# Patient Record
Sex: FEMALE | Race: BLACK OR AFRICAN AMERICAN | NOT HISPANIC OR LATINO | Employment: OTHER | URBAN - METROPOLITAN AREA
[De-identification: names, ages, dates, MRNs, and addresses within clinical notes are randomized per-mention and may not be internally consistent; named-entity substitution may affect disease eponyms.]

---

## 2017-01-07 DIAGNOSIS — D05.12 DCIS (DUCTAL CARCINOMA IN SITU), LEFT: ICD-10-CM

## 2017-01-07 RX ORDER — TAMOXIFEN CITRATE 20 MG/1
TABLET ORAL
Qty: 30 TABLET | Refills: 0 | Status: SHIPPED | OUTPATIENT
Start: 2017-01-07 | End: 2017-03-01 | Stop reason: SDUPTHER

## 2017-01-23 DIAGNOSIS — D05.12 DCIS (DUCTAL CARCINOMA IN SITU), LEFT: ICD-10-CM

## 2017-01-24 RX ORDER — TAMOXIFEN CITRATE 20 MG/1
20 TABLET ORAL DAILY
Qty: 30 TABLET | Refills: 0 | Status: SHIPPED | OUTPATIENT
Start: 2017-01-24 | End: 2017-04-03 | Stop reason: SDUPTHER

## 2017-02-03 ENCOUNTER — OFFICE VISIT (OUTPATIENT)
Dept: OPHTHALMOLOGY | Facility: CLINIC | Age: 62
End: 2017-02-03
Payer: COMMERCIAL

## 2017-02-03 ENCOUNTER — CLINICAL SUPPORT (OUTPATIENT)
Dept: OPHTHALMOLOGY | Facility: CLINIC | Age: 62
End: 2017-02-03
Payer: COMMERCIAL

## 2017-02-03 DIAGNOSIS — H40.1133 PRIMARY OPEN ANGLE GLAUCOMA OF BOTH EYES, SEVERE STAGE: ICD-10-CM

## 2017-02-03 DIAGNOSIS — H53.40 VISUAL FIELD LOSS: ICD-10-CM

## 2017-02-03 DIAGNOSIS — H52.4 HYPEROPIA OF BOTH EYES WITH ASTIGMATISM AND PRESBYOPIA: ICD-10-CM

## 2017-02-03 DIAGNOSIS — H52.203 HYPEROPIA OF BOTH EYES WITH ASTIGMATISM AND PRESBYOPIA: ICD-10-CM

## 2017-02-03 DIAGNOSIS — H25.13 NUCLEAR SCLEROSIS OF BOTH EYES: Primary | ICD-10-CM

## 2017-02-03 DIAGNOSIS — H52.03 HYPEROPIA OF BOTH EYES WITH ASTIGMATISM AND PRESBYOPIA: ICD-10-CM

## 2017-02-03 PROCEDURE — 92083 EXTENDED VISUAL FIELD XM: CPT | Mod: S$GLB,,, | Performed by: OPHTHALMOLOGY

## 2017-02-03 PROCEDURE — 99999 PR PBB SHADOW E&M-EST. PATIENT-LVL III: CPT | Mod: PBBFAC,,, | Performed by: OPHTHALMOLOGY

## 2017-02-03 PROCEDURE — 92133 CPTRZD OPH DX IMG PST SGM ON: CPT | Mod: S$GLB,,, | Performed by: OPHTHALMOLOGY

## 2017-02-03 PROCEDURE — 92014 COMPRE OPH EXAM EST PT 1/>: CPT | Mod: S$GLB,,, | Performed by: OPHTHALMOLOGY

## 2017-02-03 NOTE — PROGRESS NOTES
HPI     DLS: 12/20/16    Pt here for HVF review;    Meds: Jennifer 1% tid od             Cosopt bid ou             Brimonidine tid ou             Lumigan qhs ou    1. Primary open angle glaucoma of both eyes, severe stage  2. Visual field loss  3. Papilloma of left eyelid  4. Nuclear sclerosis, both eyes  5. Hyperopia with astigmatism and presbyopia, bilateral          Last edited by Chapis York on 2/3/2017 11:44 AM.     ROS     Positive for: Eyes    Negative for: Constitutional, Gastrointestinal, Neurological, Skin,   Genitourinary, Musculoskeletal, HENT, Endocrine, Cardiovascular,   Respiratory, Psychiatric, Allergic/Imm, Heme/Lymph    Last edited by Alfonso Atkins MD on 2/3/2017 12:51 PM. (History)        Assessment /Plan     For exam results, see Encounter Report.    Nuclear sclerosis of both eyes    Primary open angle glaucoma of both eyes, severe stage  -     Posterior Segment OCT Optic Nerve- Both eyes    Visual field loss    Hyperopia of both eyes with astigmatism and presbyopia      REFERRED BY DR ELISE FOR SEVERE POORLY CONTROLLED POAG - 7/24/2015   Pt recently moved from [place] to the [facility]     HERE FOR PRIMARY SLT OS AND IOP CHECK OD ON JENNIFER 2%    Previously referred to Dr. Elise from [facility], review of outside records:    Tm 27/25    Last visit 8/5/13 - s/p 360 degree SLT OD  Ta 16 OD, 18 OS  Pt was supposed to be scheduled for SLT OS...    Prior to that, IOP ranged mid-teens to low-20's on Combigan/Dorzolamide/Lumigan OU    C:D documented as 0.9 OD, 0.85 OS    Initial visit 3/29/12  IOP 10 OU on Combitan and Lumigan  C:D 0.85 OU  CCT 524OD, 531 OS    HVF's  2012 - superior defect affecting fixation OD with beginnings of early inferior arc; possible early superior arc OS  2013 - stable superior defect OD with early inferior arc; OS superior nasal step    Here for evaluation of POAG on MMT. Last IOP with Dr. Elise 21//19. IOP today 21//20 on MMT. Patient has a family  history of glaucoma in maternal grandfather. States that she is compliant with all Gtt's.              Glaucoma (type and duration)    Dx about 2002 - [place]    First HVF   [facility]   First photos   First at [facility] 2015    Treatment / Drops started   2002            Family history    + mom / brother         Glaucoma meds    cosopt ou / brimonidine ou tid / lumigan ou q hs / bisi od tid        H/O adverse rxn to glaucoma drops    none        LASERS    SLT OD - 8/5/2013 - [place] // SLT os - [facility] 8/27/2015 (good resp 21-->16)        GLAUCOMA SURGERIES    none        OTHER EYE SURGERIES    none        CDR    0.99/0.95        Tbase    ?  Off gtts (on gtts was up to 28/25)         Tmax    27/25 - per outside record's (reviewed by Arik )             Ttarget    15 ou    (may need to be lower)           HVF   24-2 ss //  2 test 2015 to  [place] [facility] [place]  od // SAD   10-2 ss - 3 test 2015 to 2017 - SALT/ IAD (progression) od // hint SAD / IAD os    (( 2 VF's from [place] - that were reviewed by Dr. IVEY - 2012 and 2013 -  SAD w/ fix invl . IAD od / early IAD os ))            Gonio    +3-4 ou         CCT    524/531        OCT    2 test 2016 to 2017 - RNFL - OD:dec. S/T/I/N // OS:dec. S/I, pratik N/T        HRT    1 test 2015 to 2015  - MR - dec. S/N/I od // dec./ S/N/I os        Disc photos    2015     - Ttoday   18/17 ( still above target of 15- 16 ou)    - Test done today    IOP / DFE / HVF 10-2 OD 24-2 OS / OCT RNFL    2. + APD od     3.  NS ou    Minimal - [facility] 20/25 ou still     4. Papilloma OS   To be removed with Arik       PLAN   Severe POAG ou   OD > OS  IOP still above target - even with diamox 500 po bid    Cont  bisi to 4% od tid - change to ou - 1/15/2016   Cont all other glaucoma gtts ou   Cont diamox 500 sequals po twice daily     HVF's continue to progress - especially OD     IOP still too high ou - rec trab with MMC od vs cyclo G6 od - very little sight to save on this eye    Once od stable - consider trab with MMC os or cyclo G6   No vis sign cataracts   Long discussion with patient about the irreversible nature of the glaucomatous visual field and vision loss.  Showed her how restricted her field in the right eye is using Merk view master     Pts  is going through a lot of medical testing and evaluations at this time   She is not able to consider surgery until her husbands situation is sorted out     F/U 6 weeks - IOP check - re-discuss surgical options     I have seen and personally examined the patient.  I agree with the findings, assessment and plan of the resident and/or fellow.     Jacqueline Perez MD

## 2017-03-01 ENCOUNTER — OFFICE VISIT (OUTPATIENT)
Dept: HEMATOLOGY/ONCOLOGY | Facility: CLINIC | Age: 62
End: 2017-03-01
Payer: COMMERCIAL

## 2017-03-01 ENCOUNTER — LAB VISIT (OUTPATIENT)
Dept: LAB | Facility: HOSPITAL | Age: 62
End: 2017-03-01
Attending: INTERNAL MEDICINE
Payer: COMMERCIAL

## 2017-03-01 VITALS
SYSTOLIC BLOOD PRESSURE: 121 MMHG | HEIGHT: 65 IN | RESPIRATION RATE: 17 BRPM | DIASTOLIC BLOOD PRESSURE: 62 MMHG | WEIGHT: 173.06 LBS | BODY MASS INDEX: 28.83 KG/M2 | TEMPERATURE: 98 F | HEART RATE: 69 BPM

## 2017-03-01 DIAGNOSIS — D05.12 DCIS (DUCTAL CARCINOMA IN SITU), LEFT: ICD-10-CM

## 2017-03-01 DIAGNOSIS — M89.9 DISORDER OF BONE AND CARTILAGE: ICD-10-CM

## 2017-03-01 DIAGNOSIS — Z79.810 USE OF TAMOXIFEN (NOLVADEX): ICD-10-CM

## 2017-03-01 DIAGNOSIS — C50.412 MALIGNANT NEOPLASM OF UPPER-OUTER QUADRANT OF LEFT FEMALE BREAST: Primary | ICD-10-CM

## 2017-03-01 DIAGNOSIS — E87.6 HYPOKALEMIA: ICD-10-CM

## 2017-03-01 DIAGNOSIS — M94.9 DISORDER OF BONE AND CARTILAGE: ICD-10-CM

## 2017-03-01 DIAGNOSIS — N60.99 ATYPICAL DUCTAL HYPERPLASIA OF BREAST: ICD-10-CM

## 2017-03-01 LAB
ALBUMIN SERPL BCP-MCNC: 4.4 G/DL
ALP SERPL-CCNC: 72 U/L
ALT SERPL W/O P-5'-P-CCNC: 38 U/L
ANION GAP SERPL CALC-SCNC: 12 MMOL/L
AST SERPL-CCNC: 26 U/L
BASOPHILS # BLD AUTO: 0.08 K/UL
BASOPHILS NFR BLD: 1 %
BILIRUB SERPL-MCNC: 0.5 MG/DL
BUN SERPL-MCNC: 20 MG/DL
CALCIUM SERPL-MCNC: 9.5 MG/DL
CHLORIDE SERPL-SCNC: 105 MMOL/L
CO2 SERPL-SCNC: 27 MMOL/L
CREAT SERPL-MCNC: 0.91 MG/DL
DIFFERENTIAL METHOD: NORMAL
EOSINOPHIL # BLD AUTO: 0.1 K/UL
EOSINOPHIL NFR BLD: 1.5 %
ERYTHROCYTE [DISTWIDTH] IN BLOOD BY AUTOMATED COUNT: 13.4 %
EST. GFR  (AFRICAN AMERICAN): >60 ML/MIN/1.73 M^2
EST. GFR  (NON AFRICAN AMERICAN): >60 ML/MIN/1.73 M^2
GLUCOSE SERPL-MCNC: 125 MG/DL
HCT VFR BLD AUTO: 38.7 %
HGB BLD-MCNC: 13.4 G/DL
LDH SERPL L TO P-CCNC: 437 U/L
LYMPHOCYTES # BLD AUTO: 1.8 K/UL
LYMPHOCYTES NFR BLD: 22.8 %
MCH RBC QN AUTO: 28.5 PG
MCHC RBC AUTO-ENTMCNC: 34.6 %
MCV RBC AUTO: 82 FL
MONOCYTES # BLD AUTO: 0.5 K/UL
MONOCYTES NFR BLD: 6.8 %
NEUTROPHILS # BLD AUTO: 5.4 K/UL
NEUTROPHILS NFR BLD: 67.9 %
NRBC BLD-RTO: 0 /100 WBC
PLATELET # BLD AUTO: 269 K/UL
PMV BLD AUTO: 10.5 FL
POTASSIUM SERPL-SCNC: 2.8 MMOL/L
PROT SERPL-MCNC: 7.9 G/DL
RBC # BLD AUTO: 4.7 M/UL
SODIUM SERPL-SCNC: 144 MMOL/L
WBC # BLD AUTO: 7.91 K/UL

## 2017-03-01 PROCEDURE — 1160F RVW MEDS BY RX/DR IN RCRD: CPT | Mod: S$GLB,,, | Performed by: NURSE PRACTITIONER

## 2017-03-01 PROCEDURE — 83615 LACTATE (LD) (LDH) ENZYME: CPT | Mod: PN

## 2017-03-01 PROCEDURE — 80053 COMPREHEN METABOLIC PANEL: CPT | Mod: PN

## 2017-03-01 PROCEDURE — 83615 LACTATE (LD) (LDH) ENZYME: CPT

## 2017-03-01 PROCEDURE — 85025 COMPLETE CBC W/AUTO DIFF WBC: CPT | Mod: PN

## 2017-03-01 PROCEDURE — 99999 PR PBB SHADOW E&M-EST. PATIENT-LVL IV: CPT | Mod: PBBFAC,,, | Performed by: NURSE PRACTITIONER

## 2017-03-01 PROCEDURE — 99213 OFFICE O/P EST LOW 20 MIN: CPT | Mod: S$GLB,,, | Performed by: NURSE PRACTITIONER

## 2017-03-01 PROCEDURE — 3074F SYST BP LT 130 MM HG: CPT | Mod: S$GLB,,, | Performed by: NURSE PRACTITIONER

## 2017-03-01 PROCEDURE — 3078F DIAST BP <80 MM HG: CPT | Mod: S$GLB,,, | Performed by: NURSE PRACTITIONER

## 2017-03-01 PROCEDURE — 85025 COMPLETE CBC W/AUTO DIFF WBC: CPT

## 2017-03-01 PROCEDURE — 36415 COLL VENOUS BLD VENIPUNCTURE: CPT | Mod: PN

## 2017-03-01 PROCEDURE — 80053 COMPREHEN METABOLIC PANEL: CPT

## 2017-03-01 NOTE — MR AVS SNAPSHOT
[facility]  [facility] [facility] [address]  [facility] [address]  Phone: [phone]  Fax: [phone]                  Deb Tesfaye   3/1/2017 9:00 AM   Office Visit    Description:  Female : 1955   Provider:  Tony Berg NP   Department:  [facility]           Diagnoses this Visit        Comments    Malignant neoplasm of upper-outer quadrant of left female breast    -  Primary     Hypokalemia         Disorder of bone and cartilage         Use of tamoxifen (Nolvadex)                To Do List           Future Appointments        Provider Department Dept Phone    3/6/2017 10:00 AM LAB, [facility] DRAW STATION [facility] [phone]    3/10/2017 11:00 AM Jacqueline Perez MD [facility] - Ophthalmology [phone]    3/14/2017 10:00 AM NS DEXA1 [facility] [phone]    3/14/2017 10:45 AM [facility] XR3 [facility] [phone]    2017 9:20 AM Shy Tapia MD [facility] Internal Medicine [phone]      Goals (5 Years of Data)     None      Follow-Up and Disposition     Return in about 6 months (around 2017) for 18 month post therapy evaluation - no labs.    Follow-up and Disposition History      [facility] On Call     [facility] On Call Nurse Care Line -  Assistance  Registered nurses in the [facility] On Call Center provide clinical advisement, health education, appointment booking, and other advisory services.  Call for this free service at [phone].             Medications           Message regarding Medications     Verify the changes and/or additions to your medication regime listed below are the same as discussed with your clinician today.  If any of these changes or additions are incorrect, please notify your healthcare provider.             Verify that the below list of medications is an accurate representation of the medications you are currently taking.  If none reported, the list may be blank.  If incorrect, please contact your healthcare provider. Carry this list with you in case of emergency.           Current Medications     acetaZOLAMIDE (DIAMOX) 500 mg CpSR Take 1 capsule (500 mg total) by mouth 2 (two) times daily.    bimatoprost (LUMIGAN) 0.01 % Drop Place 1 drop into both eyes every evening.    brimonidine 0.2% (ALPHAGAN) 0.2 % Drop     dorzolamide-timolol 2-0.5% (COSOPT) 22.3-6.8 mg/mL ophthalmic solution     ERGOCALCIFEROL, VITAMIN D2, (VITAMIN D ORAL) Take 1,000 Units by mouth.    KRILL OIL ORAL Take 1 capsule by mouth once daily.     MULTIVITS,CA,MINERALS/IRON/FA (ONE-A-DAY WOMENS FORMULA ORAL) Take 1 capsule by mouth once daily.    pilocarpine HCL 4% (PILOCAR) 4 % ophthalmic solution Place 1 drop into both eyes 3 (three) times daily.    potassium chloride SA (K-DUR,KLOR-CON) 20 MEQ tablet TAKE 1 TABLET BY MOUTH EVERY DAY    pravastatin (PRAVACHOL) 20 MG tablet TAKE 1 TABLET BY MOUTH EVERY DAY    tamoxifen (NOLVADEX) 20 MG Tab Take 1 tablet (20 mg total) by mouth once daily.    triamterene-hydrochlorothiazide 37.5-25 mg (DYAZIDE) 37.5-25 mg per capsule TAKE 1 CAPSULE BY MOUTH EVERY DAY           Clinical Reference Information           Your Vitals Were     BP                   121/62           Blood Pressure          Most Recent Value    BP  121/62      Allergies as of 3/1/2017     No Known Allergies      Immunizations Administered on Date of Encounter - 3/1/2017     None      Orders Placed During Today's Visit     Future Labs/Procedures Expected by Expires    BONE MIN DENSITY  3/1/2017 3/1/2018    CXR  3/1/2017 3/1/2018    POTASSIUM  3/1/2017 4/30/2018      Language Assistance Services     ATTENTION: Language assistance services are available, free of charge. Please call [phone].      ATENCIÓN: Si luis thompsonisak, tiene a montalvo disposición servicios gratuitos de asistencia lingüística. Llame al [phone].     CHÚ Ý: N?u b?n nói Ti?ng Vi?t, có các d?ch v? h? tr? ngôn ng? mi?n phí dành  pepe gonzales?nAlaina G?i s? [phone].         [facility] complies with applicable Federal civil rights laws and does not discriminate on the basis of race, color, national origin, age, disability, or sex.

## 2017-03-01 NOTE — PROGRESS NOTES
HISTORY OF PRESENT ILLNESS:  This is a 62-year-old black female known to Dr. 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  She is status post lumpectomy, post-lumpectomy irradiation   and presently on adjuvant tamoxifen 20 mg daily.  She presents to the clinic   today for her 12-month post-therapy evaluation in good spirits.  She denies any   new breast complaints, bone pain, difficulties with hot flashes, vaginal bleeding,   abdominal discomfort/bloating, nausea, vomiting, constipation, diarrhea, irregular   heartbeat, chest pain, myalgias cramping, etc.  No other new complaints or pertinent   findings on a 14-point review of systems.    PHYSICAL EXAMINATION:  GENERAL:  Well-developed, well-nourished black female in no acute distress.    Alert and oriented x3.  VITAL SIGNS:  Weight 173.1 pounds (decrease of 3 pounds in three months),   /62, pulse 69, respirations 17 and temp 98.4.  HEENT:  Normocephalic, atraumatic.  Oral mucosa pink and moist.  Lips without   lesions.  Tongue midline.  Oropharynx clear.  Nonicteric sclerae.  NECK:  Supple, no adenopathy.  No carotid bruits, thyromegaly or thyroid nodule.  HEART:  Regular rate and rhythm without murmur, gallop or rub.  LUNGS:  Clear to auscultation bilaterally.  Normal respiratory effort.  ABDOMEN:  Soft, nontender, nondistended with positive normoactive bowel sounds,   no hepatosplenomegaly.  EXTREMITIES:  No cyanosis, clubbing or edema.  Distal pulses are intact.  AXILLAE AND GROIN:  No palpable pathologic lymphadenopathy is appreciated.  SKIN:  Intact/turgor normal.  NEUROLOGIC:  Cranial nerves II-XII grossly intact.  Motor:  Good muscle bulk and   tone.  Strength/sensory 5/5 throughout.  Gait stable.  BREASTS:  The right breast is soft, free of masses, tenderness, nipple discharge   or inversion.  The left breast with noted healed lumpectomy scar to the upper   outer region; no signs of local  recurrence.    LABORATORY DATA:  Dated 03/01/2017, WBC 7.91, H & H 13.4/38.7, platelets 269,   unremarkable differential.  Chemistry:  Sodium 144, potassium 2.8 (3.5, 3.3),   chloride 105, CO2 27, BUN 20, creatinine 0.91, EGFR > 60, glucose 125, calcium 9.5.    Alk phos, liver enzymes and LDH within normal limits.    RADIOLOGY:  Mammogram dated 12/02/2016, impression:  Post-surgical scar in the   left breast is benign - negative, mammogram in one year is recommended.    ACR BI-RADS category II, benign.    IMPRESSION:  1.  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  2.  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  3.  Hypokalemia.    PLAN:  1.  The patient will continue tamoxifen 20 mg daily.  2.  We will increase K-Dur from 20 mEq per day to 20 mEq b.i.d. X 5 days and   redraw serum potassium on Monday, March 6th and evaluate.  3.  We will schedule bone mineral density and chest x-ray as soon as possible   and phone review.    Assessment/plan reviewed and approved by Dr. Rider.      COCO/DAPHNE  dd: 03/01/2017 09:24:43 (CST)  td: 03/01/2017 12:01:40 (CST)  Doc ID   #[id number]  Job ID #[id number]    CC:

## 2017-03-06 ENCOUNTER — LAB VISIT (OUTPATIENT)
Dept: LAB | Facility: HOSPITAL | Age: 62
End: 2017-03-06
Attending: NURSE PRACTITIONER
Payer: COMMERCIAL

## 2017-03-06 DIAGNOSIS — E87.6 HYPOKALEMIA: ICD-10-CM

## 2017-03-06 LAB — POTASSIUM SERPL-SCNC: 2.9 MMOL/L

## 2017-03-06 PROCEDURE — 36415 COLL VENOUS BLD VENIPUNCTURE: CPT | Mod: PN

## 2017-03-06 PROCEDURE — 84132 ASSAY OF SERUM POTASSIUM: CPT | Mod: PN

## 2017-03-06 PROCEDURE — 84132 ASSAY OF SERUM POTASSIUM: CPT

## 2017-03-07 RX ORDER — POTASSIUM CHLORIDE 20 MEQ/1
20 TABLET, EXTENDED RELEASE ORAL DAILY
Qty: 30 TABLET | Refills: 5 | Status: SHIPPED | OUTPATIENT
Start: 2017-03-07 | End: 2017-03-08 | Stop reason: SDUPTHER

## 2017-03-08 ENCOUNTER — TELEPHONE (OUTPATIENT)
Dept: HEMATOLOGY/ONCOLOGY | Facility: CLINIC | Age: 62
End: 2017-03-08

## 2017-03-08 DIAGNOSIS — E87.6 HYPOKALEMIA: ICD-10-CM

## 2017-03-08 RX ORDER — POTASSIUM CHLORIDE 20 MEQ/1
20 TABLET, EXTENDED RELEASE ORAL 2 TIMES DAILY
Qty: 60 TABLET | Refills: 1 | Status: SHIPPED | OUTPATIENT
Start: 2017-03-08 | End: 2017-05-23 | Stop reason: SDUPTHER

## 2017-03-08 NOTE — TELEPHONE ENCOUNTER
----- Message from Annalisa New sent at 3/8/2017  4:14 PM CST -----  Contact: self  Call placed to pod.  Returning your call.  Please call back at

## 2017-03-08 NOTE — TELEPHONE ENCOUNTER
Left message on answering machine to call office.  Repeat K+ = 2.9 from 2.8 with 20 meq bid x 5 day; Will continue K-dur 20 meq bid; Rx sent to [facility], [address] in [place].

## 2017-03-10 ENCOUNTER — OFFICE VISIT (OUTPATIENT)
Dept: OPHTHALMOLOGY | Facility: CLINIC | Age: 62
End: 2017-03-10
Payer: COMMERCIAL

## 2017-03-10 DIAGNOSIS — H52.03 HYPEROPIA OF BOTH EYES WITH ASTIGMATISM AND PRESBYOPIA: ICD-10-CM

## 2017-03-10 DIAGNOSIS — H53.40 VISUAL FIELD LOSS: ICD-10-CM

## 2017-03-10 DIAGNOSIS — H52.203 HYPEROPIA OF BOTH EYES WITH ASTIGMATISM AND PRESBYOPIA: ICD-10-CM

## 2017-03-10 DIAGNOSIS — H40.1133 PRIMARY OPEN ANGLE GLAUCOMA OF BOTH EYES, SEVERE STAGE: Primary | ICD-10-CM

## 2017-03-10 DIAGNOSIS — H52.4 HYPEROPIA OF BOTH EYES WITH ASTIGMATISM AND PRESBYOPIA: ICD-10-CM

## 2017-03-10 DIAGNOSIS — H25.13 NUCLEAR SCLEROSIS OF BOTH EYES: ICD-10-CM

## 2017-03-10 PROCEDURE — 99999 PR PBB SHADOW E&M-EST. PATIENT-LVL III: CPT | Mod: PBBFAC,,, | Performed by: OPHTHALMOLOGY

## 2017-03-10 PROCEDURE — 92012 INTRM OPH EXAM EST PATIENT: CPT | Mod: S$GLB,,, | Performed by: OPHTHALMOLOGY

## 2017-03-10 NOTE — PROGRESS NOTES
HPI     Glaucoma    Additional comments: IOP ck today           Comments   DLS: 2/3*17    1. POAG OD>OS  2. APD OD  3. NS OU  4. Papilloma OS    MEDS:  Cosopt BID OU  Sandro 4% TID OU  Brimonidine TID OU  Lumigan QHS OU  Diamox 500mg BID PO       Last edited by Bharati Graham MA on 3/10/2017 11:19 AM. (History)            Assessment /Plan     For exam results, see Encounter Report.    Primary open angle glaucoma of both eyes, severe stage    Visual field loss    Nuclear sclerosis of both eyes    Hyperopia of both eyes with astigmatism and presbyopia    Glaucoma of both eyes secondary to inflammation, unspecified glaucoma stage      REFERRED BY DR HILLMAN FOR SEVERE POORLY CONTROLLED POAG - 7/24/2015   Pt recently moved from [place] to the [facility]     HERE FOR PRIMARY SLT OS AND IOP CHECK OD ON SANDRO 2%    Previously referred to Dr. Hillman from [facility], review of outside records:    Tm 27/25    Last visit 8/5/13 - s/p 360 degree SLT OD  Ta 16 OD, 18 OS  Pt was supposed to be scheduled for SLT OS...    Prior to that, IOP ranged mid-teens to low-20's on Combigan/Dorzolamide/Lumigan OU    C:D documented as 0.9 OD, 0.85 OS    Initial visit 3/29/12  IOP 10 OU on Combitan and Lumigan  C:D 0.85 OU  CCT 524OD, 531 OS    HVF's  2012 - superior defect affecting fixation OD with beginnings of early inferior arc; possible early superior arc OS  2013 - stable superior defect OD with early inferior arc; OS superior nasal step    Here for evaluation of POAG on MMT. Last IOP with Dr. Hillman 21//19. IOP today 21//20 on MMT. Patient has a family history of glaucoma in maternal grandfather. States that she is compliant with all Gtt's.              Glaucoma (type and duration)    Dx about 2002 - [place]    First HVF   First a t [facility] 2015   First photos   First at [facility] 2015    Treatment / Drops started   2002            Family history    + mom / brother         Glaucoma meds    cosopt ou / brimonidine ou tid  / lumigan ou q hs / bisi od tid        H/O adverse rxn to glaucoma drops    none        LASERS    SLT OD - 8/5/2013 - [place] // [facility]T os - [facility] 8/27/2015 (good resp 21-->16)        GLAUCOMA SURGERIES    none        OTHER EYE SURGERIES    none        CDR    0.99/0.95        Tbase    ?  Off gtts (on gtts was up to 28/25)         Tmax    27/25 - per outside record's (reviewed by Arik )             Ttarget    15 ou    (may need to be lower)           HVF   24-2 ss //  2 test 2015 to  2017 - [facility]  od // SAD   10-2 ss - 3 test 2015 to 2017 - SALT/ IAD (progression) od // hint SAD / IAD os    (( 2 VF's from [place] - that were reviewed by Dr. IVEY - 2012 and 2013 -  SAD w/ fix invl . IAD od / early IAD os ))            Gonio    +3-4 ou         CCT    524/531        OCT    2 test 2016 to 2017 - RNFL - OD:dec. S/T/I/N // OS:dec. S/I, bord N/T        HRT    1 test 2015 to 2015  - MR - dec. S/N/I od // dec./ S/N/I os        Disc photos    2015     - Ttoday   19/18 ( still above target of 15- 16 ou)    - Test done today    IOP    2. + APD od     3.  NS ou    Minimal - [facility] 20/25 ou still     4. Papilloma OS   To be removed with Arik       PLAN   Severe POAG ou   OD > OS  IOP still above target - even with diamox 500 po bid    Cont  bisi to 4% od tid - change to ou - 1/15/2016   Cont all other glaucoma gtts ou   Cont diamox 500 sequals po twice daily     HVF's continue to progress - especially OD     IOP still too high ou - rec trab with MMC od vs cyclo G6 od - very little sight to save on this eye   Once od stable - consider trab with MMC os or cyclo G6   No vis sign cataracts   Long discussion with patient about the irreversible nature of the glaucomatous visual field and vision loss.  Showed her how restricted her field in the right eye is using [employer] master     Pts  is with her today 3/10/2017   Discussed at length the options of cyclo G6 od vs trab with MMC and PI - (pt is still phakic -  minimal to no cataract) - a bit shallow for a tube or mini shunt.   In view of the extremely poor potential od - they are leaning towards the cyclo G6 - but they will think about it for a wile and will let angle know which one they choose to go ahead with.    With the left eye - i think incisional surgery may be best option - as there is more vision to save long term     Will have angle call and schedule surgery od - cyclo G6 vs traditional trab with MMC with surgical PI     I have seen and personally examined the patient.  I agree with the findings, assessment and plan of the resident and/or fellow.     Jacqueline Perez MD

## 2017-03-10 NOTE — Clinical Note
Pt needs a glaucoma surgery in the right eye  She is decieding between the cyclo G6 od vs a traditional trab with MMC od  She will let you know which she has decieded on

## 2017-03-10 NOTE — Clinical Note
Pt is deciding between a cyclo G6 od vs a traditional trab with MMC od - VF od is essentially extinguished. Her left eye is in better shape - but will need surgery once od is stable

## 2017-03-13 ENCOUNTER — TELEPHONE (OUTPATIENT)
Dept: HEMATOLOGY/ONCOLOGY | Facility: CLINIC | Age: 62
End: 2017-03-13

## 2017-03-13 NOTE — TELEPHONE ENCOUNTER
----- Message from Selene Garza sent at 3/13/2017  8:43 AM CDT -----  Contact: self  Patient is returning call regarding results.  Please call patient at [phone].  Thanks!

## 2017-03-13 NOTE — TELEPHONE ENCOUNTER
Called patient to inform of Tony Berg NP message. Patient also asked the difference between DCIS and malignant neoplasm of the upper outer quad of left breast, spoke with dr. rico and he informed that it would be easier to have patient wait until followup then will discuss with patient. Patient was informed.

## 2017-03-14 ENCOUNTER — HOSPITAL ENCOUNTER (OUTPATIENT)
Dept: RADIOLOGY | Facility: HOSPITAL | Age: 62
Discharge: HOME | End: 2017-03-14
Attending: NURSE PRACTITIONER
Payer: COMMERCIAL

## 2017-03-14 DIAGNOSIS — M89.9 DISORDER OF BONE AND CARTILAGE: ICD-10-CM

## 2017-03-14 DIAGNOSIS — M94.9 DISORDER OF BONE AND CARTILAGE: ICD-10-CM

## 2017-03-14 DIAGNOSIS — C50.412 MALIGNANT NEOPLASM OF UPPER-OUTER QUADRANT OF LEFT FEMALE BREAST: ICD-10-CM

## 2017-03-14 PROCEDURE — 71020 XR CHEST PA AND LATERAL: CPT | Mod: 26,,, | Performed by: RADIOLOGY

## 2017-03-14 PROCEDURE — 71020 XR CHEST PA AND LATERAL: CPT | Mod: TC,PO

## 2017-03-14 PROCEDURE — 77080 DXA BONE DENSITY AXIAL: CPT | Mod: 26,,, | Performed by: RADIOLOGY

## 2017-03-14 PROCEDURE — 77080 DXA BONE DENSITY AXIAL: CPT | Mod: TC,PO

## 2017-03-16 ENCOUNTER — TELEPHONE (OUTPATIENT)
Dept: OPHTHALMOLOGY | Facility: CLINIC | Age: 62
End: 2017-03-16

## 2017-03-20 ENCOUNTER — TELEPHONE (OUTPATIENT)
Dept: OPHTHALMOLOGY | Facility: CLINIC | Age: 62
End: 2017-03-20

## 2017-03-20 DIAGNOSIS — H40.1133 PRIMARY OPEN ANGLE GLAUCOMA OF BOTH EYES, SEVERE STAGE: Primary | ICD-10-CM

## 2017-03-31 ENCOUNTER — OFFICE VISIT (OUTPATIENT)
Dept: OPHTHALMOLOGY | Facility: CLINIC | Age: 62
End: 2017-03-31
Payer: COMMERCIAL

## 2017-03-31 DIAGNOSIS — H40.1133 PRIMARY OPEN ANGLE GLAUCOMA OF BOTH EYES, SEVERE STAGE: Primary | ICD-10-CM

## 2017-03-31 DIAGNOSIS — H53.40 VISUAL FIELD LOSS: ICD-10-CM

## 2017-03-31 DIAGNOSIS — H21.561 AFFERENT PUPILLARY DEFECT, RIGHT: ICD-10-CM

## 2017-03-31 DIAGNOSIS — H52.03 HYPEROPIA OF BOTH EYES WITH ASTIGMATISM AND PRESBYOPIA: ICD-10-CM

## 2017-03-31 DIAGNOSIS — H52.4 HYPEROPIA OF BOTH EYES WITH ASTIGMATISM AND PRESBYOPIA: ICD-10-CM

## 2017-03-31 DIAGNOSIS — D23.10 PAPILLOMA OF LEFT EYELID: ICD-10-CM

## 2017-03-31 DIAGNOSIS — H52.203 HYPEROPIA OF BOTH EYES WITH ASTIGMATISM AND PRESBYOPIA: ICD-10-CM

## 2017-03-31 PROCEDURE — 99499 UNLISTED E&M SERVICE: CPT | Mod: S$GLB,,, | Performed by: OPHTHALMOLOGY

## 2017-03-31 PROCEDURE — 99999 PR PBB SHADOW E&M-EST. PATIENT-LVL III: CPT | Mod: PBBFAC,,, | Performed by: OPHTHALMOLOGY

## 2017-03-31 RX ORDER — LIDOCAINE HYDROCHLORIDE 10 MG/ML
1 INJECTION, SOLUTION EPIDURAL; INFILTRATION; INTRACAUDAL; PERINEURAL ONCE
Status: CANCELLED | OUTPATIENT
Start: 2017-03-31 | End: 2017-03-31

## 2017-03-31 RX ORDER — SODIUM CHLORIDE 9 MG/ML
INJECTION, SOLUTION INTRAVENOUS CONTINUOUS
Status: CANCELLED | OUTPATIENT
Start: 2017-03-31

## 2017-03-31 RX ORDER — MOXIFLOXACIN 5 MG/ML
1 SOLUTION/ DROPS OPHTHALMIC
Status: CANCELLED | OUTPATIENT
Start: 2017-03-31

## 2017-03-31 NOTE — H&P
Subjective:       Patient ID: Deb Tesfaye is a 62 y.o. female.    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  Mother with colon cancer.    COLONOSCOPY  12/2010    COLONOSCOPY N/A 7/20/2016    Procedure: COLONOSCOPY;  Surgeon: Janak De Leon Jr., MD;  Location[facility] ENDO;  Service: Endoscopy;  Laterality: N/A;    SELECTIVE LASER TRABECULPALSTY Right 8/13    OS DONE IN [place], [place]    SELECTIVE LASER TRABECUPLASTY Left 8/27/15    OS WITH     TUBAL LIGATION       Family History   Problem Relation Age of Onset    Cancer Mother 54     colon cancer    Cancer Maternal Grandfather     Glaucoma Maternal Grandfather     Cataracts Maternal Grandfather     Hypertension Father     Amblyopia Neg Hx     Blindness Neg Hx     Diabetes Neg Hx     Macular degeneration Neg Hx     Retinal detachment Neg Hx     Stroke Neg Hx     Strabismus Neg Hx     Thyroid disease Neg Hx      Social History     Social History    Marital status:      Spouse name: N/A    Number of children: 3    Years of education: N/A     Social History Main Topics    Smoking status: Never Smoker    Smokeless tobacco: Never Used    Alcohol use No    Drug use: No    Sexual activity: Yes     Partners: Male     Other Topics Concern    None     Social History Narrative       Current Outpatient Prescriptions   Medication Sig Dispense Refill    acetaZOLAMIDE (DIAMOX) 500 mg CpSR Take 1 capsule (500 mg total) by mouth 2 (two) times daily. 60 capsule 3    bimatoprost (LUMIGAN) 0.01 % Drop Place 1 drop into both eyes every evening. 1 Bottle 12    brimonidine 0.2% (ALPHAGAN) 0.2 % Drop Place 1 drop into both eyes 3 (three)  times daily.       dorzolamide-timolol 2-0.5% (COSOPT) 22.3-6.8 mg/mL ophthalmic solution Place 1 drop into both eyes 2 (two) times daily.       ERGOCALCIFEROL, VITAMIN D2, (VITAMIN D ORAL) Take 1,000 Units by mouth once daily.       KRILL OIL ORAL Take 1 capsule by mouth once daily.       MULTIVITS,CA,MINERALS/IRON/FA (ONE-A-DAY WOMENS FORMULA ORAL) Take 1 capsule by mouth once daily.      pilocarpine HCL 4% (PILOCAR) 4 % ophthalmic solution Place 1 drop into both eyes 3 (three) times daily. 15 mL 12    potassium chloride SA (K-DUR,KLOR-CON) 20 MEQ tablet Take 1 tablet (20 mEq total) by mouth 2 (two) times daily. 60 tablet 1    pravastatin (PRAVACHOL) 20 MG tablet TAKE 1 TABLET BY MOUTH EVERY DAY 30 tablet 9    tamoxifen (NOLVADEX) 20 MG Tab Take 1 tablet (20 mg total) by mouth once daily. 30 tablet 0    triamterene-hydrochlorothiazide 37.5-25 mg (DYAZIDE) 37.5-25 mg per capsule TAKE 1 CAPSULE BY MOUTH EVERY DAY 30 capsule 5     No current facility-administered medications for this visit.      Review of patient's allergies indicates:  No Known Allergies    Review of Systems   Constitutional: Negative.    HENT: Negative.    Eyes: Positive for visual disturbance.   Respiratory: Negative.    Cardiovascular: Negative.    Neurological: Negative.    Psychiatric/Behavioral: Negative.        Objective:      There were no vitals filed for this visit.  Physical Exam   Constitutional: She is oriented to person, place, and time. She appears well-developed and well-nourished.   HENT:   Head: Normocephalic and atraumatic.   Eyes:   See eye exam   Cardiovascular: Normal rate.    Pulmonary/Chest: Effort normal.   Neurological: She is alert and oriented to person, place, and time.   Skin: Skin is warm and dry.   Psychiatric: She has a normal mood and affect.            Assessment:       1. Primary open angle glaucoma of both eyes, severe stage    2. Visual field loss    3. Afferent pupillary defect, right    4. Papilloma  of left eyelid    5. Hyperopia of both eyes with astigmatism and presbyopia        Plan:       Adv. Glaucoma od - plan cyclo G6 laser - CB destruction

## 2017-03-31 NOTE — PROGRESS NOTES
HPI     DLS: 3/10/17    Pt here for Pre-Op cyclo G6 vs traditional trab with MMC with surgical PI-   (Surgery 4/12/17)    Meds: Cosopt bid ou             Sandro 4% tid ou            Brimonidine tid ou            Lumigan qhs ou            Diamox 500mg bid po     1. Primary open angle glaucoma of both eyes, severe stage  2. Visual field loss  3. Nuclear sclerosis of both eyes  4. Hyperopia of both eyes with astigmatism and presbyopia  5. Glaucoma of both eyes secondary to inflammation, unspecified glaucoma   stage       Last edited by Chapis York on 3/31/2017  1:45 PM.         Assessment /Plan     For exam results, see Encounter Report.    Primary open angle glaucoma of both eyes, severe stage    Visual field loss    Afferent pupillary defect, right    Papilloma of left eyelid    Hyperopia of both eyes with astigmatism and presbyopia        REFERRED BY DR ELISE FOR SEVERE POORLY CONTROLLED POAG - 7/24/2015   Pt recently moved from [place] to [facility]     Previously referred to Dr. Elise from [facility], review of outside records:    Tm 27/25.    Prior to that, IOP ranged mid-teens to low-20's on Combigan/Dorzolamide/Lumigan OU    C:D documented as 0.9 OD, 0.85 OS    Initial visit 3/29/12  IOP 10 OU on Combitan and Lumigan  C:D 0.85 OU  CCT 524OD, 531 OS    HVF's  2012 - superior defect affecting fixation OD with beginnings of early inferior arc; possible early superior arc OS  2013 - stable superior defect OD with early inferior arc; OS superior nasal step    Here for evaluation of POAG on MMT. Last IOP with Dr. Elise 21//19. IOP today 21//20 on MMT. Patient has a family history of glaucoma in maternal grandfather. States that she is compliant with all Gtt's.        Glaucoma (type and duration)    Dx about 2002 - [place]    First HVF   First at [facility] 2015   First photos   First at [facility] 2015    Treatment / Drops started   2002            Family history    + mom / brother          Glaucoma meds    cosopt ou / brimonidine ou tid / lumigan ou q hs / bisi od tid / diamox / bisi ou        H/O adverse rxn to glaucoma drops    none        LASERS    SLT OD - 8/5/2013 - [place] [facility] // SLT os - [facility] 8/27/2015 (good resp 21-->16)        GLAUCOMA SURGERIES    none        OTHER EYE SURGERIES    none        CDR    0.99/0.95        Tbase    ?  Off gtts (on gtts was up to 28/25)         Tmax    27/25 - per outside record's (reviewed by Arik )             Ttarget    15 ou    (may need to be lower)           HVF   24-2 ss //  2 test 2015 to  2017 [place]  od // SAD   10-2 ss - 3 test 2015 to 2017 - SALT/ IAD (progression) od // hint SAD / IAD os    (( 2 VF's from [place] - that were reviewed by Dr. IVEY - 2012 and 2013 -  SAD w/ fix invl . IAD od / early IAD os ))            Gonio    +3-4 ou         CCT    524/531        OCT    2 test 2016 to 2017 - RNFL - OD:dec. S/T/I/N // OS:dec. S/I, bord N/T        HRT    1 test 2015 to 2015  - MR - dec. S/N/I od // dec./ S/N/I os        Disc photos    2015     - Ttoday   19/18 ( still above target of 15- 16 ou)    - Test done today    IOP    2. + APD od     3.  NS ou    Minimal - VA 20/25 ou still     4. Papilloma OS   To be removed with [facility]k       PLAN   Severe POAG ou   OD > OS  IOP still above target - even with diamox 500 po bid    Cont  bisi to 4% od tid - change to ou - 1/15/2016   Cont all other glaucoma gtts ou   Cont diamox 500 sequals po twice daily     HVF's continue to progress - especially OD     IOP still too high ou - rec trab with MMC od vs cyclo G6 od - very little sight to save on this eye   Once od stable - consider trab with MMC os or cyclo G6   No vis sign cataracts   Long discussion with patient about the irreversible nature of the glaucomatous visual field and vision loss.  Showed her how restricted her field in the right eye is using Folkstr master     Pts  is with her today 3/10/2017   Discussed at length the options  of cyclo G6 od vs trab with MMC and PI - (pt is still phakic - minimal to no cataract) - a bit shallow for a tube or mini shunt.   In view of the extremely poor potential od - they are leaning towards the cyclo G6 - but they will think about it for a wile and will let angle know which one they choose to go ahead with.    With the left eye - I think incisional surgery may be best option - as there is more vision to save long term     Pt has elected to proceed with cyclo G6 od   Risks and benefits discussed and consent signed. (3/31/2017 )       I have seen and personally examined the patient.  I agree with the findings, assessment and plan of the resident and/or fellow.     Jacqueline Perez MD

## 2017-04-03 DIAGNOSIS — D05.12 DUCTAL CARCINOMA IN SITU (DCIS) OF LEFT BREAST: ICD-10-CM

## 2017-04-03 RX ORDER — TAMOXIFEN CITRATE 20 MG/1
TABLET ORAL
Qty: 30 TABLET | Refills: 0 | Status: SHIPPED | OUTPATIENT
Start: 2017-04-03 | End: 2017-05-22 | Stop reason: SDUPTHER

## 2017-04-12 ENCOUNTER — HOSPITAL ENCOUNTER (OUTPATIENT)
Facility: HOSPITAL | Age: 62
Discharge: HOME | End: 2017-04-12
Attending: OPHTHALMOLOGY | Admitting: OPHTHALMOLOGY
Payer: COMMERCIAL

## 2017-04-12 ENCOUNTER — ANESTHESIA (OUTPATIENT)
Dept: SURGERY | Facility: HOSPITAL | Age: 62
End: 2017-04-12
Payer: COMMERCIAL

## 2017-04-12 ENCOUNTER — ANESTHESIA EVENT (OUTPATIENT)
Dept: SURGERY | Facility: HOSPITAL | Age: 62
End: 2017-04-12
Payer: COMMERCIAL

## 2017-04-12 ENCOUNTER — SURGERY (OUTPATIENT)
Age: 62
End: 2017-04-12

## 2017-04-12 VITALS
RESPIRATION RATE: 16 BRPM | BODY MASS INDEX: 27.99 KG/M2 | HEIGHT: 65 IN | OXYGEN SATURATION: 100 % | SYSTOLIC BLOOD PRESSURE: 127 MMHG | WEIGHT: 168 LBS | DIASTOLIC BLOOD PRESSURE: 76 MMHG | TEMPERATURE: 98 F | HEART RATE: 62 BPM

## 2017-04-12 DIAGNOSIS — H40.1133 PRIMARY OPEN ANGLE GLAUCOMA OF BOTH EYES, SEVERE STAGE: ICD-10-CM

## 2017-04-12 PROCEDURE — 25000003 PHARM REV CODE 250: Performed by: OPHTHALMOLOGY

## 2017-04-12 PROCEDURE — 66710 CILIARY TRANSSLERAL THERAPY: CPT | Mod: RT,,, | Performed by: OPHTHALMOLOGY

## 2017-04-12 PROCEDURE — 71000015 HC POSTOP RECOV 1ST HR: Performed by: OPHTHALMOLOGY

## 2017-04-12 PROCEDURE — 37000009 HC ANESTHESIA EA ADD 15 MINS: Performed by: OPHTHALMOLOGY

## 2017-04-12 PROCEDURE — 36000706: Performed by: OPHTHALMOLOGY

## 2017-04-12 PROCEDURE — 27201423 OPTIME MED/SURG SUP & DEVICES STERILE SUPPLY: Performed by: OPHTHALMOLOGY

## 2017-04-12 PROCEDURE — 99499 UNLISTED E&M SERVICE: CPT | Mod: ,,, | Performed by: OPHTHALMOLOGY

## 2017-04-12 PROCEDURE — 36000707: Performed by: OPHTHALMOLOGY

## 2017-04-12 PROCEDURE — D9220A PRA ANESTHESIA: Mod: ANES,,, | Performed by: ANESTHESIOLOGY

## 2017-04-12 PROCEDURE — 37000008 HC ANESTHESIA 1ST 15 MINUTES: Performed by: OPHTHALMOLOGY

## 2017-04-12 PROCEDURE — 25000003 PHARM REV CODE 250: Performed by: NURSE ANESTHETIST, CERTIFIED REGISTERED

## 2017-04-12 PROCEDURE — 63600175 PHARM REV CODE 636 W HCPCS: Performed by: NURSE ANESTHETIST, CERTIFIED REGISTERED

## 2017-04-12 PROCEDURE — D9220A PRA ANESTHESIA: Mod: CRNA,,, | Performed by: NURSE ANESTHETIST, CERTIFIED REGISTERED

## 2017-04-12 RX ORDER — BUPIVACAINE HYDROCHLORIDE 7.5 MG/ML
INJECTION, SOLUTION EPIDURAL; RETROBULBAR
Status: DISCONTINUED
Start: 2017-04-12 | End: 2017-04-12 | Stop reason: HOSPADM

## 2017-04-12 RX ORDER — BUPIVACAINE HYDROCHLORIDE 7.5 MG/ML
INJECTION, SOLUTION EPIDURAL; RETROBULBAR
Status: DISCONTINUED | OUTPATIENT
Start: 2017-04-12 | End: 2017-04-12 | Stop reason: HOSPADM

## 2017-04-12 RX ORDER — ATROPINE SULFATE 10 MG/ML
SOLUTION/ DROPS OPHTHALMIC
Status: DISCONTINUED
Start: 2017-04-12 | End: 2017-04-12 | Stop reason: HOSPADM

## 2017-04-12 RX ORDER — LIDOCAINE HYDROCHLORIDE 40 MG/ML
INJECTION, SOLUTION RETROBULBAR
Status: DISCONTINUED | OUTPATIENT
Start: 2017-04-12 | End: 2017-04-12 | Stop reason: HOSPADM

## 2017-04-12 RX ORDER — LIDOCAINE HYDROCHLORIDE 40 MG/ML
INJECTION, SOLUTION RETROBULBAR
Status: DISCONTINUED
Start: 2017-04-12 | End: 2017-04-12 | Stop reason: HOSPADM

## 2017-04-12 RX ORDER — ATROPINE SULFATE 10 MG/ML
SOLUTION/ DROPS OPHTHALMIC
Status: DISCONTINUED | OUTPATIENT
Start: 2017-04-12 | End: 2017-04-12 | Stop reason: HOSPADM

## 2017-04-12 RX ORDER — MIDAZOLAM HYDROCHLORIDE 1 MG/ML
INJECTION, SOLUTION INTRAMUSCULAR; INTRAVENOUS
Status: DISCONTINUED | OUTPATIENT
Start: 2017-04-12 | End: 2017-04-12

## 2017-04-12 RX ORDER — PREDNISOLONE ACETATE 10 MG/ML
SUSPENSION/ DROPS OPHTHALMIC
Status: DISCONTINUED
Start: 2017-04-12 | End: 2017-04-12 | Stop reason: HOSPADM

## 2017-04-12 RX ORDER — MOXIFLOXACIN 5 MG/ML
1 SOLUTION/ DROPS OPHTHALMIC
Status: DISCONTINUED | OUTPATIENT
Start: 2017-04-12 | End: 2017-04-12 | Stop reason: HOSPADM

## 2017-04-12 RX ORDER — LIDOCAINE HYDROCHLORIDE 20 MG/ML
INJECTION, SOLUTION INFILTRATION; PERINEURAL
Status: DISCONTINUED | OUTPATIENT
Start: 2017-04-12 | End: 2017-04-12 | Stop reason: HOSPADM

## 2017-04-12 RX ORDER — LIDOCAINE HYDROCHLORIDE 20 MG/ML
INJECTION, SOLUTION EPIDURAL; INFILTRATION; INTRACAUDAL; PERINEURAL
Status: DISCONTINUED
Start: 2017-04-12 | End: 2017-04-12 | Stop reason: HOSPADM

## 2017-04-12 RX ORDER — LIDOCAINE HYDROCHLORIDE 10 MG/ML
1 INJECTION, SOLUTION EPIDURAL; INFILTRATION; INTRACAUDAL; PERINEURAL ONCE
Status: COMPLETED | OUTPATIENT
Start: 2017-04-12 | End: 2017-04-12

## 2017-04-12 RX ORDER — PROPOFOL 10 MG/ML
VIAL (ML) INTRAVENOUS
Status: DISCONTINUED | OUTPATIENT
Start: 2017-04-12 | End: 2017-04-12

## 2017-04-12 RX ORDER — LIDOCAINE HCL/PF 100 MG/5ML
SYRINGE (ML) INTRAVENOUS
Status: DISCONTINUED | OUTPATIENT
Start: 2017-04-12 | End: 2017-04-12

## 2017-04-12 RX ORDER — SODIUM CHLORIDE 9 MG/ML
INJECTION, SOLUTION INTRAVENOUS CONTINUOUS
Status: DISCONTINUED | OUTPATIENT
Start: 2017-04-12 | End: 2017-04-12 | Stop reason: HOSPADM

## 2017-04-12 RX ADMIN — BUPIVACAINE HYDROCHLORIDE 2.5 ML: 7.5 INJECTION, SOLUTION EPIDURAL; RETROBULBAR at 12:04

## 2017-04-12 RX ADMIN — PROPOFOL 30 MG: 10 INJECTION, EMULSION INTRAVENOUS at 12:04

## 2017-04-12 RX ADMIN — MOXIFLOXACIN HYDROCHLORIDE 1 DROP: 5 SOLUTION/ DROPS OPHTHALMIC at 11:04

## 2017-04-12 RX ADMIN — LIDOCAINE HYDROCHLORIDE 4 DROP: 40 INJECTION, SOLUTION RETROBULBAR; TOPICAL at 12:04

## 2017-04-12 RX ADMIN — PROPOFOL 100 MG: 10 INJECTION, EMULSION INTRAVENOUS at 12:04

## 2017-04-12 RX ADMIN — LIDOCAINE HYDROCHLORIDE 25 MG: 20 INJECTION, SOLUTION INTRAVENOUS at 12:04

## 2017-04-12 RX ADMIN — LIDOCAINE HYDROCHLORIDE 10 MG: 10 INJECTION, SOLUTION EPIDURAL; INFILTRATION; INTRACAUDAL; PERINEURAL at 11:04

## 2017-04-12 RX ADMIN — MIDAZOLAM HYDROCHLORIDE 2 MG: 1 INJECTION, SOLUTION INTRAMUSCULAR; INTRAVENOUS at 12:04

## 2017-04-12 RX ADMIN — SODIUM CHLORIDE: 0.9 INJECTION, SOLUTION INTRAVENOUS at 11:04

## 2017-04-12 RX ADMIN — LIDOCAINE HYDROCHLORIDE 2.5 ML: 20 INJECTION, SOLUTION INFILTRATION; PERINEURAL at 12:04

## 2017-04-12 RX ADMIN — ATROPINE SULFATE 2 DROP: 10 SOLUTION OPHTHALMIC at 12:04

## 2017-04-12 NOTE — TRANSFER OF CARE
"Anesthesia Transfer of Care Note    Patient: Deb Tesfaye    Procedure(s) Performed: Procedure(s) (LRB):  G6 laser (Right)    Patient location: PACU    Anesthesia Type: MAC    Transport from OR: Transported from OR on room air with adequate spontaneous ventilation    Post pain: adequate analgesia    Post assessment: no apparent anesthetic complications    Post vital signs: stable    Level of consciousness: awake    Nausea/Vomiting: no nausea/vomiting    Complications: none          Last vitals:   Visit Vitals    /86 (BP Location: Right arm, Patient Position: Lying, BP Method: Automatic)    Pulse 64    Temp 37 °C (98.6 °F) (Oral)    Resp 16    Ht 5' 5" (1.651 m)    Wt 76.2 kg (168 lb)    Breastfeeding No    BMI 27.96 kg/m2     "

## 2017-04-12 NOTE — H&P (VIEW-ONLY)
Subjective:       Patient ID: Deb Tesfaye is a 62 y.o. female.    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  Mother with colon cancer.    COLONOSCOPY  12/2010    COLONOSCOPY N/A 7/20/2016    Procedure: COLONOSCOPY;  Surgeon: Janak De Leon Jr., MD;  Location: [facility];  Service: Endoscopy;  Laterality: N/A;    SELECTIVE LASER TRABECULPALSTY Right 8/13    OS DONE IN [place]    SELECTIVE LASER TRABECUPLASTY Left 8/27/15    OS WITH     TUBAL LIGATION       Family History   Problem Relation Age of Onset    Cancer Mother 54     colon cancer    Cancer Maternal Grandfather     Glaucoma Maternal Grandfather     Cataracts Maternal Grandfather     Hypertension Father     Amblyopia Neg Hx     Blindness Neg Hx     Diabetes Neg Hx     Macular degeneration Neg Hx     Retinal detachment Neg Hx     Stroke Neg Hx     Strabismus Neg Hx     Thyroid disease Neg Hx      Social History     Social History    Marital status:      Spouse name: N/A    Number of children: 3    Years of education: N/A     Social History Main Topics    Smoking status: Never Smoker    Smokeless tobacco: Never Used    Alcohol use No    Drug use: No    Sexual activity: Yes     Partners: Male     Other Topics Concern    None     Social History Narrative       Current Outpatient Prescriptions   Medication Sig Dispense Refill    acetaZOLAMIDE (DIAMOX) 500 mg CpSR Take 1 capsule (500 mg total) by mouth 2 (two) times daily. 60 capsule 3    bimatoprost (LUMIGAN) 0.01 % Drop Place 1 drop into both eyes every evening. 1 Bottle 12    brimonidine 0.2% (ALPHAGAN) 0.2 % Drop Place 1 drop into both eyes 3 (three)  times daily.       dorzolamide-timolol 2-0.5% (COSOPT) 22.3-6.8 mg/mL ophthalmic solution Place 1 drop into both eyes 2 (two) times daily.       ERGOCALCIFEROL, VITAMIN D2, (VITAMIN D ORAL) Take 1,000 Units by mouth once daily.       KRILL OIL ORAL Take 1 capsule by mouth once daily.       MULTIVITS,CA,MINERALS/IRON/FA (ONE-A-DAY WOMENS FORMULA ORAL) Take 1 capsule by mouth once daily.      pilocarpine HCL 4% (PILOCAR) 4 % ophthalmic solution Place 1 drop into both eyes 3 (three) times daily. 15 mL 12    potassium chloride SA (K-DUR,KLOR-CON) 20 MEQ tablet Take 1 tablet (20 mEq total) by mouth 2 (two) times daily. 60 tablet 1    pravastatin (PRAVACHOL) 20 MG tablet TAKE 1 TABLET BY MOUTH EVERY DAY 30 tablet 9    tamoxifen (NOLVADEX) 20 MG Tab Take 1 tablet (20 mg total) by mouth once daily. 30 tablet 0    triamterene-hydrochlorothiazide 37.5-25 mg (DYAZIDE) 37.5-25 mg per capsule TAKE 1 CAPSULE BY MOUTH EVERY DAY 30 capsule 5     No current facility-administered medications for this visit.      Review of patient's allergies indicates:  No Known Allergies    Review of Systems   Constitutional: Negative.    HENT: Negative.    Eyes: Positive for visual disturbance.   Respiratory: Negative.    Cardiovascular: Negative.    Neurological: Negative.    Psychiatric/Behavioral: Negative.        Objective:      There were no vitals filed for this visit.  Physical Exam   Constitutional: She is oriented to person, place, and time. She appears well-developed and well-nourished.   HENT:   Head: Normocephalic and atraumatic.   Eyes:   See eye exam   Cardiovascular: Normal rate.    Pulmonary/Chest: Effort normal.   Neurological: She is alert and oriented to person, place, and time.   Skin: Skin is warm and dry.   Psychiatric: She has a normal mood and affect.            Assessment:       1. Primary open angle glaucoma of both eyes, severe stage    2. Visual field loss    3. Afferent pupillary defect, right    4. Papilloma  of left eyelid    5. Hyperopia of both eyes with astigmatism and presbyopia        Plan:       Adv. Glaucoma od - plan cyclo G6 laser - CB destruction

## 2017-04-12 NOTE — DISCHARGE INSTRUCTIONS
Follow verbal instructions given to you by Dr. Perez.         GENERAL POST-OPERATIVE  PATIENT INSTRUCTIONS      FOLLOW-UP:  Please make an appointment with your physician in 1 day. Call your physician immediately if you have any fevers greater than 102.5, drainage from you wound that is not clear or looks infected, persistent bleeding, increasing abdominal pain, problems urinating, or persistent nausea/vomiting.      WOUND CARE INSTRUCTIONS:  No dressing needed.    DIET:  You may eat any foods that you can tolerate.  It is a good idea to eat a high fiber diet and take in plenty of fluids to prevent constipation.  If you do become constipated you may want to take a mild laxative or take ducolax tablets on a daily basis until your bowel habits are regular.  Constipation can be very uncomfortable, along with straining, after recent surgery.    ACTIVITY:  Light activity until seen by MD in clinic.      MEDICATIONS:  Try to take narcotic medications and anti-inflammatory medications, such as tylenol, ibuprofen, naprosyn, etc., with food.  This will minimize stomach upset from the medication.  Should you develop nausea and vomiting from the pain medication, or develop a rash, please discontinue the medication and contact your physician.  You should not drive, make important decisions, or operate machinery when taking narcotic pain medication.    QUESTIONS:  Please feel free to call your physician or the hospital  if you have any questions, and they will be glad to assist you.

## 2017-04-12 NOTE — ANESTHESIA PREPROCEDURE EVALUATION
04/12/2017  Deb Tesfaye is a 62 y.o., female.  Pre-operative evaluation for Procedure(s) (LRB):  G6 laser (Right)    Deb Tesfaye is a 62 y.o. female     Patient Active Problem List   Diagnosis    Primary open angle glaucoma of both eyes, severe stage    Visual field loss    Papilloma of left eyelid    Hyperopia of both eyes with astigmatism and presbyopia    Nuclear sclerosis of both eyes    Family history of colon cancer in mother    Essential hypertension    Ductal carcinoma in situ (DCIS) of left breast    Hyperlipidemia    Hypokalemia    Family history of colon cancer    Atypical ductal hyperplasia of breast    Glaucoma of both eyes secondary to inflammation       Review of patient's allergies indicates:  No Known Allergies    No current facility-administered medications on file prior to encounter.      Current Outpatient Prescriptions on File Prior to Encounter   Medication Sig Dispense Refill    acetaZOLAMIDE (DIAMOX) 500 mg CpSR Take 1 capsule (500 mg total) by mouth 2 (two) times daily. 60 capsule 3    bimatoprost (LUMIGAN) 0.01 % Drop Place 1 drop into both eyes every evening. 1 Bottle 12    brimonidine 0.2% (ALPHAGAN) 0.2 % Drop Place 1 drop into both eyes 3 (three) times daily.       dorzolamide-timolol 2-0.5% (COSOPT) 22.3-6.8 mg/mL ophthalmic solution Place 1 drop into both eyes 2 (two) times daily.       ERGOCALCIFEROL, VITAMIN D2, (VITAMIN D ORAL) Take 1,000 Units by mouth once daily.       KRILL OIL ORAL Take 1 capsule by mouth once daily.       MULTIVITS,CA,MINERALS/IRON/FA (ONE-A-DAY WOMENS FORMULA ORAL) Take 1 capsule by mouth once daily.      pilocarpine HCL 4% (PILOCAR) 4 % ophthalmic solution Place 1 drop into both eyes 3 (three) times daily. 15 mL 12    potassium chloride SA (K-DUR,KLOR-CON) 20 MEQ tablet Take 1 tablet (20 mEq total) by mouth 2  (two) times daily. 60 tablet 1    pravastatin (PRAVACHOL) 20 MG tablet TAKE 1 TABLET BY MOUTH EVERY DAY 30 tablet 9    triamterene-hydrochlorothiazide 37.5-25 mg (DYAZIDE) 37.5-25 mg per capsule TAKE 1 CAPSULE BY MOUTH EVERY DAY 30 capsule 5       Past Surgical History:   Procedure Laterality Date    BREAST BIOPSY      BREAST LUMPECTOMY Left     COLONOSCOPY      due for every 5.  Mother with colon cancer.    COLONOSCOPY  2010    COLONOSCOPY N/A 2016    Procedure: COLONOSCOPY;  Surgeon: Janak De Leon Jr., MD;  Location[facility];  Service: Endoscopy;  Laterality: N/A;    SELECTIVE LASER TRABECULPALSTY Right     OS DONE IN [place], [place]    SELECTIVE LASER TRABECUPLASTY Left 8/27/15    OS WITH     TUBAL LIGATION         Social History     Social History    Marital status:      Spouse name: N/A    Number of children: 3    Years of education: N/A     Occupational History    Not on file.     Social History Main Topics    Smoking status: Never Smoker    Smokeless tobacco: Never Used    Alcohol use No    Drug use: No    Sexual activity: Yes     Partners: Male     Other Topics Concern    Not on file     Social History Narrative         Vital Signs Range (Last 24H):         CBC: No results for input(s): WBC, RBC, HGB, HCT, PLT, MCV, MCH, MCHC in the last 72 hours.    CMP: No results for input(s): NA, K, CL, CO2, BUN, CREATININE, GLU, MG, PHOS, CALCIUM, ALBUMIN, PROT, ALKPHOS, ALT, AST, BILITOT in the last 72 hours.    INR  No results for input(s): INR, PROTIME, APTT in the last 72 hours.    Invalid input(s): PT        Diagnostic Studies:      [facility] Echo:      [facility] Anesthesia Evaluation    I have reviewed the Patient Summary Reports.    I have reviewed the Nursing Notes.   I have reviewed the Medications.     Review of Systems  Anesthesia Hx:  No problems with previous Anesthesia  Denies Family Hx of Anesthesia complications.   Denies Personal Hx of Anesthesia  complications.   Cardiovascular:   Hypertension    Pulmonary:  Pulmonary Normal    Hepatic/GI:   Denies GERD.        Physical Exam  General:  Well nourished    Airway/Jaw/Neck:  Airway Findings: Mouth Opening: Normal Tongue: Normal  Mallampati: II  TM Distance: Normal, at least 6 cm      Dental:  Dental Findings: In tact   Chest/Lungs:  Chest/Lungs Findings: Clear to auscultation, Normal Respiratory Rate     Heart/Vascular:  Heart Findings: Rate: Normal  Rhythm: Regular Rhythm  Sounds: Normal             Anesthesia Plan  Type of Anesthesia, risks & benefits discussed:  Anesthesia Type:  MAC  Patient's Preference:   Intra-op Monitoring Plan:   Intra-op Monitoring Plan Comments:   Post Op Pain Control Plan:   Post Op Pain Control Plan Comments:   Induction:   IV  Beta Blocker:  Patient is not currently on a Beta-Blocker (No further documentation required).       Informed Consent: Patient understands risks and agrees with Anesthesia plan.  Questions answered. Anesthesia consent signed with patient.  ASA Score: 2     Day of Surgery Review of History & Physical:    H&P update referred to the surgeon.         Ready For Surgery From Anesthesia Perspective.

## 2017-04-12 NOTE — OP NOTE
Operative Date:  04/12/2017    Discharge Date:  04/12/2017    Discharge Patient Home    SURGEON: Nataile Perez MD    ASSISTANT: Sarina Wright MD     PREOPERATIVE DIAGNOSIS: Poorly controlled glaucoma of the right eye    POSTOPERATIVE DIAGNOSIS: Poorly controlled glaucoma of the right eye    PROCEDURE PERFORMED: MicroPulse G6 Laser Ciliary Body Destruction 63451 Treatment  To all quadrants of the right eye    COMPLICATIONS: None.    ESTIMATED BLOOD LOSS: Minimal.    ANESTHESIA: Retrobulbar block     PROCEDURE IN DETIAL: The patient is a 62 year old woman with poorly controlled glaucoma.  The intraocular pressure was deemed too high for the health the optic nerve and visual field status. Discussions have been carried out with this patient regarding the pertinent options, surgical risks and benefits of the procedure and expectations.   The patient voiced good understanding and wished to proceed with the above procedure.    The patient and correct eye to be operated on were identified by the operating surgeon and surgical team and the patient was brought into the operating room. The patient received topical anesthesia followed by a retrobulbar block consisting of Bupivacaine and non preserved lidocaine, a speculum was placed and Gonak solution was applied to the eye surface.    The MP3 handpiece was positioned on the eye in proper location using the calibrated guides provided on the probe and treatment was begun with nine 10 second timed sweeps (90 sec total) at 2000 mW through 180 degrees of treatment.  Care was taken to skip the 3 & 9 o'clock locations. The anterior chamber was well formed throughout the case and there were no complications.  Following the procedure, a drop of atropine 1% along with maxitrol ointment was placed on the cornea.  The eye was closed & patched.  The patient was taken to the recovery room in good and stable condition.  The patient tolerated the procedure well.  The patient  was  instructed to refrain from any heavy lifting, bending, stooping, or straining activities, discharged Home and to follow up in the morning for routine postoperative care with Dr. Perez.

## 2017-04-12 NOTE — IP AVS SNAPSHOT
[facility]  [address]  [facility] [address]  Phone: [phone]           Patient Discharge Instructions   Our goal is to set you up for success. This packet includes information on your condition, medications, and your home care.  It will help you care for yourself to prevent having to return to the hospital.     Please ask your nurse if you have any questions.      There are many details to remember when preparing to leave the hospital. Here is what you will need to do:    1. Take your medicine. If you are prescribed medications, review your Medication List on the following pages. You may have new medications to  at the pharmacy and others that you'll need to stop taking. Review the instructions for how and when to take your medications. Talk with your doctor or nurses if you are unsure of what to do.     2. Go to your follow-up appointments. Specific follow-up information is listed in the following pages. Your may be contacted by a nurse or clinical provider about future appointments. Be sure we have all of the phone numbers to reach you. Please contact your provider's office if you are unable to make an appointment.     3. Watch for warning signs. Your doctor or nurse will give you detailed warning signs to watch for and when to call for assistance. These instructions may also include educational information about your condition. If you experience any of warning signs to your health, call your doctor.           [facility]  Unless otherwise directed by your provider, please   contact [facility], our nurse care line   that is available for 24/7 assistance.     [phone] (toll-free)     Registered nurses in the [facility]   provide: appointment scheduling, clinical advisement, health education, and other advisory services.                  ** Verify the list of medication(s) below is accurate and up to date. Carry this with you in case of  emergency. If your medications have changed, please notify your healthcare provider.             Medication List      CONTINUE taking these medications        Additional Info                      acetaZOLAMIDE 500 mg Cpsr   Commonly known as:  DIAMOX   Quantity:  60 capsule   Refills:  3   Dose:  500 mg    Instructions:  Take 1 capsule (500 mg total) by mouth 2 (two) times daily.     Begin Date    AM    Noon    PM    Bedtime       bimatoprost 0.01 % Drop   Commonly known as:  LUMIGAN   Quantity:  1 Bottle   Refills:  12   Dose:  1 drop    Instructions:  Place 1 drop into both eyes every evening.     Begin Date    AM    Noon    PM    Bedtime       brimonidine 0.2% 0.2 % Drop   Commonly known as:  ALPHAGAN   Refills:  0   Dose:  1 drop    Instructions:  Place 1 drop into both eyes 3 (three) times daily.     Begin Date    AM    Noon    PM    Bedtime       dorzolamide-timolol 2-0.5% 22.3-6.8 mg/mL ophthalmic solution   Commonly known as:  COSOPT   Refills:  0   Dose:  1 drop    Instructions:  Place 1 drop into both eyes 2 (two) times daily.     Begin Date    AM    Noon    PM    Bedtime       KRILL OIL ORAL   Refills:  0   Dose:  1 capsule    Instructions:  Take 1 capsule by mouth once daily.     Begin Date    AM    Noon    PM    Bedtime       ONE-A-DAY WOMENS FORMULA ORAL   Refills:  0   Dose:  1 capsule    Instructions:  Take 1 capsule by mouth once daily.     Begin Date    AM    Noon    PM    Bedtime       pilocarpine HCL 4% 4 % ophthalmic solution   Commonly known as:  PILOCAR   Quantity:  15 mL   Refills:  12   Dose:  1 drop    Instructions:  Place 1 drop into both eyes 3 (three) times daily.     Begin Date    AM    Noon    PM    Bedtime       potassium chloride SA 20 MEQ tablet   Commonly known as:  K-DUR,KLOR-CON   Quantity:  60 tablet   Refills:  1   Dose:  20 mEq    Instructions:  Take 1 tablet (20 mEq total) by mouth 2 (two) times daily.     Begin Date    AM    Noon    PM    Bedtime       pravastatin 20 MG  tablet   Commonly known as:  PRAVACHOL   Quantity:  30 tablet   Refills:  9    Instructions:  TAKE 1 TABLET BY MOUTH EVERY DAY     Begin Date    AM    Noon    PM    Bedtime       tamoxifen 20 MG Tab   Commonly known as:  NOLVADEX   Quantity:  30 tablet   Refills:  0    Instructions:  TAKE 1 TABLET BY MOUTH ONCE DAILY     Begin Date    AM    Noon    PM    Bedtime       triamterene-hydrochlorothiazide 37.5-25 mg 37.5-25 mg per capsule   Commonly known as:  DYAZIDE   Quantity:  30 capsule   Refills:  5    Instructions:  TAKE 1 CAPSULE BY MOUTH EVERY DAY     Begin Date    AM    Noon    PM    Bedtime       VITAMIN D ORAL   Refills:  0   Dose:  1000 Units    Instructions:  Take 1,000 Units by mouth once daily.     Begin Date    AM    Noon    PM    Bedtime                  Please bring to all follow up appointments:    1. A copy of your discharge instructions.  2. All medicines you are currently taking in their original bottles.  3. Identification and insurance card.    Please arrive 15 minutes ahead of scheduled appointment time.    Please call 24 hours in advance if you must reschedule your appointment and/or time.        Your Scheduled Appointments     Apr 13, 2017 11:00 AM CDT   Post OP with MD David Sepulveda kenny - Ophthalmology ([facility] )    [address]  [place] [address]   [phone]            Apr 25, 2017  9:20 AM CDT   Established Patient Visit with Shy Tapia MD   [place] - Internal Medicine ([facility] [place])    [address] [facility] [address] [place] [address]   [phone]            Sep 01, 2017  9:00 AM CDT   Established Patient Visit with Tony Berg NP   [facility] - Hematology ([facility])    [address] [place] [address] [address]  [facility] [address]   [phone]              Follow-up Information     Follow up with Jacqueline Perez MD. Go in 1 day.    Specialty:  Ophthalmology    Contact information:    [facility]  [address]  [phone]          Discharge Instructions     Future Orders    Activity as tolerated     Diet general     Questions:    Total calories:      Fat restriction, if any:      Protein restriction, if any:      Na restriction, if any:      Fluid restriction:      Additional restrictions:      Leave dressing on - Keep it clean, dry, and intact until clinic visit         Discharge Instructions       Follow verbal instructions given to you by Dr. Perez.         GENERAL POST-OPERATIVE  PATIENT INSTRUCTIONS      FOLLOW-UP:  Please make an appointment with your physician in 1 day. Call your physician immediately if you have any fevers greater than 102.5, drainage from you wound that is not clear or looks infected, persistent bleeding, increasing abdominal pain, problems urinating, or persistent nausea/vomiting.      WOUND CARE INSTRUCTIONS:  No dressing needed.    DIET:  You may eat any foods that you can tolerate.  It is a good idea to eat a high fiber diet and take in plenty of fluids to prevent constipation.  If you do become constipated you may want to take a mild laxative or take ducolax tablets on a daily basis until your bowel habits are regular.  Constipation can be very uncomfortable, along with straining, after recent surgery.    ACTIVITY:  Light activity until seen by MD in clinic.      MEDICATIONS:  Try to take narcotic medications and anti-inflammatory medications, such as tylenol, ibuprofen, naprosyn, etc., with food.  This will minimize stomach upset from the medication.  Should you develop nausea and vomiting from the pain medication, or develop a rash, please discontinue the medication and contact your physician.  You should not drive, make important decisions, or operate machinery when taking narcotic pain medication.    QUESTIONS:  Please feel free to call your physician or the hospital  if you have any questions, and they will be glad to assist you.            Primary Diagnosis     Your  "primary diagnosis was:  Glaucoma (Increased Eye Pressure)      Admission Information     Date & Time Provider Department CSN    4/12/2017 10:19 AM Jacqueline Perez MD [facility] [id number]      Care Providers     Provider Role Specialty Primary office phone    Jacqueline Perez MD Attending Provider Ophthalmology [phone]    Jacqueline Perez MD Surgeon  Ophthalmology [phone]      Your Vitals Were     BP Pulse Temp Resp Height Weight    117/75 (BP Location: Left arm, Patient Position: Lying, BP Method: Automatic) 70 98.2 °F (36.8 °C) (Temporal) 16 5' 5" (1.651 m) 76.2 kg (168 lb)    SpO2 BMI             100% 27.96 kg/m2         Recent Lab Values     No lab values to display.      Allergies as of 4/12/2017     No Known Allergies      Advance Directives     An advance directive is a document which, in the event you are no longer able to make decisions for yourself, tells your healthcare team what kind of treatment you do or do not want to receive, or who you would like to make those decisions for you.  If you do not currently have an advance directive, [facility] encourages you to create one.  For more information call:  [phone]), [phone] ([phone]),  or log on to [email or web address].        Language Assistance Services     ATTENTION: Language assistance services are available, free of charge. Please call [phone].      ATENCIÓN: Si habla español, tiene a montalvo disposición servicios gratuitos de asistencia lingüística. Llame al [phone].     CHÚ Ý: N?u b?n nói Ti?ng Vi?t, có các d?ch v? h? tr? ngôn ng? mi?n phí dành cho b?n. G?i s? [phone].         [facility] complies with applicable Federal civil rights laws and does not discriminate on the basis of race, color, national origin, age, disability, or sex.        "

## 2017-04-12 NOTE — ANESTHESIA POSTPROCEDURE EVALUATION
"Anesthesia Post Evaluation    Patient: Deb Tesfaye    Procedure(s) Performed: Procedure(s) (LRB):  G6 laser (Right)    Final Anesthesia Type: MAC  Patient location during evaluation: PACU  Patient participation: Yes- Able to Participate  Level of consciousness: awake and alert  Post-procedure vital signs: reviewed and stable  Pain management: adequate  Airway patency: patent  PONV status at discharge: No PONV  Anesthetic complications: no      Cardiovascular status: stable  Respiratory status: unassisted  Hydration status: euvolemic  Follow-up not needed.        Visit Vitals    /75 (BP Location: Left arm, Patient Position: Lying, BP Method: Automatic)    Pulse 70    Temp 36.8 °C (98.2 °F) (Temporal)    Resp 16    Ht 5' 5" (1.651 m)    Wt 76.2 kg (168 lb)    SpO2 100%    Breastfeeding No    BMI 27.96 kg/m2       Pain/Luke Score: Pain Assessment Performed: Yes (4/12/2017 12:50 PM)  Presence of Pain: denies (4/12/2017 12:50 PM)  Luke Score: 10 (4/12/2017 11:21 AM)  Modified Luke Score: 19 (4/12/2017 12:50 PM)      "

## 2017-04-12 NOTE — ANESTHESIA RELEASE NOTE
"Anesthesia Release from PACU Note    Patient: Deb Tesfaye    Procedure(s) Performed: Procedure(s) (LRB):  G6 laser (Right)    Anesthesia type: MAC    Post pain: Adequate analgesia    Post assessment: no apparent anesthetic complications    Last Vitals:   Visit Vitals    /75 (BP Location: Left arm, Patient Position: Lying, BP Method: Automatic)    Pulse 70    Temp 36.8 °C (98.2 °F) (Temporal)    Resp 16    Ht 5' 5" (1.651 m)    Wt 76.2 kg (168 lb)    SpO2 100%    Breastfeeding No    BMI 27.96 kg/m2       Post vital signs: stable    Level of consciousness: awake    Nausea/Vomiting: no nausea/no vomiting    Complications: none    Airway Patency: patent    Respiratory: unassisted    Cardiovascular: stable and blood pressure at baseline    Hydration: euvolemic  "

## 2017-04-12 NOTE — PLAN OF CARE
Problem: Patient Care Overview  Goal: Plan of Care Review  Outcome: Outcome(s) achieved Date Met:  04/12/17     Discharge instructions given to patient and spouse. Verbalized understanding. Patient stable, tolerating fluids. No complaints at this time. Patient adequate for discharge.

## 2017-04-12 NOTE — DISCHARGE SUMMARY
Date of Procedure / Discharge: 04/12/2017     PreOp Diagnosis: Uncontrolled Glaucoma OD     PostOp Diagnosis:as above s/p procedure    Procedure:laser ciliary body destruction  OD w/ laser G6 micropulse   Attending:Jacqueline Perez MD    Assistant Sarina jade MD      Anesthesia:Local MAC with retrobulbar block    Complications::None    Blood loss: <5 CC    Specimens: none    Procedure Details:  See Dictation      -D/C home when patient meets anesthesia requirements  -Follow up tomorrow with surgeon in the [facility].

## 2017-04-13 ENCOUNTER — OFFICE VISIT (OUTPATIENT)
Dept: OPHTHALMOLOGY | Facility: CLINIC | Age: 62
End: 2017-04-13
Payer: COMMERCIAL

## 2017-04-13 DIAGNOSIS — D23.10 PAPILLOMA OF LEFT EYELID: ICD-10-CM

## 2017-04-13 DIAGNOSIS — H40.1133 PRIMARY OPEN ANGLE GLAUCOMA OF BOTH EYES, SEVERE STAGE: ICD-10-CM

## 2017-04-13 DIAGNOSIS — H21.561 AFFERENT PUPILLARY DEFECT, RIGHT: ICD-10-CM

## 2017-04-13 DIAGNOSIS — H53.40 VISUAL FIELD LOSS: ICD-10-CM

## 2017-04-13 DIAGNOSIS — H52.4 HYPEROPIA WITH ASTIGMATISM AND PRESBYOPIA, BILATERAL: ICD-10-CM

## 2017-04-13 DIAGNOSIS — H25.13 NUCLEAR SCLEROSIS OF BOTH EYES: ICD-10-CM

## 2017-04-13 DIAGNOSIS — H52.03 HYPEROPIA WITH ASTIGMATISM AND PRESBYOPIA, BILATERAL: ICD-10-CM

## 2017-04-13 DIAGNOSIS — Z48.89 ENCOUNTER FOR POSTOPERATIVE CARE: Primary | ICD-10-CM

## 2017-04-13 DIAGNOSIS — H52.203 HYPEROPIA WITH ASTIGMATISM AND PRESBYOPIA, BILATERAL: ICD-10-CM

## 2017-04-13 PROCEDURE — 99999 PR PBB SHADOW E&M-EST. PATIENT-LVL II: CPT | Mod: PBBFAC,,, | Performed by: OPHTHALMOLOGY

## 2017-04-13 PROCEDURE — 99024 POSTOP FOLLOW-UP VISIT: CPT | Mod: S$GLB,,, | Performed by: OPHTHALMOLOGY

## 2017-04-24 ENCOUNTER — OFFICE VISIT (OUTPATIENT)
Dept: OPHTHALMOLOGY | Facility: CLINIC | Age: 62
End: 2017-04-24
Payer: COMMERCIAL

## 2017-04-24 DIAGNOSIS — H25.13 NUCLEAR SCLEROSIS OF BOTH EYES: ICD-10-CM

## 2017-04-24 DIAGNOSIS — H52.03 HYPEROPIA WITH ASTIGMATISM AND PRESBYOPIA, BILATERAL: ICD-10-CM

## 2017-04-24 DIAGNOSIS — H53.40 VISUAL FIELD LOSS: ICD-10-CM

## 2017-04-24 DIAGNOSIS — H52.4 HYPEROPIA WITH ASTIGMATISM AND PRESBYOPIA, BILATERAL: ICD-10-CM

## 2017-04-24 DIAGNOSIS — H52.203 HYPEROPIA WITH ASTIGMATISM AND PRESBYOPIA, BILATERAL: ICD-10-CM

## 2017-04-24 DIAGNOSIS — D23.10 PAPILLOMA OF LEFT EYELID: ICD-10-CM

## 2017-04-24 DIAGNOSIS — H21.561 AFFERENT PUPILLARY DEFECT, RIGHT: ICD-10-CM

## 2017-04-24 DIAGNOSIS — Z48.89 ENCOUNTER FOR POSTOPERATIVE CARE: Primary | ICD-10-CM

## 2017-04-24 DIAGNOSIS — H40.1133 PRIMARY OPEN ANGLE GLAUCOMA OF BOTH EYES, SEVERE STAGE: ICD-10-CM

## 2017-04-24 PROCEDURE — 99024 POSTOP FOLLOW-UP VISIT: CPT | Mod: S$GLB,,, | Performed by: OPHTHALMOLOGY

## 2017-04-24 PROCEDURE — 99999 PR PBB SHADOW E&M-EST. PATIENT-LVL III: CPT | Mod: PBBFAC,,, | Performed by: OPHTHALMOLOGY

## 2017-04-24 RX ORDER — PREDNISOLONE ACETATE 10 MG/ML
1 SUSPENSION/ DROPS OPHTHALMIC 3 TIMES DAILY
Qty: 10 ML | Refills: 1 | Status: SHIPPED | OUTPATIENT
Start: 2017-04-24 | End: 2017-08-18 | Stop reason: SDUPTHER

## 2017-04-24 RX ORDER — ATROPINE SULFATE 10 MG/ML
1 SOLUTION/ DROPS OPHTHALMIC DAILY
Qty: 5 ML | Refills: 0 | Status: SHIPPED | OUTPATIENT
Start: 2017-04-24 | End: 2017-06-15

## 2017-04-24 NOTE — PROGRESS NOTES
HPI     DLS: 4/13/17    Pt here for S/P MicroPulse G6 Laser Ciliary body destruction OD- 4/12/17  Pt states OD is  but not as tender as it was before.    Meds: PA qid od            Atropine bid od            Diamox 500 po bid                        Sandro tid os            Lumigan qhs os              Cosopt bid ou            Brimonidine tid ou    1. Post operative state  2. Primary open angle glaucoma of both eyes, severe stage  3. Visual field loss   4. Afferent pupillary defect, right  5. Papilloma of left eyelid  6. Nuclear sclerosis, both eyes  7. Hyperopia with astigmatism and presbyopia, bilateral        Last edited by Chapis York on 4/24/2017 11:28 AM.         Assessment /Plan     For exam results, see Encounter Report.    There are no diagnoses linked to this encounter.    F/U CYCLO G6 OD - 4/12/2017  REFERRED BY DR ELISE FOR SEVERE POORLY CONTROLLED POAG - 7/24/2015   Pt recently moved from [place] to the [facility]     Previously referred to Dr. Elise from [facility], review of outside records:    Tm 27/25.    Prior to that, IOP ranged mid-teens to low-20's on Combigan/Dorzolamide/Lumigan OU    C:D documented as 0.9 OD, 0.85 OS    Initial visit 3/29/12  IOP 10 OU on Combitan and Lumigan  C:D 0.85 OU  CCT 524OD, 531 OS    HVF's  2012 - superior defect affecting fixation OD with beginnings of early inferior arc; possible early superior arc OS  2013 - stable superior defect OD with early inferior arc; OS superior nasal step    Here for evaluation of POAG on MMT. Last IOP with Dr. Elise 21//19. IOP today 21//20 on MMT. Patient has a family history of glaucoma in maternal grandfather. States that she is compliant with all Gtt's.        Glaucoma (type and duration)    Dx about 2002 - [place]    First HVF   [facility] at [facility] 2015   First photos   First at [facility] 2015    Treatment / Drops started   2002            Family history    + mom / brother         Glaucoma meds     cosopt ou / brimonidine ou tid / lumigan ou q hs / bisi od tid / diamox / bisi ou        H/O adverse rxn to glaucoma drops    none        LASERS    SLT OD - 8/5/2013 - [facility] // SLT os - [facility] 8/27/2015 (good resp 21-->16)        GLAUCOMA SURGERIES    CYCL G6 OD - 4/12/2017        OTHER EYE SURGERIES    none        CDR    0.99/0.95        Tbase    ?  Off gtts (on gtts was up to 28/25)         Tmax    27/25 - per outside record's (reviewed by Arik )             Ttarget    15 ou    (may need to be lower)           HVF   24-2 ss //  2 test 2015 to  2017 - [place]  od // SAD   10-2 ss - 3 test 2015 to 2017 - SALT/ IAD (progression) od // hint SAD / IAD os    (( 2 VF's from [facility] - that were reviewed by Dr. IVEY - 2012 and 2013 -  SAD w/ fix invl . IAD od / early IAD os ))            Gonio    +3-4 ou         CCT    524/531        OCT    2 test 2016 to 2017 - RNFL - OD:dec. S/T/I/N // OS:dec. S/I, bord N/T        HRT    1 test 2015 to 2015  - MR - dec. S/N/I od // dec./ S/N/I os        Disc photos    2015     - Ttoday  8 OD  ( POW #2 cyclo G6 od)    - Test done today    IOP check/AC check     2. + APD od     3.  NS ou    Minimal - VA 20/25 ou still     4. Papilloma OS   To be removed with Arik       PLAN   Severe POAG ou   OD > OS  IOP still above target - even with diamox 500 po bid    Cont  bisi to 4% od tid - change to ou - 1/15/2016   Cont all other glaucoma gtts ou   Cont diamox 500 sequals po twice daily     HVF's continue to progress - especially OD     IOP still too high ou - rec trab with MMC od vs cyclo G6 od - very little sight to save on this eye   Once od stable - consider trab with MMC os or cyclo G6   No vis sign cataracts   Long discussion with patient about the irreversible nature of the glaucomatous visual field and vision loss.  Showed her how restricted her field in the right eye is using [email or web address].com master     Pts  is with her today 3/10/2017   Discussed at length the  options of cyclo G6 od vs trab with MMC and PI - (pt is still phakic - minimal to no cataract) - a bit shallow for a tube or mini shunt.   In view of the extremely poor potential od - they are leaning towards the cyclo G6 - but they will think about it for a wile and will let angle know which one they choose to go ahead with.    With the left eye - I think incisional surgery may be best option - as there is more vision to save long term     Pt has elected to proceed with cyclo G6 od   S/P cyclo G6 od 4/12/2017   POW #2  IOP 8  + iritis and conj hyperemia     Right eye   Begin PA qid   Begin atropine bid     Both eyes   Cont cosopt  - OU bid  Cont brimonidine tid OU    Left eye   bisi tid   lumigan q hs     STOP       A/P:  Doing well   IOP 8 OD   Trace cell/flare --> DECREASE PA tid OD  Continue atropine - DECREASE TO Q  OD   Stop DIAMOX     CONT   F/U  3 WEEKS  // IOP check ou // iritis check od - iop CHECK OFF DIAMOX

## 2017-04-25 ENCOUNTER — LAB VISIT (OUTPATIENT)
Dept: LAB | Facility: HOSPITAL | Age: 62
End: 2017-04-25
Attending: INTERNAL MEDICINE
Payer: COMMERCIAL

## 2017-04-25 ENCOUNTER — OFFICE VISIT (OUTPATIENT)
Dept: INTERNAL MEDICINE | Facility: CLINIC | Age: 62
End: 2017-04-25
Payer: COMMERCIAL

## 2017-04-25 VITALS
HEART RATE: 67 BPM | BODY MASS INDEX: 28.57 KG/M2 | HEIGHT: 65 IN | OXYGEN SATURATION: 98 % | RESPIRATION RATE: 16 BRPM | WEIGHT: 171.5 LBS | SYSTOLIC BLOOD PRESSURE: 110 MMHG | DIASTOLIC BLOOD PRESSURE: 80 MMHG

## 2017-04-25 DIAGNOSIS — E87.6 HYPOKALEMIA: ICD-10-CM

## 2017-04-25 DIAGNOSIS — E78.5 HYPERLIPIDEMIA, UNSPECIFIED HYPERLIPIDEMIA TYPE: ICD-10-CM

## 2017-04-25 DIAGNOSIS — I10 ESSENTIAL HYPERTENSION: ICD-10-CM

## 2017-04-25 DIAGNOSIS — E78.5 HYPERLIPIDEMIA, UNSPECIFIED HYPERLIPIDEMIA TYPE: Primary | ICD-10-CM

## 2017-04-25 DIAGNOSIS — D05.12 DUCTAL CARCINOMA IN SITU (DCIS) OF LEFT BREAST: ICD-10-CM

## 2017-04-25 LAB
ANION GAP SERPL CALC-SCNC: 9 MMOL/L
BUN SERPL-MCNC: 17 MG/DL
CALCIUM SERPL-MCNC: 9.5 MG/DL
CHLORIDE SERPL-SCNC: 108 MMOL/L
CHOLEST/HDLC SERPL: 3.7 {RATIO}
CO2 SERPL-SCNC: 25 MMOL/L
CREAT SERPL-MCNC: 0.9 MG/DL
EST. GFR  (AFRICAN AMERICAN): >60 ML/MIN/1.73 M^2
EST. GFR  (NON AFRICAN AMERICAN): >60 ML/MIN/1.73 M^2
GLUCOSE SERPL-MCNC: 104 MG/DL
HDL/CHOLESTEROL RATIO: 27.3 %
HDLC SERPL-MCNC: 176 MG/DL
HDLC SERPL-MCNC: 48 MG/DL
LDLC SERPL CALC-MCNC: 111.2 MG/DL
NONHDLC SERPL-MCNC: 128 MG/DL
POTASSIUM SERPL-SCNC: 2.9 MMOL/L
SODIUM SERPL-SCNC: 142 MMOL/L
TRIGL SERPL-MCNC: 84 MG/DL

## 2017-04-25 PROCEDURE — 99214 OFFICE O/P EST MOD 30 MIN: CPT | Mod: S$GLB,,, | Performed by: INTERNAL MEDICINE

## 2017-04-25 PROCEDURE — 36415 COLL VENOUS BLD VENIPUNCTURE: CPT | Mod: PO

## 2017-04-25 PROCEDURE — 80061 LIPID PANEL: CPT

## 2017-04-25 PROCEDURE — 80048 BASIC METABOLIC PNL TOTAL CA: CPT

## 2017-04-25 PROCEDURE — 1160F RVW MEDS BY RX/DR IN RCRD: CPT | Mod: S$GLB,,, | Performed by: INTERNAL MEDICINE

## 2017-04-25 PROCEDURE — 99999 PR PBB SHADOW E&M-EST. PATIENT-LVL III: CPT | Mod: PBBFAC,,, | Performed by: INTERNAL MEDICINE

## 2017-04-25 PROCEDURE — 3079F DIAST BP 80-89 MM HG: CPT | Mod: S$GLB,,, | Performed by: INTERNAL MEDICINE

## 2017-04-25 PROCEDURE — 3074F SYST BP LT 130 MM HG: CPT | Mod: S$GLB,,, | Performed by: INTERNAL MEDICINE

## 2017-04-25 NOTE — PROGRESS NOTES
HISTORY OF PRESENT ILLNESS:  Pt. is a 62 y.o. female presents for 6 month monitoring of her hyperlipidemia, hypokalemia, HTN, DCIS of left breast.  She has high-grade DCIS of the left breast as well as atypical ductal hyperplasia. The patient is status post lumpectomy, post-lumpectomy radiation, and is receiving adjuvant tamoxifen 20 mg daily.  Is followed by Dr. Quiles.  We had referred her back to Dr. Perez for her glaucoma after our last appt, S/P MicroPulse G6 Laser Ciliary body destruction OD- 4/12/17.  She is being followed by [facility] and I have reviewed their last note.  HTN is in good control on current meds.  She states she doesn't wish to have her pneumonia shot today.    Lab Results   Component Value Date    WBC 7.91 03/01/2017    HGB 13.4 03/01/2017    HCT 38.7 03/01/2017     03/01/2017    CHOL 210 (H) 06/07/2016    TRIG 116 06/07/2016    HDL 50 06/07/2016    ALT 38 03/01/2017    AST 26 03/01/2017     03/01/2017    K 2.9 (L) 03/06/2017     03/01/2017    CREATININE 0.91 03/01/2017    BUN 20 (H) 03/01/2017    CO2 27 03/01/2017    TSH 1.925 11/28/2016     Lab Results   Component Value Date    LDLCALC 136.8 06/07/2016       Constipation, diarrhea improved.       ROS:  GENERAL: No fever, chills, fatigability or weight loss.  SKIN: No rashes, itching or changes in color or texture of skin.  HEAD: No headaches or recent head trauma.  EARS: Denies ear pain, discharge or vertigo.  NOSE: No loss of smell, no epistaxis or postnasal drip.  MOUTH & THROAT: No hoarseness or change in voice. No excessive gum bleeding.  NODES: Denies swollen glands.  CHEST: Denies MCCOY, cyanosis, wheezing, cough and sputum production.  CARDIOVASCULAR: Denies chest pain, PND, orthopnea or reduced exercise tolerance.  ABDOMEN: Appetite fine. No weight loss. Denies constipation, diarrhea, abdominal pain, hematemesis or blood in stool.  URINARY: No flank pain, dysuria or hematuria.  PERIPHERAL VASCULAR: No  claudication or cyanosis. No edema.  MUSCULOSKELETAL: No joint stiffness or swelling. Denies back pain.  NEUROLOGIC: Denies numbness    PE:   Vitals:   Vitals:    04/25/17 0935   BP: 110/80   Pulse: 67   Resp: 16     GENERAL: no acute distress, A&Ox3, comfortable.  Female with BMI of 28   HEENT: tympanic membranes clear, nasal mucosa pink, no pharyngeal erythema or exudate  NECK: supple, no cervical lymphadenopathy, no thyromegaly; no supraclavicular nodes;   CHEST:  clear to auscultation bilaterally, no crackles or wheeze; no increased work of breathing;  CARDIOVASCULAR: regular rate and rhythm, no rubs, murmurs or gallops.  ABDOMEN: normal bowel sounds, soft non-tender, non-distended; no palpable organomegaly;   EXT: no clubbing, cyanosis or edema.     ASSESSMENT/PLAN:    HTN: In control on current medication; will continue;    Hyperlipidemia, unspecified hyperlipidemia type  -     Lipid panel; Future; Expected date: 5/25/17    Hypokalemia  -     Basic metabolic panel; Future; Expected date: 4/25/17      History breast cancer: is UTD with the mammogram; saw Dr. Quiles and Dr. Rider;    Medication List with Changes/Refills   New Medications    LOSARTAN-HYDROCHLOROTHIAZIDE 50-12.5 MG (HYZAAR) 50-12.5 MG PER TABLET    Take 1 tablet by mouth once daily.   Current Medications    ATROPINE 1% (ISOPTO ATROPINE) 1 % DROP    Place 1 drop into the right eye once daily.    BIMATOPROST (LUMIGAN) 0.01 % DROP    Place 1 drop into both eyes every evening.    BRIMONIDINE 0.2% (ALPHAGAN) 0.2 % DROP    Place 1 drop into both eyes 3 (three) times daily.     DORZOLAMIDE-TIMOLOL 2-0.5% (COSOPT) 22.3-6.8 MG/ML OPHTHALMIC SOLUTION    Place 1 drop into both eyes 2 (two) times daily.     ERGOCALCIFEROL, VITAMIN D2, (VITAMIN D ORAL)    Take 1,000 Units by mouth once daily.     KRILL OIL ORAL    Take 1 capsule by mouth once daily.     MULTIVITS,CA,MINERALS/IRON/FA (ONE-A-DAY WOMENS FORMULA ORAL)    Take 1 capsule by mouth once daily.     PILOCARPINE HCL 4% (PILOCAR) 4 % OPHTHALMIC SOLUTION    Place 1 drop into both eyes 3 (three) times daily.    POTASSIUM CHLORIDE SA (K-DUR,KLOR-CON) 20 MEQ TABLET    Take 1 tablet (20 mEq total) by mouth 2 (two) times daily.    PRAVASTATIN (PRAVACHOL) 20 MG TABLET    TAKE 1 TABLET BY MOUTH EVERY DAY    PREDNISOLONE ACETATE (PRED FORTE) 1 % DRPS    Place 1 drop into the right eye 3 (three) times daily.    TAMOXIFEN (NOLVADEX) 20 MG TAB    TAKE 1 TABLET BY MOUTH ONCE DAILY   Discontinued Medications    ACETAZOLAMIDE (DIAMOX) 500 MG CPSR    Take 1 capsule (500 mg total) by mouth 2 (two) times daily.    TRIAMTERENE-HYDROCHLOROTHIAZIDE 37.5-25 MG (DYAZIDE) 37.5-25 MG PER CAPSULE    TAKE 1 CAPSULE BY MOUTH EVERY DAY         Call if condition changes or worsens.  Answers for HPI/ROS submitted by the patient on 4/23/2017   neck pain: No, No  unexpected weight change: No  hearing loss: No  rhinorrhea: No  trouble swallowing: No  eye discharge: No  visual disturbance: Yes  chest tightness: No  wheezing: No  chest pain: No  palpitations: No  blood in stool: No  constipation: No  vomiting: No  diarrhea: No  polydipsia: No  polyuria: No  difficulty urinating: No  hematuria: No  menstrual problem: No  dysuria: No  joint swelling: No  arthralgias: No  headaches: No  weakness: No  confusion: No  dysphoric mood: No

## 2017-04-25 NOTE — MR AVS SNAPSHOT
[facility]  [address] [facility]  [facility] [address]  Phone: [phone]  Fax: [phone]                  Deb Tesfaye   2017 9:20 AM   Office Visit    Description:  Female : 1955   Provider:  Shy Tapia MD   Department:  [facility]           Reason for Visit     Follow-up           Diagnoses this Visit        Comments    Hyperlipidemia, unspecified hyperlipidemia type    -  Primary     Hypokalemia                To Do List           Future Appointments        Provider Department Dept Phone    2017 11:00 AM Jacqueline Perez MD [facility] - Ophthalmology [phone]    2017 9:00 AM Tony Berg NP [facility] - Hematology [phone]    10/27/2017 9:00 AM Shy Tapia MD [facility] [phone]      Goals (5 Years of Data)     None      Follow-Up and Disposition     Return in about 6 months (around 10/25/2017).      [facility]     [facility] Nurse Care Line -  Assistance  Unless otherwise directed by your provider, please contact [facility], our nurse care line that is available for  assistance.     Registered nurses in the [facility] provide: appointment scheduling, clinical advisement, health education, and other advisory services.  Call: [phone] (toll free)               Medications           Message regarding Medications     Verify the changes and/or additions to your medication regime listed below are the same as discussed with your clinician today.  If any of these changes or additions are incorrect, please notify your healthcare provider.        STOP taking these medications     acetaZOLAMIDE (DIAMOX) 500 mg CpSR Take 1 capsule (500 mg total) by mouth 2 (two) times daily.           Verify that the below list of medications is an accurate representation of the medications you are currently taking.  If none reported, the list may be blank. If incorrect,  "please contact your healthcare provider. Carry this list with you in case of emergency.           Current Medications     atropine 1% (ISOPTO ATROPINE) 1 % Drop Place 1 drop into the right eye once daily.    bimatoprost (LUMIGAN) 0.01 % Drop Place 1 drop into both eyes every evening.    brimonidine 0.2% (ALPHAGAN) 0.2 % Drop Place 1 drop into both eyes 3 (three) times daily.     dorzolamide-timolol 2-0.5% (COSOPT) 22.3-6.8 mg/mL ophthalmic solution Place 1 drop into both eyes 2 (two) times daily.     ERGOCALCIFEROL, VITAMIN D2, (VITAMIN D ORAL) Take 1,000 Units by mouth once daily.     KRILL OIL ORAL Take 1 capsule by mouth once daily.     MULTIVITS,CA,MINERALS/IRON/FA (ONE-A-DAY WOMENS FORMULA ORAL) Take 1 capsule by mouth once daily.    pilocarpine HCL 4% (PILOCAR) 4 % ophthalmic solution Place 1 drop into both eyes 3 (three) times daily.    potassium chloride SA (K-DUR,KLOR-CON) 20 MEQ tablet Take 1 tablet (20 mEq total) by mouth 2 (two) times daily.    pravastatin (PRAVACHOL) 20 MG tablet TAKE 1 TABLET BY MOUTH EVERY DAY    prednisoLONE acetate (PRED FORTE) 1 % DrpS Place 1 drop into the right eye 3 (three) times daily.    tamoxifen (NOLVADEX) 20 MG Tab TAKE 1 TABLET BY MOUTH ONCE DAILY    triamterene-hydrochlorothiazide 37.5-25 mg (DYAZIDE) 37.5-25 mg per capsule TAKE 1 CAPSULE BY MOUTH EVERY DAY           Clinical Reference Information           Your Vitals Were     BP Pulse Resp Height Weight SpO2    110/80 67 16 5' 5" (1.651 m) 77.8 kg (171 lb 8.3 oz) 98%    BMI                28.54 kg/m2          Blood Pressure          Most Recent Value    BP  110/80      Allergies as of 4/25/2017     No Known Allergies      Immunizations Administered on Date of Encounter - 4/25/2017     None      Orders Placed During Today's Visit     Future Labs/Procedures Expected by Expires    Basic metabolic panel  4/25/2017 6/24/2018    Lipid panel  5/25/2017 6/24/2018      Language Assistance Services     ATTENTION: Language " assistance services are available, free of charge. Please call [phone].      ATENCIÓN: Si habla luke, tiene a montalvo disposición servicios gratuitos de asistencia lingüística. Llame al [phone].     CHÚ Ý: N?u b?n nói Ti?ng Vi?t, có các d?ch v? h? tr? ngôn ng? mi?n phí dành cho b?n. G?i s? [phone].         [facility] complies with applicable Federal civil rights laws and does not discriminate on the basis of race, color, national origin, age, disability, or sex.

## 2017-04-26 ENCOUNTER — TELEPHONE (OUTPATIENT)
Dept: INTERNAL MEDICINE | Facility: CLINIC | Age: 62
End: 2017-04-26

## 2017-04-26 DIAGNOSIS — E87.6 HYPOKALEMIA: Primary | ICD-10-CM

## 2017-04-26 RX ORDER — LOSARTAN POTASSIUM AND HYDROCHLOROTHIAZIDE 12.5; 5 MG/1; MG/1
1 TABLET ORAL DAILY
Qty: 30 TABLET | Refills: 1 | Status: SHIPPED | OUTPATIENT
Start: 2017-04-26 | End: 2017-06-15 | Stop reason: SDUPTHER

## 2017-05-04 DIAGNOSIS — D05.12 DUCTAL CARCINOMA IN SITU (DCIS) OF LEFT BREAST: ICD-10-CM

## 2017-05-04 RX ORDER — TAMOXIFEN CITRATE 20 MG/1
TABLET ORAL
Qty: 30 TABLET | Refills: 0 | Status: SHIPPED | OUTPATIENT
Start: 2017-05-04 | End: 2017-08-24 | Stop reason: SDUPTHER

## 2017-05-16 ENCOUNTER — CLINICAL SUPPORT (OUTPATIENT)
Dept: FAMILY MEDICINE | Facility: CLINIC | Age: 62
End: 2017-05-16
Payer: COMMERCIAL

## 2017-05-16 ENCOUNTER — TELEPHONE (OUTPATIENT)
Dept: FAMILY MEDICINE | Facility: CLINIC | Age: 62
End: 2017-05-16

## 2017-05-16 ENCOUNTER — LAB VISIT (OUTPATIENT)
Dept: LAB | Facility: HOSPITAL | Age: 62
End: 2017-05-16
Attending: INTERNAL MEDICINE
Payer: COMMERCIAL

## 2017-05-16 VITALS — DIASTOLIC BLOOD PRESSURE: 80 MMHG | SYSTOLIC BLOOD PRESSURE: 116 MMHG

## 2017-05-16 DIAGNOSIS — E87.6 HYPOKALEMIA: ICD-10-CM

## 2017-05-16 DIAGNOSIS — H40.1133 PRIMARY OPEN ANGLE GLAUCOMA OF BOTH EYES, SEVERE STAGE: ICD-10-CM

## 2017-05-16 DIAGNOSIS — I10 ESSENTIAL HYPERTENSION: Primary | ICD-10-CM

## 2017-05-16 LAB
ANION GAP SERPL CALC-SCNC: 11 MMOL/L
BUN SERPL-MCNC: 13 MG/DL
CALCIUM SERPL-MCNC: 9.3 MG/DL
CHLORIDE SERPL-SCNC: 106 MMOL/L
CO2 SERPL-SCNC: 27 MMOL/L
CREAT SERPL-MCNC: 0.9 MG/DL
EST. GFR  (AFRICAN AMERICAN): >60 ML/MIN/1.73 M^2
EST. GFR  (NON AFRICAN AMERICAN): >60 ML/MIN/1.73 M^2
GLUCOSE SERPL-MCNC: 96 MG/DL
POTASSIUM SERPL-SCNC: 3.6 MMOL/L
SODIUM SERPL-SCNC: 144 MMOL/L

## 2017-05-16 PROCEDURE — 99499 UNLISTED E&M SERVICE: CPT | Mod: S$GLB,,, | Performed by: INTERNAL MEDICINE

## 2017-05-16 PROCEDURE — 80048 BASIC METABOLIC PNL TOTAL CA: CPT

## 2017-05-16 PROCEDURE — 36415 COLL VENOUS BLD VENIPUNCTURE: CPT | Mod: PO

## 2017-05-16 NOTE — TELEPHONE ENCOUNTER
Pt here for Bp check. Pt /78 at 930am and waited to recheck BP at 937am 116/80. Pt advised Will let MD know and will contact pt with any further instructions

## 2017-05-17 RX ORDER — ACETAZOLAMIDE 500 MG/1
CAPSULE, EXTENDED RELEASE ORAL
Qty: 60 CAPSULE | Refills: 3 | Status: SHIPPED | OUTPATIENT
Start: 2017-05-17 | End: 2017-05-18

## 2017-05-18 ENCOUNTER — OFFICE VISIT (OUTPATIENT)
Dept: OPHTHALMOLOGY | Facility: CLINIC | Age: 62
End: 2017-05-18
Payer: COMMERCIAL

## 2017-05-18 DIAGNOSIS — Z48.89 ENCOUNTER FOR POSTOPERATIVE CARE: Primary | ICD-10-CM

## 2017-05-18 DIAGNOSIS — H53.40 VISUAL FIELD LOSS: ICD-10-CM

## 2017-05-18 DIAGNOSIS — H25.13 NUCLEAR SCLEROSIS OF BOTH EYES: ICD-10-CM

## 2017-05-18 DIAGNOSIS — H21.561 AFFERENT PUPILLARY DEFECT, RIGHT: ICD-10-CM

## 2017-05-18 DIAGNOSIS — H52.203 HYPEROPIA WITH ASTIGMATISM AND PRESBYOPIA, BILATERAL: ICD-10-CM

## 2017-05-18 DIAGNOSIS — H52.4 HYPEROPIA WITH ASTIGMATISM AND PRESBYOPIA, BILATERAL: ICD-10-CM

## 2017-05-18 DIAGNOSIS — H52.03 HYPEROPIA WITH ASTIGMATISM AND PRESBYOPIA, BILATERAL: ICD-10-CM

## 2017-05-18 DIAGNOSIS — H40.1133 PRIMARY OPEN ANGLE GLAUCOMA OF BOTH EYES, SEVERE STAGE: ICD-10-CM

## 2017-05-18 DIAGNOSIS — D23.10 PAPILLOMA OF LEFT EYELID: ICD-10-CM

## 2017-05-18 PROCEDURE — 99999 PR PBB SHADOW E&M-EST. PATIENT-LVL III: CPT | Mod: PBBFAC,,, | Performed by: OPHTHALMOLOGY

## 2017-05-18 PROCEDURE — 99024 POSTOP FOLLOW-UP VISIT: CPT | Mod: S$GLB,,, | Performed by: OPHTHALMOLOGY

## 2017-05-18 NOTE — PROGRESS NOTES
HPI     DLS:  4/24/17    S/P MicroPulse G6 Laser Ciliary body destruction OD- 4/12/17    Pt here for post op IOP check .  Pt denies eye pain. Pt without complaints   today OU.       Atropine daily OD  PF TID OD    Brimonidine TID OU  Cosopt BID OU    Lumigan QHS OS  Bisi 4% TID OS         Last edited by Jacqueline Perez MD on 5/18/2017 12:30 PM.         Assessment /Plan     For exam results, see Encounter Report.    Encounter for postoperative care    Primary open angle glaucoma of both eyes, severe stage    Visual field loss    Afferent pupillary defect, right    Papilloma of left eyelid    Nuclear sclerosis of both eyes    Hyperopia with astigmatism and presbyopia, bilateral      F/U CYCLO G6 OD - 4/12/2017  REFERRED BY DR HILLMAN FOR SEVERE POORLY CONTROLLED POAG - 7/24/2015   Pt recently moved from [place] to [facility]     Previously referred to Dr. Hillman from [facility], review of outside records:    Tm 27/25.    Prior to that, IOP ranged mid-teens to low-20's on Combigan/Dorzolamide/Lumigan OU    C:D documented as 0.9 OD, 0.85 OS    Initial visit 3/29/12  IOP 10 OU on Combitan and Lumigan  C:D 0.85 OU  CCT 524OD, 531 OS    HVF's  2012 - superior defect affecting fixation OD with beginnings of early inferior arc; possible early superior arc OS  2013 - stable superior defect OD with early inferior arc; OS superior nasal step    Here for evaluation of POAG on MMT. Last IOP with Dr. Hillman 21//19. IOP today 21//20 on MMT. Patient has a family history of glaucoma in maternal grandfather. States that she is compliant with all Gtt's.        Glaucoma (type and duration)    Dx about 2002 - [place]    First HVF   First at [facility] 2015   First photos   [facility] at [facility] 2015    Treatment / Drops started   2002            Family history    + mom / brother         Glaucoma meds    cosopt ou / brimonidine ou tid / lumigan ou q hs / bisi od tid / diamox / bisi ou        H/O adverse rxn to  glaucoma drops    none        LASERS    SLT OD - 8/5/2013 - [place] // [facility]T os - [facility] 8/27/2015 (good resp 21-->16)        GLAUCOMA SURGERIES    CYCL G6 OD - 4/12/2017        OTHER EYE SURGERIES    none        CDR    0.99/0.95        Tbase    ?  Off gtts (on gtts was up to 28/25)         Tmax    27/25 - per outside record's (reviewed by Arik )             Ttarget    15 ou    (may need to be lower)           HVF   24-2 ss //  2 test [place]  [place] // SAD   10-2 ss - 3 test 2015 to 2017 - SALT/ IAD (progression) od // hint SAD / IAD os    (( 2 VF's from [place] - that were reviewed by Dr. IVEY - 2012 and 2013 -  SAD w/ fix invl . IAD od / early IAD os ))            Gonio    +3-4 ou         CCT    524/531        OCT    2 test 2016 to 2017 - RNFL - OD:dec. S/T/I/N // OS:dec. S/Ipratik N/T        HRT    1 test 2015 to 2015  - MR - dec. S/N/I od // dec./ S/N/I os        Disc photos    2015     - Ttoday  8 OD  ( POW #2 cyclo G6 od)    - Test done today    IOP check/AC check     2. + APD od     3.  NS ou    Minimal - [place] 20/25 ou still     4. Papilloma OS   To be removed with [facility]k       PLAN   Severe POAG ou   OD > OS  IOP still above target - even with diamox 500 po bid    Cont  bisi to 4% od tid - change to ou - 1/15/2016   Cont all other glaucoma gtts ou   Cont diamox 500 sequals po twice daily     HVF's continue to progress - especially OD     IOP still too high ou - rec trab with MMC od vs cyclo G6 od - very little sight to save on this eye   Once od stable - consider trab with MMC os or cyclo G6   No vis sign cataracts   Long discussion with patient about the irreversible nature of the glaucomatous visual field and vision loss.  Showed her how restricted her field in the right eye is using [employer] master     Pts  is with her today 3/10/2017   Discussed at length the options of cyclo G6 od vs trab with MMC and PI - (pt is still phakic - minimal to no cataract) - a bit shallow  for a tube or mini shunt.   In view of the extremely poor potential od - they are leaning towards the cyclo G6 - but they will think about it for a wile and will let angle know which one they choose to go ahead with.    With the left eye - I think incisional surgery may be best option - as there is more vision to save long term     Pt has elected to proceed with cyclo G6 od   S/P cyclo G6 od 4/12/2017   POW #5  IOP 8  + iritis and conj hyperemia     Right eye   Begin PA tid  Begin atropine q day     Both eyes   Cont cosopt  - OU bid  Cont brimonidine tid OU    Left eye   bisi tid   lumigan q hs         A/P:  Doing well   IOP 14 OD  (still ok off diamox)   Trace cell/flare --> cont pred acetate tid od  - still with cell and flare   Continue atropine - DECREASE TO Q  OD - stop when runs out   IOP went up off diamox from 8 to 14 od and from 14 to 19 os     F/U 1 month - if iritis ok can begin steroid taper - once od stabilizes - will need to consider surgery os - better eye

## 2017-05-22 DIAGNOSIS — E87.6 HYPOKALEMIA: ICD-10-CM

## 2017-05-22 DIAGNOSIS — D05.12 DUCTAL CARCINOMA IN SITU (DCIS) OF LEFT BREAST: ICD-10-CM

## 2017-05-22 RX ORDER — TAMOXIFEN CITRATE 20 MG/1
20 TABLET ORAL DAILY
Qty: 30 TABLET | Refills: 0 | Status: SHIPPED | OUTPATIENT
Start: 2017-05-22 | End: 2017-06-12 | Stop reason: SDUPTHER

## 2017-05-23 RX ORDER — POTASSIUM CHLORIDE 20 MEQ/1
20 TABLET, EXTENDED RELEASE ORAL 2 TIMES DAILY
Qty: 60 TABLET | Refills: 1 | Status: SHIPPED | OUTPATIENT
Start: 2017-05-23 | End: 2017-07-20 | Stop reason: SDUPTHER

## 2017-06-01 RX ORDER — BRIMONIDINE TARTRATE 2 MG/ML
SOLUTION/ DROPS OPHTHALMIC
Qty: 10 ML | Refills: 12 | Status: SHIPPED | OUTPATIENT
Start: 2017-06-01 | End: 2017-08-18 | Stop reason: SDUPTHER

## 2017-06-12 DIAGNOSIS — D05.12 DUCTAL CARCINOMA IN SITU (DCIS) OF LEFT BREAST: ICD-10-CM

## 2017-06-12 RX ORDER — TAMOXIFEN CITRATE 20 MG/1
20 TABLET ORAL DAILY
Qty: 30 TABLET | Refills: 0 | Status: SHIPPED | OUTPATIENT
Start: 2017-06-12 | End: 2017-08-09 | Stop reason: SDUPTHER

## 2017-06-15 ENCOUNTER — OFFICE VISIT (OUTPATIENT)
Dept: OPHTHALMOLOGY | Facility: CLINIC | Age: 62
End: 2017-06-15
Payer: COMMERCIAL

## 2017-06-15 DIAGNOSIS — Z48.89 ENCOUNTER FOR POSTOPERATIVE CARE: Primary | ICD-10-CM

## 2017-06-15 DIAGNOSIS — H21.561 AFFERENT PUPILLARY DEFECT, RIGHT: ICD-10-CM

## 2017-06-15 DIAGNOSIS — H25.13 NUCLEAR SCLEROSIS OF BOTH EYES: ICD-10-CM

## 2017-06-15 DIAGNOSIS — H52.203 HYPEROPIA WITH ASTIGMATISM AND PRESBYOPIA, BILATERAL: ICD-10-CM

## 2017-06-15 DIAGNOSIS — H52.4 HYPEROPIA WITH ASTIGMATISM AND PRESBYOPIA, BILATERAL: ICD-10-CM

## 2017-06-15 DIAGNOSIS — H53.40 VISUAL FIELD LOSS: ICD-10-CM

## 2017-06-15 DIAGNOSIS — D23.10 PAPILLOMA OF LEFT EYELID: ICD-10-CM

## 2017-06-15 DIAGNOSIS — H40.1133 PRIMARY OPEN ANGLE GLAUCOMA OF BOTH EYES, SEVERE STAGE: ICD-10-CM

## 2017-06-15 DIAGNOSIS — H52.03 HYPEROPIA WITH ASTIGMATISM AND PRESBYOPIA, BILATERAL: ICD-10-CM

## 2017-06-15 PROCEDURE — 99024 POSTOP FOLLOW-UP VISIT: CPT | Mod: S$GLB,,, | Performed by: OPHTHALMOLOGY

## 2017-06-15 PROCEDURE — 99999 PR PBB SHADOW E&M-EST. PATIENT-LVL II: CPT | Mod: PBBFAC,,, | Performed by: OPHTHALMOLOGY

## 2017-06-15 RX ORDER — LOSARTAN POTASSIUM AND HYDROCHLOROTHIAZIDE 12.5; 5 MG/1; MG/1
TABLET ORAL
Qty: 30 TABLET | Refills: 11 | Status: SHIPPED | OUTPATIENT
Start: 2017-06-15 | End: 2017-12-06 | Stop reason: ALTCHOICE

## 2017-06-15 NOTE — PROGRESS NOTES
HPI     DLS: 5/18/17    Pt here for S/P MicoPulse G6 laser ciliary body destruction- OD 4/12/17    Pt states no pain or discomfort.     Meds: Atropine qday od            PF tid od           Brimonidine tid ou          Cosopt bid ou          Lumigan qhs os          Sandro 4% tid os    1. Post operative state  2. Primary open angle glaucoma of both eyes, severe stage  3. Visual field loss  4. Afferent pupillary defect, right eye  5. Papilloma of left eyelid  6. Nuclear sclerosis of both eyes  7. Hyperopia with astigmatism and presbyopia, bilateral     Last edited by Chapis York on 6/15/2017 11:24 AM. (History)            Assessment /Plan     For exam results, see Encounter Report.    Encounter for postoperative care    Primary open angle glaucoma of both eyes, severe stage    Visual field loss    Afferent pupillary defect, right    Nuclear sclerosis of both eyes    Hyperopia with astigmatism and presbyopia, bilateral    Papilloma of left eyelid        F/U CYCLO G6 OD - 4/12/2017  REFERRED BY DR ELISE FOR SEVERE POORLY CONTROLLED POAG - 7/24/2015   Pt recently moved from [place] to [facility]     Previously referred to Dr. Elise from [facility], review of outside records:    Tm 27/25.    Prior to that, IOP ranged mid-teens to low-20's on Combigan/Dorzolamide/Lumigan OU    C:D documented as 0.9 OD, 0.85 OS    Initial visit 3/29/12  IOP 10 OU on Combitan and Lumigan  C:D 0.85 OU  CCT 524OD, 531 OS    HVF's  2012 - superior defect affecting fixation OD with beginnings of early inferior arc; possible early superior arc OS  2013 - stable superior defect OD with early inferior arc; OS superior nasal step    Here for evaluation of POAG on MMT. Last IOP with Dr. Elise 21//19. IOP today 21//20 on MMT. Patient has a family history of glaucoma in maternal grandfather. States that she is compliant with all Gtt's.        Glaucoma (type and duration)    Dx about 2002 - [place]    First HVF   [facility]   First photos   First at [facility] 2015    Treatment / Drops started   2002            Family history    + mom / brother         Glaucoma meds    cosopt ou / brimonidine ou tid / lumigan ou q hs / bisi od tid / diamox / bisi ou        H/O adverse rxn to glaucoma drops    none        LASERS    SLT OD - 8/5/2013 - [place] // SLT os - [facility] 8/27/2015 (good resp 21-->16)        GLAUCOMA SURGERIES    CYCL G6 OD - 4/12/2017        OTHER EYE SURGERIES    none        CDR    0.99/0.95        Tbase    ?  Off gtts (on gtts was up to 28/25)         Tmax    27/25 - per outside record's (reviewed by Arik )             Ttarget    15 ou    (may need to be lower)           HVF   24-2 ss //  2 test 2015 to  [place]  od // SAD   10-2 ss - 3 test 2015 to 2017 - SALT/ IAD (progression) od // hint SAD / IAD os    (( 2 VF's from [place] - that were reviewed by Dr. IVEY - 2012 and 2013 -  SAD w/ fix invl . IAD od / early IAD os ))            Gonio    +3-4 ou         CCT    524/531        OCT    2 test 2016 to 2017 - RNFL - OD:dec. S/T/I/N // OS:dec. S/I, pratik N/T        HRT    1 test 2015 to 2015  - MR - dec. S/N/I od // dec./ S/N/I os        Disc photos    2015     - Ttoday  8 OD  ( POW #2 cyclo G6 od)    - Test done today    IOP check/AC check     2. + APD od     3.  NS ou    Minimal - [place] 20/25 ou still     4. Papilloma OS   To be removed with [facility]       PLAN   Severe POAG ou   OD > OS  IOP still above target - even with diamox 500 po bid    Cont  bisi to 4% od tid - change to ou - 1/15/2016   Cont all other glaucoma gtts ou   Cont diamox 500 sequals po twice daily     HVF's continue to progress - especially OD     IOP still too high ou - rec trab with MMC od vs cyclo G6 od - very little sight to save on this eye   Once od stable - consider trab with MMC os or cyclo G6   No vis sign cataracts   Long discussion with patient about the irreversible nature of the glaucomatous visual field and vision  loss.  Showed her how restricted her field in the right eye is using [employer] view master     Pts  is with her today 3/10/2017   Discussed at length the options of cyclo G6 od vs trab with MMC and PI - (pt is still phakic - minimal to no cataract) - a bit shallow for a tube or mini shunt.   In view of the extremely poor potential od - they are leaning towards the cyclo G6 - but they will think about it for a wile and will let angle know which one they choose to go ahead with.    With the left eye - I think incisional surgery may be best option - as there is more vision to save long term     Pt has elected to proceed with cyclo G6 od   S/P cyclo G6 od 4/12/2017   POM 2   IOP 16  + iritis     Right eye   Cont  PA tid    Both eyes   Cont cosopt  - OU bid  Cont brimonidine tid OU    Left eye   bisi tid   lumigan q hs         A/P:  Doing well - but with persitent iritis post G6 od   IOP 16 OD  (still ok off diamox)   +1-2 cell/flare --> cont pred acetate tid od  - still with cell and flare   Stop atropine   IOP went up off diamox from 8 to 14 od and from 14 to 19 os     F/U 1 month - if iritis ok can begin steroid taper - once od stabilizes - will need to consider surgery os - better eye

## 2017-07-18 DIAGNOSIS — E78.5 HYPERLIPIDEMIA: ICD-10-CM

## 2017-07-19 ENCOUNTER — PATIENT MESSAGE (OUTPATIENT)
Dept: INTERNAL MEDICINE | Facility: CLINIC | Age: 62
End: 2017-07-19

## 2017-07-19 DIAGNOSIS — E87.6 HYPOKALEMIA: ICD-10-CM

## 2017-07-19 RX ORDER — PRAVASTATIN SODIUM 20 MG/1
TABLET ORAL
Qty: 30 TABLET | Refills: 0 | Status: SHIPPED | OUTPATIENT
Start: 2017-07-19 | End: 2017-08-11 | Stop reason: SDUPTHER

## 2017-07-20 NOTE — TELEPHONE ENCOUNTER
Pt requesting to have noted medication refilled at noted pharmacy. Last lab for potassium was 3/6/17 and LOV 4/25/17 with an upcoming appt 10/27/17. Please review and advise. Thank you. CLC

## 2017-07-21 ENCOUNTER — OFFICE VISIT (OUTPATIENT)
Dept: OPHTHALMOLOGY | Facility: CLINIC | Age: 62
End: 2017-07-21
Payer: COMMERCIAL

## 2017-07-21 ENCOUNTER — TELEPHONE (OUTPATIENT)
Dept: HEMATOLOGY/ONCOLOGY | Facility: CLINIC | Age: 62
End: 2017-07-21

## 2017-07-21 DIAGNOSIS — H52.03 HYPEROPIA WITH ASTIGMATISM AND PRESBYOPIA, BILATERAL: ICD-10-CM

## 2017-07-21 DIAGNOSIS — H53.40 VISUAL FIELD LOSS: ICD-10-CM

## 2017-07-21 DIAGNOSIS — H52.203 HYPEROPIA WITH ASTIGMATISM AND PRESBYOPIA, BILATERAL: ICD-10-CM

## 2017-07-21 DIAGNOSIS — H21.561 AFFERENT PUPILLARY DEFECT, RIGHT: ICD-10-CM

## 2017-07-21 DIAGNOSIS — D23.10 PAPILLOMA OF LEFT EYELID: ICD-10-CM

## 2017-07-21 DIAGNOSIS — Z48.89 ENCOUNTER FOR POSTOPERATIVE CARE: Primary | ICD-10-CM

## 2017-07-21 DIAGNOSIS — H25.13 NUCLEAR SCLEROSIS OF BOTH EYES: ICD-10-CM

## 2017-07-21 DIAGNOSIS — H52.4 HYPEROPIA WITH ASTIGMATISM AND PRESBYOPIA, BILATERAL: ICD-10-CM

## 2017-07-21 DIAGNOSIS — H40.1133 PRIMARY OPEN ANGLE GLAUCOMA OF BOTH EYES, SEVERE STAGE: ICD-10-CM

## 2017-07-21 PROCEDURE — 99024 POSTOP FOLLOW-UP VISIT: CPT | Mod: S$GLB,,, | Performed by: OPHTHALMOLOGY

## 2017-07-21 PROCEDURE — 99999 PR PBB SHADOW E&M-EST. PATIENT-LVL II: CPT | Mod: PBBFAC,,, | Performed by: OPHTHALMOLOGY

## 2017-07-21 NOTE — PROGRESS NOTES
HPI     DLS: 6/15/17    Pt here for S/P MicoPulse G6 laser ciliary body destruction- OD 4/12/17  Pt states no pain or discomfort. Pt states it feels like a film over OD.     Meds:  PA od tid    Cosopt bid ou   Brimonidine ou tid ou             Lumigan qhs os            Sandro 4% tid os     1. Post operative state  2. Primary open angle glaucoma of both eyes, severe stage  3. Visual field loss  4. Afferent pupillary defect, right  5. Nuclear sclerosis of both eyes  6. Hyperopia with astigmatism and presbyopia, bilateral  7. Papilloma of left eyelid    Last edited by Jacqueline Perez MD on 7/21/2017 12:54 PM. (History)            Assessment /Plan     For exam results, see Encounter Report.    Encounter for postoperative care    Primary open angle glaucoma of both eyes, severe stage    Visual field loss    Afferent pupillary defect, right    Nuclear sclerosis of both eyes    Hyperopia with astigmatism and presbyopia, bilateral    Papilloma of left eyelid        F/U CYCLO G6 OD - 4/12/2017  REFERRED BY DR HILLMAN FOR SEVERE POORLY CONTROLLED POAG - 7/24/2015   Pt recently moved from [place] to [facility]     Previously referred to Dr. Hillman from [facility], review of outside records:    Tm 27/25.    Prior to that, IOP ranged mid-teens to low-20's on Combigan/Dorzolamide/Lumigan OU    C:D documented as 0.9 OD, 0.85 OS    Initial visit 3/29/12  IOP 10 OU on Combitan and Lumigan  C:D 0.85 OU  CCT 524OD, 531 OS    HVF's  2012 - superior defect affecting fixation OD with beginnings of early inferior arc; possible early superior arc OS  2013 - stable superior defect OD with early inferior arc; OS superior nasal step    Here for evaluation of POAG on MMT. Last IOP with Dr. Hillman 21//19. IOP today 21//20 on MMT. Patient has a family history of glaucoma in maternal grandfather. States that she is compliant with all Gtt's.        Glaucoma (type and duration)    Dx about 2002 - [facility]    First at [facility] 2015   First photos   First at [facility] 2015    Treatment / Drops started   2002            Family history    + mom / brother         Glaucoma meds    cosopt ou / brimonidine ou tid / lumigan ou q hs / bisi od tid / diamox / bisi ou        H/O adverse rxn to glaucoma drops    none        LASERS    SLT OD - 8/5/2013 - [place] // SLT os - [facility] 8/27/2015 (good resp 21-->16)        GLAUCOMA SURGERIES    CYCL G6 OD - 4/12/2017        OTHER EYE SURGERIES    none        CDR    0.99/0.95        Tbase    ?  Off gtts (on gtts was up to 28/25)         Tmax    27/25 - per outside record's (reviewed by Arik )             Ttarget    15 ou    (may need to be lower)           HVF   24-2 ss //  2 test 2015 to  [place]  od // SAD   10-2 ss - 3 test 2015 to 2017 - SALT/ IAD (progression) od // hint SAD / IAD os    (( 2 VF's from [place] - that were reviewed by Dr. IVEY - 2012 and 2013 -  SAD w/ fix invl . IAD od / early IAD os ))            Gonio    +3-4 ou         CCT    524/531        OCT    2 test 2016 to 2017 - RNFL - OD:dec. S/T/I/N // OS:dec. S/Ipratik N/T        HRT    1 test 2015 to 2015  - MR - dec. S/N/I od // dec./ S/N/I os        Disc photos    2015     - Ttoday 20/18 (3 months post G6 od)   - Test done today    IOP check/AC check     2. + APD od     3.  NS ou    Minimal - [place] 20/25 ou still     4. Papilloma OS   To be removed with [facility]       PLAN   Severe POAG ou   OD > OS  IOP still above target - even with diamox 500 po bid    Cont  bisi to 4% od tid - change to ou - 1/15/2016   Cont all other glaucoma gtts ou   Cont diamox 500 sequals po twice daily     HVF's continue to progress - especially OD     IOP still too high ou - rec trab with MMC od vs cyclo G6 od - very little sight to save on this eye   Once od stable - consider trab with MMC os or cyclo G6   No vis sign cataracts   Long discussion with patient about the irreversible nature of the glaucomatous visual field and  vision loss.  Showed her how restricted her field in the right eye is using [employer] view master     Pts  is with her today 3/10/2017   Discussed at length the options of cyclo G6 od vs trab with MMC and PI - (pt is still phakic - minimal to no cataract) - a bit shallow for a tube or mini shunt.   In view of the extremely poor potential od - they are leaning towards the cyclo G6 - but they will think about it for a wile and will let angle know which one they choose to go ahead with.    With the left eye - I think incisional surgery may be best option - as there is more vision to save long term     Pt has elected to proceed with cyclo G6 od   S/P cyclo G6 od 4/12/2017   POM 3   IOP 20 (back on cosopt and brimonidine)   + iritis       A/P:  Doing well - but with persitent iritis post G6 od - and IOP creeping up - still on pred acetate  IOP 20 OD  - on cosopt and brimonidine   +1 cell/flare --> dec pred acetate to bid od  - still with cell and flare     IOP went up off diamox from 8 to 14 od and from 14 to 19 os     ADJUST GTTS   OD   pred acetate bid    OU   cosopt tid ou   Brimonidine tid ou    OS   bisi tid os  lumigan qhs os     F/U - 1 month IOP check - if iritis ok can cont with taper od - will be outside the 90 day post op period   Once stable - consider trab os

## 2017-07-23 RX ORDER — POTASSIUM CHLORIDE 20 MEQ/1
20 TABLET, EXTENDED RELEASE ORAL 2 TIMES DAILY
Qty: 60 TABLET | Refills: 2 | Status: SHIPPED | OUTPATIENT
Start: 2017-07-23 | End: 2017-10-11 | Stop reason: SDUPTHER

## 2017-08-09 DIAGNOSIS — D05.12 DUCTAL CARCINOMA IN SITU (DCIS) OF LEFT BREAST: ICD-10-CM

## 2017-08-09 DIAGNOSIS — H40.1133 PRIMARY OPEN ANGLE GLAUCOMA OF BOTH EYES, SEVERE STAGE: ICD-10-CM

## 2017-08-09 RX ORDER — DORZOLAMIDE HYDROCHLORIDE AND TIMOLOL MALEATE 20; 5 MG/ML; MG/ML
SOLUTION/ DROPS OPHTHALMIC
Qty: 10 ML | Refills: 11 | Status: SHIPPED | OUTPATIENT
Start: 2017-08-09 | End: 2017-08-18 | Stop reason: SDUPTHER

## 2017-08-09 RX ORDER — TAMOXIFEN CITRATE 20 MG/1
TABLET ORAL
Qty: 30 TABLET | Refills: 0 | Status: SHIPPED | OUTPATIENT
Start: 2017-08-09 | End: 2017-08-22 | Stop reason: SDUPTHER

## 2017-08-11 DIAGNOSIS — H40.1133 PRIMARY OPEN ANGLE GLAUCOMA OF BOTH EYES, SEVERE STAGE: ICD-10-CM

## 2017-08-11 DIAGNOSIS — E78.5 HYPERLIPIDEMIA: ICD-10-CM

## 2017-08-11 RX ORDER — PRAVASTATIN SODIUM 20 MG/1
TABLET ORAL
Qty: 30 TABLET | Refills: 0 | Status: SHIPPED | OUTPATIENT
Start: 2017-08-11 | End: 2017-09-03 | Stop reason: SDUPTHER

## 2017-08-18 ENCOUNTER — OFFICE VISIT (OUTPATIENT)
Dept: OPHTHALMOLOGY | Facility: CLINIC | Age: 62
End: 2017-08-18
Payer: COMMERCIAL

## 2017-08-18 DIAGNOSIS — H21.561 AFFERENT PUPILLARY DEFECT, RIGHT: ICD-10-CM

## 2017-08-18 DIAGNOSIS — H52.03 HYPEROPIA WITH ASTIGMATISM AND PRESBYOPIA, BILATERAL: ICD-10-CM

## 2017-08-18 DIAGNOSIS — H40.1133 PRIMARY OPEN ANGLE GLAUCOMA OF BOTH EYES, SEVERE STAGE: Primary | ICD-10-CM

## 2017-08-18 DIAGNOSIS — Z48.89 ENCOUNTER FOR POSTOPERATIVE CARE: ICD-10-CM

## 2017-08-18 DIAGNOSIS — H20.9 IRITIS OF RIGHT EYE: ICD-10-CM

## 2017-08-18 DIAGNOSIS — H53.40 VISUAL FIELD LOSS: ICD-10-CM

## 2017-08-18 DIAGNOSIS — D23.10 PAPILLOMA OF LEFT EYELID: ICD-10-CM

## 2017-08-18 DIAGNOSIS — H52.203 HYPEROPIA WITH ASTIGMATISM AND PRESBYOPIA, BILATERAL: ICD-10-CM

## 2017-08-18 DIAGNOSIS — H52.4 HYPEROPIA WITH ASTIGMATISM AND PRESBYOPIA, BILATERAL: ICD-10-CM

## 2017-08-18 PROCEDURE — 99999 PR PBB SHADOW E&M-EST. PATIENT-LVL II: CPT | Mod: PBBFAC,,, | Performed by: OPHTHALMOLOGY

## 2017-08-18 PROCEDURE — 92012 INTRM OPH EXAM EST PATIENT: CPT | Mod: S$GLB,,, | Performed by: OPHTHALMOLOGY

## 2017-08-18 RX ORDER — DORZOLAMIDE HYDROCHLORIDE AND TIMOLOL MALEATE 20; 5 MG/ML; MG/ML
1 SOLUTION/ DROPS OPHTHALMIC 3 TIMES DAILY
Qty: 10 ML | Refills: 11 | Status: SHIPPED | OUTPATIENT
Start: 2017-08-18 | End: 2018-04-06 | Stop reason: RX

## 2017-08-18 RX ORDER — ACETAZOLAMIDE 500 MG/1
500 CAPSULE, EXTENDED RELEASE ORAL 2 TIMES DAILY
Qty: 60 CAPSULE | Refills: 6 | Status: SHIPPED | OUTPATIENT
Start: 2017-08-18 | End: 2017-12-22 | Stop reason: SDUPTHER

## 2017-08-18 RX ORDER — PREDNISOLONE ACETATE 10 MG/ML
1 SUSPENSION/ DROPS OPHTHALMIC
Qty: 10 ML | Refills: 3 | Status: SHIPPED | OUTPATIENT
Start: 2017-08-18 | End: 2018-01-11 | Stop reason: SDUPTHER

## 2017-08-18 RX ORDER — BRIMONIDINE TARTRATE 2 MG/ML
1 SOLUTION/ DROPS OPHTHALMIC 3 TIMES DAILY
Qty: 10 ML | Refills: 12 | Status: SHIPPED | OUTPATIENT
Start: 2017-08-18 | End: 2019-08-02 | Stop reason: SDUPTHER

## 2017-08-18 RX ORDER — PILOCARPINE HYDROCHLORIDE 40 MG/ML
1 SOLUTION/ DROPS OPHTHALMIC 3 TIMES DAILY
Qty: 15 ML | Refills: 12 | Status: SHIPPED | OUTPATIENT
Start: 2017-08-18 | End: 2020-04-02 | Stop reason: SDUPTHER

## 2017-08-18 NOTE — PROGRESS NOTES
HPI     DLS: 7/21/17    Pt here for 1 month check;  Pt states no pain or discomfort.    Meds: Cosopt bid ou             Brimonidine tid ou             Lumigan qhs os              Sandro 4% tid os             PA bid tid    1. Primary open glaucoma of both eyes, severe stage  2. Visual field loss  3. Afferent pupillary defect, right  4. Nuclear sclerosis of both eyes  5. Hyperopia with astigmatism and presbyopia, bilateral  6. Papilloma of left eyelid     Last edited by Chapis York on 8/18/2017 11:42 AM. (History)            Assessment /Plan     For exam results, see Encounter Report.    Primary open angle glaucoma of both eyes, severe stage    Iritis of right eye    Visual field loss    Afferent pupillary defect, right    Hyperopia with astigmatism and presbyopia, bilateral    Papilloma of left eyelid          F/U CYCLO G6 OD - 4/12/2017  REFERRED BY DR ELISE FOR SEVERE POORLY CONTROLLED POAG - 7/24/2015   Pt recently moved from [place] to [facility]     Previously referred to Dr. Elise from [facility], review of outside records:    Tm 27/25.    Prior to that, IOP ranged mid-teens to low-20's on Combigan/Dorzolamide/Lumigan OU    C:D documented as 0.9 OD, 0.85 OS    Initial visit 3/29/12  IOP 10 OU on Combitan and Lumigan  C:D 0.85 OU  CCT 524OD, 531 OS    HVF's  2012 - superior defect affecting fixation OD with beginnings of early inferior arc; possible early superior arc OS  2013 - stable superior defect OD with early inferior arc; OS superior nasal step    Here for evaluation of POAG on MMT. Last IOP with Dr. Elise 21//19. IOP today 21//20 on MMT. Patient has a family history of glaucoma in maternal grandfather. States that she is compliant with all Gtt's.        Glaucoma (type and duration)    Dx about 2002 - [place]    First HVF   First at [facility] 2015   First photos   First at [facility] 2015    Treatment / Drops started   2002            Family history    + mom / brother          "Glaucoma meds    cosopt ou / brimonidine ou tid / lumigan ou q hs / bisi od tid / diamox / bisi ou        H/O adverse rxn to glaucoma drops    none        LASERS    SLT OD - 8/5/2013 - [place] // SLT os - [facility] 8/27/2015 (good resp 21-->16)        GLAUCOMA SURGERIES    CYCL G6 OD - 4/12/2017        OTHER EYE SURGERIES    none        CDR    0.99/0.95        Tbase    ?  Off gtts (on gtts was up to 28/25)         Tmax    27/25 - per outside record's (reviewed by Arik )             Ttarget    15 ou    (may need to be lower)           HVF   24-2 ss //  2 test [place] // SAD   10-2 ss - 3 test 2015 to 2017 - SALT/ IAD (progression) od // hint SAD / IAD os    (( 2 VF's from [place] - that were reviewed by Dr. IVEY - 2012 and 2013 -  SAD w/ fix invl . IAD od / early IAD os ))            Gonio    +3-4 ou         CCT    524/531        OCT    2 test 2016 to 2017 - RNFL - OD:dec. S/T/I/N // OS:dec. S/I, pratik N/T        HRT    1 test 2015 to 2015  - MR - dec. S/N/I od // dec./ S/N/I os        Disc photos    2015     - Ttoday 20/18 (3 months post G6 od)   - Test done today    IOP check/AC check     2. + APD od     3.  NS ou    Minimal - [facility] 20/25 ou still     4. Papilloma OS   To be removed with Arik       HVF's continue to progress - especially OD       From previous notes:  "IOP still too high ou - rec trab with MMC od vs cyclo G6 od - very little sight to save on this eye   Once od stable - consider trab with MMC os or cyclo G6   No vis sign cataracts   Long discussion with patient about the irreversible nature of the glaucomatous visual field and vision loss.  Showed her how restricted her field in the right eye is using Platter view master     Pts  is with her today 3/10/2017   Discussed at length the options of cyclo G6 od vs trab with MMC and PI - (pt is still phakic - minimal to no cataract) - a bit shallow for a tube or mini shunt.   In view of the extremely poor potential od - " "they are leaning towards the cyclo G6 - but they will think about it for a wile and will let angle know which one they choose to go ahead with.    With the left eye - I think incisional surgery may be best option - as there is more vision to save long term "       S/P cyclo G6 od 4/12/2017   POM 3   IOP 29 still on steroids given prolonged Iritis  + iritis         A/P:  Doing well - but with persitent iritis post G6 od - and IOP creeping up - still on pred acetate  IOP 29 OD  - on cosopt and brimonidine   +1 cell/flare --> dec pred acetate to bid od  - still with cell and flare     ADD DIAMOX BACK 500 PO BID - HAS SOME LEFT OVER     ADJUST GTTS   OD   pred acetate increase to 6 x day for 2 weeks then qid till next visit     OU   cosopt tid ou   Brimonidine tid ou    OS   bisi tid os  lumigan qhs os     F/U - 1 month IOP check - if iritis ok can cont with taper od - will be outside the 90 day post op period   Once stable - consider trab os     PT MAY NEED TO HAVE A GDD OD - ALTHOUGH THE ON AND VF ARE SO DAMAGED THAT IT MAY SNUFF - and pt is still phakic     Pt likely will need trab os - but IOP is good today at 15 - and will likely go down further now that the diamox has been re-started         "

## 2017-08-22 ENCOUNTER — OFFICE VISIT (OUTPATIENT)
Dept: HEMATOLOGY/ONCOLOGY | Facility: CLINIC | Age: 62
End: 2017-08-22
Payer: COMMERCIAL

## 2017-08-22 VITALS
WEIGHT: 175.06 LBS | HEIGHT: 65 IN | BODY MASS INDEX: 29.17 KG/M2 | RESPIRATION RATE: 18 BRPM | SYSTOLIC BLOOD PRESSURE: 122 MMHG | DIASTOLIC BLOOD PRESSURE: 67 MMHG | HEART RATE: 65 BPM | TEMPERATURE: 98 F

## 2017-08-22 DIAGNOSIS — D05.12 DUCTAL CARCINOMA IN SITU (DCIS) OF LEFT BREAST: Primary | ICD-10-CM

## 2017-08-22 DIAGNOSIS — N60.99 ATYPICAL DUCTAL HYPERPLASIA OF BREAST: ICD-10-CM

## 2017-08-22 PROCEDURE — 3008F BODY MASS INDEX DOCD: CPT | Mod: S$GLB,,, | Performed by: NURSE PRACTITIONER

## 2017-08-22 PROCEDURE — 3074F SYST BP LT 130 MM HG: CPT | Mod: S$GLB,,, | Performed by: NURSE PRACTITIONER

## 2017-08-22 PROCEDURE — 99213 OFFICE O/P EST LOW 20 MIN: CPT | Mod: S$GLB,,, | Performed by: NURSE PRACTITIONER

## 2017-08-22 PROCEDURE — 99999 PR PBB SHADOW E&M-EST. PATIENT-LVL V: CPT | Mod: PBBFAC,,, | Performed by: NURSE PRACTITIONER

## 2017-08-22 PROCEDURE — 3078F DIAST BP <80 MM HG: CPT | Mod: S$GLB,,, | Performed by: NURSE PRACTITIONER

## 2017-08-22 NOTE — PROGRESS NOTES
HISTORY OF PRESENT ILLNESS:  This is a 62-year-old black female known to Dr. 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  She is status post lumpectomy, post-lumpectomy irradiation   and presently on adjuvant tamoxifen 20 mg daily.  She presents to the clinic   today for her 18-month post-therapy evaluation.  She denies any new breast complaints,   bone pain, difficulties with hot flashes, vaginal bleeding, abdominal discomfort/bloating,   nausea, vomiting, constipation, diarrhea, irregular heartbeat, chest pain, myalgias cramping, etc.    She remains compliant on Tamoxifen.  No other new complaints or pertinent   findings on a 14-point review of systems.    PHYSICAL EXAMINATION:  GENERAL:  Well-developed, well-nourished black female in no acute distress.    Alert and oriented x3.  VITAL SIGNS:  Weight 175 pounds (increase of 2 pounds in six months),   /67, pulse 65, respirations 18 and temp 97.7.  HEENT:  Normocephalic, atraumatic.  Oral mucosa pink and moist.  Lips without   lesions.  Tongue midline.  Oropharynx clear.  Nonicteric sclerae.  NECK:  Supple, no adenopathy.  No carotid bruits, thyromegaly or thyroid nodule.  HEART:  Regular rate and rhythm without murmur, gallop or rub.  LUNGS:  Clear to auscultation bilaterally.  Normal respiratory effort.  ABDOMEN:  Soft, nontender, nondistended with positive normoactive bowel sounds,   no hepatosplenomegaly.  EXTREMITIES:  No cyanosis, clubbing or edema.  Distal pulses are intact.  AXILLAE AND GROIN:  No palpable pathologic lymphadenopathy is appreciated.  SKIN:  Intact/turgor normal.  NEUROLOGIC:  Cranial nerves II-XII grossly intact.  Motor:  Good muscle bulk and   tone.  Strength/sensory 5/5 throughout.  Gait stable.  BREASTS:  The right breast is soft, free of masses, tenderness, nipple discharge   or inversion.  The left breast with noted healed lumpectomy scar to the upper   outer region; no signs  of local recurrence.    LABORATORY DATA:    [facility]  Lab Results   Component Value Date     05/16/2017    K 3.6 05/16/2017     05/16/2017    CO2 27 05/16/2017    BUN 13 05/16/2017    CREATININE 0.9 05/16/2017    CALCIUM 9.3 05/16/2017    ANIONGAP 11 05/16/2017    ESTGFRAFRICA >60.0 05/16/2017    EGFRNONAA >60.0 05/16/2017     RADIOLOGY:  BMD dated 03/14/17:  Impression:  Normal.  CXR dated 03/14/17:  No evidence of active chest disease.    IMPRESSION:  1.  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  2.  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  3.  Hypokalemia, resolved, continue potassium supplementation.    PLAN:  1.  Continue tamoxifen 20 mg daily.  2.  Return to clinic in 6 months for 24 month post therapy evaluation with interval   CBC, CMP, LDH, CXR, MAMMO (on or after 12/02/17).    Assessment/plan reviewed and approved by Dr. Rider.

## 2017-08-24 DIAGNOSIS — D05.12 DUCTAL CARCINOMA IN SITU (DCIS) OF LEFT BREAST: ICD-10-CM

## 2017-08-24 DIAGNOSIS — N60.99 BENIGN MAMMARY DYSPLASIA, UNSPECIFIED: Primary | ICD-10-CM

## 2017-08-24 RX ORDER — TAMOXIFEN CITRATE 20 MG/1
20 TABLET ORAL DAILY
Qty: 30 TABLET | Refills: 0 | Status: SHIPPED | OUTPATIENT
Start: 2017-08-24 | End: 2017-10-18 | Stop reason: SDUPTHER

## 2017-09-03 DIAGNOSIS — E78.5 HYPERLIPIDEMIA: ICD-10-CM

## 2017-09-04 RX ORDER — PRAVASTATIN SODIUM 20 MG/1
TABLET ORAL
Qty: 30 TABLET | Refills: 8 | Status: SHIPPED | OUTPATIENT
Start: 2017-09-04 | End: 2018-08-01 | Stop reason: SDUPTHER

## 2017-09-15 ENCOUNTER — OFFICE VISIT (OUTPATIENT)
Dept: OPHTHALMOLOGY | Facility: CLINIC | Age: 62
End: 2017-09-15
Payer: COMMERCIAL

## 2017-09-15 DIAGNOSIS — D23.10 PAPILLOMA OF EYELID, LEFT: ICD-10-CM

## 2017-09-15 DIAGNOSIS — H52.03 HYPEROPIA WITH ASTIGMATISM AND PRESBYOPIA, BILATERAL: ICD-10-CM

## 2017-09-15 DIAGNOSIS — H53.40 VISUAL FIELD LOSS: ICD-10-CM

## 2017-09-15 DIAGNOSIS — D23.10 PAPILLOMA OF LEFT EYELID: ICD-10-CM

## 2017-09-15 DIAGNOSIS — H21.561 AFFERENT PUPILLARY DEFECT, RIGHT: ICD-10-CM

## 2017-09-15 DIAGNOSIS — H52.4 HYPEROPIA WITH ASTIGMATISM AND PRESBYOPIA, BILATERAL: ICD-10-CM

## 2017-09-15 DIAGNOSIS — H25.13 NUCLEAR SCLEROSIS OF BOTH EYES: ICD-10-CM

## 2017-09-15 DIAGNOSIS — H20.9 IRITIS OF RIGHT EYE: Primary | ICD-10-CM

## 2017-09-15 DIAGNOSIS — H52.203 HYPEROPIA WITH ASTIGMATISM AND PRESBYOPIA, BILATERAL: ICD-10-CM

## 2017-09-15 PROCEDURE — 92012 INTRM OPH EXAM EST PATIENT: CPT | Mod: S$GLB,,, | Performed by: OPHTHALMOLOGY

## 2017-09-15 PROCEDURE — 99999 PR PBB SHADOW E&M-EST. PATIENT-LVL II: CPT | Mod: PBBFAC,,, | Performed by: OPHTHALMOLOGY

## 2017-09-15 NOTE — PROGRESS NOTES
HPI     DLS: 8/18/17    Pt here for IOP check;    Meds: Cosopt bid ou             Brimonidine tid ou             Lumigan qhs os             Sandro 4% tid os   PA - still taking QID OD  Diamox 500mg PO BID                 1. Primary open angle glaucoma of both eyes, severe stage  2. Iritis of right eye  3. Visual field loss  4. Afferent pupillary defect, right  5. Hyperopia with astigmatism and presbyopia, bilateral  6. Papilloma of left eyelid    Last edited by Jacqueline Perez MD on 9/15/2017 12:24 PM. (History)            Assessment /Plan     For exam results, see Encounter Report.    Iritis of right eye    Visual field loss    Afferent pupillary defect, right    Hyperopia with astigmatism and presbyopia, bilateral    Papilloma of left eyelid    Nuclear sclerosis of both eyes    Papilloma of eyelid, left        F/U CYCLO G6 OD - 4/12/2017  REFERRED BY DR HILLMAN FOR SEVERE POORLY CONTROLLED POAG - 7/24/2015   Pt recently moved from [place] to [facility]     Previously referred to Dr. Hillman from [facility], review of outside records:    Tm 27/25.    Prior to that, IOP ranged mid-teens to low-20's on Combigan/Dorzolamide/Lumigan OU    C:D documented as 0.9 OD, 0.85 OS    Initial visit 3/29/12  IOP 10 OU on Combitan and Lumigan  C:D 0.85 OU  CCT 524OD, 531 OS    HVF's  2012 - superior defect affecting fixation OD with beginnings of early inferior arc; possible early superior arc OS  2013 - stable superior defect OD with early inferior arc; OS superior nasal step    Here for evaluation of POAG on MMT. Last IOP with Dr. Hillman 21//19. IOP today 21//20 on MMT. Patient has a family history of glaucoma in maternal grandfather. States that she is compliant with all Gtt's.        Glaucoma (type and duration)    Dx about 2002 - [place]    First HVF   First at [facility] 2015   First photos   First at [facility] 2015    Treatment / Drops started   2002              Family history    + mom / brother       "   Glaucoma meds    cosopt ou / brimonidine ou tid / lumigan ou q hs / bisi od tid / diamox / bisi ou        H/O adverse rxn to glaucoma drops    none        LASERS    SLT OD - 8/5/2013 - [place] // SLT os - [facility] 8/27/2015 (good resp 21-->16)        GLAUCOMA SURGERIES    CYCL G6 OD - 4/12/2017        OTHER EYE SURGERIES    none        CDR    0.99/0.95        Tbase    ?  Off gtts (on gtts was up to 28/25)         Tmax    27/25 - per outside record's (reviewed by Arik )             Ttarget    15 ou    (may need to be lower)           HVF   24-2 ss //  2 test 2015 to  [place] [facility]  [place] // SAD   10-2 ss - 3 test 2015 to 2017 - SALT/ IAD (progression) od // hint SAD / IAD os    (( 2 VF's from [place] - that were reviewed by Dr. IVEY - 2012 and 2013 -  SAD w/ fix invl . IAD od / early IAD os ))            Gonio    +3-4 ou         CCT    524/531        OCT    2 test 2016 to 2017 - RNFL - OD:dec. S/T/I/N // OS:dec. S/I, pratik N/T        HRT    1 test 2015 to 2015  - MR - dec. S/N/I od // dec./ S/N/I os        Disc photos    2015     - Ttoday 26/16 (4 months post G6 od)   - Test done today    IOP check/AC check     2. + APD od     3.  NS ou    Minimal - [facility] 20/25 ou still     4. Papilloma OS   To be removed with Arik       HVF's continue to progress - especially OD       POM 5 od - cyclo G6  "IOP still too high od - very little sight to save on this eye   S/P  cyclo G6  OD  (4/12/2017)  - IOP back up and persistent iritis   Rec phaco / IOL / GDD - ? ahmed od  (increase in cataract od post cyclo G6 and slightly shallow AC)   Pt is reluctant for surgery   Explained that with the IOP up she will likely loose her remaining sight soon       With the left eye - I think incisional surgery may be best option - as there is more vision to save long term "  IOP seems ok for now on Maximum drops and diamox        ADJUST GTTS   OD   Decrease PA to bid - still with iritis - and ?? Steroid reponder     OU   cosopt tid " ou   Brimonidine tid ou  Diamox pills 500 bid     OS   bisi tid os  lumigan qhs os     Pt likely will need trab os - but IOP is good today at 16    RECOMMEND   COMBINED PHACO/IOL AHMED OD - pt will think about it - literature given   If IOP goes up os - better eye will need surgery - IOP ok at 16 os today

## 2017-09-27 DIAGNOSIS — H40.1133 PRIMARY OPEN ANGLE GLAUCOMA OF BOTH EYES, SEVERE STAGE: ICD-10-CM

## 2017-09-27 RX ORDER — PILOCARPINE HYDROCHLORIDE 40 MG/ML
SOLUTION/ DROPS OPHTHALMIC
Qty: 15 ML | Refills: 6 | Status: SHIPPED | OUTPATIENT
Start: 2017-09-27 | End: 2017-11-16 | Stop reason: SDUPTHER

## 2017-10-11 ENCOUNTER — TELEPHONE (OUTPATIENT)
Dept: INTERNAL MEDICINE | Facility: CLINIC | Age: 62
End: 2017-10-11

## 2017-10-11 DIAGNOSIS — E87.6 HYPOKALEMIA: ICD-10-CM

## 2017-10-12 RX ORDER — POTASSIUM CHLORIDE 20 MEQ/1
TABLET, EXTENDED RELEASE ORAL
Qty: 60 TABLET | Refills: 0 | Status: SHIPPED | OUTPATIENT
Start: 2017-10-12 | End: 2017-12-06 | Stop reason: SDUPTHER

## 2017-10-12 NOTE — TELEPHONE ENCOUNTER
----- Message from Negrita Thompson sent at 10/12/2017 10:40 AM CDT -----  Contact: Deb Coats is returning call. Please call [phone]. Thanks!

## 2017-10-18 DIAGNOSIS — D05.12 DUCTAL CARCINOMA IN SITU (DCIS) OF LEFT BREAST: ICD-10-CM

## 2017-10-18 RX ORDER — TAMOXIFEN CITRATE 20 MG/1
20 TABLET ORAL DAILY
Qty: 30 TABLET | Refills: 0 | Status: SHIPPED | OUTPATIENT
Start: 2017-10-18 | End: 2017-11-17 | Stop reason: SDUPTHER

## 2017-10-19 ENCOUNTER — LAB VISIT (OUTPATIENT)
Dept: LAB | Facility: HOSPITAL | Age: 62
End: 2017-10-19
Attending: INTERNAL MEDICINE
Payer: COMMERCIAL

## 2017-10-19 DIAGNOSIS — E87.6 HYPOKALEMIA: ICD-10-CM

## 2017-10-19 LAB
ANION GAP SERPL CALC-SCNC: 11 MMOL/L
BUN SERPL-MCNC: 13 MG/DL
CALCIUM SERPL-MCNC: 9.4 MG/DL
CHLORIDE SERPL-SCNC: 111 MMOL/L
CO2 SERPL-SCNC: 21 MMOL/L
CREAT SERPL-MCNC: 0.9 MG/DL
EST. GFR  (AFRICAN AMERICAN): >60 ML/MIN/1.73 M^2
EST. GFR  (NON AFRICAN AMERICAN): >60 ML/MIN/1.73 M^2
GLUCOSE SERPL-MCNC: 93 MG/DL
POTASSIUM SERPL-SCNC: 3.3 MMOL/L
SODIUM SERPL-SCNC: 143 MMOL/L

## 2017-10-19 PROCEDURE — 80048 BASIC METABOLIC PNL TOTAL CA: CPT

## 2017-10-19 PROCEDURE — 36415 COLL VENOUS BLD VENIPUNCTURE: CPT | Mod: PO

## 2017-10-20 ENCOUNTER — TELEPHONE (OUTPATIENT)
Dept: FAMILY MEDICINE | Facility: CLINIC | Age: 62
End: 2017-10-20

## 2017-10-20 NOTE — TELEPHONE ENCOUNTER
----- Message from Briseyda Franklin sent at 10/20/2017  1:12 PM CDT -----  Contact: patient   Patient Deb Tesfaye, [phone] is returning the call from Dr. Tapia's office regarding test results. Placed call to POD, no answer.   Please advise.  Thanks!

## 2017-10-27 ENCOUNTER — OFFICE VISIT (OUTPATIENT)
Dept: INTERNAL MEDICINE | Facility: CLINIC | Age: 62
End: 2017-10-27
Payer: COMMERCIAL

## 2017-10-27 ENCOUNTER — LAB VISIT (OUTPATIENT)
Dept: LAB | Facility: HOSPITAL | Age: 62
End: 2017-10-27
Attending: INTERNAL MEDICINE
Payer: COMMERCIAL

## 2017-10-27 VITALS
BODY MASS INDEX: 28.4 KG/M2 | DIASTOLIC BLOOD PRESSURE: 78 MMHG | SYSTOLIC BLOOD PRESSURE: 126 MMHG | OXYGEN SATURATION: 96 % | HEART RATE: 66 BPM | WEIGHT: 170.63 LBS | RESPIRATION RATE: 18 BRPM

## 2017-10-27 DIAGNOSIS — E78.5 HYPERLIPIDEMIA, UNSPECIFIED HYPERLIPIDEMIA TYPE: ICD-10-CM

## 2017-10-27 DIAGNOSIS — L30.9 ECZEMA, UNSPECIFIED TYPE: ICD-10-CM

## 2017-10-27 DIAGNOSIS — Z00.00 HEALTH CARE MAINTENANCE: ICD-10-CM

## 2017-10-27 DIAGNOSIS — E87.6 HYPOKALEMIA: ICD-10-CM

## 2017-10-27 DIAGNOSIS — I10 ESSENTIAL HYPERTENSION: ICD-10-CM

## 2017-10-27 DIAGNOSIS — D05.12 DUCTAL CARCINOMA IN SITU (DCIS) OF LEFT BREAST: ICD-10-CM

## 2017-10-27 LAB — POTASSIUM UR-SCNC: 39 MMOL/L

## 2017-10-27 PROCEDURE — 90670 PCV13 VACCINE IM: CPT | Mod: S$GLB,,, | Performed by: INTERNAL MEDICINE

## 2017-10-27 PROCEDURE — 84133 ASSAY OF URINE POTASSIUM: CPT

## 2017-10-27 PROCEDURE — 90472 IMMUNIZATION ADMIN EACH ADD: CPT | Mod: S$GLB,,, | Performed by: INTERNAL MEDICINE

## 2017-10-27 PROCEDURE — 90686 IIV4 VACC NO PRSV 0.5 ML IM: CPT | Mod: S$GLB,,, | Performed by: INTERNAL MEDICINE

## 2017-10-27 PROCEDURE — 99999 PR PBB SHADOW E&M-EST. PATIENT-LVL III: CPT | Mod: PBBFAC,,, | Performed by: INTERNAL MEDICINE

## 2017-10-27 PROCEDURE — 90471 IMMUNIZATION ADMIN: CPT | Mod: S$GLB,,, | Performed by: INTERNAL MEDICINE

## 2017-10-27 PROCEDURE — 99214 OFFICE O/P EST MOD 30 MIN: CPT | Mod: 25,S$GLB,, | Performed by: INTERNAL MEDICINE

## 2017-10-27 RX ORDER — DORZOLAMIDE HCL 20 MG/ML
SOLUTION/ DROPS OPHTHALMIC
COMMUNITY
Start: 2017-08-29 | End: 2017-11-16

## 2017-10-27 RX ORDER — TIMOLOL MALEATE 5 MG/ML
SOLUTION/ DROPS OPHTHALMIC
Refills: 3 | COMMUNITY
Start: 2017-10-12 | End: 2017-11-16

## 2017-10-27 RX ORDER — DESONIDE 0.5 MG/G
CREAM TOPICAL 2 TIMES DAILY
Qty: 15 G | Refills: 3 | Status: SHIPPED | OUTPATIENT
Start: 2017-10-27 | End: 2017-11-16

## 2017-10-27 NOTE — PROGRESS NOTES
HISTORY OF PRESENT ILLNESS:  Pt. is a 62 y.o. female presents for 6 month monitoring of her hyperlipidemia, hypokalemia, HTN, DCIS of left breast.  She has high-grade DCIS of the left breast as well as atypical ductal hyperplasia. The patient is status post lumpectomy, post-lumpectomy radiation, and is receiving adjuvant tamoxifen 20 mg daily.  Is followed by Dr. Quiles, has upcoming appt. With him and Dr. Rider. She is on pravastatin 20 mg po q day.  Her last potassium was slightly decreased, states she has been taking regularly., HTN is WNL on current meds.  She is due for pneumonia vaccines, flu vaccine.  Is UTD with Pap, will be due for mammogram after 12/2/17.  Had colonoscopy done 2016.  Tetanus done 10/26/16.  She states she has some eczema behind her ears.    Lab Results   Component Value Date    WBC 7.91 03/01/2017    HGB 13.4 03/01/2017    HCT 38.7 03/01/2017     03/01/2017    CHOL 176 04/25/2017    TRIG 84 04/25/2017    HDL 48 04/25/2017    ALT 38 03/01/2017    AST 26 03/01/2017     10/19/2017    K 3.3 (L) 10/19/2017     (H) 10/19/2017    CREATININE 0.9 10/19/2017    BUN 13 10/19/2017    CO2 21 (L) 10/19/2017    TSH 1.925 11/28/2016     Lab Results   Component Value Date    LDLCALC 111.2 04/25/2017             ROS:  GENERAL: No fever, chills, fatigability or weight loss.  SKIN: No rashes, itching or changes in color or texture of skin; eczema behind ears;  HEAD: No headaches or recent head trauma.  EARS: Denies ear pain, discharge or vertigo.  NOSE: No loss of smell, no epistaxis; occ postnasal drip.  MOUTH & THROAT: No hoarseness or change in voice. No excessive gum bleeding.  NODES: Denies swollen glands.  CHEST: Denies MCCOY, cyanosis, wheezing, slight cough and no sputum production.  CARDIOVASCULAR: Denies chest pain, PND, orthopnea or reduced exercise tolerance.  ABDOMEN: Appetite fine. No weight loss. Denies constipation, diarrhea, abdominal pain, hematemesis or blood in  stool.  URINARY: No flank pain, dysuria or hematuria.  PERIPHERAL VASCULAR: No claudication or cyanosis. No edema.  MUSCULOSKELETAL: No joint stiffness or swelling. Denies back pain; occ sciatica/rare;  NEUROLOGIC: Denies numbness    PE:   Vitals:   Vitals:    10/27/17 0900   BP: 126/78   Pulse: 66   Resp: 18     GENERAL: no acute distress, A&Ox3, comfortable.  Female with BMI of 28   HEENT: tympanic membranes clear, nasal mucosa pink, no pharyngeal erythema or exudate  NECK: supple, no cervical lymphadenopathy, no thyromegaly; no supraclavicular nodes;   CHEST:  clear to auscultation bilaterally, no crackles or wheeze; no increased work of breathing;  CARDIOVASCULAR: regular rate and rhythm, no rubs, murmurs or gallops.  ABDOMEN: normal bowel sounds, soft non-tender, non-distended; no palpable organomegaly;   EXT: no clubbing, cyanosis or edema.     ASSESSMENT/PLAN:    HTN: in control on current meds; will continue;    Hyperlipidemia: in control on current pravastatin; will continue;    DCIS of left breast: has high-grade DCIS of the left breast as well as atypical ductal hyperplasia. The patient is status post lumpectomy, post-lumpectomy radiation, and is receiving adjuvant tamoxifen 20 mg daily.  Is followed by Dr. Quiles, has upcoming appt. With him and Dr. Rider;    Hypokalemia  -     Potassium, urine, random; Future; Expected date: 11/10/2017  -     Basic metabolic panel; Future; Expected date: 01/27/2018    Eczema, unspecified type  -     desonide (DESOWEN) 0.05 % cream; Apply topically 2 (two) times daily.  Dispense: 15 g; Refill: 3    Health Maintenance: She is due for pneumonia vaccines, flu vaccine.  Is UTD with Pap, will be due for mammogram after 12/2/17.  Had colonoscopy done 2016.  Tetanus done 10/26/16.   -     (In Office Administered) Pneumococcal Conjugate Vaccine (13 Valent) (IM)      Call if condition changes or worsens.    Answers for HPI/ROS submitted by the patient on 10/19/2017   activity  change: No  unexpected weight change: No  neck pain: No  hearing loss: No  rhinorrhea: No  trouble swallowing: No  eye discharge: Yes  visual disturbance: No  chest tightness: No  wheezing: No  chest pain: No  palpitations: No  blood in stool: No  constipation: No  vomiting: No  diarrhea: No  polydipsia: No  polyuria: No  difficulty urinating: No  hematuria: No  menstrual problem: No  dysuria: No  joint swelling: No  arthralgias: No  headaches: No  weakness: No  confusion: No  dysphoric mood: No

## 2017-11-07 ENCOUNTER — PATIENT MESSAGE (OUTPATIENT)
Dept: OPHTHALMOLOGY | Facility: CLINIC | Age: 62
End: 2017-11-07

## 2017-11-14 ENCOUNTER — TELEPHONE (OUTPATIENT)
Dept: OPHTHALMOLOGY | Facility: CLINIC | Age: 62
End: 2017-11-14

## 2017-11-16 ENCOUNTER — OFFICE VISIT (OUTPATIENT)
Dept: OPHTHALMOLOGY | Facility: CLINIC | Age: 62
End: 2017-11-16
Payer: COMMERCIAL

## 2017-11-16 DIAGNOSIS — H20.9 IRITIS OF RIGHT EYE: ICD-10-CM

## 2017-11-16 DIAGNOSIS — H25.13 NUCLEAR SCLEROSIS OF BOTH EYES: ICD-10-CM

## 2017-11-16 DIAGNOSIS — D23.10 PAPILLOMA OF EYELID, LEFT: ICD-10-CM

## 2017-11-16 DIAGNOSIS — H21.561 AFFERENT PUPILLARY DEFECT, RIGHT: ICD-10-CM

## 2017-11-16 DIAGNOSIS — H40.1133 PRIMARY OPEN ANGLE GLAUCOMA OF BOTH EYES, SEVERE STAGE: Primary | ICD-10-CM

## 2017-11-16 DIAGNOSIS — H53.40 VISUAL FIELD LOSS: ICD-10-CM

## 2017-11-16 PROCEDURE — 99999 PR PBB SHADOW E&M-EST. PATIENT-LVL II: CPT | Mod: PBBFAC,,, | Performed by: OPHTHALMOLOGY

## 2017-11-16 PROCEDURE — 92012 INTRM OPH EXAM EST PATIENT: CPT | Mod: S$GLB,,, | Performed by: OPHTHALMOLOGY

## 2017-11-17 DIAGNOSIS — D05.12 DUCTAL CARCINOMA IN SITU (DCIS) OF LEFT BREAST: ICD-10-CM

## 2017-11-17 RX ORDER — TAMOXIFEN CITRATE 20 MG/1
20 TABLET ORAL DAILY
Qty: 30 TABLET | Refills: 0 | Status: SHIPPED | OUTPATIENT
Start: 2017-11-17 | End: 2017-12-06 | Stop reason: SDUPTHER

## 2017-11-17 NOTE — PROGRESS NOTES
HPI     Glaucoma    Additional comments: IOP ck today           Comments   DLS: 9/15/17    1. POAG OD>OS  2. APD OD  3. NS OU  4. Papilloma OS  5. Steroid Responder    MEDS:  PA BID OD  Cosopt BID OU  Brimonidine TID OU  Bisi 4% TID OS  Lumigan QHS OS  Diamox 500mg BID PO       Last edited by Bharati Graham MA on 11/16/2017  2:09 PM. (History)            Assessment /Plan     For exam results, see Encounter Report.    Primary open angle glaucoma of both eyes, severe stage    Iritis of right eye    Visual field loss    Afferent pupillary defect, right    Nuclear sclerosis of both eyes    Papilloma of eyelid, left        F/U CYCLO G6 OD - 4/12/2017  REFERRED BY DR HILLMAN FOR SEVERE POORLY CONTROLLED POAG - 7/24/2015   Pt recently moved from [place] to [facility]     Previously referred to Dr. Hillman from [facility], review of outside records:    Tm 27/25.    Prior to that, IOP ranged mid-teens to low-20's on Combigan/Dorzolamide/Lumigan OU    C:D documented as 0.9 OD, 0.85 OS    Initial visit 3/29/12  IOP 10 OU on Combitan and Lumigan  C:D 0.85 OU  CCT 524OD, 531 OS    HVF's  2012 - superior defect affecting fixation OD with beginnings of early inferior arc; possible early superior arc OS  2013 - stable superior defect OD with early inferior arc; OS superior nasal step    Here for evaluation of POAG on MMT. Last IOP with Dr. Hillman 21//19. IOP today 21//20 on MMT. Patient has a family history of glaucoma in maternal grandfather. States that she is compliant with all Gtt's.        Glaucoma (type and duration)    Dx about 2002 - [place]    First HVF   First at [facility] 2015   First photos   First at [facility] 2015    Treatment / Drops started   2002              Family history    + mom / brother         Glaucoma meds    cosopt ou / brimonidine ou tid / lumigan ou q hs / bisi od tid / diamox / bisi ou        H/O adverse rxn to glaucoma drops    none        LASERS    SLT OD - 8/5/2013 - [place] // SLT  "os - [facility] 8/[facility]/2015 (good resp 21-->16)        GLAUCOMA SURGERIES    CYCL G6 OD - 4/12/2017        OTHER EYE SURGERIES    none        CDR    0.99/0.95        Tbase    ?  Off gtts (on gtts was up to 28/25)         Tmax    27/25 - per outside record's (reviewed by Arik )             Ttarget    15 ou    (may need to be lower)           HVF   24-2 ss //  2 test 2015 to  [place] [facility] [place]  od // SAD   10-2 ss - 3 test 2015 to 2017 - SALT/ IAD (progression) od // hint SAD / IAD os    (( 2 VF's from [place] - that were reviewed by Dr. IVEY - 2012 and 2013 -  SAD w/ fix invl . IAD od / early IAD os ))            Gonio    +3-4 ou         CCT    524/531        OCT    2 test 2016 to 2017 - RNFL - OD:dec. S/T/I/N // OS:dec. S/I, pratik N/T        HRT    1 test 2015 to 2015  - MR - dec. S/N/I od // dec./ S/N/I os        Disc photos    2015     - Ttoday 26/16 (4 months post G6 od)   - Test done today    IOP check/AC check     2. + APD od     3.  NS ou    Minimal - [facility] 20/25 ou still     4. Papilloma OS   To be removed with Arik       HVF's continue to progress - especially OD       POM 5 od - cyclo G6  "IOP still too high od - very little sight to save on this eye   S/P  cyclo G6  OD  (4/12/2017)  - IOP back up and persistent iritis   Rec phaco / IOL / GDD - ? ahmed od  (increase in cataract od post cyclo G6 and slightly shallow AC)   Pt is reluctant for surgery   Explained that with the IOP up she will likely loose her remaining sight soon       With the left eye - I think incisional surgery may be best option - as there is more vision to save long term "  IOP seems ok for now on Maximum drops and diamox        ADJUST GTTS   OD   Decrease PA to q day od     OU   cosopt tid ou   Brimonidine tid ou  Diamox pills 500 bid     OS   bisi tid os  lumigan qhs os     Pt likely will need trab os - but IOP is good today at 16    RECOMMEND   COMBINED PHACO/IOL AHMED OD - January - trypan blue // ?? If dilates well   If IOP " goes up os - better eye will need surgery - IOP ok at 12 os today

## 2017-11-27 ENCOUNTER — OFFICE VISIT (OUTPATIENT)
Dept: INTERNAL MEDICINE | Facility: CLINIC | Age: 62
End: 2017-11-27
Payer: COMMERCIAL

## 2017-11-27 VITALS
WEIGHT: 168.19 LBS | BODY MASS INDEX: 28.02 KG/M2 | HEIGHT: 65 IN | TEMPERATURE: 98 F | HEART RATE: 66 BPM | SYSTOLIC BLOOD PRESSURE: 120 MMHG | OXYGEN SATURATION: 98 % | DIASTOLIC BLOOD PRESSURE: 76 MMHG

## 2017-11-27 DIAGNOSIS — J40 BRONCHITIS: ICD-10-CM

## 2017-11-27 DIAGNOSIS — J32.9 SINUSITIS, UNSPECIFIED CHRONICITY, UNSPECIFIED LOCATION: Primary | ICD-10-CM

## 2017-11-27 PROCEDURE — 99999 PR PBB SHADOW E&M-EST. PATIENT-LVL III: CPT | Mod: PBBFAC,,, | Performed by: INTERNAL MEDICINE

## 2017-11-27 PROCEDURE — 99213 OFFICE O/P EST LOW 20 MIN: CPT | Mod: S$GLB,,, | Performed by: INTERNAL MEDICINE

## 2017-11-27 RX ORDER — CEFDINIR 300 MG/1
600 CAPSULE ORAL DAILY
Qty: 20 CAPSULE | Refills: 0 | Status: SHIPPED | OUTPATIENT
Start: 2017-11-27 | End: 2017-12-07

## 2017-11-27 RX ORDER — AZELASTINE 1 MG/ML
1 SPRAY, METERED NASAL 2 TIMES DAILY
Qty: 30 ML | Refills: 11 | Status: SHIPPED | OUTPATIENT
Start: 2017-11-27 | End: 2018-12-10 | Stop reason: SDUPTHER

## 2017-11-27 RX ORDER — BENZONATATE 200 MG/1
200 CAPSULE ORAL 2 TIMES DAILY PRN
Qty: 20 CAPSULE | Refills: 0 | Status: SHIPPED | OUTPATIENT
Start: 2017-11-27 | End: 2018-04-09 | Stop reason: SDUPTHER

## 2017-11-27 NOTE — PROGRESS NOTES
HISTORY OF PRESENT ILLNESS:  Pt. is a 62 y.o. female presents with complaint of URI for the last several weeks.  She has yellow rhinorrhea, is coughing a lot, especially at night.  She has been using mucinex DM.  She is having surgery 1/10/18 for her right eye.  She states initial fever, but not the last several days.  She has a slight headache.    Health Maintenance Topics with due status: Not Due       Topic Last Completion Date    Pap Smear with HPV Cotest 12/02/2015    Colonoscopy 07/20/2016    TETANUS VACCINE 10/26/2016    Mammogram 12/02/2016    Lipid Panel 04/25/2017    Pneumococcal PPSV23 (High Risk) Not Due     Health Maintenance Due   Topic Date Due    Zoster Vaccine  02/12/2017       Lab Results   Component Value Date    WBC 7.91 03/01/2017    HGB 13.4 03/01/2017    HCT 38.7 03/01/2017     03/01/2017    CHOL 176 04/25/2017    TRIG 84 04/25/2017    HDL 48 04/25/2017    LDLCALC 111.2 04/25/2017    ALT 38 03/01/2017    AST 26 03/01/2017     10/19/2017    K 3.3 (L) 10/19/2017     (H) 10/19/2017    CREATININE 0.9 10/19/2017    BUN 13 10/19/2017    CO2 21 (L) 10/19/2017    ALBUMIN 4.4 03/01/2017    TSH 1.925 11/28/2016       Past Medical History:   Diagnosis Date    Breast cancer     Cataract     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  Mother with colon cancer.    COLONOSCOPY  12/2010    COLONOSCOPY N/A 7/20/2016    Procedure: COLONOSCOPY;  Surgeon: Janak De Leon Jr., MD;  Location: [facility];  Service: Endoscopy;  Laterality: N/A;    SELECTIVE LASER TRABECULPALSTY Right 8/13    OS DONE IN [place], [place]    SELECTIVE LASER TRABECUPLASTY Left 8/27/15    OS WITH     TUBAL LIGATION         Social History     Social History    Marital status:      Spouse name: N/A    Number  of children: 3    Years of education: N/A     Social History Main Topics    Smoking status: Never Smoker    Smokeless tobacco: Never Used    Alcohol use No    Drug use: No    Sexual activity: Yes     Partners: Male     Other Topics Concern    None     Social History Narrative    None       ROS:  GENERAL: No fever, chills, fatigability or weight loss.  SKIN: No rashes, itching or changes in color or texture of skin.  HEAD: Slight headaches; no recent head trauma.  EARS: Denies ear pain, discharge or vertigo.  NOSE: No loss of smell, no epistaxis; positive postnasal drip.  MOUTH & THROAT: No hoarseness or change in voice. No excessive gum bleeding.  NODES: Denies swollen glands.  CHEST: Denies MCCOY, cyanosis, wheezing, cough and sputum production.  CARDIOVASCULAR: Denies chest pain, PND, orthopnea or reduced exercise tolerance.  ABDOMEN: Appetite fine. No weight loss. Denies constipation, diarrhea, abdominal pain, hematemesis or blood in stool.  URINARY: No flank pain, dysuria or hematuria.  PERIPHERAL VASCULAR: No claudication or cyanosis. No edema.  MUSCULOSKELETAL: No joint stiffness or swelling. Denies back pain.  NEUROLOGIC: Denies numbness    PE:   Vitals:   Vitals:    11/27/17 1530   BP: 120/76   Pulse: 66   Temp: 98.2 °F (36.8 °C)     GENERAL: no acute distress, A&Ox3, comfortable.   HEENT: tympanic membranes dull, nasal mucosa pink, positive congestion; no pharyngeal erythema or exudate  NECK: supple, no cervical lymphadenopathy, no thyromegaly; no supraclavicular nodes;   CHEST:  clear to auscultation bilaterally, no crackles or wheeze; no increased work of breathing; positive cough on deep inspiration;  CARDIOVASCULAR: regular rate and rhythm, no rubs, murmurs or gallops.  ABDOMEN: normal bowel sounds, soft non-tender, non-distended; no palpable organomegaly;   EXT: no clubbing, cyanosis or edema.     ASSESSMENT/PLAN:    Sinusitis, unspecified chronicity, unspecified location  -     cefdinir  (OMNICEF) 300 MG capsule; Take 2 capsules (600 mg total) by mouth once daily.  Dispense: 20 capsule; Refill: 0  -     azelastine (ASTELIN) 137 mcg (0.1 %) nasal spray; 1 spray (137 mcg total) by Nasal route 2 (two) times daily.  Dispense: 30 mL; Refill: 11  -     benzonatate (TESSALON) 200 MG capsule; Take 1 capsule (200 mg total) by mouth 2 (two) times daily as needed for Cough.  Dispense: 20 capsule; Refill: 0    Bronchitis  -     cefdinir (OMNICEF) 300 MG capsule; Take 2 capsules (600 mg total) by mouth once daily.  Dispense: 20 capsule; Refill: 0  -     azelastine (ASTELIN) 137 mcg (0.1 %) nasal spray; 1 spray (137 mcg total) by Nasal route 2 (two) times daily.  Dispense: 30 mL; Refill: 11  -     benzonatate (TESSALON) 200 MG capsule; Take 1 capsule (200 mg total) by mouth 2 (two) times daily as needed for Cough.  Dispense: 20 capsule; Refill: 0        Medication List with Changes/Refills   New Medications    AZELASTINE (ASTELIN) 137 MCG (0.1 %) NASAL SPRAY    1 spray (137 mcg total) by Nasal route 2 (two) times daily.    BENZONATATE (TESSALON) 200 MG CAPSULE    Take 1 capsule (200 mg total) by mouth 2 (two) times daily as needed for Cough.    CEFDINIR (OMNICEF) 300 MG CAPSULE    Take 2 capsules (600 mg total) by mouth once daily.   Current Medications    ACETAZOLAMIDE (DIAMOX) 500 MG CPSR    Take 1 capsule (500 mg total) by mouth 2 (two) times daily.    BIMATOPROST (LUMIGAN) 0.01 % DROP    Place 1 drop into the left eye every evening.    BRIMONIDINE 0.2% (ALPHAGAN) 0.2 % DROP    Place 1 drop into both eyes 3 (three) times daily.    DORZOLAMIDE-TIMOLOL 2-0.5% (COSOPT) 22.3-6.8 MG/ML OPHTHALMIC SOLUTION    Place 1 drop into both eyes 3 (three) times daily.    ERGOCALCIFEROL, VITAMIN D2, (VITAMIN D ORAL)    Take 1,000 Units by mouth once daily.     KRILL OIL ORAL    Take 1 capsule by mouth once daily.     LOSARTAN-HYDROCHLOROTHIAZIDE 50-12.5 MG (HYZAAR) 50-12.5 MG PER TABLET    TAKE 1 TABLET BY MOUTH EVERY DAY     MULTIVITS,CA,MINERALS/IRON/FA (ONE-A-DAY WOMENS FORMULA ORAL)    Take 1 capsule by mouth once daily.    PILOCARPINE HCL 4% (PILOCAR) 4 % OPHTHALMIC SOLUTION    Place 1 drop into the left eye 3 (three) times daily.    POTASSIUM CHLORIDE SA (K-DUR,KLOR-CON) 20 MEQ TABLET    TAKE 1 TABLET(20 MEQ) BY MOUTH TWICE DAILY    PRAVASTATIN (PRAVACHOL) 20 MG TABLET    TAKE 1 TABLET BY MOUTH EVERY DAY    PREDNISOLONE ACETATE (PRED FORTE) 1 % DRPS    Place 1 drop into the right eye 6 (six) times daily.    TAMOXIFEN (NOLVADEX) 20 MG TAB    Take 1 tablet (20 mg total) by mouth once daily.     Call if condition changes or worsens.

## 2017-12-04 ENCOUNTER — HOSPITAL ENCOUNTER (OUTPATIENT)
Dept: RADIOLOGY | Facility: HOSPITAL | Age: 62
Discharge: HOME | End: 2017-12-04
Attending: NURSE PRACTITIONER
Payer: COMMERCIAL

## 2017-12-04 DIAGNOSIS — D05.12 DUCTAL CARCINOMA IN SITU (DCIS) OF LEFT BREAST: ICD-10-CM

## 2017-12-04 PROCEDURE — 71020 XR CHEST PA AND LATERAL: CPT | Mod: TC,PO

## 2017-12-04 PROCEDURE — 71020 XR CHEST PA AND LATERAL: CPT | Mod: 26,,, | Performed by: RADIOLOGY

## 2017-12-05 DIAGNOSIS — E87.6 HYPOKALEMIA: ICD-10-CM

## 2017-12-05 DIAGNOSIS — Z51.81 MEDICATION MONITORING ENCOUNTER: Primary | ICD-10-CM

## 2017-12-05 NOTE — TELEPHONE ENCOUNTER
Spoke with pt she is taking her potassium as directed and adjusted her diet to eating more foods high  In potassium.

## 2017-12-05 NOTE — TELEPHONE ENCOUNTER
----- Message from Tony Berg NP sent at 12/4/2017  3:58 PM CST -----  Potassium is still low.  She sees Dr. Tapia for replacement.    Results for JODI XIE (MRN [id number]) as of 12/4/2017 15:59    4/25/2017 10:11  Sodium: 142  Potassium: 2.9 (L)  Chloride: 108  CO2: 25  Anion Gap: 9  BUN, Bld: 17  Creatinine: 0.9  eGFR if non African American: >60.0  eGFR if African American: >60.0  Glucose: 104  Calcium: 9.5    5/16/2017 09:47  Sodium: 144  Potassium: 3.6  Chloride: 106  CO2: 27  Anion Gap: 11  BUN, Bld: 13  Creatinine: 0.9  eGFR if non African American: >60.0  eGFR if African American: >60.0  Glucose: 96  Calcium: 9.3    10/19/2017 12:46  Sodium: 143  Potassium: 3.3 (L)  Chloride: 111 (H)  CO2: 21 (L)  Anion Gap: 11  BUN, Bld: 13  Creatinine: 0.9  eGFR if non African American: >60.0  eGFR if African American: >60.0  Glucose: 93  Calcium: 9.4    12/4/2017 10:38  Sodium: 140  Potassium: 3.0 (L)  Chloride: 108  CO2: 24  Anion Gap: 8  BUN, Bld: 18  Creatinine: 0.9  eGFR if non : >60  eGFR if African American: >60  Glucose: 101  Calcium: 9.1  Alkaline Phosphatase: 78  Total Protein: 7.5  Albumin: 3.6  Total Bilirubin: 0.4  AST: 28  ALT: 26

## 2017-12-06 ENCOUNTER — TELEPHONE (OUTPATIENT)
Dept: OPHTHALMOLOGY | Facility: CLINIC | Age: 62
End: 2017-12-06

## 2017-12-06 DIAGNOSIS — H25.11 NUCLEAR SCLEROTIC CATARACT OF RIGHT EYE: Primary | ICD-10-CM

## 2017-12-06 DIAGNOSIS — D05.12 DUCTAL CARCINOMA IN SITU (DCIS) OF LEFT BREAST: ICD-10-CM

## 2017-12-06 DIAGNOSIS — H40.1133 PRIMARY OPEN ANGLE GLAUCOMA OF BOTH EYES, SEVERE STAGE: ICD-10-CM

## 2017-12-06 RX ORDER — VALSARTAN 160 MG/1
160 TABLET ORAL DAILY
Qty: 30 TABLET | Refills: 1 | Status: SHIPPED | OUTPATIENT
Start: 2017-12-06 | End: 2018-01-19 | Stop reason: SDUPTHER

## 2017-12-06 RX ORDER — TAMOXIFEN CITRATE 20 MG/1
20 TABLET ORAL DAILY
Qty: 30 TABLET | Refills: 11 | Status: SHIPPED | OUTPATIENT
Start: 2017-12-06 | End: 2018-12-12 | Stop reason: SDUPTHER

## 2017-12-06 RX ORDER — POTASSIUM CHLORIDE 20 MEQ/1
TABLET, EXTENDED RELEASE ORAL
Qty: 60 TABLET | Refills: 5 | Status: SHIPPED | OUTPATIENT
Start: 2017-12-06 | End: 2018-06-07 | Stop reason: SDUPTHER

## 2017-12-06 RX ORDER — POTASSIUM CHLORIDE 20 MEQ/1
TABLET, EXTENDED RELEASE ORAL
Qty: 60 TABLET | Refills: 0 | OUTPATIENT
Start: 2017-12-06

## 2017-12-06 NOTE — TELEPHONE ENCOUNTER
Let's try taking the HCTZ out of her B/P med, will send in new Rx.  Repeat BMP in one month.  Stop losartan HCT.  Sent in valsartan.

## 2017-12-07 DIAGNOSIS — H40.1133 PRIMARY OPEN ANGLE GLAUCOMA OF BOTH EYES, SEVERE STAGE: ICD-10-CM

## 2017-12-07 RX ORDER — ACETAZOLAMIDE 500 MG/1
500 CAPSULE, EXTENDED RELEASE ORAL 2 TIMES DAILY
Qty: 60 CAPSULE | Refills: 12 | Status: SHIPPED | OUTPATIENT
Start: 2017-12-07 | End: 2018-01-11

## 2017-12-13 ENCOUNTER — HOSPITAL ENCOUNTER (OUTPATIENT)
Dept: RADIOLOGY | Facility: HOSPITAL | Age: 62
Discharge: HOME | End: 2017-12-13
Attending: NURSE PRACTITIONER
Payer: COMMERCIAL

## 2017-12-13 DIAGNOSIS — D05.12 DUCTAL CARCINOMA IN SITU (DCIS) OF LEFT BREAST: ICD-10-CM

## 2017-12-13 PROCEDURE — 77062 BREAST TOMOSYNTHESIS BI: CPT | Mod: 26,,, | Performed by: RADIOLOGY

## 2017-12-13 PROCEDURE — 77062 BREAST TOMOSYNTHESIS BI: CPT | Mod: TC,PO

## 2017-12-13 PROCEDURE — 77066 DX MAMMO INCL CAD BI: CPT | Mod: 26,,, | Performed by: RADIOLOGY

## 2017-12-22 ENCOUNTER — OFFICE VISIT (OUTPATIENT)
Dept: OPHTHALMOLOGY | Facility: CLINIC | Age: 62
End: 2017-12-22
Payer: COMMERCIAL

## 2017-12-22 DIAGNOSIS — H21.561 AFFERENT PUPILLARY DEFECT, RIGHT: ICD-10-CM

## 2017-12-22 DIAGNOSIS — H52.203 HYPEROPIA WITH ASTIGMATISM AND PRESBYOPIA, BILATERAL: ICD-10-CM

## 2017-12-22 DIAGNOSIS — H25.11 NUCLEAR SCLEROTIC CATARACT OF RIGHT EYE: Primary | ICD-10-CM

## 2017-12-22 DIAGNOSIS — H25.13 NUCLEAR SCLEROSIS OF BOTH EYES: ICD-10-CM

## 2017-12-22 DIAGNOSIS — D23.10 PAPILLOMA OF EYELID, LEFT: ICD-10-CM

## 2017-12-22 DIAGNOSIS — H40.1133 PRIMARY OPEN ANGLE GLAUCOMA OF BOTH EYES, SEVERE STAGE: ICD-10-CM

## 2017-12-22 DIAGNOSIS — H52.03 HYPEROPIA WITH ASTIGMATISM AND PRESBYOPIA, BILATERAL: ICD-10-CM

## 2017-12-22 DIAGNOSIS — H52.4 HYPEROPIA WITH ASTIGMATISM AND PRESBYOPIA, BILATERAL: ICD-10-CM

## 2017-12-22 DIAGNOSIS — H53.40 VISUAL FIELD LOSS: ICD-10-CM

## 2017-12-22 DIAGNOSIS — H20.9 IRITIS OF RIGHT EYE: ICD-10-CM

## 2017-12-22 PROCEDURE — 99499 UNLISTED E&M SERVICE: CPT | Mod: S$GLB,,, | Performed by: OPHTHALMOLOGY

## 2017-12-22 PROCEDURE — 92136 OPHTHALMIC BIOMETRY: CPT | Mod: RT,S$GLB,, | Performed by: OPHTHALMOLOGY

## 2017-12-22 PROCEDURE — 99999 PR PBB SHADOW E&M-EST. PATIENT-LVL II: CPT | Mod: PBBFAC,,, | Performed by: OPHTHALMOLOGY

## 2017-12-22 RX ORDER — TROPICAMIDE 10 MG/ML
1 SOLUTION/ DROPS OPHTHALMIC
Status: CANCELLED | OUTPATIENT
Start: 2017-12-22

## 2017-12-22 RX ORDER — MOXIFLOXACIN 5 MG/ML
1 SOLUTION/ DROPS OPHTHALMIC
Status: CANCELLED | OUTPATIENT
Start: 2017-12-22

## 2017-12-22 RX ORDER — SODIUM CHLORIDE 9 MG/ML
INJECTION, SOLUTION INTRAVENOUS CONTINUOUS
Status: CANCELLED | OUTPATIENT
Start: 2017-12-22

## 2017-12-22 RX ORDER — PHENYLEPHRINE HYDROCHLORIDE 100 MG/ML
1 SOLUTION/ DROPS OPHTHALMIC
Status: CANCELLED | OUTPATIENT
Start: 2017-12-22

## 2017-12-22 RX ORDER — LIDOCAINE HYDROCHLORIDE 10 MG/ML
1 INJECTION, SOLUTION EPIDURAL; INFILTRATION; INTRACAUDAL; PERINEURAL ONCE
Status: CANCELLED | OUTPATIENT
Start: 2017-12-22 | End: 2017-12-22

## 2017-12-22 RX ORDER — KETOROLAC TROMETHAMINE 5 MG/ML
1 SOLUTION OPHTHALMIC
Status: CANCELLED | OUTPATIENT
Start: 2017-12-22

## 2017-12-23 NOTE — H&P
Subjective:       Patient ID: Deb Tesfaye is a 62 y.o. female.    Chief Complaint: Pre-op Exam (phaco and ahmed od)    HPI  Review of Systems   Constitutional: Negative.    HENT: Negative.    Eyes: Positive for visual disturbance.   Respiratory: Negative.    Cardiovascular: Negative.    Neurological: Negative.    Psychiatric/Behavioral: Negative.        Objective:      Physical Exam   Constitutional: She is oriented to person, place, and time. She appears well-developed and well-nourished.   HENT:   Head: Normocephalic.   Eyes:   See eye exam   Cardiovascular: Normal rate and regular rhythm.    Pulmonary/Chest: Effort normal.   Neurological: She is oriented to person, place, and time.       Assessment:       1. Nuclear sclerotic cataract of right eye    2. Primary open angle glaucoma of both eyes, severe stage    3. Iritis of right eye    4. Visual field loss    5. Afferent pupillary defect, right    6. Nuclear sclerosis of both eyes    7. Papilloma of eyelid, left    8. Hyperopia with astigmatism and presbyopia, bilateral        Plan:       Combine - phaco/IOL - trypan blue // and ahmed  OD

## 2017-12-23 NOTE — PROGRESS NOTES
HPI     Pre-op Exam    Additional comments: phaco and ahmed od           Comments   Preop Combined (Phaco IOL and Ahmed) OD (sx 1/10/18)    1. POAG OD>OS  2. APD OD  3. NS OU  4. Papilloma OS  5. Steroid Responder    MEDS:  PA QDAY OD  Cosopt TID OU  Brimonidine TID OU  Sandro 4% TID OS  Lumigan QHS OS  Diamox 500mg BID PO       Last edited by Bharati Graham MA on 12/22/2017  2:31 PM. (History)            Assessment /Plan     For exam results, see Encounter Report.    Nuclear sclerotic cataract of right eye  -     IOL Master - OU - Both Eyes    Primary open angle glaucoma of both eyes, severe stage    Iritis of right eye    Visual field loss    Afferent pupillary defect, right    Nuclear sclerosis of both eyes    Papilloma of eyelid, left    Hyperopia with astigmatism and presbyopia, bilateral      HERE FOR PRE-OP PHACO/IOL/AHMED - OD     F/U CYCLO G6 OD - 4/12/2017  REFERRED BY DR HILLMAN FOR SEVERE POORLY CONTROLLED POAG - 7/24/2015   Pt recently moved from [place] to [facility]     Previously referred to Dr. Hillman from [facility], review of outside records:    Tm 27/25.    Prior to that, IOP ranged mid-teens to low-20's on Combigan/Dorzolamide/Lumigan OU    C:D documented as 0.9 OD, 0.85 OS    Initial visit 3/29/12  IOP 10 OU on Combitan and Lumigan  C:D 0.85 OU  CCT 524OD, 531 OS    HVF's  2012 - superior defect affecting fixation OD with beginnings of early inferior arc; possible early superior arc OS  2013 - stable superior defect OD with early inferior arc; OS superior nasal step    Here for evaluation of POAG on MMT. Last IOP with Dr. Hillman 21//19. IOP today 21//20 on MMT. Patient has a family history of glaucoma in maternal grandfather. States that she is compliant with all Gtt's.        Glaucoma (type and duration)    Dx about 2002 - [place]    First HVF   First at [facility] 2015   First photos   First at [facility] 2015    Treatment / Drops started   2002              Family history     "+ mom / brother         Glaucoma meds    cosopt ou / brimonidine ou tid / lumigan ou q hs / bisi od tid / diamox / bisi ou        H/O adverse rxn to glaucoma drops    none        LASERS    SLT OD - 8/5/2013 - [facility] // SLT os - [facility] 8/27/2015 (good resp 21-->16)        GLAUCOMA SURGERIES    CYCL G6 OD - 4/12/2017        OTHER EYE SURGERIES    none        CDR    0.99/0.95        Tbase    ?  Off gtts (on gtts was up to 28/25)         Tmax    27/25 - per outside record's (reviewed by Arik )             Ttarget    15 ou    (may need to be lower)           HVF   24-2 ss //  2 test 2015 to  2017 [place] [facility]  od // SAD   10-2 ss - 3 test 2015 to 2017 - SALT/ IAD (progression) od // hint SAD / IAD os    (( 2 VF's from [facility] - that were reviewed by Dr. IVEY - 2012 and 2013 -  SAD w/ fix invl . IAD od / early IAD os ))            Gonio    +3-4 ou         CCT    524/531        OCT    2 test 2016 to 2017 - RNFL - OD:dec. S/T/I/N // OS:dec. S/I, pratik N/T        HRT    1 test 2015 to 2015  - MR - dec. S/N/I od // dec./ S/N/I os        Disc photos    2015     - Ttoday 26/16 (4 months post G6 od)   - Test done today    Pre-op phaco/IOL and ahmed OD     2. + APD od     3.  NS ou    Minimal - VA 20/25 ou still     4. Papilloma OS   To be removed with Arik       HVF's continue to progress - especially OD       S/P  - cyclo G6 - OD - 4/12/2017   "IOP still too high od - very little sight to save on this eye     - IOP back up and persistent iritis   Rec phaco / IOL / GDD - ? ahmed od  (increase in cataract od post cyclo G6 and slightly shallow AC)     With the left eye - I think incisional surgery may be best option - as there is more vision to save long term "  IOP seems ok for now on Maximum drops and diamox         GTTS   OD   Cont  PA to q day od     OU   cosopt tid ou   Brimonidine tid ou  Diamox pills 500 bid     OS   bisi tid os  lumigan qhs os     Pt likely will need trab os - but IOP is reasonably good  " today at 16 -17    RECOMMEND   COMBINED PHACO/IOL AHMED OD - January - trypan blue // ?? If dilates well   If IOP goes up os - better eye will need surgery - IOP borderline  at 17 os today     IOL calculations   OD  PCB00 23.0  AC IOL 18.5      Pt understands that even with surgery she may loose all the sight in the right eye   Pt is at very high risk for Snuff  Pt does not want to be aware of the surgery at all  May do ok with IV sedation and RB block - but she may do better with general anesthesia   Will have her discuss it with the anesthesia staff the day of surgery   Risks and benefits discussed and consent signed.

## 2018-01-09 NOTE — PRE-PROCEDURE INSTRUCTIONS
PreOp Instructions given:     - Verbal medication information (what to hold and what to take)   - NPO guidelines   - Arrival place directions given;  - Bathing with antibacterial soap   - Don't wear any jewelry or bring any valuables AM of surgery   - No makeup or moisturizer to face   - No perfume/cologne, powder, lotions or aftershave       Pt. verbalized understanding.     Denies any family history of side effects or issues with anesthesia or sedation.

## 2018-01-10 ENCOUNTER — ANESTHESIA (OUTPATIENT)
Dept: SURGERY | Facility: HOSPITAL | Age: 63
End: 2018-01-10
Payer: COMMERCIAL

## 2018-01-10 ENCOUNTER — ANESTHESIA EVENT (OUTPATIENT)
Dept: SURGERY | Facility: HOSPITAL | Age: 63
End: 2018-01-10
Payer: COMMERCIAL

## 2018-01-10 ENCOUNTER — HOSPITAL ENCOUNTER (OUTPATIENT)
Facility: HOSPITAL | Age: 63
Discharge: HOME | End: 2018-01-10
Attending: OPHTHALMOLOGY | Admitting: OPHTHALMOLOGY
Payer: COMMERCIAL

## 2018-01-10 ENCOUNTER — SURGERY (OUTPATIENT)
Age: 63
End: 2018-01-10

## 2018-01-10 VITALS
TEMPERATURE: 98 F | OXYGEN SATURATION: 100 % | HEART RATE: 74 BPM | SYSTOLIC BLOOD PRESSURE: 136 MMHG | DIASTOLIC BLOOD PRESSURE: 66 MMHG | BODY MASS INDEX: 28.16 KG/M2 | WEIGHT: 169 LBS | RESPIRATION RATE: 18 BRPM | HEIGHT: 65 IN

## 2018-01-10 DIAGNOSIS — H40.1113 PRIMARY OPEN-ANGLE GLAUCOMA, RIGHT EYE, SEVERE STAGE: ICD-10-CM

## 2018-01-10 DIAGNOSIS — H40.1133 PRIMARY OPEN ANGLE GLAUCOMA OF BOTH EYES, SEVERE STAGE: ICD-10-CM

## 2018-01-10 DIAGNOSIS — H25.11 NUCLEAR SCLEROTIC CATARACT OF RIGHT EYE: Primary | ICD-10-CM

## 2018-01-10 PROCEDURE — 36000706: Performed by: OPHTHALMOLOGY

## 2018-01-10 PROCEDURE — 36000707: Performed by: OPHTHALMOLOGY

## 2018-01-10 PROCEDURE — D9220A PRA ANESTHESIA: Mod: ANES,,, | Performed by: ANESTHESIOLOGY

## 2018-01-10 PROCEDURE — D9220A PRA ANESTHESIA: Mod: CRNA,,, | Performed by: NURSE ANESTHETIST, CERTIFIED REGISTERED

## 2018-01-10 PROCEDURE — 66180 AQUEOUS SHUNT EYE W/GRAFT: CPT | Mod: RT,,, | Performed by: OPHTHALMOLOGY

## 2018-01-10 PROCEDURE — 37000008 HC ANESTHESIA 1ST 15 MINUTES: Performed by: OPHTHALMOLOGY

## 2018-01-10 PROCEDURE — 25000003 PHARM REV CODE 250: Performed by: OPHTHALMOLOGY

## 2018-01-10 PROCEDURE — C9447 INJ, PHENYLEPHRINE KETOROLAC: HCPCS | Performed by: OPHTHALMOLOGY

## 2018-01-10 PROCEDURE — 25000003 PHARM REV CODE 250: Performed by: NURSE ANESTHETIST, CERTIFIED REGISTERED

## 2018-01-10 PROCEDURE — 27201423 OPTIME MED/SURG SUP & DEVICES STERILE SUPPLY: Performed by: OPHTHALMOLOGY

## 2018-01-10 PROCEDURE — 63600175 PHARM REV CODE 636 W HCPCS: Performed by: NURSE ANESTHETIST, CERTIFIED REGISTERED

## 2018-01-10 PROCEDURE — 27800903 OPTIME MED/SURG SUP & DEVICES OTHER IMPLANTS: Performed by: OPHTHALMOLOGY

## 2018-01-10 PROCEDURE — V2632 POST CHMBR INTRAOCULAR LENS: HCPCS | Performed by: OPHTHALMOLOGY

## 2018-01-10 PROCEDURE — 71000033 HC RECOVERY, INTIAL HOUR: Performed by: OPHTHALMOLOGY

## 2018-01-10 PROCEDURE — 37000009 HC ANESTHESIA EA ADD 15 MINS: Performed by: OPHTHALMOLOGY

## 2018-01-10 PROCEDURE — C1783 OCULAR IMP, AQUEOUS DRAIN DE: HCPCS | Performed by: OPHTHALMOLOGY

## 2018-01-10 PROCEDURE — 27200651 HC AIRWAY, LMA: Performed by: NURSE ANESTHETIST, CERTIFIED REGISTERED

## 2018-01-10 PROCEDURE — 63600175 PHARM REV CODE 636 W HCPCS: Performed by: OPHTHALMOLOGY

## 2018-01-10 PROCEDURE — 71000015 HC POSTOP RECOV 1ST HR: Performed by: OPHTHALMOLOGY

## 2018-01-10 PROCEDURE — 66984 XCAPSL CTRC RMVL W/O ECP: CPT | Mod: 51,RT,, | Performed by: OPHTHALMOLOGY

## 2018-01-10 DEVICE — LENS IOL ITEC PRELOAD 23.0D: Type: IMPLANTABLE DEVICE | Site: EYE | Status: FUNCTIONAL

## 2018-01-10 DEVICE — IMPLANTABLE DEVICE: Type: IMPLANTABLE DEVICE | Site: EYE | Status: FUNCTIONAL

## 2018-01-10 DEVICE — PERICARDIUM TUTOPLAST 1.5X1.5: Type: IMPLANTABLE DEVICE | Site: EYE | Status: FUNCTIONAL

## 2018-01-10 RX ORDER — MOXIFLOXACIN 5 MG/ML
1 SOLUTION/ DROPS OPHTHALMIC
Status: DISCONTINUED | OUTPATIENT
Start: 2018-01-10 | End: 2018-01-10 | Stop reason: HOSPADM

## 2018-01-10 RX ORDER — LIDOCAINE HYDROCHLORIDE 20 MG/ML
JELLY TOPICAL
Status: DISCONTINUED
Start: 2018-01-10 | End: 2018-01-10 | Stop reason: HOSPADM

## 2018-01-10 RX ORDER — GENTAMICIN SULFATE 40 MG/ML
INJECTION, SOLUTION INTRAMUSCULAR; INTRAVENOUS
Status: DISCONTINUED
Start: 2018-01-10 | End: 2018-01-10 | Stop reason: HOSPADM

## 2018-01-10 RX ORDER — MOXIFLOXACIN 5 MG/ML
SOLUTION/ DROPS OPHTHALMIC
Status: DISCONTINUED | OUTPATIENT
Start: 2018-01-10 | End: 2018-01-10 | Stop reason: HOSPADM

## 2018-01-10 RX ORDER — ATROPINE SULFATE 10 MG/ML
SOLUTION/ DROPS OPHTHALMIC
Status: DISCONTINUED
Start: 2018-01-10 | End: 2018-01-10 | Stop reason: WASHOUT

## 2018-01-10 RX ORDER — PREDNISOLONE ACETATE 10 MG/ML
SUSPENSION/ DROPS OPHTHALMIC
Status: DISCONTINUED | OUTPATIENT
Start: 2018-01-10 | End: 2018-01-10 | Stop reason: HOSPADM

## 2018-01-10 RX ORDER — PROPOFOL 10 MG/ML
VIAL (ML) INTRAVENOUS
Status: DISCONTINUED | OUTPATIENT
Start: 2018-01-10 | End: 2018-01-10

## 2018-01-10 RX ORDER — LIDOCAINE HYDROCHLORIDE 40 MG/ML
INJECTION, SOLUTION RETROBULBAR
Status: DISCONTINUED
Start: 2018-01-10 | End: 2018-01-10 | Stop reason: HOSPADM

## 2018-01-10 RX ORDER — SODIUM CHLORIDE 9 MG/ML
INJECTION, SOLUTION INTRAVENOUS CONTINUOUS
Status: DISCONTINUED | OUTPATIENT
Start: 2018-01-10 | End: 2018-01-10 | Stop reason: HOSPADM

## 2018-01-10 RX ORDER — LIDOCAINE HYDROCHLORIDE 10 MG/ML
INJECTION, SOLUTION EPIDURAL; INFILTRATION; INTRACAUDAL; PERINEURAL
Status: DISCONTINUED
Start: 2018-01-10 | End: 2018-01-10 | Stop reason: HOSPADM

## 2018-01-10 RX ORDER — TROPICAMIDE 10 MG/ML
1 SOLUTION/ DROPS OPHTHALMIC
Status: DISCONTINUED | OUTPATIENT
Start: 2018-01-10 | End: 2018-01-10 | Stop reason: HOSPADM

## 2018-01-10 RX ORDER — DEXAMETHASONE SODIUM PHOSPHATE 4 MG/ML
INJECTION, SOLUTION INTRA-ARTICULAR; INTRALESIONAL; INTRAMUSCULAR; INTRAVENOUS; SOFT TISSUE
Status: DISCONTINUED
Start: 2018-01-10 | End: 2018-01-10 | Stop reason: HOSPADM

## 2018-01-10 RX ORDER — KETOROLAC TROMETHAMINE 5 MG/ML
1 SOLUTION OPHTHALMIC
Status: DISCONTINUED | OUTPATIENT
Start: 2018-01-10 | End: 2018-01-10 | Stop reason: HOSPADM

## 2018-01-10 RX ORDER — LIDOCAINE HYDROCHLORIDE 10 MG/ML
1 INJECTION, SOLUTION EPIDURAL; INFILTRATION; INTRACAUDAL; PERINEURAL ONCE
Status: COMPLETED | OUTPATIENT
Start: 2018-01-10 | End: 2018-01-10

## 2018-01-10 RX ORDER — MIDAZOLAM HYDROCHLORIDE 1 MG/ML
INJECTION, SOLUTION INTRAMUSCULAR; INTRAVENOUS
Status: DISCONTINUED | OUTPATIENT
Start: 2018-01-10 | End: 2018-01-10

## 2018-01-10 RX ORDER — LIDOCAINE HYDROCHLORIDE 40 MG/ML
INJECTION, SOLUTION RETROBULBAR
Status: DISCONTINUED | OUTPATIENT
Start: 2018-01-10 | End: 2018-01-10 | Stop reason: HOSPADM

## 2018-01-10 RX ORDER — PHENYLEPHRINE HYDROCHLORIDE 100 MG/ML
1 SOLUTION/ DROPS OPHTHALMIC
Status: DISCONTINUED | OUTPATIENT
Start: 2018-01-10 | End: 2018-01-10 | Stop reason: HOSPADM

## 2018-01-10 RX ORDER — LIDOCAINE HCL/PF 100 MG/5ML
SYRINGE (ML) INTRAVENOUS
Status: DISCONTINUED | OUTPATIENT
Start: 2018-01-10 | End: 2018-01-10

## 2018-01-10 RX ORDER — LIDOCAINE HYDROCHLORIDE 10 MG/ML
INJECTION, SOLUTION EPIDURAL; INFILTRATION; INTRACAUDAL; PERINEURAL
Status: DISCONTINUED | OUTPATIENT
Start: 2018-01-10 | End: 2018-01-10 | Stop reason: HOSPADM

## 2018-01-10 RX ORDER — PREDNISOLONE ACETATE 10 MG/ML
SUSPENSION/ DROPS OPHTHALMIC
Status: DISCONTINUED
Start: 2018-01-10 | End: 2018-01-10 | Stop reason: HOSPADM

## 2018-01-10 RX ORDER — FENTANYL CITRATE 50 UG/ML
INJECTION, SOLUTION INTRAMUSCULAR; INTRAVENOUS
Status: DISCONTINUED | OUTPATIENT
Start: 2018-01-10 | End: 2018-01-10

## 2018-01-10 RX ORDER — PHENYLEPHRINE HYDROCHLORIDE 10 MG/ML
INJECTION INTRAVENOUS
Status: DISCONTINUED | OUTPATIENT
Start: 2018-01-10 | End: 2018-01-10

## 2018-01-10 RX ORDER — GENTAMICIN SULFATE 40 MG/ML
INJECTION, SOLUTION INTRAMUSCULAR; INTRAVENOUS
Status: DISCONTINUED | OUTPATIENT
Start: 2018-01-10 | End: 2018-01-10 | Stop reason: HOSPADM

## 2018-01-10 RX ORDER — ACETAMINOPHEN 325 MG/1
650 TABLET ORAL EVERY 6 HOURS PRN
Status: DISCONTINUED | OUTPATIENT
Start: 2018-01-10 | End: 2018-01-10 | Stop reason: HOSPADM

## 2018-01-10 RX ORDER — ONDANSETRON 2 MG/ML
INJECTION INTRAMUSCULAR; INTRAVENOUS
Status: DISCONTINUED | OUTPATIENT
Start: 2018-01-10 | End: 2018-01-10

## 2018-01-10 RX ORDER — SODIUM CHLORIDE 0.9 % (FLUSH) 0.9 %
3 SYRINGE (ML) INJECTION
Status: DISCONTINUED | OUTPATIENT
Start: 2018-01-10 | End: 2018-01-10 | Stop reason: HOSPADM

## 2018-01-10 RX ORDER — DEXAMETHASONE SODIUM PHOSPHATE 4 MG/ML
INJECTION, SOLUTION INTRA-ARTICULAR; INTRALESIONAL; INTRAMUSCULAR; INTRAVENOUS; SOFT TISSUE
Status: DISCONTINUED | OUTPATIENT
Start: 2018-01-10 | End: 2018-01-10 | Stop reason: HOSPADM

## 2018-01-10 RX ADMIN — EPHEDRINE SULFATE 10 MG: 50 INJECTION, SOLUTION INTRAMUSCULAR; INTRAVENOUS; SUBCUTANEOUS at 11:01

## 2018-01-10 RX ADMIN — PHENYLEPHRINE HYDROCHLORIDE 1 DROP: 100 SOLUTION/ DROPS OPHTHALMIC at 09:01

## 2018-01-10 RX ADMIN — PHENYLEPHRINE HYDROCHLORIDE 100 MCG: 10 INJECTION INTRAVENOUS at 10:01

## 2018-01-10 RX ADMIN — MOXIFLOXACIN HYDROCHLORIDE 1 DROP: 5 SOLUTION/ DROPS OPHTHALMIC at 09:01

## 2018-01-10 RX ADMIN — DEXAMETHASONE SODIUM PHOSPHATE 4 MG: 4 INJECTION, SOLUTION INTRAMUSCULAR; INTRAVENOUS at 12:01

## 2018-01-10 RX ADMIN — LIDOCAINE HYDROCHLORIDE 1 ML: 10 INJECTION, SOLUTION EPIDURAL; INFILTRATION; INTRACAUDAL; PERINEURAL at 10:01

## 2018-01-10 RX ADMIN — TROPICAMIDE 1 DROP: 10 SOLUTION/ DROPS OPHTHALMIC at 09:01

## 2018-01-10 RX ADMIN — PHENYLEPHRINE AND KETOROLAC 4 ML: 10.16; 2.88 INJECTION, SOLUTION, CONCENTRATE INTRAOCULAR at 10:01

## 2018-01-10 RX ADMIN — GENTAMICIN SULFATE 80 MG: 40 INJECTION, SOLUTION INTRAMUSCULAR; INTRAVENOUS at 11:01

## 2018-01-10 RX ADMIN — ACETAMINOPHEN 650 MG: 325 TABLET ORAL at 12:01

## 2018-01-10 RX ADMIN — SODIUM CHONDROITIN SULFATE / SODIUM HYALURONATE 1.05 ML: 0.55-0.5 INJECTION INTRAOCULAR at 10:01

## 2018-01-10 RX ADMIN — KETOROLAC TROMETHAMINE 1 DROP: 5 SOLUTION/ DROPS OPHTHALMIC at 09:01

## 2018-01-10 RX ADMIN — PROPOFOL 150 MG: 10 INJECTION, EMULSION INTRAVENOUS at 10:01

## 2018-01-10 RX ADMIN — LIDOCAINE HYDROCHLORIDE 100 MG: 20 INJECTION, SOLUTION INTRAVENOUS at 10:01

## 2018-01-10 RX ADMIN — LIDOCAINE HYDROCHLORIDE 0.5 MG: 10 INJECTION, SOLUTION EPIDURAL; INFILTRATION; INTRACAUDAL; PERINEURAL at 09:01

## 2018-01-10 RX ADMIN — MIDAZOLAM HYDROCHLORIDE 2 MG: 1 INJECTION, SOLUTION INTRAMUSCULAR; INTRAVENOUS at 10:01

## 2018-01-10 RX ADMIN — ACETYLCHOLINE CHLORIDE 2 ML: KIT at 11:01

## 2018-01-10 RX ADMIN — PHENYLEPHRINE HYDROCHLORIDE 100 MCG: 10 INJECTION INTRAVENOUS at 11:01

## 2018-01-10 RX ADMIN — MOXIFLOXACIN HYDROCHLORIDE 1 DROP: 5 SOLUTION/ DROPS OPHTHALMIC at 10:01

## 2018-01-10 RX ADMIN — SODIUM CHLORIDE, SODIUM GLUCONATE, SODIUM ACETATE, POTASSIUM CHLORIDE, MAGNESIUM CHLORIDE, SODIUM PHOSPHATE, DIBASIC, AND POTASSIUM PHOSPHATE: .53; .5; .37; .037; .03; .012; .00082 INJECTION, SOLUTION INTRAVENOUS at 11:01

## 2018-01-10 RX ADMIN — FENTANYL CITRATE 25 MCG: 50 INJECTION, SOLUTION INTRAMUSCULAR; INTRAVENOUS at 10:01

## 2018-01-10 RX ADMIN — LIDOCAINE HYDROCHLORIDE 5 ML: 40 INJECTION, SOLUTION RETROBULBAR; TOPICAL at 10:01

## 2018-01-10 RX ADMIN — SODIUM CHLORIDE: 0.9 INJECTION, SOLUTION INTRAVENOUS at 09:01

## 2018-01-10 RX ADMIN — ONDANSETRON 4 MG: 2 INJECTION INTRAMUSCULAR; INTRAVENOUS at 10:01

## 2018-01-10 RX ADMIN — PREDNISOLONE ACETATE 1 DROP: 10 SUSPENSION/ DROPS OPHTHALMIC at 10:01

## 2018-01-10 NOTE — ANESTHESIA POSTPROCEDURE EVALUATION
"Anesthesia Post Evaluation    Patient: Deb Tesfaye    Procedure(s) Performed: Procedure(s) (LRB):  PHACOEMULSIFICATION-ASPIRATION-CATARACT/ Trypan blue (Right)  INSERTION-INTRAOCULAR LENS (IOL) (Right)  INSERTION-IMPLANT-GLAUCOMA (Right)    Final Anesthesia Type: general  Patient location during evaluation: PACU  Patient participation: Yes- Able to Participate  Level of consciousness: awake and alert  Post-procedure vital signs: reviewed and stable  Pain management: adequate  Airway patency: patent  PONV status at discharge: No PONV  Anesthetic complications: no      Cardiovascular status: hemodynamically stable  Respiratory status: room air, spontaneous ventilation and unassisted  Hydration status: euvolemic  Follow-up not needed.        Visit Vitals  /66   Pulse 74   Temp 36.8 °C (98.2 °F) (Temporal)   Resp 18   Ht 5' 5" (1.651 m)   Wt 76.7 kg (169 lb)   SpO2 100%   Breastfeeding? No   BMI 28.12 kg/m²       Pain/Luke Score: Pain Assessment Performed: Yes (1/10/2018  1:37 PM)  Presence of Pain: complains of pain/discomfort (1/10/2018  1:37 PM)  Pain Rating Prior to Med Admin: 6 (1/10/2018 12:56 PM)  Luke Score: 9 (1/10/2018 12:30 PM)      "

## 2018-01-10 NOTE — OP NOTE
DATE OF PROCEDURE: 1/10/2018    PREOPERATIVE DIAGNOSIS: 1. Cataract// Nuclear sclerotic cataract of right eye OD.        2. Primary open angle glaucoma - severe stage OD    POSTOPERATIVE DIAGNOSIS: 1. Cataract// Nuclear sclerotic cataract of right eyeOD, status post procedure.       2. Primary open angle glaucoma right eye - severe stage      PROCEDURE PERFORMED: 1. Cataract extraction with phacoemulsification, posterior   chamber intraocular lens placement.     2. ahmed glaucoma implant OD     SURGEON: Jacqueline Perez M.D.     ASSISTANT: Parmjit Maldonado MD     COMPLICATIONS: None.     BLOOD LOSS: Less than 5 mL.     ANESTHESIA: General    IMPLANT DATA:   1. Abbott PCB00 , power 23.0 diopters, serial #[id number]    PROCEDURE IN DETAIL: After informed consent including risks, benefits,   alternatives, the patient was brought to the operating room table, placed in   supine position. General  anesthesia care was used throughout the entire   procedure. Once adequate anesthesia was confirmed, the patient was then prepped   and draped in usual sterile fashion for intraocular surgery. Wire speculum was   used to hold the eyelids apart and the procedure was initiated by making   a partial thickness corneal wound with a la blade temporally.   A supersharp blade was then used to make a second entry into the eye via a paracentesis incision.  Intracameral lidocaine followed by Viscoat were placed in the anterior chamber.   A 2.4 mm blade was then used to make to complete the clear corneal   incision temporally. A cystotome was used to make a tear in   the anterior capsular flap, which was continued around with Utrata forceps for   continuous curvilinear capsulorrhexis. BSS on a Travis cannula was then used for   hydrodissection and hydrodelineation of lens. The lens was noted to spin   freely in the bag. Phacoemulsification handpiece was then used to remove the   lens in a divide-and-conquer manner. Irrigation aspiration  handpiece removed   the remaining cortical material. Provisc was placed in the eye followed by the   lens as mentioned above without any complications. Once the lens was in proper   position, irrigation aspiration handpiece was used to remove the remaining Provisc. The   wounds were then hydrated with BSS and noted to be watertight . A 10-0 vicryl suture was placed in the corneal incision.     Next attention was directed to the ahmed glaucoma implant surgery.   superotemporal fornix-based conjunctival peritomy was performed with Vannas and Candido scissor dissection.  The  implant was inspected and tested with 30-gauge cannula on a BSS syringe and demonstrated to be patent.  A sub-Tenons pocket was formed, the rectus muscles were identified on successive throws with muscle hooks and the shunt implant was then placed in the sub-Tenons space without difficulty.  The implant was fixed to the globe 9 to 10 mm posterior to the limbus using 6-0 Vicryl sutures.  The tubing was then cut to size and the eye was first entered at the paracentesis site and then the eye was reentered to form a stent tract site using a 23-gauge butterfly needle.  The shunt tubing  was placed in the anterior chamber in good position through this tract without difficulty.  The silicone stent was fixated to the globe using interrupted 10-0 nylon suture and a pericardial patch graft was cut to shape, fitting  well over the entrance site and tubing length and fixed to the globe with 10-0 Vicryl suture.  The conjunctiva was then secured to the limbus in a watertight fashion using 8-0 and 10-0 Vicryl sutures.  The anterior chamber was well formed throughout the case and there were no complications.  Following the procedure, a collagen shield soaked in vigamox and prednisone 1% along with a drop homatropine 5% was placed on the cornea.  The eye was closed, patched, and a Ordonez shield was placed.  The patient was taken to the recovery room in good and  stable condition.  The patient tolerated the procedure well.  The patient  was instructed to refrain from any heavy lifting, bending, stooping, or straining activities, and to follow up in the morning for routine postoperative care with Dr. Jacqueline Perez.

## 2018-01-10 NOTE — TRANSFER OF CARE
"Anesthesia Transfer of Care Note    Patient: Deb Tesfaye    Procedure(s) Performed: Procedure(s) (LRB):  PHACOEMULSIFICATION-ASPIRATION-CATARACT/ Trypan blue (Right)  INSERTION-INTRAOCULAR LENS (IOL) (Right)  INSERTION-IMPLANT-GLAUCOMA (Right)    Patient location: PACU    Anesthesia Type: general    Transport from OR: Transported from OR on 6-10 L/min O2 by face mask with adequate spontaneous ventilation    Post pain: adequate analgesia    Post assessment: no apparent anesthetic complications and tolerated procedure well    Post vital signs: stable    Level of consciousness: sedated and responds to stimulation    Nausea/Vomiting: no nausea/vomiting    Complications: none    Transfer of care protocol was followed      Last vitals:   Visit Vitals  /65   Pulse 79   Temp 36.7 °C (98.1 °F) (Temporal)   Resp 18   Ht 5' 5" (1.651 m)   Wt 76.7 kg (169 lb)   SpO2 98%   Breastfeeding? No   BMI 28.12 kg/m²     "

## 2018-01-10 NOTE — ANESTHESIA PREPROCEDURE EVALUATION
01/10/2018  Deb Tesfaye is a 62 y.o., female.    Anesthesia Evaluation         Review of Systems  Anesthesia Hx:  No problems with previous Anesthesia   Social:  Non-Smoker, No Alcohol Use    Hematology/Oncology:  Hematology Normal      Current/Recent Cancer. (2015, DCIS, lumpectomy) Breast left no axillary node dissection no lymphedema radiation  Oncology Comments: FRANCISCO JAVIER now     Cardiovascular:   Hypertension, well controlled Denies MI.    Denies Angina. Very active without limitation, 3 hours in mall at times,  Takes care of 3 grandkids during the day./   Pulmonary:  Pulmonary Normal  Denies COPD.  Denies Asthma.  Denies Shortness of breath.    Renal/:   Denies Chronic Renal Disease.     Hepatic/GI:   Denies Liver Disease.    Neurological:   Denies TIA. Denies CVA. Denies Seizures.    Endocrine:   Denies Diabetes.        Physical Exam  General:  Well nourished    Airway/Jaw/Neck:  Airway Findings: Mouth Opening: Normal Tongue: Normal  General Airway Assessment: Adult, Average  Mallampati: II  TM Distance: Normal, at least 6 cm  Jaw/Neck Findings:  Neck ROM: Normal ROM       Chest/Lungs:  Chest/Lungs Findings: Clear to auscultation     Heart/Vascular:  Heart Findings: Rate: Normal  Rhythm: Regular Rhythm  Sounds: Normal        Mental Status:  Mental Status Findings:  Cooperative, Alert and Oriented         Anesthesia Plan  Type of Anesthesia, risks & benefits discussed:  Anesthesia Type:  general, MAC  Patient's Preference:   Intra-op Monitoring Plan:   Intra-op Monitoring Plan Comments:   Post Op Pain Control Plan:   Post Op Pain Control Plan Comments: As per surgeon's plan  Induction:   IV  Beta Blocker:  Patient is not currently on a Beta-Blocker (No further documentation required).       Informed Consent: Patient understands risks and agrees with Anesthesia plan.  Questions answered. Anesthesia  consent signed with patient.  ASA Score: 2     Day of Surgery Review of History & Physical:    H&P update referred to the surgeon.         Ready For Surgery From Anesthesia Perspective.     Placement Date: 01/25/16; Placement Time: 0945; Inserted by: CRNA; Airway Device: LMA; Mask Ventilation: Easy; Intubated: Postinduction; Airway Device Size: 4.0; Style: Cuffed; Cuff Inflation: Minimal occlusive pressure; Inflation Amount: 28; Placement Verified By: Auscultation, Capnometry; Complicating Factors: None; Intubation Findings: Positive EtCO2, Bilateral breath sounds, Atraumatic/Condition of teeth unchanged; Securment: Lips; Complications: None; Breath Sounds: Equal Bilateral; Insertion Attempts: 1; Removal Date: 01/25/16;  Removal Time: 1026

## 2018-01-10 NOTE — DISCHARGE SUMMARY
PreOp Diagnosis: 1. Cataract OD                                 2. Uncontrolled Glaucoma OD    PostOp Diagnosis:as above s/p procedure    Procedure:1. Cataract Extraction OD w/ Phacoemulsification and PCIOL placement                    2. Ahmed Glaucoma Valve Placement OD    Attending: Jacqueline Perez MD    Assistant:Parmjit bejarano MD     Anesthesia:General    Complications::None    Blood loss: <5 CC    Specimens: none    Procedure Details:  See Dictation      -D/C home when patient meets anesthesia requirements  -Follow up tomorrow with surgeon in the [facility].

## 2018-01-10 NOTE — DISCHARGE INSTRUCTIONS
Discharge Instructions for Cataract Surgery  A surgeon removed the cloudy lens in your eye and replaced it with a clear man-made lens. Be sure to have an adult family member or friend drive you home after surgery. Heres what you can expect following surgery and tips for a healthy recovery.  It is normal to have the following:  · Bruised or bloodshot eye for 7 days  · Itching and mild discomfort for several days  · Some fluid discharge  · Sensitivity to light  · Scratchy, sandlike feeling in the eye for 2 weeks  · Feeling tired, especially during the first 24 hours  Activity level  · Do not drive for 2 days or as instructed by your doctor.  · Do not drink alcohol for at least 24 hours.  · Avoid bending at the waist to  objects or lifting anything heavy for 2 days.  · Relax for the first 24 hours after surgery. Watching TV and reading are OK and wont harm your eye.  Eye protection  · Do not rub or press on your eye.  · Sleep on your back or on your unoperated side for 2 nights.  · If instructed, wear a bandage over your eye for 2 days and 2 nights.  · If instructed, wear a shield to protect your eye for 2 days and 2 nights.  Using eyedrops  You may be given special eyedrops or ointment. Here is one way to use eyedrops:  · Tilt your head back.  · Pull your bottom eyelid down.  · Squeeze one drop into your eye. Do not touch your eye with the bottle tip.  · Close your eyes for a few seconds.  · If you need more than one drop, wait a few minutes before adding the next one.   Call your doctor right away if you have any of the following:  · Bleeding or discharge from the eye  · Your vision suddenly becomes worse  · Pain medicine you are told to take does not ease your pain  · Nausea or vomiting  · Chills or fever over 100.4°F (39.1°C)

## 2018-01-11 ENCOUNTER — OFFICE VISIT (OUTPATIENT)
Dept: OPHTHALMOLOGY | Facility: CLINIC | Age: 63
End: 2018-01-11
Payer: COMMERCIAL

## 2018-01-11 DIAGNOSIS — H21.561 AFFERENT PUPILLARY DEFECT, RIGHT: ICD-10-CM

## 2018-01-11 DIAGNOSIS — Z48.89 ENCOUNTER FOR POSTOPERATIVE CARE: Primary | ICD-10-CM

## 2018-01-11 DIAGNOSIS — H20.9 IRITIS OF RIGHT EYE: ICD-10-CM

## 2018-01-11 DIAGNOSIS — H40.1133 PRIMARY OPEN ANGLE GLAUCOMA OF BOTH EYES, SEVERE STAGE: ICD-10-CM

## 2018-01-11 DIAGNOSIS — Z96.1 PSEUDOPHAKIA, RIGHT EYE: ICD-10-CM

## 2018-01-11 DIAGNOSIS — H25.12 NUCLEAR SCLEROSIS OF LEFT EYE: ICD-10-CM

## 2018-01-11 PROCEDURE — 99024 POSTOP FOLLOW-UP VISIT: CPT | Mod: S$GLB,,, | Performed by: OPHTHALMOLOGY

## 2018-01-11 PROCEDURE — 99999 PR PBB SHADOW E&M-EST. PATIENT-LVL II: CPT | Mod: PBBFAC,,, | Performed by: OPHTHALMOLOGY

## 2018-01-11 RX ORDER — DORZOLAMIDE HCL 20 MG/ML
1 SOLUTION/ DROPS OPHTHALMIC 3 TIMES DAILY
Qty: 10 ML | Refills: 12 | OUTPATIENT
Start: 2018-01-11 | End: 2018-01-11 | Stop reason: SDUPTHER

## 2018-01-11 RX ORDER — MOXIFLOXACIN 5 MG/ML
1 SOLUTION/ DROPS OPHTHALMIC 4 TIMES DAILY
COMMUNITY
Start: 2018-01-11 | End: 2018-01-20

## 2018-01-11 RX ORDER — BRINZOLAMIDE 10 MG/ML
1 SUSPENSION/ DROPS OPHTHALMIC 3 TIMES DAILY
Qty: 10 ML | Refills: 12 | Status: SHIPPED | OUTPATIENT
Start: 2018-01-11 | End: 2018-02-01

## 2018-01-11 RX ORDER — DORZOLAMIDE HCL 20 MG/ML
1 SOLUTION/ DROPS OPHTHALMIC 3 TIMES DAILY
Qty: 10 ML | Refills: 12 | Status: SHIPPED | OUTPATIENT
Start: 2018-01-11 | End: 2018-03-02 | Stop reason: CLARIF

## 2018-01-11 RX ORDER — PREDNISOLONE ACETATE 10 MG/ML
1 SUSPENSION/ DROPS OPHTHALMIC 4 TIMES DAILY
COMMUNITY
Start: 2018-01-11 | End: 2018-01-15 | Stop reason: SDUPTHER

## 2018-01-11 NOTE — PROGRESS NOTES
HPI     Post-op Evaluation    Additional comments: Pt states she had some pain OD last night but she   took some tylenol and went to sleep           Comments   1 day po Combined (Phaco IOL and Ahmed) OD 1/10/18    MEDS: (NO DROPS OD YESTERDAY)  PA QDAY OD  Cosopt TID OU  Brimonidine TID OU  Sandro 4% TID OS  Lumigan QHS OS  Diamox 500mg BID PO       Last edited by Bharati Graham MA on 1/11/2018  8:52 AM. (History)            Assessment /Plan     For exam results, see Encounter Report.    Encounter for postoperative care    Primary open angle glaucoma of both eyes, severe stage    Iritis of right eye    Afferent pupillary defect, right    Nuclear sclerosis of left eye    Pseudophakia, right eye      HERE FOR Post -OP PHACO/IOL/AHMED - OD - 1/10/2017     F/U CYCLO G6 OD - 4/12/2017  REFERRED BY DR HILLMAN FOR SEVERE POORLY CONTROLLED POAG - 7/24/2015   Pt recently moved from [place] to [facility]     Previously referred to Dr. Hillman from [facility], review of outside records:    Tm 27/25.    Prior to that, IOP ranged mid-teens to low-20's on Combigan/Dorzolamide/Lumigan OU    C:D documented as 0.9 OD, 0.85 OS    Initial visit 3/29/12  IOP 10 OU on Combitan and Lumigan  C:D 0.85 OU  CCT 524OD, 531 OS    HVF's  2012 - superior defect affecting fixation OD with beginnings of early inferior arc; possible early superior arc OS  2013 - stable superior defect OD with early inferior arc; OS superior nasal step    Here for evaluation of POAG on MMT. Last IOP with Dr. Hillman 21//19. IOP today 21//20 on MMT. Patient has a family history of glaucoma in maternal grandfather. States that she is compliant with all Gtt's.        Glaucoma (type and duration)    Dx about 2002 - [facility]    First HVF   First at [facility] 2015   First photos   First at [facility] 2015    Treatment / Drops started   2002              Family history    + mom / brother         Glaucoma meds    cosopt ou / brimonidine ou tid / lumigan ou q  "hs / bisi od tid / diamox / bisi ou        H/O adverse rxn to glaucoma drops    none        LASERS    SLT OD - 8/5/2013 - [place] // SLT os - [facility] 8/27/2015 (good resp 21-->16)        GLAUCOMA SURGERIES    CYCL G6 OD - 4/12/2017  / phaco/IOL / ahmed od 1/10/2017         OTHER EYE SURGERIES    Combined phaco/IOL / ahmed 1/10/2017         CDR    0.99/0.95        Tbase    ?  Off gtts (on gtts was up to 28/25)         Tmax    27/25 - per outside record's (reviewed by [facility] )             Ttarget    15 ou    (may need to be lower)           HVF   24-2 ss //  2 test 2015 to  [place] [facility] [place]  od // SAD   10-2 ss - 3 test 2015 to 2017 - SALT/ IAD (progression) od // hint SAD / IAD os    (( 2 VF's from [place] - that were reviewed by Dr. IVEY - 2012 and 2013 -  SAD w/ fix invl . IAD od / early IAD os ))            Gonio    +3-4 ou         CCT    524/531        OCT    2 test 2016 to 2017 - RNFL - OD:dec. S/T/I/N // OS:dec. S/I, pratik N/T        HRT    1 test 2015 to 2015  - MR - dec. S/N/I od // dec./ S/N/I os        Disc photos    2015     - Ttoday - 6/13 (POD #1 - phaco/IOL ahmed od)   - Test done today    Post -op phaco/IOL and ahmed OD  - 1/10/2017     2. + APD od     3.  NS ou    Minimal - [facility] 20/25 ou still     4. Papilloma OS   To be removed with [facility]       HVF's continue to progress - especially OD       S/P  - cyclo G6 - OD - 4/12/2017   "IOP still too high od - very little sight to save on this eye     - IOP back up and persistent iritis   Rec phaco / IOL / GDD - ? ahmed od  (increase in cataract od post cyclo G6 and slightly shallow AC)     With the left eye - I think incisional surgery may be best option - as there is more vision to save long term "  IOP seems ok for now on Maximum drops and diamox         GTTS   OD   Cont  PA to q day od     OU   cosopt tid ou   Brimonidine tid ou  Diamox pills 500 bid     OS   bisi tid os  lumigan qhs os     Pt likely will need trab os - but IOP is reasonably " good  today at 16 -17    RECOMMEND   COMBINED PHACO/IOL AHMED OD - January - trypan blue // ?? If dilates well   If IOP goes up os - better eye will need surgery - IOP borderline  at 17 os today     IOL calculations   OD  PCB00 23.0  AC IOL 18.5    Phaco / IOL // ahmed  OD Date: 1/10/2017 - general anesthesia   POD # 1 - phaco/IOL   Doing well - ++ iritis - had pre-op post G6 laser   Begin Pred Acetate - 6 x day   Begin vigamox TID  Shield at night  Glasses day  No lifting, no bending, no eye rubbing  F/U 1 week, AR/MR ou    HOLD all glaucoma gtts od   Hold diamox     Cont all glaucoma gtts os   cosopt tid  ( or timolol and dorzolamide or azopt)   Brimonidine tid   bisi tid   lumigan q hs     No lifting   No bending   No eye rubbing       Pt understands that even with surgery she may loose all the sight in the right eye   Pt is at very high risk for Snuff    F/U Monday - sooner prn increae in pain , decrease in vision

## 2018-01-15 ENCOUNTER — LAB VISIT (OUTPATIENT)
Dept: LAB | Facility: HOSPITAL | Age: 63
End: 2018-01-15
Attending: INTERNAL MEDICINE
Payer: COMMERCIAL

## 2018-01-15 ENCOUNTER — OFFICE VISIT (OUTPATIENT)
Dept: OPHTHALMOLOGY | Facility: CLINIC | Age: 63
End: 2018-01-15
Payer: COMMERCIAL

## 2018-01-15 DIAGNOSIS — H21.561 AFFERENT PUPILLARY DEFECT, RIGHT: ICD-10-CM

## 2018-01-15 DIAGNOSIS — E87.6 HYPOKALEMIA: ICD-10-CM

## 2018-01-15 DIAGNOSIS — H25.12 NUCLEAR SCLEROSIS OF LEFT EYE: ICD-10-CM

## 2018-01-15 DIAGNOSIS — Z96.89 HISTORY OF GLAUCOMA TUBE SHUNT PROCEDURE: ICD-10-CM

## 2018-01-15 DIAGNOSIS — H40.1133 PRIMARY OPEN ANGLE GLAUCOMA OF BOTH EYES, SEVERE STAGE: ICD-10-CM

## 2018-01-15 DIAGNOSIS — H20.9 IRITIS OF RIGHT EYE: ICD-10-CM

## 2018-01-15 DIAGNOSIS — Z48.89 ENCOUNTER FOR POSTOPERATIVE CARE: Primary | ICD-10-CM

## 2018-01-15 DIAGNOSIS — Z96.1 PSEUDOPHAKIA, RIGHT EYE: ICD-10-CM

## 2018-01-15 LAB
ANION GAP SERPL CALC-SCNC: 6 MMOL/L
BUN SERPL-MCNC: 12 MG/DL
CALCIUM SERPL-MCNC: 9.2 MG/DL
CHLORIDE SERPL-SCNC: 108 MMOL/L
CO2 SERPL-SCNC: 26 MMOL/L
CREAT SERPL-MCNC: 0.8 MG/DL
EST. GFR  (AFRICAN AMERICAN): >60 ML/MIN/1.73 M^2
EST. GFR  (NON AFRICAN AMERICAN): >60 ML/MIN/1.73 M^2
GLUCOSE SERPL-MCNC: 108 MG/DL
POTASSIUM SERPL-SCNC: 3.6 MMOL/L
SODIUM SERPL-SCNC: 140 MMOL/L

## 2018-01-15 PROCEDURE — 36415 COLL VENOUS BLD VENIPUNCTURE: CPT | Mod: PO

## 2018-01-15 PROCEDURE — 80048 BASIC METABOLIC PNL TOTAL CA: CPT

## 2018-01-15 PROCEDURE — 99999 PR PBB SHADOW E&M-EST. PATIENT-LVL II: CPT | Mod: PBBFAC,,, | Performed by: OPHTHALMOLOGY

## 2018-01-15 PROCEDURE — 99024 POSTOP FOLLOW-UP VISIT: CPT | Mod: S$GLB,,, | Performed by: OPHTHALMOLOGY

## 2018-01-15 RX ORDER — PREDNISOLONE ACETATE 10 MG/ML
1 SUSPENSION/ DROPS OPHTHALMIC
Qty: 10 ML | Refills: 1 | Status: SHIPPED | OUTPATIENT
Start: 2018-01-15 | End: 2018-02-01 | Stop reason: SDUPTHER

## 2018-01-15 NOTE — PROGRESS NOTES
HPI     Post-op Evaluation    Additional comments: Pt has some scratchy feeling OD but no pain           Comments   S/p Combined (Phaco IOL and Ahmed) OD 1/10/18    MEDS:  PA 6x's OD  Vigamox TID OD    Cosopt TID OS  Brimonidine TID OS  Bisi 4% TID OS  Lumigan QHS OS         Last edited by Bharati Graham MA on 1/15/2018 11:22 AM. (History)            Assessment /Plan     For exam results, see Encounter Report.    Encounter for postoperative care    Primary open angle glaucoma of both eyes, severe stage    Iritis of right eye    Afferent pupillary defect, right    Nuclear sclerosis of left eye    Pseudophakia, right eye    History of glaucoma tube shunt procedure        HERE FOR Post -OP PHACO/IOL/AHMED - OD - 1/10/2017     F/U CYCLO G6 OD - 4/12/2017  REFERRED BY DR HILLMAN FOR SEVERE POORLY CONTROLLED POAG - 7/24/2015   Pt recently moved from [place] to [facility]     Previously referred to Dr. Hillman from [facility], review of outside records:    Tm 27/25.    Prior to that, IOP ranged mid-teens to low-20's on Combigan/Dorzolamide/Lumigan OU    C:D documented as 0.9 OD, 0.85 OS    Initial visit 3/29/12  IOP 10 OU on Combitan and Lumigan  C:D 0.85 OU  CCT 524OD, 531 OS    HVF's  2012 - superior defect affecting fixation OD with beginnings of early inferior arc; possible early superior arc OS  2013 - stable superior defect OD with early inferior arc; OS superior nasal step    Here for evaluation of POAG on MMT. Last IOP with Dr. Hillman 21//19. IOP today 21//20 on MMT. Patient has a family history of glaucoma in maternal grandfather. States that she is compliant with all Gtt's.        Glaucoma (type and duration)    Dx about 2002 - [facility]    First HVF   First at [facility] 2015   First photos   First at [facility] 2015    Treatment / Drops started   2002              Family history    + mom / brother         Glaucoma meds    cosopt ou / brimonidine ou tid / lumigan ou q hs / bisi od tid / diamox /  "biis ou        H/O adverse rxn to glaucoma drops    none        LASERS    SLT OD - 8/5/2013 - [place] // SLT os - [facility] 8/27/2015 (good resp 21-->16)        GLAUCOMA SURGERIES    CYCL G6 OD - 4/12/2017  / phaco/IOL / ahmed od 1/10/2017         OTHER EYE SURGERIES    Combined phaco/IOL / ahmed 1/10/2017         CDR    0.99/0.95        Tbase    ?  Off gtts (on gtts was up to 28/25)         Tmax    27/25 - per outside record's (reviewed by [facility]k )             Ttarget    15 ou    (may need to be lower)           HVF   24-2 ss //  2 test 2015 to  [place]  [place] // SAD   10-2 ss - 3 test 2015 to 2017 - SALT/ IAD (progression) od // hint SAD / IAD os    (( 2 VF's from [place] - that were reviewed by Dr. IVEY - 2012 and 2013 -  SAD w/ fix invl . IAD od / early IAD os ))            Gonio    +3-4 ou         CCT    524/531        OCT    2 test 2016 to 2017 - RNFL - OD:dec. S/T/I/N // OS:dec. S/I, bord N/T        HRT    1 test 2015 to 2015  - MR - dec. S/N/I od // dec./ S/N/I os        Disc photos    2015     - Ttoday - 6/13 (POD #1 - phaco/IOL ahmed od)   - Test done today    Post -op phaco/IOL and ahmed OD  - 1/10/2017     2. + APD od     3.  NS ou    Minimal - [facility] 20/25 ou still     4. Papilloma OS   To be removed with [facility]       HVF's continue to progress - especially OD       S/P  - cyclo G6 - OD - 4/12/2017   "IOP still too high od - very little sight to save on this eye     - IOP back up and persistent iritis   Rec phaco / IOL / GDD - ? ahmed od  (increase in cataract od post cyclo G6 and slightly shallow AC)     With the left eye - I think incisional surgery may be best option - as there is more vision to save long term "  IOP seems ok for now on Maximum drops and diamox         GTTS   OD   Cont  PA to q day od     OU   cosopt tid ou   Brimonidine tid ou  Diamox pills 500 bid     OS   bisi tid os  lumigan qhs os     Pt likely will need trab os - but IOP is reasonably good  today at 16 " -17    RECOMMEND   COMBINED PHACO/IOL AHMED OD - January - trypan blue // ?? If dilates well   If IOP goes up os - better eye will need surgery - IOP borderline  at 17 os today     IOL calculations   OD  PCB00 23.0  AC IOL 18.5    Phaco / IOL // phaco/ IOL / ahmed  OD Date: 1/10/2017 - general anesthesia   POD # 5   Doing well - ++ iritis - had pre-op post G6 laser   Begin Pred Acetate - 6 x day   Begin vigamox TID  Shield at night  Glasses day  No lifting, no bending, no eye rubbing  F/U 1-2 week, AR/MR ou    HOLD all glaucoma gtts od   Hold diamox     Cont all glaucoma gtts os   cosopt tid  ( or timolol and dorzolamide or azopt)   Brimonidine tid   bisi tid   lumigan q hs     No lifting   No bending   No eye rubbing       Pt understands that even with surgery she may loose all the sight in the right eye   Pt is at very high risk for Snuff    F/U Monday - sooner prn increae in pain , decrease in vision

## 2018-01-19 ENCOUNTER — OFFICE VISIT (OUTPATIENT)
Dept: INTERNAL MEDICINE | Facility: CLINIC | Age: 63
End: 2018-01-19
Payer: COMMERCIAL

## 2018-01-19 VITALS
TEMPERATURE: 99 F | SYSTOLIC BLOOD PRESSURE: 136 MMHG | HEIGHT: 65 IN | RESPIRATION RATE: 18 BRPM | BODY MASS INDEX: 29.27 KG/M2 | OXYGEN SATURATION: 98 % | DIASTOLIC BLOOD PRESSURE: 80 MMHG | HEART RATE: 68 BPM | WEIGHT: 175.69 LBS

## 2018-01-19 DIAGNOSIS — D05.12 DUCTAL CARCINOMA IN SITU (DCIS) OF LEFT BREAST: ICD-10-CM

## 2018-01-19 DIAGNOSIS — E87.6 HYPOKALEMIA: ICD-10-CM

## 2018-01-19 DIAGNOSIS — I10 ESSENTIAL HYPERTENSION: ICD-10-CM

## 2018-01-19 DIAGNOSIS — E78.5 HYPERLIPIDEMIA, UNSPECIFIED HYPERLIPIDEMIA TYPE: Primary | ICD-10-CM

## 2018-01-19 PROCEDURE — 99214 OFFICE O/P EST MOD 30 MIN: CPT | Mod: S$GLB,,, | Performed by: INTERNAL MEDICINE

## 2018-01-19 PROCEDURE — 99999 PR PBB SHADOW E&M-EST. PATIENT-LVL III: CPT | Mod: PBBFAC,,, | Performed by: INTERNAL MEDICINE

## 2018-01-19 RX ORDER — VALSARTAN 160 MG/1
160 TABLET ORAL DAILY
Qty: 90 TABLET | Refills: 3 | Status: SHIPPED | OUTPATIENT
Start: 2018-01-19 | End: 2018-07-20

## 2018-01-19 NOTE — PROGRESS NOTES
HISTORY OF PRESENT ILLNESS:  Pt. is a 62 y.o. female presents for 6 month monitoring of her hyperlipidemia, hypokalemia, HTN, DCIS of left breast.  She has high-grade DCIS of the left breast as well as atypical ductal hyperplasia. The patient is status post lumpectomy, post-lumpectomy radiation, and is receiving adjuvant tamoxifen 20 mg daily.  Is followed by Dr. Quiles, has upcoming appt. With him and Dr. Rider. She is on pravastatin 20 mg po q day.  Since our last appt, has had cataract surgery and surgery for glaucoma.  She sees Dr. Yadav for her Pap.    Health Maintenance Topics with due status: Not Due       Topic Last Completion Date    Pap Smear with HPV Cotest 12/02/2015    Colonoscopy 07/20/2016    TETANUS VACCINE 10/26/2016    Lipid Panel 04/25/2017    Mammogram 12/13/2017     Health Maintenance Due   Topic Date Due    Zoster Vaccine  02/12/2017    Pneumococcal PPSV23 (High Risk) (1) 12/22/2017       Lab Results   Component Value Date    WBC 7.64 12/04/2017    HGB 12.3 12/04/2017    HCT 35.7 (L) 12/04/2017     12/04/2017    CHOL 176 04/25/2017    TRIG 84 04/25/2017    HDL 48 04/25/2017    LDLCALC 111.2 04/25/2017    ALT 26 12/04/2017    AST 28 12/04/2017     01/15/2018    K 3.6 01/15/2018     01/15/2018    CREATININE 0.8 01/15/2018    BUN 12 01/15/2018    CO2 26 01/15/2018    ALBUMIN 3.6 12/04/2017    TSH 1.925 11/28/2016       Past Medical History:   Diagnosis Date    Breast cancer     Cataract     Ductal carcinoma in situ (DCIS) of left breast 1/11/2016    left     Fibrocystic breast     Glaucoma     Hypertension        Past Surgical History:   Procedure Laterality Date    AHMED GLAUCOMA VALVE Right 01/10/2018    WITH CE ()    BREAST BIOPSY      BREAST LUMPECTOMY Left     CATARACT EXTRACTION W/  INTRAOCULAR LENS IMPLANT Right 01/10/2018    with [facility] ()    COLONOSCOPY  2002    due for every 5.  Mother with colon cancer.    COLONOSCOPY   12/2010    COLONOSCOPY N/A 7/20/2016    Procedure: COLONOSCOPY;  Surgeon: Janak De Leon Jr., MD;  Location: [facility];  Service: Endoscopy;  Laterality: N/A;    EYE SURGERY      SELECTIVE LASER TRABECULPALSTY Right 8/13    OS DONE IN [place], [place]    SELECTIVE LASER TRABECUPLASTY Left 8/27/15    OS WITH     TUBAL LIGATION         Social History     Social History    Marital status:      Spouse name: N/A    Number of children: 3    Years of education: N/A     Social History Main Topics    Smoking status: Never Smoker    Smokeless tobacco: Never Used    Alcohol use No    Drug use: No    Sexual activity: Yes     Partners: Male     Other Topics Concern    None     Social History Narrative    None       ROS:  GENERAL: No fever, chills, recent fatigability; noweight loss.  SKIN: No rashes, itching or changes in color or texture of skin.  HEAD: No headaches or recent head trauma; positive vision changes;  EARS: Denies ear pain, discharge or vertigo.  NOSE: No loss of smell, no epistaxis; occ postnasal drip.  MOUTH & THROAT: No hoarseness or change in voice. No excessive gum bleeding.  NODES: Denies swollen glands.  CHEST: Denies MCCOY, cyanosis, wheezing, no cough and occ sputum production.  CARDIOVASCULAR: Denies chest pain, PND, orthopnea or reduced exercise tolerance.  ABDOMEN: Appetite fine. No weight loss. Denies constipation, diarrhea, abdominal pain, hematemesis or blood in stool.  URINARY: No flank pain, dysuria or hematuria.  PERIPHERAL VASCULAR: No claudication or cyanosis. No edema.  MUSCULOSKELETAL: No joint stiffness or swelling. Denies back pain.  NEUROLOGIC: Denies numbness    PE:   Vitals:   Vitals:    01/19/18 1009   BP: 136/80   Pulse: 68   Resp: 18   Temp: 98.6 °F (37 °C)     GENERAL: no acute distress, A&Ox3, comfortable.  Female with BMI of 29   HEENT: tympanic membranes clear, nasal mucosa pink, no pharyngeal erythema or exudate  NECK: supple, no cervical  lymphadenopathy, no thyromegaly; no supraclavicular nodes;   CHEST:  clear to auscultation bilaterally, no crackles or wheeze; no increased work of breathing;  CARDIOVASCULAR: regular rate and rhythm, no rubs, murmurs or gallops.  ABDOMEN: normal bowel sounds, soft non-tender, non-distended; no palpable organomegaly;   EXT: no clubbing, cyanosis or edema.     ASSESSMENT/PLAN:    Hyperlipidemia, unspecified hyperlipidemia type  -     Lipid panel; Future; Expected date: 04/19/2018    Essential hypertension  -     valsartan (DIOVAN) 160 MG tablet; Take 1 tablet (160 mg total) by mouth once daily.  Dispense: 90 tablet; Refill: 3    DCIS of left breast: high-grade DCIS of the left breast as well as atypical ductal hyperplasia. The patient is status post lumpectomy, post-lumpectomy radiation, and is receiving adjuvant tamoxifen 20 mg daily.  Is followed by Dr. Quiles, has upcoming appt. With him and Dr. Rider;    Hypokalemia: in control; continue current meds;    Medication List with Changes/Refills   Current Medications    AZELASTINE (ASTELIN) 137 MCG (0.1 %) NASAL SPRAY    1 spray (137 mcg total) by Nasal route 2 (two) times daily.    BIMATOPROST (LUMIGAN) 0.01 % DROP    Place 1 drop into the left eye every evening.    BRIMONIDINE 0.2% (ALPHAGAN) 0.2 % DROP    Place 1 drop into both eyes 3 (three) times daily.    BRINZOLAMIDE (AZOPT) 1 % OPHTHALMIC SUSPENSION    Place 1 drop into the left eye 3 (three) times daily.    DORZOLAMIDE (TRUSOPT) 2 % OPHTHALMIC SOLUTION    Place 1 drop into the left eye 3 (three) times daily.    DORZOLAMIDE-TIMOLOL 2-0.5% (COSOPT) 22.3-6.8 MG/ML OPHTHALMIC SOLUTION    Place 1 drop into both eyes 3 (three) times daily.    ERGOCALCIFEROL, VITAMIN D2, (VITAMIN D ORAL)    Take 1,000 Units by mouth once daily.     KRILL OIL ORAL    Take 1 capsule by mouth once daily.     MULTIVITS,CA,MINERALS/IRON/FA (ONE-A-DAY WOMENS FORMULA ORAL)    Take 1 capsule by mouth once daily.    PILOCARPINE HCL 4%  (PILOCAR) 4 % OPHTHALMIC SOLUTION    Place 1 drop into the left eye 3 (three) times daily.    POTASSIUM CHLORIDE SA (K-DUR,KLOR-CON) 20 MEQ TABLET    TAKE 1 TABLET(20 MEQ) BY MOUTH TWICE DAILY    PRAVASTATIN (PRAVACHOL) 20 MG TABLET    TAKE 1 TABLET BY MOUTH EVERY DAY    PREDNISOLONE ACETATE (PRED FORTE) 1 % DRPS    Place 1 drop into the right eye 6 (six) times daily.    TAMOXIFEN (NOLVADEX) 20 MG TAB    Take 1 tablet (20 mg total) by mouth once daily.   Changed and/or Refilled Medications    Modified Medication Previous Medication    VALSARTAN (DIOVAN) 160 MG TABLET valsartan (DIOVAN) 160 MG tablet       Take 1 tablet (160 mg total) by mouth once daily.    Take 1 tablet (160 mg total) by mouth once daily.     Call if condition changes or worsens.  Answers for HPI/ROS submitted by the patient on 1/17/2018   activity change: No  unexpected weight change: No  neck pain: No  hearing loss: No  rhinorrhea: No  trouble swallowing: No  eye discharge: No  visual disturbance: Yes  chest tightness: No  wheezing: No  chest pain: No  palpitations: No  blood in stool: No  constipation: No  vomiting: No  diarrhea: No  polydipsia: No  polyuria: No  difficulty urinating: No  hematuria: No  menstrual problem: No  dysuria: No  joint swelling: No  arthralgias: No  headaches: No  weakness: No  confusion: No  dysphoric mood: No

## 2018-01-22 NOTE — TELEPHONE ENCOUNTER
Since [facility], [facility], and [facility] have cosopt on back order, in [place], was able to call it into [facility], [address], [phone]- 3 refills w/90 day supply given

## 2018-01-23 ENCOUNTER — TELEPHONE (OUTPATIENT)
Dept: FAMILY MEDICINE | Facility: CLINIC | Age: 63
End: 2018-01-23

## 2018-01-23 RX ORDER — CEFDINIR 300 MG/1
600 CAPSULE ORAL DAILY
Qty: 20 CAPSULE | Refills: 0 | Status: SHIPPED | OUTPATIENT
Start: 2018-01-23 | End: 2018-02-02

## 2018-01-23 NOTE — TELEPHONE ENCOUNTER
Spoke to the pt and she states she thinks she has a sinus infection. States that she has a stuffy nose with yellow mucus when she blows her nose and states this morning there was a tinge of blood with the mucus when she blew her nose. Pt denies fever. Pt states she has a cough that is nonproductive. States she tried the cold ease that you suggested on 1/19/18 but is not helping. Pt would like an antibiotic called in if possible.   Does pt need another appt? Please advise. Thanks.

## 2018-01-23 NOTE — TELEPHONE ENCOUNTER
----- Message from Brandi Hernandez sent at 1/23/2018  8:19 AM CST -----  Contact: Self  Patient is requesting that you call her in an antibiotic for her sinus infection.  Please call back at [phone] (home).  Thank you!    [facility] [address]  [place] [address]  [address] AT [facility] [place] [address]  Phone: [phone] Fax: [phone]

## 2018-01-31 ENCOUNTER — TELEPHONE (OUTPATIENT)
Dept: HEMATOLOGY/ONCOLOGY | Facility: CLINIC | Age: 63
End: 2018-01-31

## 2018-01-31 NOTE — TELEPHONE ENCOUNTER
Called patient to notify светлана would be out of clinic on Friday and appointment needs to be rescheduled. No answer message left.

## 2018-02-01 ENCOUNTER — OFFICE VISIT (OUTPATIENT)
Dept: OPHTHALMOLOGY | Facility: CLINIC | Age: 63
End: 2018-02-01
Payer: COMMERCIAL

## 2018-02-01 DIAGNOSIS — H25.12 NUCLEAR SCLEROSIS OF LEFT EYE: ICD-10-CM

## 2018-02-01 DIAGNOSIS — Z48.89 ENCOUNTER FOR POSTOPERATIVE CARE: Primary | ICD-10-CM

## 2018-02-01 DIAGNOSIS — H21.561 AFFERENT PUPILLARY DEFECT, RIGHT: ICD-10-CM

## 2018-02-01 DIAGNOSIS — Z96.89 HISTORY OF GLAUCOMA TUBE SHUNT PROCEDURE: ICD-10-CM

## 2018-02-01 DIAGNOSIS — H40.1133 PRIMARY OPEN ANGLE GLAUCOMA OF BOTH EYES, SEVERE STAGE: ICD-10-CM

## 2018-02-01 DIAGNOSIS — Z96.1 PSEUDOPHAKIA, RIGHT EYE: ICD-10-CM

## 2018-02-01 DIAGNOSIS — H20.9 IRITIS OF RIGHT EYE: ICD-10-CM

## 2018-02-01 PROCEDURE — 99024 POSTOP FOLLOW-UP VISIT: CPT | Mod: S$GLB,,, | Performed by: OPHTHALMOLOGY

## 2018-02-01 PROCEDURE — 99999 PR PBB SHADOW E&M-EST. PATIENT-LVL II: CPT | Mod: PBBFAC,,, | Performed by: OPHTHALMOLOGY

## 2018-02-01 RX ORDER — PREDNISOLONE ACETATE 10 MG/ML
1 SUSPENSION/ DROPS OPHTHALMIC 4 TIMES DAILY
Qty: 10 ML | Refills: 1 | Status: SHIPPED | OUTPATIENT
Start: 2018-02-01 | End: 2018-06-08

## 2018-02-01 NOTE — PROGRESS NOTES
HPI     Post-op Evaluation    Additional comments: Pt c.o itchy irritated eye but no pain           Comments   S/p Combined (Phaco IOL and Ahmed) OD 1/10/18    MEDS:  PA 6x's OD    Cosopt TID OS  Brimonidine TID OS  Bisi 4% TID OS  Lumigan QHS OS         Last edited by Bharati Graham MA on 2/1/2018 11:44 AM. (History)            Assessment /Plan     For exam results, see Encounter Report.    Encounter for postoperative care    Primary open angle glaucoma of both eyes, severe stage    Iritis of right eye    Afferent pupillary defect, right    Nuclear sclerosis of left eye    Pseudophakia, right eye    History of glaucoma tube shunt procedure        HERE FOR Post -OP PHACO/IOL/AHMED - OD - 1/10/2017     F/U CYCLO G6 OD - 4/12/2017  REFERRED BY DR HILLMAN FOR SEVERE POORLY CONTROLLED POAG - 7/24/2015   Pt recently moved from [place] to [facility]     Previously referred to Dr. Hillman from [facility], review of outside records:    Tm 27/25.    Prior to that, IOP ranged mid-teens to low-20's on Combigan/Dorzolamide/Lumigan OU    C:D documented as 0.9 OD, 0.85 OS    Initial visit 3/29/12  IOP 10 OU on Combitan and Lumigan  C:D 0.85 OU  CCT 524OD, 531 OS    HVF's  2012 - superior defect affecting fixation OD with beginnings of early inferior arc; possible early superior arc OS  2013 - stable superior defect OD with early inferior arc; OS superior nasal step    Here for evaluation of POAG on MMT. Last IOP with Dr. Hillman 21//19. IOP today 21//20 on MMT. Patient has a family history of glaucoma in maternal grandfather. States that she is compliant with all Gtt's.        Glaucoma (type and duration)    Dx about 2002 - [place]    First HVF   First at [facility] 2015   First photos   First at [facility] 2015    Treatment / Drops started   2002              Family history    + mom / brother         Glaucoma meds    cosopt ou / brimonidine ou tid / lumigan ou q hs / bisi od tid / diamox / bisi ou        H/O  "adverse rxn to glaucoma drops    none        LASERS    SLT OD - 8/5/2013 - [place] // SLT os - [facility] 8/27/2015 (good resp 21-->16)        GLAUCOMA SURGERIES    CYCL G6 OD - 4/12/2017  / phaco/IOL / ahmed od 1/10/2017         OTHER EYE SURGERIES    Combined phaco/IOL / ahmed 1/10/2017         CDR    0.99/0.95        Tbase    ?  Off gtts (on gtts was up to 28/25)         Tmax    27/25 - per outside record's (reviewed by Arik )             Ttarget    15 ou    (may need to be lower)           HVF   24-2 ss //  2 test [place] to  [place] [facility]  od // SAD   10-2 ss - 3 test 2015 to 2017 - SALT/ IAD (progression) od // hint SAD / IAD os    (( 2 VF's from [place] - that were reviewed by Dr. IVEY - 2012 and 2013 -  SAD w/ fix invl . IAD od / early IAD os ))            Gonio    +3-4 ou         CCT    524/531        OCT    2 test 2016 to 2017 - RNFL - OD:dec. S/T/I/N // OS:dec. S/I, bord N/T        HRT    1 test 2015 to 2015  - MR - dec. S/N/I od // dec./ S/N/I os        Disc photos    2015     - Ttoday - 6/13 (POD #1 - phaco/IOL ahmed od)   - Test done today    Post -op phaco/IOL and ahmed OD  - 1/10/2017     2. + APD od     3.  NS ou    Minimal - [facility] 20/25 ou still     4. Papilloma OS   To be removed with Arik       HVF's continue to progress - especially OD       S/P  - cyclo G6 - OD - 4/12/2017   "IOP still too high od - very little sight to save on this eye     - IOP back up and persistent iritis   Rec phaco / IOL / GDD - ? ahmed od  (increase in cataract od post cyclo G6 and slightly shallow AC)     With the left eye - I think incisional surgery may be best option - as there is more vision to save long term "  IOP seems ok for now on Maximum drops and diamox       Pt likely will need trab os - but IOP is reasonably good  today at 16 -19    Phaco / IOL // phaco/ IOL / ahmed  OD Date: 1/10/2017 - general anesthesia // PCB00 23.0  POW #  3    Doing well - ++ iritis - had pre-op post G6 laser   Begin Pred " Acetate - 6 x day   Begin vigamox TID - done   Shield at night  Glasses day  No lifting, no bending, no eye rubbing  F/U 1-2 week, AR/MR ou    HOLD all glaucoma gtts od   Hold diamox     Cont all glaucoma gtts os   cosopt tid  ( or timolol and dorzolamide or azopt)   Brimonidine tid   bisi tid   lumigan q hs     No lifting   No bending   No eye rubbing     Pt understands that even with surgery she may loose all the sight in the right eye   Pt is at very high risk for Snuff    F/U 3-4 weeks - if IOP goes up can add cosopt or brimonidine back

## 2018-02-09 NOTE — ANESTHESIA POSTPROCEDURE EVALUATION
"Anesthesia Post Evaluation    Patient: Deb Tesfaye    Procedure(s) Performed: Procedure(s) (LRB):  PHACOEMULSIFICATION-ASPIRATION-CATARACT/ Trypan blue (Right)  INSERTION-INTRAOCULAR LENS (IOL) (Right)  INSERTION-IMPLANT-GLAUCOMA (Right)    Final Anesthesia Type: MAC  Patient location during evaluation: PACU  Patient participation: Yes- Able to Participate  Level of consciousness: awake and alert  Post-procedure vital signs: reviewed and stable  Pain management: adequate  Airway patency: patent  PONV status at discharge: No PONV  Anesthetic complications: no      Cardiovascular status: hemodynamically stable  Respiratory status: room air, spontaneous ventilation and unassisted  Hydration status: euvolemic  Follow-up not needed.        Visit Vitals  /66   Pulse 74   Temp 36.8 °C (98.2 °F) (Temporal)   Resp 18   Ht 5' 5" (1.651 m)   Wt 76.7 kg (169 lb)   SpO2 100%   Breastfeeding? No   BMI 28.12 kg/m²       Pain/Luke Score: No Data Recorded      "

## 2018-02-20 ENCOUNTER — TELEPHONE (OUTPATIENT)
Dept: HEMATOLOGY/ONCOLOGY | Facility: CLINIC | Age: 63
End: 2018-02-20

## 2018-02-20 NOTE — TELEPHONE ENCOUNTER
As patient was no show for lab and chest xray, called patient to reschedule lab, chest xray and follow up, no answer, left detailed message on voice mail

## 2018-02-21 ENCOUNTER — OFFICE VISIT (OUTPATIENT)
Dept: HEMATOLOGY/ONCOLOGY | Facility: CLINIC | Age: 63
End: 2018-02-21
Payer: COMMERCIAL

## 2018-02-21 VITALS
HEIGHT: 65 IN | SYSTOLIC BLOOD PRESSURE: 139 MMHG | TEMPERATURE: 98 F | BODY MASS INDEX: 29.49 KG/M2 | DIASTOLIC BLOOD PRESSURE: 80 MMHG | RESPIRATION RATE: 16 BRPM | HEART RATE: 65 BPM | WEIGHT: 177 LBS

## 2018-02-21 DIAGNOSIS — D05.12 DUCTAL CARCINOMA IN SITU (DCIS) OF LEFT BREAST: Primary | ICD-10-CM

## 2018-02-21 PROCEDURE — 99999 PR PBB SHADOW E&M-EST. PATIENT-LVL IV: CPT | Mod: PBBFAC,,, | Performed by: NURSE PRACTITIONER

## 2018-02-21 PROCEDURE — 3008F BODY MASS INDEX DOCD: CPT | Mod: S$GLB,,, | Performed by: NURSE PRACTITIONER

## 2018-02-21 PROCEDURE — 99213 OFFICE O/P EST LOW 20 MIN: CPT | Mod: S$GLB,,, | Performed by: NURSE PRACTITIONER

## 2018-02-21 NOTE — PROGRESS NOTES
HISTORY OF PRESENT ILLNESS:  This is a 63-year-old black female known to Dr. 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  She is status post lumpectomy,   post-lumpectomy irradiation and presently on adjuvant tamoxifen 20 mg daily.  She presents to the clinic   today for her 24-month post-therapy evaluation.  She denies any new breast complaints, bone pain, difficulties   with hot flashes, vaginal bleeding, abdominal discomfort/bloating, nausea, vomiting, constipation, diarrhea,   irregular heartbeat, chest pain, myalgias cramping, etc.  She remains compliant on Tamoxifen.  No other new   complaints or pertinent findings on a 14-point review of systems.    PHYSICAL EXAMINATION:  GENERAL:  Well-developed, well-nourished black female in no acute distress.  Alert and oriented x3.  VITAL SIGNS:  Weight:  Gain of 2 pounds in 6 months  Wt Readings from Last 3 Encounters:   02/21/18 80.3 kg (177 lb 0.5 oz)   01/19/18 79.7 kg (175 lb 11.3 oz)   01/10/18 76.7 kg (169 lb)     Temp Readings from Last 3 Encounters:   02/21/18 98.1 °F (36.7 °C)   01/19/18 98.6 °F (37 °C) (Oral)   01/10/18 98.2 °F (36.8 °C) (Temporal)     BP Readings from Last 3 Encounters:   02/21/18 139/80   01/19/18 136/80   01/10/18 136/66     Pulse Readings from Last 3 Encounters:   02/21/18 65   01/19/18 68   01/10/18 74   HEENT:  Normocephalic, atraumatic.  Oral mucosa pink and moist.  Lips without   lesions.  Tongue midline.  Oropharynx clear.  Nonicteric sclerae.  NECK:  Supple, no adenopathy.  No carotid bruits, thyromegaly or thyroid nodule.  HEART:  Regular rate and rhythm without murmur, gallop or rub.  LUNGS:  Clear to auscultation bilaterally.  Normal respiratory effort.  ABDOMEN:  Soft, nontender, nondistended with positive normoactive bowel sounds,   no hepatosplenomegaly.  EXTREMITIES:  No cyanosis, clubbing or edema.  Distal pulses are intact.  AXILLAE AND GROIN:  No palpable pathologic  lymphadenopathy is appreciated.  SKIN:  Intact/turgor normal.  NEUROLOGIC:  Cranial nerves II-XII grossly intact.  Motor:  Good muscle bulk and   tone.  Strength/sensory 5/5 throughout.  Gait stable.  BREASTS:  Right breast is soft, free of masses, tenderness, nipple discharge   or inversion.  Left breast with noted healed lumpectomy scar to the upper   outer region; no signs of local recurrence.    LABORATORY DATA:    Lab Results   Component Value Date    WBC 7.64 12/04/2017    HGB 12.3 12/04/2017    HCT 35.7 (L) 12/04/2017    MCV 82 12/04/2017     12/04/2017     Unremarkable differential    CMP  Sodium   Date Value Ref Range Status   01/15/2018 140 136 - 145 mmol/L Final     Potassium   Date Value Ref Range Status   01/15/2018 3.6 3.5 - 5.1 mmol/L Final     Chloride   Date Value Ref Range Status   01/15/2018 108 95 - 110 mmol/L Final     CO2   Date Value Ref Range Status   01/15/2018 26 23 - 29 mmol/L Final     Glucose   Date Value Ref Range Status   01/15/2018 108 70 - 110 mg/dL Final     BUN, Bld   Date Value Ref Range Status   01/15/2018 12 6 - 20 mg/dL Final     Creatinine   Date Value Ref Range Status   01/15/2018 0.8 0.5 - 1.4 mg/dL Final     Calcium   Date Value Ref Range Status   01/15/2018 9.2 8.7 - 10.5 mg/dL Final     Total Protein   Date Value Ref Range Status   12/04/2017 7.5 6.0 - 8.4 g/dL Final     Albumin   Date Value Ref Range Status   12/04/2017 3.6 3.5 - 5.2 g/dL Final     Total Bilirubin   Date Value Ref Range Status   12/04/2017 0.4 0.1 - 1.0 mg/dL Final     Comment:     For infants and newborns, interpretation of results should be based  on gestational age, weight and in agreement with clinical  observations.  Premature Infant recommended reference ranges:  Up to 24 hours.............<8.0 mg/dL  Up to 48 hours............<12.0 mg/dL  3-5 days..................<15.0 mg/dL  6-29 days.................<15.0 mg/dL       Alkaline Phosphatase   Date Value Ref Range Status   12/04/2017 78 55 -  135 U/L Final     AST   Date Value Ref Range Status   12/04/2017 28 10 - 40 U/L Final     ALT   Date Value Ref Range Status   12/04/2017 26 10 - 44 U/L Final     Anion Gap   Date Value Ref Range Status   01/15/2018 6 (L) 8 - 16 mmol/L Final     eGFR if    Date Value Ref Range Status   01/15/2018 >60.0 >60 mL/min/1.73 m^2 Final     eGFR if non    Date Value Ref Range Status   01/15/2018 >60.0 >60 mL/min/1.73 m^2 Final     Comment:     Calculation used to obtain the estimated glomerular filtration  rate (eGFR) is the CKD-EPI equation.            RADIOLOGY:    CXR dated 12/04/2017:  No evidence of active chest disease.  Mammogram:  Dated 12/13/2017:   IMPRESSION:  No mammographic evidence of malignancy.   BI-RADS Category 1: Negative   Recommendation:  Routine screening mammogram in 1 year is recommended.     IMPRESSION:  1.  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  2.  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  3.  Hypokalemia, resolved, continue potassium supplementation.    PLAN:  1.  Continue tamoxifen 20 mg daily.  2.  Return to clinic in 6 months for 30 month post therapy evaluation without labs or imaging.    Assessment/plan reviewed and approved by Dr. Rider.

## 2018-03-02 ENCOUNTER — OFFICE VISIT (OUTPATIENT)
Dept: OPHTHALMOLOGY | Facility: CLINIC | Age: 63
End: 2018-03-02
Payer: COMMERCIAL

## 2018-03-02 DIAGNOSIS — H20.9 IRITIS OF RIGHT EYE: ICD-10-CM

## 2018-03-02 DIAGNOSIS — Z96.89 HISTORY OF GLAUCOMA TUBE SHUNT PROCEDURE: ICD-10-CM

## 2018-03-02 DIAGNOSIS — H25.12 NUCLEAR SCLEROSIS OF LEFT EYE: ICD-10-CM

## 2018-03-02 DIAGNOSIS — Z96.1 PSEUDOPHAKIA, RIGHT EYE: ICD-10-CM

## 2018-03-02 DIAGNOSIS — Z48.89 ENCOUNTER FOR POSTOPERATIVE CARE: Primary | ICD-10-CM

## 2018-03-02 DIAGNOSIS — H40.1133 PRIMARY OPEN ANGLE GLAUCOMA OF BOTH EYES, SEVERE STAGE: ICD-10-CM

## 2018-03-02 DIAGNOSIS — H21.561 AFFERENT PUPILLARY DEFECT, RIGHT: ICD-10-CM

## 2018-03-02 PROCEDURE — 99024 POSTOP FOLLOW-UP VISIT: CPT | Mod: S$GLB,,, | Performed by: OPHTHALMOLOGY

## 2018-03-02 PROCEDURE — 99999 PR PBB SHADOW E&M-EST. PATIENT-LVL II: CPT | Mod: PBBFAC,,, | Performed by: OPHTHALMOLOGY

## 2018-04-06 ENCOUNTER — OFFICE VISIT (OUTPATIENT)
Dept: OPHTHALMOLOGY | Facility: CLINIC | Age: 63
End: 2018-04-06
Payer: COMMERCIAL

## 2018-04-06 DIAGNOSIS — H21.561 AFFERENT PUPILLARY DEFECT, RIGHT: ICD-10-CM

## 2018-04-06 DIAGNOSIS — H25.12 NUCLEAR SCLEROSIS OF LEFT EYE: ICD-10-CM

## 2018-04-06 DIAGNOSIS — H25.11 NUCLEAR SCLEROTIC CATARACT OF RIGHT EYE: ICD-10-CM

## 2018-04-06 DIAGNOSIS — Z48.89 ENCOUNTER FOR POSTOPERATIVE CARE: ICD-10-CM

## 2018-04-06 DIAGNOSIS — H53.40 VISUAL FIELD LOSS: ICD-10-CM

## 2018-04-06 DIAGNOSIS — H40.1133 PRIMARY OPEN ANGLE GLAUCOMA OF BOTH EYES, SEVERE STAGE: ICD-10-CM

## 2018-04-06 DIAGNOSIS — Z96.89 HISTORY OF GLAUCOMA TUBE SHUNT PROCEDURE: ICD-10-CM

## 2018-04-06 DIAGNOSIS — H40.1133 PRIMARY OPEN ANGLE GLAUCOMA (POAG) OF BOTH EYES, SEVERE STAGE: Primary | ICD-10-CM

## 2018-04-06 DIAGNOSIS — Z96.1 PSEUDOPHAKIA, RIGHT EYE: ICD-10-CM

## 2018-04-06 DIAGNOSIS — H20.9 IRITIS OF RIGHT EYE: ICD-10-CM

## 2018-04-06 PROCEDURE — 99999 PR PBB SHADOW E&M-EST. PATIENT-LVL II: CPT | Mod: PBBFAC,,, | Performed by: OPHTHALMOLOGY

## 2018-04-06 PROCEDURE — 99024 POSTOP FOLLOW-UP VISIT: CPT | Mod: S$GLB,,, | Performed by: OPHTHALMOLOGY

## 2018-04-06 RX ORDER — DORZOLAMIDE HCL 20 MG/ML
1 SOLUTION/ DROPS OPHTHALMIC 3 TIMES DAILY
COMMUNITY
Start: 2018-03-19 | End: 2018-06-08

## 2018-04-06 RX ORDER — BIMATOPROST 0.3 MG/ML
1 SOLUTION/ DROPS OPHTHALMIC NIGHTLY
Qty: 2.5 ML | Refills: 6 | Status: SHIPPED | OUTPATIENT
Start: 2018-04-06 | End: 2018-09-07 | Stop reason: CLARIF

## 2018-04-06 RX ORDER — TIMOLOL MALEATE 5 MG/ML
1 SOLUTION/ DROPS OPHTHALMIC 2 TIMES DAILY
COMMUNITY
Start: 2018-03-02 | End: 2018-06-08

## 2018-04-06 RX ORDER — BIMATOPROST 0.3 MG/ML
1 SOLUTION/ DROPS OPHTHALMIC NIGHTLY
COMMUNITY
Start: 2018-04-06 | End: 2018-04-06 | Stop reason: SDUPTHER

## 2018-04-06 NOTE — PROGRESS NOTES
HPI     Post-op Evaluation    Additional comments: Pt states no complaints today           Comments   S/p Combined (Phaco IOL and Ahmed) OD 1/10/18    MEDS:  Right eye  PA BID OD    Left eye  (NO Cosopt TID OS)  Dorzolamide TID OS  Timolol BID OS  Brimonidine TID OS  Sandro 4% TID OS  Lumigan QHS OS         Last edited by Jacqueline Perez MD on 4/6/2018  1:16 PM. (History)            Assessment /Plan     For exam results, see Encounter Report.    Primary open angle glaucoma (POAG) of both eyes, severe stage  -     bimatoprost (LUMIGAN) 0.03 % ophthalmic drops; Place 1 drop into both eyes every evening.  Dispense: 2.5 mL; Refill: 6    Encounter for postoperative care    Primary open angle glaucoma of both eyes, severe stage    Iritis of right eye    Afferent pupillary defect, right    Nuclear sclerosis of left eye    Pseudophakia, right eye    History of glaucoma tube shunt procedure    Nuclear sclerotic cataract of right eye    Visual field loss      HERE FOR Post -OP PHACO/IOL/AHMED - OD - 1/10/2017     F/U CYCLO G6 OD - 4/12/2017  REFERRED BY DR HILLMAN FOR SEVERE POORLY CONTROLLED POAG - 7/24/2015   Pt recently moved from [place] to [facility]     Previously referred to Dr. Hillman from [facility], review of outside records:    Tm 27/25.    Prior to that, IOP ranged mid-teens to low-20's on Combigan/Dorzolamide/Lumigan OU    C:D documented as 0.9 OD, 0.85 OS    Initial visit 3/29/12  IOP 10 OU on Combitan and Lumigan  C:D 0.85 OU  CCT 524OD, 531 OS    HVF's  2012 - superior defect affecting fixation OD with beginnings of early inferior arc; possible early superior arc OS  2013 - stable superior defect OD with early inferior arc; OS superior nasal step    Here for evaluation of POAG on MMT. Last IOP with Dr. Hillman 21//19. IOP today 21//20 on MMT. Patient has a family history of glaucoma in maternal grandfather. States that she is compliant with all Gtt's.        Glaucoma (type and  "duration)    Dx about 2002 - [place]    First HVF   First at [facility] 2015   First photos   First at [facility] 2015    Treatment / Drops started   2002              Family history    + mom / brother         Glaucoma meds    cosopt ou / brimonidine ou tid / lumigan ou q hs / bisi od tid / diamox / bisi ou        H/O adverse rxn to glaucoma drops    none        LASERS    SLT OD - 8/5/2013 - [place] // SLT os - [facility] 8/27/2015 (good resp 21-->16)        GLAUCOMA SURGERIES    CYCL G6 OD - 4/12/2017  / phaco/IOL / ahmed od 1/10/2017         OTHER EYE SURGERIES    Combined phaco/IOL / ahmed 1/10/2017         CDR    0.99/0.95        Tbase    ?  Off gtts (on gtts was up to 28/25)         Tmax    27/25 - per outside record's (reviewed by Arik )             Ttarget    15 ou    (may need to be lower)           HVF   24-2 ss //  2 test 2015 to  [place] [facility]  od // SAD   10-2 ss - 3 test 2015 to 2017 - SALT/ IAD (progression) od // hint SAD / IAD os    (( 2 VF's from [place] - that were reviewed by Dr. IVEY - 2012 and 2013 -  SAD w/ fix invl . IAD od / early IAD os ))            Gonio    +3-4 ou         CCT    524/531        OCT    2 test 2016 to 2017 - RNFL - OD:dec. S/T/I/N // OS:dec. S/I, bord N/T        HRT    1 test 2015 to 2015  - MR - dec. S/N/I od // dec./ S/N/I os        Disc photos    2015     - Ttoday - 6/13 (POD #1 - phaco/IOL ahmed od)   - Test done today    Post -op phaco/IOL and ahmed OD  - 1/10/2017     2. + APD od     3.  NS ou    Minimal - [facility] 20/25 ou still     4. Papilloma OS   To be removed with Arik       HVF's continue to progress - especially OD       S/P  - cyclo G6 - OD - 4/12/2017   "IOP still too high od - very little sight to save on this eye     - IOP back up and persistent iritis   Rec phaco / IOL / GDD - ? ahmed od  (increase in cataract od post cyclo G6 and slightly shallow AC)     With the left eye - I think incisional surgery may be best option - as there is more vision to " "save long term "  IOP seems ok for now on Maximum drops and diamox       Pt likely will need trab os - but IOP is reasonably good  today at 16 -19    Phaco / IOL // phaco/ IOL / ahmed  OD Date: 1/10/2017 - general anesthesia // PCB00 23.0  POM #  3   Doing well - ++ iritis - had pre-op post G6 laser   - Pred Acetate -  Decrease to q day x 1 month then stop     HOLD all glaucoma gtts od   Hold diamox     Cont all glaucoma gtts os   cosopt tid  ( or timolol and dorzolamide or azopt)   Brimonidine tid   bisi tid   lumigan q hs     No lifting   No bending   No eye rubbing     Pt understands that even with surgery she may loose all the sight in the right eye   Pt is at very high risk for Snuff    F/U 2 months - EP HRT // IOP // iritis check off steroids od     "

## 2018-04-09 ENCOUNTER — TELEPHONE (OUTPATIENT)
Dept: INTERNAL MEDICINE | Facility: CLINIC | Age: 63
End: 2018-04-09

## 2018-04-09 DIAGNOSIS — J40 BRONCHITIS: ICD-10-CM

## 2018-04-09 DIAGNOSIS — J32.9 SINUSITIS, UNSPECIFIED CHRONICITY, UNSPECIFIED LOCATION: ICD-10-CM

## 2018-04-09 RX ORDER — VALSARTAN 160 MG/1
TABLET ORAL
Qty: 90 TABLET | Refills: 0 | Status: SHIPPED | OUTPATIENT
Start: 2018-04-09 | End: 2018-07-20

## 2018-04-09 RX ORDER — BENZONATATE 200 MG/1
CAPSULE ORAL
Qty: 20 CAPSULE | Refills: 0 | Status: SHIPPED | OUTPATIENT
Start: 2018-04-09 | End: 2018-07-20

## 2018-04-26 DIAGNOSIS — L30.9 ECZEMA, UNSPECIFIED TYPE: ICD-10-CM

## 2018-04-26 RX ORDER — DESONIDE 0.5 MG/G
CREAM TOPICAL
Qty: 15 G | Refills: 0 | OUTPATIENT
Start: 2018-04-26

## 2018-06-04 ENCOUNTER — TELEPHONE (OUTPATIENT)
Dept: OPHTHALMOLOGY | Facility: CLINIC | Age: 63
End: 2018-06-04

## 2018-06-06 NOTE — PROGRESS NOTES
Assessment /Plan     For exam results, see Encounter Report.    Primary open angle glaucoma (POAG) of both eyes, severe stage    Iritis of right eye    Afferent pupillary defect, right    Nuclear sclerosis of left eye    Pseudophakia, right eye    History of glaucoma tube shunt procedure    Visual field loss        HERE FOR Post -OP PHACO/IOL/AHMED - OD - 1/10/2017     F/U CYCLO G6 OD - 4/12/2017  REFERRED BY DR HILLMAN FOR SEVERE POORLY CONTROLLED POAG - 7/24/2015   Pt recently moved from [place] to [facility]     Previously referred to Dr. Hillman from [facility], review of outside records:    Tm 27/25.    Prior to that, IOP ranged mid-teens to low-20's on Combigan/Dorzolamide/Lumigan OU    C:D documented as 0.9 OD, 0.85 OS    Initial visit 3/29/12  IOP 10 OU on Combitan and Lumigan  C:D 0.85 OU  CCT 524OD, 531 OS    HVF's  2012 - superior defect affecting fixation OD with beginnings of early inferior arc; possible early superior arc OS  2013 - stable superior defect OD with early inferior arc; OS superior nasal step    Here for evaluation of POAG on MMT. Last IOP with Dr. Hillman 21//19. IOP today 21//20 on MMT. Patient has a family history of glaucoma in maternal grandfather. States that she is compliant with all Gtt's.        Glaucoma (type and duration)    Dx about 2002 - [place]    First HVF   First at [facility] 2015   First photos   First at [facility] 2015    Treatment / Drops started   2002              Family history    + mom / brother         Glaucoma meds    cosopt ou / brimonidine ou tid / lumigan ou q hs / bisi od tid / diamox / bisi ou        H/O adverse rxn to glaucoma drops    none        LASERS    SLT OD - 8/5/2013 - [place] // SLT os - [facility] 8/27/2015 (good resp 21-->16)        GLAUCOMA SURGERIES    CYCL G6 OD - 4/12/2017  / phaco/IOL / ahmed od 1/10/2017         OTHER EYE SURGERIES    Combined phaco/IOL / ahmed 1/10/2017         CDR    0.99/0.95        Tbase    ?   "Off gtts (on gtts was up to 28/25)         Tmax    27/25 - per outside record's (reviewed by Arik )             Ttarget    15 ou    (may need to be lower)           HVF   24-2 ss //  2 test 2015 to  2017 - [facility]  od // SAD   10-2 ss - 3 test 2015 to 2017 - SALT/ IAD (progression) od // hint SAD / IAD os    (( 2 VF's from [place] - that were reviewed by Dr. IVEY - 2012 and 2013 -  SAD w/ fix invl . IAD od / early IAD os ))            Gonio    +3-4 ou         CCT    524/531        OCT    2 test 2016 to 2017 - RNFL - OD:dec. S/T/I/N // OS:dec. S/Ipratik N/T        HRT    2 test 2015 to 2018  - MR -   Dec thru out   od // dec thru out  os /// CDR 0. 91 od // 0.88 os        Disc photos    2015     - Ttoday - 10//13  - Test done today    IOP check, AC check, HRT    2. + APD od     3.  NS ou    Minimal - [facility] 20/25 ou still     4. Papilloma OS   To be removed with Arik       HVF's continue to progress - especially OD       S/P  - cyclo G6 - OD - 4/12/2017   "IOP still too high od - very little sight to save on this eye     - IOP back up and persistent iritis   Rec phaco / IOL / GDD - ? ahmed od  (increase in cataract od post cyclo G6 and slightly shallow AC)     With the left eye - I think incisional surgery may be best option - as there is more vision to save long term "  IOP seems ok for now on Maximum drops and diamox       Pt likely will need trab os - but IOP is reasonably good  today at 10//13    Phaco / IOL // phaco/ IOL / ahmed  OD Date: 1/10/2017 - general anesthesia // PCB00 23.0  POM #  5   Doing well. Asymptomatic but still with trace cell and flare  iritis - had pre-op post G6 laser   - Pred Acetate - off x 1 month    HOLD all glaucoma gtts od   Hold diamox     Cont all glaucoma gtts os   cosopt tid    Brimonidine tid   bisi tid   lumigan q hs     No lifting   No bending   No eye rubbing     Pt understands that even with surgery she may loose all the sight in the right eye   Pt is at very " high risk for Snuff    F/U 3 months - F - od - 10-2 stim V // os 24-2 ss and DFE / photos

## 2018-06-07 DIAGNOSIS — E87.6 HYPOKALEMIA: ICD-10-CM

## 2018-06-07 RX ORDER — POTASSIUM CHLORIDE 20 MEQ/1
TABLET, EXTENDED RELEASE ORAL
Qty: 60 TABLET | Refills: 5 | Status: SHIPPED | OUTPATIENT
Start: 2018-06-07 | End: 2018-12-04 | Stop reason: SDUPTHER

## 2018-06-08 ENCOUNTER — OFFICE VISIT (OUTPATIENT)
Dept: OPHTHALMOLOGY | Facility: CLINIC | Age: 63
End: 2018-06-08
Payer: COMMERCIAL

## 2018-06-08 ENCOUNTER — CLINICAL SUPPORT (OUTPATIENT)
Dept: OPHTHALMOLOGY | Facility: CLINIC | Age: 63
End: 2018-06-08
Payer: COMMERCIAL

## 2018-06-08 DIAGNOSIS — Z96.1 PSEUDOPHAKIA, RIGHT EYE: ICD-10-CM

## 2018-06-08 DIAGNOSIS — H40.1133 PRIMARY OPEN ANGLE GLAUCOMA (POAG) OF BOTH EYES, SEVERE STAGE: Primary | ICD-10-CM

## 2018-06-08 DIAGNOSIS — H20.9 IRITIS OF RIGHT EYE: ICD-10-CM

## 2018-06-08 DIAGNOSIS — H21.561 AFFERENT PUPILLARY DEFECT, RIGHT: ICD-10-CM

## 2018-06-08 DIAGNOSIS — H53.40 VISUAL FIELD LOSS: ICD-10-CM

## 2018-06-08 DIAGNOSIS — H25.12 NUCLEAR SCLEROSIS OF LEFT EYE: ICD-10-CM

## 2018-06-08 DIAGNOSIS — Z96.89 HISTORY OF GLAUCOMA TUBE SHUNT PROCEDURE: ICD-10-CM

## 2018-06-08 PROCEDURE — 92012 INTRM OPH EXAM EST PATIENT: CPT | Mod: S$GLB,,, | Performed by: OPHTHALMOLOGY

## 2018-06-08 PROCEDURE — 99999 PR PBB SHADOW E&M-EST. PATIENT-LVL II: CPT | Mod: PBBFAC,,, | Performed by: OPHTHALMOLOGY

## 2018-06-08 PROCEDURE — 92133 CPTRZD OPH DX IMG PST SGM ON: CPT | Mod: S$GLB,,, | Performed by: OPHTHALMOLOGY

## 2018-06-08 RX ORDER — DORZOLAMIDE HYDROCHLORIDE AND TIMOLOL MALEATE 20; 5 MG/ML; MG/ML
1 SOLUTION/ DROPS OPHTHALMIC 2 TIMES DAILY
Qty: 10 ML | Refills: 12 | Status: CANCELLED | OUTPATIENT
Start: 2018-06-08

## 2018-06-08 RX ORDER — DORZOLAMIDE HYDROCHLORIDE AND TIMOLOL MALEATE 20; 5 MG/ML; MG/ML
1 SOLUTION/ DROPS OPHTHALMIC 2 TIMES DAILY
Qty: 10 ML | Refills: 12 | Status: SHIPPED | OUTPATIENT
Start: 2018-06-08 | End: 2018-10-15

## 2018-07-20 ENCOUNTER — OFFICE VISIT (OUTPATIENT)
Dept: FAMILY MEDICINE | Facility: CLINIC | Age: 63
End: 2018-07-20
Payer: COMMERCIAL

## 2018-07-20 VITALS
WEIGHT: 175.5 LBS | BODY MASS INDEX: 29.24 KG/M2 | HEIGHT: 65 IN | HEART RATE: 68 BPM | SYSTOLIC BLOOD PRESSURE: 135 MMHG | TEMPERATURE: 99 F | RESPIRATION RATE: 18 BRPM | DIASTOLIC BLOOD PRESSURE: 90 MMHG | OXYGEN SATURATION: 97 %

## 2018-07-20 DIAGNOSIS — R30.0 DYSURIA: ICD-10-CM

## 2018-07-20 DIAGNOSIS — E78.2 MIXED HYPERLIPIDEMIA: ICD-10-CM

## 2018-07-20 DIAGNOSIS — I10 ESSENTIAL HYPERTENSION: Primary | ICD-10-CM

## 2018-07-20 DIAGNOSIS — M17.12 PRIMARY OSTEOARTHRITIS OF LEFT KNEE: ICD-10-CM

## 2018-07-20 DIAGNOSIS — E87.6 HYPOKALEMIA: ICD-10-CM

## 2018-07-20 DIAGNOSIS — M75.21 BICEPS TENDONITIS ON RIGHT: ICD-10-CM

## 2018-07-20 LAB
BACTERIA #/AREA URNS HPF: ABNORMAL /HPF
BILIRUB UR QL STRIP: NEGATIVE
CLARITY UR: CLEAR
COLOR UR: YELLOW
GLUCOSE UR QL STRIP: NEGATIVE
HGB UR QL STRIP: ABNORMAL
KETONES UR QL STRIP: NEGATIVE
LEUKOCYTE ESTERASE UR QL STRIP: ABNORMAL
MICROSCOPIC COMMENT: ABNORMAL
NITRITE UR QL STRIP: NEGATIVE
PH UR STRIP: 6 [PH] (ref 5–8)
PROT UR QL STRIP: NEGATIVE
RBC #/AREA URNS HPF: 5 /HPF (ref 0–4)
SP GR UR STRIP: 1.01 (ref 1–1.03)
URN SPEC COLLECT METH UR: ABNORMAL
WBC #/AREA URNS HPF: 2 /HPF (ref 0–5)

## 2018-07-20 PROCEDURE — 3077F SYST BP >= 140 MM HG: CPT | Mod: CPTII,S$GLB,, | Performed by: FAMILY MEDICINE

## 2018-07-20 PROCEDURE — 99214 OFFICE O/P EST MOD 30 MIN: CPT | Mod: S$GLB,,, | Performed by: FAMILY MEDICINE

## 2018-07-20 PROCEDURE — 3008F BODY MASS INDEX DOCD: CPT | Mod: CPTII,S$GLB,, | Performed by: FAMILY MEDICINE

## 2018-07-20 PROCEDURE — 81000 URINALYSIS NONAUTO W/SCOPE: CPT | Mod: PO

## 2018-07-20 PROCEDURE — 99999 PR PBB SHADOW E&M-EST. PATIENT-LVL III: CPT | Mod: PBBFAC,,, | Performed by: FAMILY MEDICINE

## 2018-07-20 PROCEDURE — 3080F DIAST BP >= 90 MM HG: CPT | Mod: CPTII,S$GLB,, | Performed by: FAMILY MEDICINE

## 2018-07-20 RX ORDER — TELMISARTAN AND HYDROCHLORTHIAZIDE 80; 25 MG/1; MG/1
1 TABLET ORAL DAILY
Qty: 90 TABLET | Refills: 3 | Status: SHIPPED | OUTPATIENT
Start: 2018-07-20 | End: 2019-07-13 | Stop reason: SDUPTHER

## 2018-07-20 NOTE — PROGRESS NOTES
"Subjective:       Patient ID: Deb Tesfaye is a 63 y.o. female.    Chief Complaint: Establish Care and Follow-up (hypertension (need new rx due to recall)and potassium)    This pt is new to me.  Pt is here for followup of chronic medical issues.  Pt is followed for HTN (she took the valsartan last night), HLD, glaucoma and breast cancer (2.5 years ago).  Today pt reports some dysuria and some vaginal irritation.  She denies abd and back pain.      Review of Systems   Constitutional: Negative for activity change and unexpected weight change.   HENT: Negative for hearing loss, rhinorrhea and trouble swallowing.    Eyes: Negative for discharge and visual disturbance.   Respiratory: Negative for chest tightness and wheezing.    Cardiovascular: Negative for chest pain and palpitations.   Gastrointestinal: Negative for blood in stool, constipation, diarrhea and vomiting.   Endocrine: Negative for polydipsia and polyuria.   Genitourinary: Positive for dysuria. Negative for difficulty urinating, hematuria and menstrual problem.   Musculoskeletal: Positive for arthralgias (R elbow, L knee). Negative for joint swelling and neck pain.   Neurological: Negative for weakness and headaches.   Psychiatric/Behavioral: Negative for confusion, dysphoric mood and sleep disturbance.       Objective:       Vitals:    07/20/18 1033   BP: (!) 160/100   BP Location: Left arm   Patient Position: Sitting   BP Method: Large (Manual)   Pulse: 68   Resp: 18   Temp: 98.9 °F (37.2 °C)   TempSrc: Oral   SpO2: 97%   Weight: 79.6 kg (175 lb 7.8 oz)   Height: 5' 5" (1.651 m)     Physical Exam   Constitutional: She is oriented to person, place, and time. She appears well-developed and well-nourished.   HENT:   Head: Normocephalic.   Eyes: Conjunctivae and EOM are normal. Pupils are equal, round, and reactive to light.   Neck: Normal range of motion. Neck supple. No thyromegaly present.   Cardiovascular: Normal rate, regular rhythm and normal " heart sounds.    Pulmonary/Chest: Effort normal and breath sounds normal.   Abdominal: Soft. Bowel sounds are normal. There is no tenderness.   Musculoskeletal: Normal range of motion. She exhibits no tenderness or deformity.   Mild crepitus B knees; pain with isolation of R biceps tendons   Lymphadenopathy:     She has no cervical adenopathy.   Neurological: She is alert and oriented to person, place, and time. She displays normal reflexes. No cranial nerve deficit. She exhibits normal muscle tone. Coordination normal.   Skin: Skin is warm and dry.   Psychiatric: She has a normal mood and affect. Her behavior is normal.       Assessment:       1. Essential hypertension    2. Dysuria    3. Mixed hyperlipidemia    4. Hypokalemia    5. Biceps tendonitis on right    6. Primary osteoarthritis of left knee        Plan:       Deb was seen today for establish care and follow-up.    Diagnoses and all orders for this visit:    Essential hypertension  -     telmisartan-hydrochlorothiazide (MICARDIS HCT) 80-25 mg per tablet; Take 1 tablet by mouth once daily.    Dysuria  -     Urinalysis; Future  -     Urinalysis    Mixed hyperlipidemia  -     Lipid panel; Future    Hypokalemia    Biceps tendonitis on right    Primary osteoarthritis of left knee    Other orders  -     Urinalysis Microscopic      During this visit, I reviewed the pt's history, medications, allergies, and problem list.

## 2018-07-25 ENCOUNTER — TELEPHONE (OUTPATIENT)
Dept: FAMILY MEDICINE | Facility: CLINIC | Age: 63
End: 2018-07-25

## 2018-07-25 DIAGNOSIS — R31.29 HEMATURIA, MICROSCOPIC: Primary | ICD-10-CM

## 2018-07-25 NOTE — TELEPHONE ENCOUNTER
----- Message from Vero Vazquez sent at 7/25/2018 12:18 PM CDT -----  Schedule a nurse visit.  Also need orders for urine.  Please call patient at [phone].

## 2018-07-25 NOTE — TELEPHONE ENCOUNTER
----- Message from Belen Hendricks sent at 7/25/2018 12:44 PM CDT -----  Contact: Patient  Type:  Patient Returning Call    Who Called:  Deb  Who Left Message for Patient:  Sharifa  Does the patient know what this is regarding?:  Unsure  Best Call Back Number:  [phone]  Additional Information:

## 2018-07-27 ENCOUNTER — TELEPHONE (OUTPATIENT)
Dept: FAMILY MEDICINE | Facility: CLINIC | Age: 63
End: 2018-07-27

## 2018-07-27 NOTE — TELEPHONE ENCOUNTER
----- Message from Diana Gaffney sent at 7/27/2018  9:41 AM CDT -----  Type:  Patient Returning Call    Who Called:  patient  Who Left Message for Patient:  Sharifa  Does the patient know what this is regarding?:  yes  Best Call Back Number:  [phone] (home)     Additional Information:  Na

## 2018-08-01 DIAGNOSIS — E78.5 HYPERLIPIDEMIA, UNSPECIFIED HYPERLIPIDEMIA TYPE: ICD-10-CM

## 2018-08-02 RX ORDER — PRAVASTATIN SODIUM 20 MG/1
20 TABLET ORAL DAILY
Qty: 30 TABLET | Refills: 5 | Status: SHIPPED | OUTPATIENT
Start: 2018-08-02 | End: 2019-02-11 | Stop reason: SDUPTHER

## 2018-08-03 ENCOUNTER — LAB VISIT (OUTPATIENT)
Dept: LAB | Facility: HOSPITAL | Age: 63
End: 2018-08-03
Attending: FAMILY MEDICINE
Payer: COMMERCIAL

## 2018-08-03 DIAGNOSIS — E78.2 MIXED HYPERLIPIDEMIA: ICD-10-CM

## 2018-08-03 LAB
CHOLEST SERPL-MCNC: 163 MG/DL
CHOLEST/HDLC SERPL: 3.4 {RATIO}
HDLC SERPL-MCNC: 48 MG/DL
HDLC SERPL: 29.4 %
LDLC SERPL CALC-MCNC: 100.8 MG/DL
NONHDLC SERPL-MCNC: 115 MG/DL
TRIGL SERPL-MCNC: 71 MG/DL

## 2018-08-03 PROCEDURE — 36415 COLL VENOUS BLD VENIPUNCTURE: CPT | Mod: PO

## 2018-08-03 PROCEDURE — 80061 LIPID PANEL: CPT

## 2018-08-16 DIAGNOSIS — H40.1133 PRIMARY OPEN ANGLE GLAUCOMA OF BOTH EYES, SEVERE STAGE: ICD-10-CM

## 2018-08-16 RX ORDER — PILOCARPINE HYDROCHLORIDE 40 MG/ML
SOLUTION/ DROPS OPHTHALMIC
Qty: 15 ML | Refills: 0 | Status: SHIPPED | OUTPATIENT
Start: 2018-08-16 | End: 2018-10-01 | Stop reason: SDUPTHER

## 2018-08-17 ENCOUNTER — PATIENT MESSAGE (OUTPATIENT)
Dept: FAMILY MEDICINE | Facility: CLINIC | Age: 63
End: 2018-08-17

## 2018-08-17 ENCOUNTER — LAB VISIT (OUTPATIENT)
Dept: LAB | Facility: HOSPITAL | Age: 63
End: 2018-08-17
Attending: FAMILY MEDICINE
Payer: COMMERCIAL

## 2018-08-17 ENCOUNTER — CLINICAL SUPPORT (OUTPATIENT)
Dept: FAMILY MEDICINE | Facility: CLINIC | Age: 63
End: 2018-08-17
Payer: COMMERCIAL

## 2018-08-17 DIAGNOSIS — R31.9 HEMATURIA OF UNDIAGNOSED CAUSE: Primary | ICD-10-CM

## 2018-08-17 DIAGNOSIS — I10 ESSENTIAL HYPERTENSION: Primary | ICD-10-CM

## 2018-08-17 DIAGNOSIS — R31.29 HEMATURIA, MICROSCOPIC: ICD-10-CM

## 2018-08-17 LAB
BACTERIA #/AREA URNS HPF: NORMAL /HPF
BILIRUB UR QL STRIP: NEGATIVE
CLARITY UR: CLEAR
COLOR UR: YELLOW
GLUCOSE UR QL STRIP: NEGATIVE
HGB UR QL STRIP: ABNORMAL
KETONES UR QL STRIP: NEGATIVE
LEUKOCYTE ESTERASE UR QL STRIP: ABNORMAL
MICROSCOPIC COMMENT: NORMAL
NITRITE UR QL STRIP: NEGATIVE
PH UR STRIP: 6 [PH] (ref 5–8)
PROT UR QL STRIP: NEGATIVE
RBC #/AREA URNS HPF: 3 /HPF (ref 0–4)
SP GR UR STRIP: 1.01 (ref 1–1.03)
SQUAMOUS #/AREA URNS HPF: 3 /HPF
URN SPEC COLLECT METH UR: ABNORMAL
WBC #/AREA URNS HPF: 3 /HPF (ref 0–5)

## 2018-08-17 PROCEDURE — 81000 URINALYSIS NONAUTO W/SCOPE: CPT | Mod: PO

## 2018-08-17 PROCEDURE — 99211 OFF/OP EST MAY X REQ PHY/QHP: CPT | Mod: S$GLB,,, | Performed by: FAMILY MEDICINE

## 2018-08-17 NOTE — PROGRESS NOTES
Deb Tesfaye 63 y.o. female is here today for Blood Pressure check.   History of HTN yes.    Review of patient's allergies indicates:  No Known Allergies  Creatinine   Date Value Ref Range Status   01/15/2018 0.8 0.5 - 1.4 mg/dL Final     Sodium   Date Value Ref Range Status   01/15/2018 140 136 - 145 mmol/L Final     Potassium   Date Value Ref Range Status   01/15/2018 3.6 3.5 - 5.1 mmol/L Final   ]  Patient verifies taking blood pressure medications on a regular basis at the same time of the day.     Current Outpatient Medications:     azelastine (ASTELIN) 137 mcg (0.1 %) nasal spray, 1 spray (137 mcg total) by Nasal route 2 (two) times daily., Disp: 30 mL, Rfl: 11    bimatoprost (LUMIGAN) 0.03 % ophthalmic drops, Place 1 drop into both eyes every evening., Disp: 2.5 mL, Rfl: 6    brimonidine 0.2% (ALPHAGAN) 0.2 % Drop, Place 1 drop into both eyes 3 (three) times daily. (Patient taking differently: Place 1 drop into the left eye 3 (three) times daily. ), Disp: 10 mL, Rfl: 12    dorzolamide-timolol 2-0.5% (COSOPT) 22.3-6.8 mg/mL ophthalmic solution, Place 1 drop into the left eye 2 (two) times daily., Disp: 10 mL, Rfl: 12    ERGOCALCIFEROL, VITAMIN D2, (VITAMIN D ORAL), Take 1,000 Units by mouth once daily. , Disp: , Rfl:     KRILL OIL ORAL, Take 1 capsule by mouth once daily. , Disp: , Rfl:     MULTIVITS,CA,MINERALS/IRON/FA (ONE-A-DAY WOMENS FORMULA ORAL), Take 1 capsule by mouth once daily., Disp: , Rfl:     pilocarpine HCL 4% (PILOCAR) 4 % ophthalmic solution, Place 1 drop into the left eye 3 (three) times daily., Disp: 15 mL, Rfl: 12    pilocarpine HCL 4% (PILOCAR) 4 % ophthalmic solution, INSTILL 1 DROP IN BOTH EYES THREE TIMES DAILY, Disp: 15 mL, Rfl: 0    potassium chloride SA (K-DUR,KLOR-CON) 20 MEQ tablet, TAKE 1 TABLET(20 MEQ) BY MOUTH TWICE DAILY, Disp: 60 tablet, Rfl: 5    pravastatin (PRAVACHOL) 20 MG tablet, Take 1 tablet (20 mg total) by mouth once daily., Disp: 30 tablet, Rfl: 5     tamoxifen (NOLVADEX) 20 MG Tab, Take 1 tablet (20 mg total) by mouth once daily., Disp: 30 tablet, Rfl: 11    telmisartan-hydrochlorothiazide (MICARDIS HCT) 80-25 mg per tablet, Take 1 tablet by mouth once daily., Disp: 90 tablet, Rfl: 3  Does patient have record of home blood pressure readings no. Readings have been averaging n/a.   Last dose of blood pressure medication was taken at yesterday evening.  Patient is asymptomatic.   Complains of n/a.     first bp reading 136/84 HR 66 ,   .    Blood pressure reading after 15 minutes was N/A/N/A, Pulse N/A.  Dr. Nichols notified.

## 2018-08-28 DIAGNOSIS — H40.1133 PRIMARY OPEN ANGLE GLAUCOMA OF BOTH EYES, SEVERE STAGE: ICD-10-CM

## 2018-08-28 RX ORDER — BIMATOPROST 0.1 MG/ML
SOLUTION/ DROPS OPHTHALMIC
Qty: 2.5 ML | Refills: 0 | Status: SHIPPED | OUTPATIENT
Start: 2018-08-28 | End: 2018-10-01 | Stop reason: SDUPTHER

## 2018-08-29 ENCOUNTER — OFFICE VISIT (OUTPATIENT)
Dept: HEMATOLOGY/ONCOLOGY | Facility: CLINIC | Age: 63
End: 2018-08-29
Payer: COMMERCIAL

## 2018-08-29 VITALS
HEART RATE: 65 BPM | TEMPERATURE: 98 F | BODY MASS INDEX: 28.91 KG/M2 | SYSTOLIC BLOOD PRESSURE: 139 MMHG | DIASTOLIC BLOOD PRESSURE: 79 MMHG | WEIGHT: 173.5 LBS | HEIGHT: 65 IN | RESPIRATION RATE: 18 BRPM

## 2018-08-29 DIAGNOSIS — M94.9 DISORDER OF BONE AND CARTILAGE: ICD-10-CM

## 2018-08-29 DIAGNOSIS — D05.12 DUCTAL CARCINOMA IN SITU (DCIS) OF LEFT BREAST: Primary | ICD-10-CM

## 2018-08-29 DIAGNOSIS — M89.9 DISORDER OF BONE AND CARTILAGE: ICD-10-CM

## 2018-08-29 DIAGNOSIS — Z79.810 USE OF TAMOXIFEN (NOLVADEX): ICD-10-CM

## 2018-08-29 PROCEDURE — 99213 OFFICE O/P EST LOW 20 MIN: CPT | Mod: S$GLB,,, | Performed by: NURSE PRACTITIONER

## 2018-08-29 PROCEDURE — 3075F SYST BP GE 130 - 139MM HG: CPT | Mod: CPTII,S$GLB,, | Performed by: NURSE PRACTITIONER

## 2018-08-29 PROCEDURE — 99999 PR PBB SHADOW E&M-EST. PATIENT-LVL IV: CPT | Mod: PBBFAC,,, | Performed by: NURSE PRACTITIONER

## 2018-08-29 PROCEDURE — 3008F BODY MASS INDEX DOCD: CPT | Mod: CPTII,S$GLB,, | Performed by: NURSE PRACTITIONER

## 2018-08-29 PROCEDURE — 3078F DIAST BP <80 MM HG: CPT | Mod: CPTII,S$GLB,, | Performed by: NURSE PRACTITIONER

## 2018-08-29 NOTE — PROGRESS NOTES
HISTORY OF PRESENT ILLNESS:  This is a 63-year-old black female known   to Dr. 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  She is status post lumpectomy, post-lumpectomy irradiation   and presently on adjuvant tamoxifen 20 mg daily.  She presents to the clinic   today for her 30-month post-therapy evaluation without complaints.  She denies   any new breast complaints, bone pain, difficulties with hot flashes, vaginal bleeding,   abdominal discomfort/bloating, nausea, vomiting, constipation, diarrhea, irregular   heartbeat, chest pain, myalgias cramping, etc.  She remains compliant on Tamoxifen.    No other new complaints or pertinent findings on a 14-point review of systems.    PHYSICAL EXAMINATION:  GENERAL:  Well-developed, well-nourished black female in no acute distress.    Alert and oriented x 3.  VITAL SIGNS:  Weight:  Loss of 3 1/2 pounds in 6 months  Wt Readings from Last 3 Encounters:   08/29/18 78.7 kg (173 lb 8 oz)   07/20/18 79.6 kg (175 lb 7.8 oz)   02/21/18 80.3 kg (177 lb 0.5 oz)     Temp Readings from Last 3 Encounters:   08/29/18 98.4 °F (36.9 °C)   07/20/18 98.9 °F (37.2 °C) (Oral)   02/21/18 98.1 °F (36.7 °C)     BP Readings from Last 3 Encounters:   08/29/18 139/79   07/20/18 (!) 135/90   02/21/18 139/80     Pulse Readings from Last 3 Encounters:   08/29/18 65   07/20/18 68   02/21/18 65     HEENT:  Normocephalic, atraumatic.  Oral mucosa pink and moist.  Lips without   lesions.  Tongue midline.  Oropharynx clear.  Nonicteric sclerae.  NECK:  Supple, no adenopathy.  No carotid bruits, thyromegaly or thyroid nodule.  HEART:  Regular rate and rhythm without murmur, gallop or rub.  LUNGS:  Clear to auscultation bilaterally.  Normal respiratory effort.  ABDOMEN:  Soft, nontender, nondistended with positive normoactive bowel sounds,   no hepatosplenomegaly.  EXTREMITIES:  No cyanosis, clubbing or edema.  Distal pulses are intact.  AXILLAE AND  GROIN:  No palpable pathologic lymphadenopathy is appreciated.  SKIN:  Intact/turgor normal.  NEUROLOGIC:  Cranial nerves II-XII grossly intact.  Motor:  Good muscle bulk and   tone.  Strength/sensory 5/5 throughout.  Gait stable.  BREASTS:  Right breast is soft, free of masses, tenderness, nipple discharge   or inversion.  Left breast with noted healed lumpectomy scar to the upper   outer region; no signs of local recurrence.    No labs or imaging.    IMPRESSION:  1.  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  2.  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    PLAN:  1.  Continue tamoxifen 20 mg daily.  2.  Return to clinic in 6 months for 36 month post therapy evaluation with cbc, cmp, ldh., vitamin D, CXR & Mammo, bilateral   (imaging on or after 12/13/18) prior.    Assessment/plan reviewed and approved by Dr. Rider.

## 2018-09-02 ENCOUNTER — PATIENT MESSAGE (OUTPATIENT)
Dept: HEMATOLOGY/ONCOLOGY | Facility: CLINIC | Age: 63
End: 2018-09-02

## 2018-09-05 ENCOUNTER — OFFICE VISIT (OUTPATIENT)
Dept: UROLOGY | Facility: CLINIC | Age: 63
End: 2018-09-05
Payer: COMMERCIAL

## 2018-09-05 VITALS
HEIGHT: 65 IN | DIASTOLIC BLOOD PRESSURE: 81 MMHG | SYSTOLIC BLOOD PRESSURE: 123 MMHG | BODY MASS INDEX: 28.95 KG/M2 | WEIGHT: 173.75 LBS | HEART RATE: 69 BPM

## 2018-09-05 DIAGNOSIS — R31.29 MICROHEMATURIA: Primary | ICD-10-CM

## 2018-09-05 LAB
BILIRUB SERPL-MCNC: NORMAL MG/DL
BLOOD URINE, POC: NORMAL
COLOR, POC UA: YELLOW
GLUCOSE UR QL STRIP: NORMAL
KETONES UR QL STRIP: NORMAL
LEUKOCYTE ESTERASE URINE, POC: NORMAL
NITRITE, POC UA: NORMAL
PH, POC UA: 6.5
PROTEIN, POC: NORMAL
SPECIFIC GRAVITY, POC UA: 1.01
UROBILINOGEN, POC UA: NORMAL

## 2018-09-05 PROCEDURE — 99999 PR PBB SHADOW E&M-EST. PATIENT-LVL III: CPT | Mod: PBBFAC,,, | Performed by: UROLOGY

## 2018-09-05 PROCEDURE — 3079F DIAST BP 80-89 MM HG: CPT | Mod: CPTII,S$GLB,, | Performed by: UROLOGY

## 2018-09-05 PROCEDURE — 99203 OFFICE O/P NEW LOW 30 MIN: CPT | Mod: 25,S$GLB,, | Performed by: UROLOGY

## 2018-09-05 PROCEDURE — 81002 URINALYSIS NONAUTO W/O SCOPE: CPT | Mod: S$GLB,,, | Performed by: UROLOGY

## 2018-09-05 PROCEDURE — 3008F BODY MASS INDEX DOCD: CPT | Mod: CPTII,S$GLB,, | Performed by: UROLOGY

## 2018-09-05 PROCEDURE — 3074F SYST BP LT 130 MM HG: CPT | Mod: CPTII,S$GLB,, | Performed by: UROLOGY

## 2018-09-05 NOTE — PROGRESS NOTES
Subjective:       Patient ID: Deb Tesfaye is a 63 y.o. female.    Chief Complaint: Hematuria    HPI     63 year old with microhematuria on several occasions.  No gross hematuria.  No previous urinary tract infection.  She has no history of kidney stones and denies flank pain.  She has never smoked.  She has no bothersome urinary frequency and urgency.  No incontinence.    Urine dipstick shows positive for 1+blood and positive for trace leukocytes.  Micro exam: 3-5 RBC's per HPF.    Past Medical History:   Diagnosis Date    Breast cancer     Cataract     Ductal carcinoma in situ (DCIS) of left breast 1/11/2016    left     Fibrocystic breast     Glaucoma     Hypertension      Past Surgical History:   Procedure Laterality Date    AHMED GLAUCOMA VALVE Right 01/10/2018    WITH CE ()    BREAST BIOPSY      BREAST LUMPECTOMY Left     CATARACT EXTRACTION W/  INTRAOCULAR LENS IMPLANT Right 01/10/2018    with [facility] ()    COLONOSCOPY  2002    due for every 5.  Mother with colon cancer.    COLONOSCOPY  12/2010    EYE SURGERY      SELECTIVE LASER TRABECULPALSTY Right 8/13    OS DONE IN [place], [place]    SELECTIVE LASER TRABECUPLASTY Left 8/27/15    OS WITH     TUBAL LIGATION         Current Outpatient Medications:     azelastine (ASTELIN) 137 mcg (0.1 %) nasal spray, 1 spray (137 mcg total) by Nasal route 2 (two) times daily., Disp: 30 mL, Rfl: 11    bimatoprost (LUMIGAN) 0.03 % ophthalmic drops, Place 1 drop into both eyes every evening., Disp: 2.5 mL, Rfl: 6    brimonidine 0.2% (ALPHAGAN) 0.2 % Drop, Place 1 drop into both eyes 3 (three) times daily. (Patient taking differently: Place 1 drop into the left eye 3 (three) times daily. ), Disp: 10 mL, Rfl: 12    dorzolamide-timolol 2-0.5% (COSOPT) 22.3-6.8 mg/mL ophthalmic solution, Place 1 drop into the left eye 2 (two) times daily., Disp: 10 mL, Rfl: 12    ERGOCALCIFEROL, VITAMIN D2, (VITAMIN D ORAL), Take 1,000  Units by mouth once daily. , Disp: , Rfl:     KRILL OIL ORAL, Take 1 capsule by mouth once daily. , Disp: , Rfl:     LUMIGAN 0.01 % Drop, INSTILL 1 DROP IN BOTH EYES EVERY EVENING, Disp: 2.5 mL, Rfl: 0    MULTIVITS,CA,MINERALS/IRON/FA (ONE-A-DAY WOMENS FORMULA ORAL), Take 1 capsule by mouth once daily., Disp: , Rfl:     pilocarpine HCL 4% (PILOCAR) 4 % ophthalmic solution, Place 1 drop into the left eye 3 (three) times daily., Disp: 15 mL, Rfl: 12    potassium chloride SA (K-DUR,KLOR-CON) 20 MEQ tablet, TAKE 1 TABLET(20 MEQ) BY MOUTH TWICE DAILY, Disp: 60 tablet, Rfl: 5    pravastatin (PRAVACHOL) 20 MG tablet, Take 1 tablet (20 mg total) by mouth once daily., Disp: 30 tablet, Rfl: 5    tamoxifen (NOLVADEX) 20 MG Tab, Take 1 tablet (20 mg total) by mouth once daily., Disp: 30 tablet, Rfl: 11    telmisartan-hydrochlorothiazide (MICARDIS HCT) 80-25 mg per tablet, Take 1 tablet by mouth once daily., Disp: 90 tablet, Rfl: 3    pilocarpine HCL 4% (PILOCAR) 4 % ophthalmic solution, INSTILL 1 DROP IN BOTH EYES THREE TIMES DAILY, Disp: 15 mL, Rfl: 0        Review of Systems   Constitutional: Negative for chills and fever.   Eyes: Negative for visual disturbance.   Respiratory: Negative for shortness of breath.    Cardiovascular: Negative for chest pain.   Gastrointestinal: Negative for nausea.   Genitourinary: Negative for dysuria, flank pain and hematuria.   Musculoskeletal: Negative for gait problem.   Skin: Negative for rash.   Neurological: Negative for seizures.   Psychiatric/Behavioral: Negative for confusion.       Objective:      Physical Exam   Constitutional: She is oriented to person, place, and time. She appears well-developed and well-nourished.   HENT:   Head: Normocephalic.   Eyes: Conjunctivae and EOM are normal.   Neck: Normal range of motion.   Cardiovascular: Normal rate.   Pulmonary/Chest: Effort normal.   Abdominal: Soft. She exhibits no distension and no mass. There is no tenderness.    Genitourinary:   Genitourinary Comments: Bladder non-tender and nondistended  No CVA tenderness   Musculoskeletal: She exhibits no edema.   Neurological: She is alert and oriented to person, place, and time.   Skin: Skin is warm and dry. No rash noted. No erythema.   Psychiatric: She has a normal mood and affect. Her behavior is normal.   Vitals reviewed.      Assessment:       1. Microhematuria        Plan:       Microhematuria  -     POCT URINE DIPSTICK WITHOUT MICROSCOPE  -     US Retroperitoneal Complete (Kidney and; Future; Expected date: 09/05/2018  -     Cystoscopy; Future

## 2018-09-06 NOTE — PROGRESS NOTES
Assessment /Plan     For exam results, see Encounter Report.    Primary open angle glaucoma (POAG) of both eyes, severe stage    Iritis of right eye    Afferent pupillary defect, right    Nuclear sclerosis of left eye    Pseudophakia, right eye    History of glaucoma tube shunt procedure              HERE FOR Post -OP PHACO/IOL/AHMED - OD - 1/10/2017     F/U CYCLO G6 OD - 4/12/2017  REFERRED BY DR HILLMAN FOR SEVERE POORLY CONTROLLED POAG - 7/24/2015   Pt recently moved from [place] to [facility]     Previously referred to Dr. Hillman from [facility], review of outside records:    Tm 27/25.    Prior to that, IOP ranged mid-teens to low-20's on Combigan/Dorzolamide/Lumigan OU    C:D documented as 0.9 OD, 0.85 OS    Initial visit 3/29/12  IOP 10 OU on Combitan and Lumigan  C:D 0.85 OU  CCT 524OD, 531 OS    HVF's  2012 - superior defect affecting fixation OD with beginnings of early inferior arc; possible early superior arc OS  2013 - stable superior defect OD with early inferior arc; OS superior nasal step    Here for evaluation of POAG on MMT. Last IOP with Dr. Hillman 21//19. IOP today 21//20 on MMT. Patient has a family history of glaucoma in maternal grandfather. States that she is compliant with all Gtt's.        Glaucoma (type and duration)    Dx about 2002 - [place]    First HVF   First at [facility] 2015   First photos   First at [facility] 2015    Treatment / Drops started   2002              Family history    + mom / brother         Glaucoma meds    cosopt ou / brimonidine ou tid / lumigan ou q hs / bisi od tid / diamox / bisi ou        H/O adverse rxn to glaucoma drops    none        LASERS    SLT OD - 8/5/2013 - [place] // SLT os - [facility] 8/27/2015 (good resp 21-->16)        GLAUCOMA SURGERIES    CYCL G6 OD - 4/12/2017  / phaco/IOL / ahmed od 1/10/2017         OTHER EYE SURGERIES    Combined phaco/IOL / ahmed 1/10/2017         CDR    0.99/0.95        Tbase    ?  Off gtts (on gtts  "was up to 28/25)         Tmax    27/25 - per outside record's (reviewed by Arik )             Ttarget    15 ou    (may need to be lower)           HVF   24-2 ss //  3 test 2015 to  2018 [place] [facility]  od // SAD   10-2 ss - 3 test 2015 to 2017 - SALT/ IAD (progression) od // hint SAD / IAD os    (( 2 VF's from [place] - that were reviewed by Dr. IVEY - 2012 and 2013 -  SAD w/ fix invl . IAD od / early IAD os ))            Gonio    +3-4 ou         CCT    524/531        OCT    2 test 2016 to 2017 - RNFL - OD:dec. S/T/I/N // OS:dec. S/I, pratik N/T        HRT    2 test 2015 to 2018  - MR -   Dec thru out   od // dec thru out  os /// CDR 0. 91 od // 0.88 os        Disc photos    2015, 2018      - Ttoday - 10//1312-14 // 21-22  - Test done today    IOP check, HVF / photos     2. + APD od     3.  NS ou    Minimal - [facility] 20/25 ou still     4. Papilloma OS   To be removed with [facility]k       HVF's continue to progress - especially OD       S/P  - cyclo G6 - OD - 4/12/2017   "IOP still too high od - very little sight to save on this eye     - IOP back up and persistent iritis   Rec phaco / IOL / GDD - ? ahmed od  (increase in cataract od post cyclo G6 and slightly shallow AC)     With the left eye - I think incisional surgery may be best option - as there is more vision to save long term "  IOP seems ok for now on Maximum drops and diamox       Pt likely will need trab os - but IOP is reasonably good  today at 10//13    Phaco / IOL // phaco/ IOL / ahmed  OD Date: 1/10/2017 - general anesthesia // PCB00 23.0  POM #  8  Doing well. Asymptomatic but still with trace cell and flare  iritis - had pre-op post G6 laser   OFF all gtts     Cont all glaucoma gtts os   cosopt increase to tid   Brimonidine tid   bisi tid   lumigan q hs     F/U repeat slt - nasal and temp od     If IOP remains high - will need some form of filtering surgery - ? Traditional trab with surgical PI and bleb  Pt has very slight narrowing sup and inf on " gonio and may do better with a surgical PI   No sign cataract at this time

## 2018-09-07 ENCOUNTER — OFFICE VISIT (OUTPATIENT)
Dept: OPHTHALMOLOGY | Facility: CLINIC | Age: 63
End: 2018-09-07
Payer: COMMERCIAL

## 2018-09-07 ENCOUNTER — CLINICAL SUPPORT (OUTPATIENT)
Dept: OPHTHALMOLOGY | Facility: CLINIC | Age: 63
End: 2018-09-07
Payer: COMMERCIAL

## 2018-09-07 DIAGNOSIS — Z96.89 HISTORY OF GLAUCOMA TUBE SHUNT PROCEDURE: ICD-10-CM

## 2018-09-07 DIAGNOSIS — H40.1133 PRIMARY OPEN ANGLE GLAUCOMA (POAG) OF BOTH EYES, SEVERE STAGE: Primary | ICD-10-CM

## 2018-09-07 DIAGNOSIS — H40.1133 PRIMARY OPEN ANGLE GLAUCOMA (POAG) OF BOTH EYES, SEVERE STAGE: ICD-10-CM

## 2018-09-07 DIAGNOSIS — H21.561 AFFERENT PUPILLARY DEFECT, RIGHT: ICD-10-CM

## 2018-09-07 DIAGNOSIS — Z96.1 PSEUDOPHAKIA, RIGHT EYE: ICD-10-CM

## 2018-09-07 DIAGNOSIS — H20.9 IRITIS OF RIGHT EYE: ICD-10-CM

## 2018-09-07 DIAGNOSIS — H25.12 NUCLEAR SCLEROSIS OF LEFT EYE: ICD-10-CM

## 2018-09-07 PROCEDURE — 99999 PR PBB SHADOW E&M-EST. PATIENT-LVL II: CPT | Mod: PBBFAC,,, | Performed by: OPHTHALMOLOGY

## 2018-09-07 PROCEDURE — 92250 FUNDUS PHOTOGRAPHY W/I&R: CPT | Mod: S$GLB,,, | Performed by: OPHTHALMOLOGY

## 2018-09-07 PROCEDURE — 92083 EXTENDED VISUAL FIELD XM: CPT | Mod: S$GLB,,, | Performed by: OPHTHALMOLOGY

## 2018-09-07 PROCEDURE — 92014 COMPRE OPH EXAM EST PT 1/>: CPT | Mod: S$GLB,,, | Performed by: OPHTHALMOLOGY

## 2018-09-11 DIAGNOSIS — H40.1133 PRIMARY OPEN ANGLE GLAUCOMA OF BOTH EYES, SEVERE STAGE: ICD-10-CM

## 2018-09-13 RX ORDER — DORZOLAMIDE HYDROCHLORIDE AND TIMOLOL MALEATE 20; 5 MG/ML; MG/ML
SOLUTION/ DROPS OPHTHALMIC
Qty: 10 ML | Refills: 12 | Status: SHIPPED | OUTPATIENT
Start: 2018-09-13 | End: 2018-12-17 | Stop reason: RX

## 2018-09-20 ENCOUNTER — OFFICE VISIT (OUTPATIENT)
Dept: OPHTHALMOLOGY | Facility: CLINIC | Age: 63
End: 2018-09-20
Payer: COMMERCIAL

## 2018-09-20 VITALS — SYSTOLIC BLOOD PRESSURE: 154 MMHG | DIASTOLIC BLOOD PRESSURE: 90 MMHG | HEART RATE: 65 BPM

## 2018-09-20 DIAGNOSIS — Z96.1 PSEUDOPHAKIA, RIGHT EYE: ICD-10-CM

## 2018-09-20 DIAGNOSIS — H20.9 IRITIS OF RIGHT EYE: ICD-10-CM

## 2018-09-20 DIAGNOSIS — Z96.89 HISTORY OF GLAUCOMA TUBE SHUNT PROCEDURE: ICD-10-CM

## 2018-09-20 DIAGNOSIS — H40.1133 PRIMARY OPEN ANGLE GLAUCOMA (POAG) OF BOTH EYES, SEVERE STAGE: Primary | ICD-10-CM

## 2018-09-20 DIAGNOSIS — H21.561 AFFERENT PUPILLARY DEFECT, RIGHT: ICD-10-CM

## 2018-09-20 DIAGNOSIS — H53.40 VISUAL FIELD LOSS: ICD-10-CM

## 2018-09-20 DIAGNOSIS — H25.12 NUCLEAR SCLEROSIS OF LEFT EYE: ICD-10-CM

## 2018-09-20 PROCEDURE — 65855 TRABECULOPLASTY LASER SURG: CPT | Mod: LT,S$GLB,, | Performed by: OPHTHALMOLOGY

## 2018-09-20 PROCEDURE — 99499 UNLISTED E&M SERVICE: CPT | Mod: S$GLB,,, | Performed by: OPHTHALMOLOGY

## 2018-09-20 PROCEDURE — 99999 PR PBB SHADOW E&M-EST. PATIENT-LVL II: CPT | Mod: PBBFAC,,, | Performed by: OPHTHALMOLOGY

## 2018-09-20 NOTE — PROGRESS NOTES
HPI     DLS: 9/07/18    Pt here for SLT OS;    Meds:Cosopt bid os               Brimonidine tid os               Bisi 4% tid os               Lumigan qhs os     1. POAG OD>OS   2. APD OD   3. NS OU   4. Papilloma OS   5. Steroid Responder   6. S/P phaco/IOL / ahmed-OD - 1/10/2017     Last edited by Chapis York on 9/20/2018 10:29 AM. (History)            Assessment /Plan     For exam results, see Encounter Report.    Primary open angle glaucoma (POAG) of both eyes, severe stage    Iritis of right eye    Afferent pupillary defect, right    Nuclear sclerosis of left eye    Pseudophakia, right eye    History of glaucoma tube shunt procedure    Visual field loss        S/P PHACO/IOL/AHMED - OD - 1/10/2017     F/U CYCLO G6 OD - 4/12/2017  REFERRED BY DR ELISE FOR SEVERE POORLY CONTROLLED POAG - 7/24/2015   Pt recently moved from [place] to [facility]     Previously referred to Dr. Elise from [facility], review of outside records:    Tm 27/25.    Prior to that, IOP ranged mid-teens to low-20's on Combigan/Dorzolamide/Lumigan OU    C:D documented as 0.9 OD, 0.85 OS    Initial visit 3/29/12  IOP 10 OU on Combitan and Lumigan  C:D 0.85 OU  CCT 524OD, 531 OS    HVF's  2012 - superior defect affecting fixation OD with beginnings of early inferior arc; possible early superior arc OS  2013 - stable superior defect OD with early inferior arc; OS superior nasal step    Here for evaluation of POAG on MMT. Last IOP with Dr. Elise 21//19. IOP today 21//20 on MMT. Patient has a family history of glaucoma in maternal grandfather. States that she is compliant with all Gtt's.        Glaucoma (type and duration)    Dx about 2002 - [place]    First HVF   First at [facility] 2015   First photos   First at [facility] 2015    Treatment / Drops started   2002              Family history    + mom / brother         Glaucoma meds    cosopt ou / brimonidine ou tid / lumigan ou q hs / bisi od tid / diamox / bisi ou         "H/O adverse rxn to glaucoma drops    none        LASERS    SLT OD - 8/5/2013 - [facility] // SLT os - [facility] 8/27/2015 (good resp 21-->16)        GLAUCOMA SURGERIES    CYCL G6 OD - 4/12/2017  / phaco/IOL / ahmed od 1/10/2017         OTHER EYE SURGERIES    Combined phaco/IOL / ahmed 1/10/2017         CDR    0.99/0.95        Tbase    ?  Off gtts (on gtts was up to 28/25)         Tmax    27/25 - per outside record's (reviewed by Arik )             Ttarget    15 ou    (may need to be lower)           HVF   24-2 ss //  3 test 2015 to  2018 [place] [facility]  od // SAD   10-2 ss - 3 test 2015 to 2017 - SALT/ IAD (progression) od // hint SAD / IAD os    (( 2 VF's from [facility] - that were reviewed by Dr. IVEY - 2012 and 2013 -  SAD w/ fix invl . IAD od / early IAD os ))            Gonio    +3-4 ou         CCT    524/531        OCT    2 test 2016 to 2017 - RNFL - OD:dec. S/T/I/N // OS:dec. S/I, pratik N/T        HRT    2 test 2015 to 2018  - MR -   Dec thru out   od // dec thru out  os /// CDR 0. 91 od // 0.88 os        Disc photos    2015, 2018      - Ttoday - 10//1312-14 // 21-22  - Test done today    IOP check, HVF / photos     2. + APD od     3.  NS ou    Minimal - [facility] 20/25 ou still     4. Papilloma OS   To be removed with [facility]       HVF's continue to progress - especially OD       S/P  - cyclo G6 - OD - 4/12/2017   "IOP still too high od - very little sight to save on this eye     - IOP back up and persistent iritis   Rec phaco / IOL / GDD - ? ahmed od  (increase in cataract od post cyclo G6 and slightly shallow AC)     With the left eye - I think incisional surgery may be best option - as there is more vision to save long term "  IOP seems ok for now on Maximum drops and diamox       Pt likely will need trab os - but IOP is reasonably good  today at 10//13    Phaco / IOL // phaco/ IOL / ahmed  OD Date: 1/10/2017 - general anesthesia // PCB00 23.0  POM #  8  Doing well. Asymptomatic but still with trace cell and " flare  iritis - had pre-op post G6 laser   OFF all gtts     Cont all glaucoma gtts os   cosopt increase to tid   Brimonidine tid   bisi tid   lumigan q hs     Repeat SLT os - only 180 degrees treated - too narrow sup and inf (9/20/2018) - steroid taper      If IOP remains high - will need some form of filtering surgery - ? Traditional trab with surgical PI and bleb  Pt has narrowing sup and inf on gonio and may do better with a surgical PI   No sign cataract at this time     F/U 4 weeks - IOP check post slt os // if IOP still too high discuss trab with [facility] and surgical PI - angles narrow

## 2018-10-01 DIAGNOSIS — H40.1133 PRIMARY OPEN ANGLE GLAUCOMA OF BOTH EYES, SEVERE STAGE: ICD-10-CM

## 2018-10-02 RX ORDER — BIMATOPROST 0.1 MG/ML
SOLUTION/ DROPS OPHTHALMIC
Qty: 2.5 ML | Refills: 0 | Status: SHIPPED | OUTPATIENT
Start: 2018-10-02 | End: 2018-11-02 | Stop reason: SDUPTHER

## 2018-10-02 RX ORDER — PILOCARPINE HYDROCHLORIDE 40 MG/ML
SOLUTION/ DROPS OPHTHALMIC
Qty: 15 ML | Refills: 0 | Status: SHIPPED | OUTPATIENT
Start: 2018-10-02 | End: 2018-10-15

## 2018-10-12 ENCOUNTER — HOSPITAL ENCOUNTER (OUTPATIENT)
Dept: RADIOLOGY | Facility: HOSPITAL | Age: 63
Discharge: HOME | End: 2018-10-12
Attending: UROLOGY
Payer: COMMERCIAL

## 2018-10-12 DIAGNOSIS — R31.29 MICROHEMATURIA: ICD-10-CM

## 2018-10-12 PROCEDURE — 76770 US EXAM ABDO BACK WALL COMP: CPT | Mod: 26,,, | Performed by: RADIOLOGY

## 2018-10-12 PROCEDURE — 76770 US EXAM ABDO BACK WALL COMP: CPT | Mod: TC,PO

## 2018-10-15 ENCOUNTER — PROCEDURE VISIT (OUTPATIENT)
Dept: UROLOGY | Facility: CLINIC | Age: 63
End: 2018-10-15
Payer: COMMERCIAL

## 2018-10-15 VITALS
WEIGHT: 175.25 LBS | HEIGHT: 65 IN | HEART RATE: 77 BPM | SYSTOLIC BLOOD PRESSURE: 122 MMHG | BODY MASS INDEX: 29.2 KG/M2 | DIASTOLIC BLOOD PRESSURE: 80 MMHG

## 2018-10-15 DIAGNOSIS — R31.29 MICROHEMATURIA: ICD-10-CM

## 2018-10-15 LAB
BILIRUB SERPL-MCNC: NORMAL MG/DL
BLOOD URINE, POC: NORMAL
COLOR, POC UA: YELLOW
GLUCOSE UR QL STRIP: NORMAL
KETONES UR QL STRIP: NORMAL
LEUKOCYTE ESTERASE URINE, POC: NORMAL
NITRITE, POC UA: NORMAL
PH, POC UA: 7
PROTEIN, POC: NORMAL
SPECIFIC GRAVITY, POC UA: 1.02
UROBILINOGEN, POC UA: NORMAL

## 2018-10-15 PROCEDURE — 52000 CYSTOURETHROSCOPY: CPT | Mod: S$GLB,,, | Performed by: UROLOGY

## 2018-10-15 PROCEDURE — 81002 URINALYSIS NONAUTO W/O SCOPE: CPT | Mod: S$GLB,,, | Performed by: UROLOGY

## 2018-10-15 NOTE — PROCEDURES
"Cystoscopy  Date/Time: 10/15/2018 2:11 PM  Performed by: JACI Mack MD  Authorized by: JACI Mack MD     Consent Done?:  Yes (Written)  Time out: Immediately prior to procedure a "time out" was called to verify the correct patient, procedure, equipment, support staff and site/side marked as required.    Indications: hematuria    Position:  Other  Patient sedated?: No    Preparation: Patient was prepped and draped in usual sterile fashion      Scope type:  Flexible cystoscope    Patient tolerance:  Patient tolerated the procedure well with no immediate complications    Blood Loss:  None    The patients clinic history and physical were reviewed and there are no changes.    The patient was placed in the frog-leg position.  The genitals were prepped and draped in a sterile fashion.  The urethra was dilated with sounds to 22 [address].  Using a flexible scope, a careful cystoscopic exam was then performed.  The entire bladder mucosa was systematically visualized.  The mucosa appeared normal.  There were no lesions, masses foreign bodies or stones.   Each ureteral orifices were visualized and both had clear efflux of urine.   The cystoscope was then removed and I examined the entire length of the urethra.  The urethra appeared normal.  She tolerated the procedure well.  There were no complications.    Impression:  Normal cystoscopy.      "

## 2018-10-19 NOTE — PROGRESS NOTES
Assessment /Plan     For exam results, see Encounter Report.    Primary open angle glaucoma (POAG) of both eyes, severe stage    Iritis of right eye    Afferent pupillary defect, right    Nuclear sclerosis of left eye    Pseudophakia, right eye    History of glaucoma tube shunt procedure    Visual field loss          S/P PHACO/IOL/AHMED - OD - 1/10/2017     F/U CYCLO G6 OD - 4/12/2017  REFERRED BY DR HILLMAN FOR SEVERE POORLY CONTROLLED POAG - 7/24/2015   Pt recently moved from [place] to [facility]     Previously referred to Dr. Hillman from [facility], review of outside records:    Tm 27/25.    Prior to that, IOP ranged mid-teens to low-20's on Combigan/Dorzolamide/Lumigan OU    C:D documented as 0.9 OD, 0.85 OS    Initial visit 3/29/12  IOP 10 OU on Combitan and Lumigan  C:D 0.85 OU  CCT 524OD, 531 OS    HVF's  2012 - superior defect affecting fixation OD with beginnings of early inferior arc; possible early superior arc OS  2013 - stable superior defect OD with early inferior arc; OS superior nasal step    Here for evaluation of POAG on MMT. Last IOP with Dr. Hillman 21//19. IOP today 21//20 on MMT. Patient has a family history of glaucoma in maternal grandfather. States that she is compliant with all Gtt's.        Glaucoma (type and duration)    Dx about 2002 - [place]    First HVF   First at [facility] 2015   First photos   First at [facility] 2015    Treatment / Drops started   2002              Family history    + mom / brother         Glaucoma meds    cosopt ou / brimonidine ou tid / lumigan ou q hs / bisi od tid / diamox / bisi ou        H/O adverse rxn to glaucoma drops    none        LASERS    SLT OD - 8/5/2013 - [place] // SLT os - [facility] 8/27/2015 (good resp 21-->16)        GLAUCOMA SURGERIES    CYCL G6 OD - 4/12/2017  / phaco/IOL / ahmed od 1/10/2017         OTHER EYE SURGERIES    Combined phaco/IOL / ahmed 1/10/2017         CDR    0.99/0.95        Tbase    ?  Off gtts (on  "gtts was up to 28/25)         Tmax    27/25 - per outside record's (reviewed by Arik )             Ttarget    15 ou    (may need to be lower)           HVF   24-2 ss //  3 test 2015 to  [place] [facility] [place] [facility]  od // SAD   10-2 ss - 3 test 2015 to 2017 - SALT/ IAD (progression) od // hint SAD / IAD os    (( 2 VF's from [place] - that were reviewed by Dr. IVEY - 2012 and 2013 -  SAD w/ fix invl . IAD od / early IAD os ))            Gonio    +3-4 ou         CCT    524/531        OCT    2 test 2016 to 2017 - RNFL - OD:dec. S/T/I/N // OS:dec. S/I, pratik N/T        HRT    2 test 2015 to 2018  - MR -   Dec thru out   od // dec thru out  os /// CDR 0. 91 od // 0.88 os        Disc photos    2015, 2018      - Ttoday - 12/15  - Test done today    IOP check,     2. + APD od     3.  NS ou    Minimal - VA 20/25 ou still     4. Papilloma OS   To be removed with Arik       HVF's continue to progress - especially OD       S/P  - cyclo G6 - OD - 4/12/2017   "IOP still too high od - very little sight to save on this eye     - IOP back up and persistent iritis   Rec phaco / IOL / GDD - ? ahmed od  (increase in cataract od post cyclo G6 and slightly shallow AC)     With the left eye - I think incisional surgery may be best option - as there is more vision to save long term "  IOP seems ok for now on Maximum drops and diamox       Pt likely will need trab os - but IOP is reasonably good  today at  12/15 (10/22/2018)     Phaco / IOL // phaco/ IOL / ahmed  OD Date: 1/10/2017 - general anesthesia // PCB00 23.0  POM #  [id number]  Doing well. Asymptomatic but still with trace cell and flare  iritis - had pre-op post G6 laser   OFF all gtts     Cont all glaucoma gtts os   cosopt increase to tid   Brimonidine tid   bisi tid   lumigan q hs     Pt is OFF the diamox for now (10/22/2018)     Repeat SLT os - only 180 degrees treated - too narrow sup and inf (9/20/2018) - steroid taper      If IOP remains / goes back up  high - will need some form " of filtering surgery - ? Traditional trab with surgical PI and bleb  Pt has narrowing sup and inf on gonio and may do better with a surgical PI   No sign cataract at this time     F/U 3 months - IOP check - if goes up rec trab with [facility] and surgical PI OS

## 2018-10-22 ENCOUNTER — OFFICE VISIT (OUTPATIENT)
Dept: OPHTHALMOLOGY | Facility: CLINIC | Age: 63
End: 2018-10-22
Payer: COMMERCIAL

## 2018-10-22 DIAGNOSIS — Z96.1 PSEUDOPHAKIA, RIGHT EYE: ICD-10-CM

## 2018-10-22 DIAGNOSIS — Z96.89 HISTORY OF GLAUCOMA TUBE SHUNT PROCEDURE: ICD-10-CM

## 2018-10-22 DIAGNOSIS — H21.561 AFFERENT PUPILLARY DEFECT, RIGHT: ICD-10-CM

## 2018-10-22 DIAGNOSIS — H40.1133 PRIMARY OPEN ANGLE GLAUCOMA (POAG) OF BOTH EYES, SEVERE STAGE: Primary | ICD-10-CM

## 2018-10-22 DIAGNOSIS — H53.40 VISUAL FIELD LOSS: ICD-10-CM

## 2018-10-22 DIAGNOSIS — H20.9 IRITIS OF RIGHT EYE: ICD-10-CM

## 2018-10-22 DIAGNOSIS — H25.12 NUCLEAR SCLEROSIS OF LEFT EYE: ICD-10-CM

## 2018-10-22 PROCEDURE — 92012 INTRM OPH EXAM EST PATIENT: CPT | Mod: S$GLB,,, | Performed by: OPHTHALMOLOGY

## 2018-10-22 PROCEDURE — 99999 PR PBB SHADOW E&M-EST. PATIENT-LVL II: CPT | Mod: PBBFAC,,, | Performed by: OPHTHALMOLOGY

## 2018-10-25 ENCOUNTER — OFFICE VISIT (OUTPATIENT)
Dept: FAMILY MEDICINE | Facility: CLINIC | Age: 63
End: 2018-10-25
Payer: COMMERCIAL

## 2018-10-25 VITALS
OXYGEN SATURATION: 99 % | HEART RATE: 73 BPM | DIASTOLIC BLOOD PRESSURE: 84 MMHG | SYSTOLIC BLOOD PRESSURE: 124 MMHG | HEIGHT: 65 IN | WEIGHT: 173.31 LBS | BODY MASS INDEX: 28.87 KG/M2

## 2018-10-25 DIAGNOSIS — M54.9 UPPER BACK PAIN ON RIGHT SIDE: Primary | ICD-10-CM

## 2018-10-25 PROCEDURE — 99214 OFFICE O/P EST MOD 30 MIN: CPT | Mod: 25,S$GLB,, | Performed by: INTERNAL MEDICINE

## 2018-10-25 PROCEDURE — 90471 IMMUNIZATION ADMIN: CPT | Mod: S$GLB,,, | Performed by: INTERNAL MEDICINE

## 2018-10-25 PROCEDURE — 3074F SYST BP LT 130 MM HG: CPT | Mod: CPTII,S$GLB,, | Performed by: INTERNAL MEDICINE

## 2018-10-25 PROCEDURE — 99999 PR PBB SHADOW E&M-EST. PATIENT-LVL III: CPT | Mod: PBBFAC,,, | Performed by: INTERNAL MEDICINE

## 2018-10-25 PROCEDURE — 90686 IIV4 VACC NO PRSV 0.5 ML IM: CPT | Mod: S$GLB,,, | Performed by: INTERNAL MEDICINE

## 2018-10-25 PROCEDURE — 3008F BODY MASS INDEX DOCD: CPT | Mod: CPTII,S$GLB,, | Performed by: INTERNAL MEDICINE

## 2018-10-25 PROCEDURE — 3079F DIAST BP 80-89 MM HG: CPT | Mod: CPTII,S$GLB,, | Performed by: INTERNAL MEDICINE

## 2018-10-25 RX ORDER — METHOCARBAMOL 500 MG/1
500 TABLET, FILM COATED ORAL 4 TIMES DAILY
Qty: 40 TABLET | Refills: 1 | Status: SHIPPED | OUTPATIENT
Start: 2018-10-25 | End: 2018-11-04

## 2018-10-25 NOTE — PROGRESS NOTES
Subjective:       Patient ID: Deb Tesfaye is a 63 y.o. female.    Chief Complaint: Shoulder Pain (right ) and Flu Vaccine    Complains of moderate right chest pain radiating to her right shoulder, arm and right upper back.  It started 2 days ago.  It is worse sitting and laying and wakes her from sleep.  Tylenol relieves the pain.        Review of Systems   Constitutional: Negative for appetite change and fever.   HENT: Negative for nosebleeds and trouble swallowing.    Eyes: Negative for discharge and visual disturbance.   Respiratory: Negative for choking and shortness of breath.    Cardiovascular: Negative for chest pain and palpitations.   Gastrointestinal: Negative for abdominal pain, nausea and vomiting.   Musculoskeletal: Positive for myalgias. Negative for arthralgias and joint swelling.   Skin: Negative for rash and wound.   Neurological: Negative for dizziness and syncope.   Psychiatric/Behavioral: Negative for confusion and dysphoric mood.       Objective:      Vitals:    10/25/18 1031   BP: 124/84   Pulse: 73     Physical Exam   Constitutional: She appears well-nourished.   Eyes: Conjunctivae and EOM are normal.   Neck: Normal range of motion.   Cardiovascular: Normal rate and regular rhythm.   Pulmonary/Chest: Effort normal and breath sounds normal.   Musculoskeletal:   Normal ROM bilateral   Right anterior chest non TTP  Right shoulder non TTP  Right upper back over rhomboids + TTP that reproduces the pain and massage improves pain.    Neurological: No cranial nerve deficit (grossly intact).   Skin: Skin is warm and dry.   Psychiatric: She has a normal mood and affect.   Alert and orientated   Vitals reviewed.        Assessment:       1. Upper back pain on right side        Plan:       Upper back pain on right side  -     methocarbamol (ROBAXIN) 500 MG Tab; Take 1 tablet (500 mg total) by mouth 4 (four) times daily. for 10 days  Dispense: 40 tablet; Refill: 1               Medication List            Accurate as of 10/25/18 10:47 AM. If you have any questions, ask your nurse or doctor.               START taking these medications    methocarbamol 500 MG Tab  Commonly known as:  ROBAXIN  Take 1 tablet (500 mg total) by mouth 4 (four) times daily. for 10 days  Started by:  Ari Ponce MD        CHANGE how you take these medications    brimonidine 0.2% 0.2 % Drop  Commonly known as:  ALPHAGAN  Place 1 drop into both eyes 3 (three) times daily.  What changed:  how to take this     dorzolamide-timolol 2-0.5% 22.3-6.8 mg/mL ophthalmic solution  Commonly known as:  COSOPT  INSTILL 1 DROP IN BOTH EYES TWICE DAILY  What changed:    · how much to take  · how to take this  · when to take this     LUMIGAN 0.01 % Drop  Generic drug:  bimatoprost  INSTILL 1 DROP IN BOTH EYES EVERY EVENING  What changed:    · how much to take  · how to take this  · when to take this        CONTINUE taking these medications    azelastine 137 mcg (0.1 %) nasal spray  Commonly known as:  ASTELIN  1 spray (137 mcg total) by Nasal route 2 (two) times daily.     KRILL OIL ORAL     ONE-A-DAY WOMENS FORMULA ORAL     pilocarpine HCL 4% 4 % ophthalmic solution  Commonly known as:  PILOCAR  Place 1 drop into the left eye 3 (three) times daily.     potassium chloride SA 20 MEQ tablet  Commonly known as:  K-DUR,KLOR-CON  TAKE 1 TABLET(20 MEQ) BY MOUTH TWICE DAILY     pravastatin 20 MG tablet  Commonly known as:  PRAVACHOL  Take 1 tablet (20 mg total) by mouth once daily.     tamoxifen 20 MG Tab  Commonly known as:  NOLVADEX  Take 1 tablet (20 mg total) by mouth once daily.     telmisartan-hydrochlorothiazide 80-25 mg per tablet  Commonly known as:  MICARDIS HCT  Take 1 tablet by mouth once daily.     VITAMIN D ORAL           Where to Get Your Medications      These medications were sent to [id number] [place] AT [facility] [place] [address]    Hours:  24-hours Phone:   "[phone]   · methocarbamol 500 MG Tab       Nw, PT  Continue current management    Counseled on regular exercise, maintenance of a healthy weight, balanced diet rich in fruits/vegetables and lean protein, and avoidance of unhealthy habits like smoking and excessive alcohol intake.   Also, counseled on importance of being compliant with medication, health appointments, diet and exercise.     No Follow-up on file.    "This note will not be shared with the patient."  "

## 2018-11-02 DIAGNOSIS — H40.1133 PRIMARY OPEN ANGLE GLAUCOMA OF BOTH EYES, SEVERE STAGE: ICD-10-CM

## 2018-11-02 RX ORDER — BIMATOPROST 0.1 MG/ML
SOLUTION/ DROPS OPHTHALMIC
Qty: 2.5 ML | Refills: 0 | Status: SHIPPED | OUTPATIENT
Start: 2018-11-02 | End: 2018-12-01 | Stop reason: SDUPTHER

## 2018-11-16 DIAGNOSIS — H40.1133 PRIMARY OPEN ANGLE GLAUCOMA OF BOTH EYES, SEVERE STAGE: ICD-10-CM

## 2018-11-19 ENCOUNTER — TELEPHONE (OUTPATIENT)
Dept: OPHTHALMOLOGY | Facility: CLINIC | Age: 63
End: 2018-11-19

## 2018-11-19 RX ORDER — PILOCARPINE HYDROCHLORIDE 40 MG/ML
SOLUTION/ DROPS OPHTHALMIC
Qty: 15 ML | Refills: 0 | Status: SHIPPED | OUTPATIENT
Start: 2018-11-19 | End: 2019-01-21 | Stop reason: SDUPTHER

## 2018-11-26 ENCOUNTER — PATIENT MESSAGE (OUTPATIENT)
Dept: OPHTHALMOLOGY | Facility: CLINIC | Age: 63
End: 2018-11-26

## 2018-12-01 DIAGNOSIS — H40.1133 PRIMARY OPEN ANGLE GLAUCOMA OF BOTH EYES, SEVERE STAGE: ICD-10-CM

## 2018-12-01 RX ORDER — BIMATOPROST 0.1 MG/ML
SOLUTION/ DROPS OPHTHALMIC
Qty: 2.5 ML | Refills: 0 | Status: SHIPPED | OUTPATIENT
Start: 2018-12-01 | End: 2019-12-02 | Stop reason: SDUPTHER

## 2018-12-04 DIAGNOSIS — E87.6 HYPOKALEMIA: ICD-10-CM

## 2018-12-05 RX ORDER — POTASSIUM CHLORIDE 20 MEQ/1
TABLET, EXTENDED RELEASE ORAL
Qty: 60 TABLET | Refills: 0 | Status: SHIPPED | OUTPATIENT
Start: 2018-12-05 | End: 2019-01-07 | Stop reason: SDUPTHER

## 2018-12-07 ENCOUNTER — HOSPITAL ENCOUNTER (OUTPATIENT)
Dept: RADIOLOGY | Facility: HOSPITAL | Age: 63
Discharge: HOME | End: 2018-12-07
Attending: OBSTETRICS & GYNECOLOGY
Payer: COMMERCIAL

## 2018-12-07 ENCOUNTER — OFFICE VISIT (OUTPATIENT)
Dept: OBSTETRICS AND GYNECOLOGY | Facility: CLINIC | Age: 63
End: 2018-12-07
Payer: COMMERCIAL

## 2018-12-07 VITALS — WEIGHT: 175.5 LBS | BODY MASS INDEX: 29.2 KG/M2

## 2018-12-07 DIAGNOSIS — Z12.39 SCREENING BREAST EXAMINATION: ICD-10-CM

## 2018-12-07 DIAGNOSIS — Z12.4 ENCOUNTER FOR PAP SMEAR OF CERVIX WITH HPV DNA COTESTING: Primary | ICD-10-CM

## 2018-12-07 PROCEDURE — 87624 HPV HI-RISK TYP POOLED RSLT: CPT

## 2018-12-07 PROCEDURE — 99999 PR PBB SHADOW E&M-EST. PATIENT-LVL IV: CPT | Mod: PBBFAC,,, | Performed by: OBSTETRICS & GYNECOLOGY

## 2018-12-07 PROCEDURE — 99386 PREV VISIT NEW AGE 40-64: CPT | Mod: S$GLB,,, | Performed by: OBSTETRICS & GYNECOLOGY

## 2018-12-07 PROCEDURE — 88175 CYTOPATH C/V AUTO FLUID REDO: CPT

## 2018-12-07 NOTE — PROGRESS NOTES
Chief Complaint   Patient presents with    Well Woman    Mammogram       History of Present Illness: Deb Tesfaye is a 63 y.o. female that presents today 12/7/2018 for well gyn visit.    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  Mother with colon cancer.    COLONOSCOPY  12/2010    COLONOSCOPY N/A 7/20/2016    Procedure: COLONOSCOPY;  Surgeon: Janak De Leon Jr., MD;  Location: [facility];  Service: Endoscopy;  Laterality: N/A;    COLONOSCOPY N/A 7/20/2016    Performed by Janak De Leon Jr., MD at [facility] ENDO    EYE SURGERY      G6 laser Right 4/12/2017    Performed by Jacqueline Perez MD at [facility] OR 1ST FLR    INSERTION-IMPLANT-GLAUCOMA Right 1/10/2018    Performed by Jacqueline Perez MD at [facility] OR 1ST [facility]LR    INSERTION-INTRAOCULAR LENS (IOL) Right 1/10/2018    Performed by Jacqueline Perez MD at [facility] OR 1ST FLR    LOCALIZATION-NEEDLE Left 1/25/2016    Performed by John Mendoza MD at [facility] OR    LUMPECTOMY-BREAST Left 1/25/2016    Per[facility] at [facility] OR    PHACOEMULSIFICATION-ASPIRATION-CATARACT/ Trypan blue Right 1/10/2018    Performed by Jacqueline Perez MD at [facility] OR 1ST FLR    SELECTIVE LASER TRABECULPALSTY Right 8/13    OS DONE IN [place]    SELECTIVE LASER TRABECUPLASTY Left 8/27/15    OS WITH     TUBAL LIGATION         Current Outpatient Medications   Medication Sig Dispense Refill    azelastine (ASTELIN) 137 mcg (0.1 %) nasal spray 1 spray (137 mcg total)  by Nasal route 2 (two) times daily. 30 mL 11    brimonidine 0.2% (ALPHAGAN) 0.2 % Drop Place 1 drop into both eyes 3 (three) times daily. (Patient taking differently: Place 1 drop into the left eye 3 (three) times daily. ) 10 mL 12    dorzolamide-timolol 2-0.5% (COSOPT) 22.3-6.8 mg/mL ophthalmic solution INSTILL 1 DROP IN BOTH EYES TWICE DAILY (Patient taking differently: INSTILL 1 DROP IN LEFT EYE THREE TIMES DAILY) 10 mL 12    ERGOCALCIFEROL, VITAMIN D2, (VITAMIN D ORAL) Take 1,000 Units by mouth once daily.       KRILL OIL ORAL Take 1 capsule by mouth once daily.       LUMIGAN 0.01 % Drop INSTILL 1 DROP IN BOTH EYES EVERY EVENING 2.5 mL 0    MULTIVITS,CA,MINERALS/IRON/FA (ONE-A-DAY WOMENS FORMULA ORAL) Take 1 capsule by mouth once daily.      pilocarpine HCL 4% (PILOCAR) 4 % ophthalmic solution Place 1 drop into the left eye 3 (three) times daily. 15 mL 12    pilocarpine HCL 4% (PILOCAR) 4 % ophthalmic solution INSTILL 1 DROP IN BOTH EYES THREE TIMES DAILY 15 mL 0    potassium chloride SA (K-DUR,KLOR-CON) 20 MEQ tablet TAKE 1 TABLET(20 MEQ) BY MOUTH TWICE DAILY 60 tablet 0    pravastatin (PRAVACHOL) 20 MG tablet Take 1 tablet (20 mg total) by mouth once daily. 30 tablet 5    tamoxifen (NOLVADEX) 20 MG Tab Take 1 tablet (20 mg total) by mouth once daily. 30 tablet 11    telmisartan-hydrochlorothiazide (MICARDIS HCT) 80-25 mg per tablet Take 1 tablet by mouth once daily. 90 tablet 3     No current facility-administered medications for this visit.        Review of patient's allergies indicates:  No Known Allergies    Family History   Problem Relation Age of Onset    Cancer Mother 54        colon cancer    Cancer Maternal Grandfather     Glaucoma Maternal Grandfather     Cataracts Maternal Grandfather     Hypertension Father     Amblyopia Neg Hx     Blindness Neg Hx     Diabetes Neg Hx     Macular degeneration Neg Hx     Retinal detachment Neg Hx     Stroke Neg Hx     Strabismus Neg Hx      Thyroid disease Neg Hx        Social History     Socioeconomic History    Marital status:      Spouse name: None    Number of children: 3    Years of education: None    Highest education level: None   Social Needs    Financial resource strain: None    Food insecurity - worry: None    Food insecurity - inability: None    Transportation needs - medical: None    Transportation needs - non-medical: None   Occupational History    None   Tobacco Use    Smoking status: Never Smoker    Smokeless tobacco: Never Used   Substance and Sexual Activity    Alcohol use: No    Drug use: No    Sexual activity: Yes     Partners: Male   Other Topics Concern    None   Social History Narrative    None       OB History    Para Term  AB Living   7 6 3   1 3   SAB TAB Ectopic Multiple Live Births   1              # Outcome Date GA Lbr Reji/2nd Weight Sex Delivery Anes PTL Lv   7 Term            6 Term            5 Term            4 SAB            3 Para      Vag-Spont      2 Para      Vag-Spont      1 Para      Vag-Spont             Review of Symptoms:  GENERAL: Denies weight gain or weight loss. Feeling well overall.   SKIN: Denies rash or lesions.   HEAD: Denies head injury or headache.   NODES: Denies enlarged lymph nodes.   CHEST: Denies chest pain or shortness of breath.   CARDIOVASCULAR: Denies palpitations or left sided chest pain.   ABDOMEN: No abdominal pain, constipation, diarrhea, nausea, vomiting or rectal bleeding.   URINARY: No frequency, dysuria, hematuria, or burning on urination.  HEMATOLOGIC: No easy bruisability or excessive bleeding.   MUSCULOSKELETAL: Denies joint pain or swelling.     Wt 79.6 kg (175 lb 7.8 oz)   Physical Exam:  APPEARANCE: Well nourished, well developed, in no acute distress.  SKIN: Normal skin turgor, no lesions.  NECK: Neck symmetric without masses   RESPIRATORY: Normal respiratory effort with no retractions or use of accessory muscles  CARDIOVASCULAR: Peripheral  vascular system with no swelling no varicosities and palpation of pulses normal  LYMPHATIC: No enlargements of the lymph nodes noted in the neck, axillae, or groin  ABDOMEN: Soft. No tenderness or masses. No hepatosplenomegaly. No hernias.  BREASTS: Symmetrical, no skin changes or visible lesions. No palpable masses, nipple discharge or adenopathy bilaterally.  PELVIC: Normal external female genitalia without lesions. Normal hair distribution. Adequate perineal body, normal urethral meatus. Urethra with no masses.  Bladder nontender. Vagina moist and well rugated without lesions or discharge. Cervix pink and without lesions. No significant cystocele or rectocele. Bimanual exam showed uterus normal size, shape, position, mobile and nontender. Adnexa without masses or tenderness. Urethra and bladder normal.   EXTREMITIES: No clubbing cyanosis or edema.    ASSESSMENT/PLAN:  Encounter for Pap smear of cervix with HPV DNA cotesting  -     Liquid-based pap smear, screening  -     HPV High Risk Genotypes, PCR    Screening breast examination  -     Mammo Digital Screening Bilat With CAD; Future; Expected date: 12/07/2018      Diagnostic mammo planned next week s/p lumpectomy    Patient was counseled today on Pap guidelines, recommendation for pelvic exams, mammograms every other year after the age of 40 and annually after the age of 50, Colonoscopy after the age of 50, Dexa Bone Scan and calcium and vitamin D supplementation in menopause and to see her PCP for other health maintenance.   FOLLOW-UP:prn

## 2018-12-10 DIAGNOSIS — J40 BRONCHITIS: ICD-10-CM

## 2018-12-10 DIAGNOSIS — J32.9 SINUSITIS, UNSPECIFIED CHRONICITY, UNSPECIFIED LOCATION: ICD-10-CM

## 2018-12-10 RX ORDER — AZELASTINE 1 MG/ML
1 SPRAY, METERED NASAL 2 TIMES DAILY
Qty: 30 ML | Refills: 11 | Status: SHIPPED | OUTPATIENT
Start: 2018-12-10 | End: 2019-12-24

## 2018-12-12 DIAGNOSIS — D05.12 DUCTAL CARCINOMA IN SITU (DCIS) OF LEFT BREAST: ICD-10-CM

## 2018-12-12 RX ORDER — TAMOXIFEN CITRATE 20 MG/1
20 TABLET ORAL DAILY
Qty: 90 TABLET | Refills: 3 | Status: SHIPPED | OUTPATIENT
Start: 2018-12-12 | End: 2019-09-30 | Stop reason: SDUPTHER

## 2018-12-13 LAB
HPV HR 12 DNA CVX QL NAA+PROBE: NEGATIVE
HPV16 AG SPEC QL: NEGATIVE
HPV18 DNA SPEC QL NAA+PROBE: NEGATIVE

## 2018-12-14 ENCOUNTER — HOSPITAL ENCOUNTER (OUTPATIENT)
Dept: RADIOLOGY | Facility: HOSPITAL | Age: 63
Discharge: HOME | End: 2018-12-14
Attending: NURSE PRACTITIONER
Payer: COMMERCIAL

## 2018-12-14 VITALS — BODY MASS INDEX: 29.24 KG/M2 | WEIGHT: 175.5 LBS | HEIGHT: 65 IN

## 2018-12-14 DIAGNOSIS — D05.12 DUCTAL CARCINOMA IN SITU (DCIS) OF LEFT BREAST: ICD-10-CM

## 2018-12-14 PROCEDURE — 77062 BREAST TOMOSYNTHESIS BI: CPT | Mod: 26,,, | Performed by: RADIOLOGY

## 2018-12-14 PROCEDURE — 71046 X-RAY EXAM CHEST 2 VIEWS: CPT | Mod: TC,FY,PO

## 2018-12-14 PROCEDURE — 77062 BREAST TOMOSYNTHESIS BI: CPT | Mod: TC,PO

## 2018-12-14 PROCEDURE — 77066 DX MAMMO INCL CAD BI: CPT | Mod: 26,,, | Performed by: RADIOLOGY

## 2018-12-14 PROCEDURE — 71046 X-RAY EXAM CHEST 2 VIEWS: CPT | Mod: 26,,, | Performed by: RADIOLOGY

## 2018-12-17 ENCOUNTER — TELEPHONE (OUTPATIENT)
Dept: OPHTHALMOLOGY | Facility: CLINIC | Age: 63
End: 2018-12-17

## 2018-12-17 RX ORDER — DORZOLAMIDE HCL 20 MG/ML
1 SOLUTION/ DROPS OPHTHALMIC 3 TIMES DAILY
COMMUNITY
Start: 2018-12-17 | End: 2019-12-02

## 2018-12-17 RX ORDER — TIMOLOL MALEATE 5 MG/ML
1 SOLUTION/ DROPS OPHTHALMIC 2 TIMES DAILY
COMMUNITY
Start: 2018-12-17 | End: 2019-12-27 | Stop reason: SDUPTHER

## 2018-12-17 NOTE — TELEPHONE ENCOUNTER
Pharmacy called to states that Cosopt is on back order. On pt's last visit, Dr. Perez increased drops to 3 x;'s a day in both eyes. Pt will switch to dorzolamide TID OU and Timolol BID OU. Rx faxed into pharmacy

## 2019-01-05 DIAGNOSIS — E87.6 HYPOKALEMIA: ICD-10-CM

## 2019-01-07 RX ORDER — POTASSIUM CHLORIDE 20 MEQ/1
TABLET, EXTENDED RELEASE ORAL
Qty: 60 TABLET | Refills: 0 | Status: SHIPPED | OUTPATIENT
Start: 2019-01-07 | End: 2019-02-04 | Stop reason: SDUPTHER

## 2019-01-21 ENCOUNTER — OFFICE VISIT (OUTPATIENT)
Dept: FAMILY MEDICINE | Facility: CLINIC | Age: 64
End: 2019-01-21
Payer: COMMERCIAL

## 2019-01-21 VITALS
SYSTOLIC BLOOD PRESSURE: 112 MMHG | WEIGHT: 179.44 LBS | DIASTOLIC BLOOD PRESSURE: 84 MMHG | HEIGHT: 65 IN | BODY MASS INDEX: 29.9 KG/M2 | HEART RATE: 67 BPM

## 2019-01-21 DIAGNOSIS — I10 ESSENTIAL HYPERTENSION: Primary | ICD-10-CM

## 2019-01-21 DIAGNOSIS — E87.6 HYPOKALEMIA: ICD-10-CM

## 2019-01-21 DIAGNOSIS — E78.2 MIXED HYPERLIPIDEMIA: ICD-10-CM

## 2019-01-21 DIAGNOSIS — J30.1 SEASONAL ALLERGIC RHINITIS DUE TO POLLEN: ICD-10-CM

## 2019-01-21 PROCEDURE — 3008F BODY MASS INDEX DOCD: CPT | Mod: CPTII,S$GLB,, | Performed by: FAMILY MEDICINE

## 2019-01-21 PROCEDURE — 3074F PR MOST RECENT SYSTOLIC BLOOD PRESSURE < 130 MM HG: ICD-10-PCS | Mod: CPTII,S$GLB,, | Performed by: FAMILY MEDICINE

## 2019-01-21 PROCEDURE — 99999 PR PBB SHADOW E&M-EST. PATIENT-LVL III: ICD-10-PCS | Mod: PBBFAC,,, | Performed by: FAMILY MEDICINE

## 2019-01-21 PROCEDURE — 3008F PR BODY MASS INDEX (BMI) DOCUMENTED: ICD-10-PCS | Mod: CPTII,S$GLB,, | Performed by: FAMILY MEDICINE

## 2019-01-21 PROCEDURE — 3079F DIAST BP 80-89 MM HG: CPT | Mod: CPTII,S$GLB,, | Performed by: FAMILY MEDICINE

## 2019-01-21 PROCEDURE — 99214 OFFICE O/P EST MOD 30 MIN: CPT | Mod: S$GLB,,, | Performed by: FAMILY MEDICINE

## 2019-01-21 PROCEDURE — 99214 PR OFFICE/OUTPT VISIT, EST, LEVL IV, 30-39 MIN: ICD-10-PCS | Mod: S$GLB,,, | Performed by: FAMILY MEDICINE

## 2019-01-21 PROCEDURE — 3079F PR MOST RECENT DIASTOLIC BLOOD PRESSURE 80-89 MM HG: ICD-10-PCS | Mod: CPTII,S$GLB,, | Performed by: FAMILY MEDICINE

## 2019-01-21 PROCEDURE — 99999 PR PBB SHADOW E&M-EST. PATIENT-LVL III: CPT | Mod: PBBFAC,,, | Performed by: FAMILY MEDICINE

## 2019-01-21 PROCEDURE — 3074F SYST BP LT 130 MM HG: CPT | Mod: CPTII,S$GLB,, | Performed by: FAMILY MEDICINE

## 2019-01-21 NOTE — PROGRESS NOTES
"Subjective:       Patient ID: Deb Tesfaye is a 63 y.o. female.    Chief Complaint: Follow-up (6 month)    Pt is known to me.  Pt is here for followup of chronic medical issues. She is doing well except for a recent runny nose and dry cough.  She treated it with OTC products and some old Tessalon perles.      Review of Systems   Constitutional: Negative for activity change, fever and unexpected weight change.   HENT: Positive for rhinorrhea. Negative for hearing loss and trouble swallowing.    Eyes: Negative for discharge and visual disturbance.   Respiratory: Negative for chest tightness and wheezing.    Cardiovascular: Negative for chest pain and palpitations.   Gastrointestinal: Negative for blood in stool, constipation, diarrhea and vomiting.   Endocrine: Negative for polydipsia and polyuria.   Genitourinary: Negative for difficulty urinating, dysuria, hematuria and menstrual problem.   Musculoskeletal: Negative for arthralgias, joint swelling and neck pain.   Neurological: Negative for weakness and headaches.   Psychiatric/Behavioral: Negative for confusion and dysphoric mood.       Objective:       Vitals:    01/21/19 1143   BP: 112/84   BP Location: Left arm   Patient Position: Sitting   BP Method: Large (Manual)   Pulse: 67   Weight: 81.4 kg (179 lb 7.3 oz)   Height: 5' 5" (1.651 m)     Physical Exam   Constitutional: She is oriented to person, place, and time. She appears well-developed and well-nourished.   HENT:   Head: Normocephalic.   Mildly boggy nasal mucosa   Eyes: Conjunctivae and EOM are normal. Pupils are equal, round, and reactive to light.   Neck: Normal range of motion. Neck supple. No thyromegaly present.   Cardiovascular: Normal rate, regular rhythm and normal heart sounds.   Pulmonary/Chest: Effort normal and breath sounds normal.   Abdominal: Soft. Bowel sounds are normal. There is no tenderness.   Musculoskeletal: Normal range of motion. She exhibits no tenderness or deformity. "   Lymphadenopathy:     She has no cervical adenopathy.   Neurological: She is alert and oriented to person, place, and time. She displays normal reflexes. No cranial nerve deficit. She exhibits normal muscle tone. Coordination normal.   Skin: Skin is warm and dry.   Psychiatric: She has a normal mood and affect. Her behavior is normal.       Assessment:       1. Essential hypertension    2. Mixed hyperlipidemia    3. Hypokalemia    4. Seasonal allergic rhinitis due to pollen        Plan:       Deb was seen today for follow-up.    Diagnoses and all orders for this visit:    Essential hypertension    Mixed hyperlipidemia  -     Lipid panel; Future    Hypokalemia    Seasonal allergic rhinitis due to pollen      During this visit, I reviewed the pt's history, medications, allergies, and problem list.    Pt to use OTC Claritin

## 2019-01-23 NOTE — PROGRESS NOTES
Assessment /Plan     For exam results, see Encounter Report.    Primary open angle glaucoma (POAG) of both eyes, severe stage    Iritis of right eye    Afferent pupillary defect, right    Nuclear sclerosis of left eye    Pseudophakia, right eye    History of glaucoma tube shunt procedure    Visual field loss        S/P PHACO/IOL/AHMED - OD - 1/10/2017     F/U CYCLO G6 OD - 4/12/2017  REFERRED BY DR HILLMAN FOR SEVERE POORLY CONTROLLED POAG - 7/24/2015   Pt recently moved from [place] to [facility]     Previously referred to Dr. Hillman from [facility] [place], review of outside records:    Tm 27/25.    Prior to that, IOP ranged mid-teens to low-20's on Combigan/Dorzolamide/Lumigan OU    C:D documented as 0.9 OD, 0.85 OS    Initial visit 3/29/12  IOP 10 OU on Combitan and Lumigan  C:D 0.85 OU  CCT 524OD, 531 OS    HVF's  2012 - superior defect affecting fixation OD with beginnings of early inferior arc; possible early superior arc OS  2013 - stable superior defect OD with early inferior arc; OS superior nasal step    Here for evaluation of POAG on MMT. Last IOP with Dr. Hillman 21//19. IOP today 21//20 on MMT. Patient has a family history of glaucoma in maternal grandfather. States that she is compliant with all Gtt's.        Glaucoma (type and duration)    Dx about 2002 - [place]    First HVF   First at [facility] 2015   First photos   First at [facility] 2015    Treatment / Drops started   2002              Family history    + mom / brother         Glaucoma meds    cosopt ou / brimonidine ou tid / lumigan ou q hs / bisi od tid / diamox / bisi ou        H/O adverse rxn to glaucoma drops    none        LASERS    SLT OD - 8/5/2013 - [place] // SLT os - [facility] 8/27/2015 (good resp 21-->16)        GLAUCOMA SURGERIES    CYCL G6 OD - 4/12/2017  / phaco/IOL / ahmed od 1/10/2017         OTHER EYE SURGERIES    Combined phaco/IOL / ahmed 1/10/2017         CDR    0.99/0.95        Tbase    ?  Off gtts (on  "gtts was up to 28/25)         Tmax    27/25 - per outside record's (reviewed by Arik )             Ttarget    15 ou    (may need to be lower)           HVF   24-2 ss //  3 test 2015 to  [place] [facility]  od // SAD   10-2 ss - 3 test 2015 to 2017 - SALT/ IAD (progression) od // hint SAD / IAD os    (( 2 VF's from [place] - that were reviewed by Dr. IVEY - 2012 and 2013 -  SAD w/ fix invl . IAD od / early IAD os ))            Gonio    +3-4 ou         CCT    524/531        OCT    2 test 2016 to 2017 - RNFL - OD:dec. S/T/I/N // OS:dec. S/Ipratik N/T        HRT    2 test 2015 to 2018  - MR -   Dec thru out   od // dec thru out  os /// CDR 0. 91 od // 0.88 os        Disc photos    2015, 2018      - Ttoday - 10/16    - Test done today    IOP check,     2. + APD od     3.  NS ou    Minimal - VA 20/25 ou still     4. Papilloma OS   To be removed with Arik       HVF's continue to progress - especially OD       S/P  - cyclo G6 - OD - 4/12/2017   "IOP still too high od - very little sight to save on this eye     - IOP back up and persistent iritis   Rec phaco / IOL / GDD - ? ahmed od  (increase in cataract od post cyclo G6 and slightly shallow AC)     With the left eye - I think incisional surgery may be best option - as there is more vision to save long term "  IOP seems ok for now on Maximum drops and diamox     Pt likely will need trab os - but IOP is reasonably good  today at  12/15 (10/22/2018)     Phaco / IOL // phaco/ IOL / ahmed  OD Date: 1/10/2017 - general anesthesia // PCB00 23.0  POY 1   IOP  10 off gtts     Cont all glaucoma gtts os - IOP creeping up - was 15 last visit and now 22   cosopt  tid   Brimonidine tid   bisi tid   lumigan q hs     Pt is OFF the diamox for now (10/22/2018)     Repeat SLT os - only 180 degrees treated - too narrow sup and inf (9/20/2018) - steroid taper      If IOP remains / goes back up  high - will need some form of filtering surgery - ? Traditional trab with surgical PI " and bleb  Pt has good vision and minimal cataract OS   Pt has narrowing sup and inf on gonio and may do better with a surgical PI   No sign cataract at this time     F/U 3 - 4  months - IOP check and OCT

## 2019-01-25 ENCOUNTER — OFFICE VISIT (OUTPATIENT)
Dept: OPHTHALMOLOGY | Facility: CLINIC | Age: 64
End: 2019-01-25
Payer: COMMERCIAL

## 2019-01-25 DIAGNOSIS — H21.561 AFFERENT PUPILLARY DEFECT, RIGHT: ICD-10-CM

## 2019-01-25 DIAGNOSIS — H25.12 NUCLEAR SCLEROSIS OF LEFT EYE: ICD-10-CM

## 2019-01-25 DIAGNOSIS — H20.9 IRITIS OF RIGHT EYE: ICD-10-CM

## 2019-01-25 DIAGNOSIS — Z96.1 PSEUDOPHAKIA, RIGHT EYE: ICD-10-CM

## 2019-01-25 DIAGNOSIS — H40.1133 PRIMARY OPEN ANGLE GLAUCOMA (POAG) OF BOTH EYES, SEVERE STAGE: Primary | ICD-10-CM

## 2019-01-25 DIAGNOSIS — H53.40 VISUAL FIELD LOSS: ICD-10-CM

## 2019-01-25 PROCEDURE — 99999 PR PBB SHADOW E&M-EST. PATIENT-LVL II: ICD-10-PCS | Mod: PBBFAC,,, | Performed by: OPHTHALMOLOGY

## 2019-01-25 PROCEDURE — 92012 PR EYE EXAM, EST PATIENT,INTERMED: ICD-10-PCS | Mod: S$GLB,,, | Performed by: OPHTHALMOLOGY

## 2019-01-25 PROCEDURE — 99999 PR PBB SHADOW E&M-EST. PATIENT-LVL II: CPT | Mod: PBBFAC,,, | Performed by: OPHTHALMOLOGY

## 2019-01-25 PROCEDURE — 92012 INTRM OPH EXAM EST PATIENT: CPT | Mod: S$GLB,,, | Performed by: OPHTHALMOLOGY

## 2019-02-04 DIAGNOSIS — E87.6 HYPOKALEMIA: ICD-10-CM

## 2019-02-04 RX ORDER — POTASSIUM CHLORIDE 20 MEQ/1
TABLET, EXTENDED RELEASE ORAL
Qty: 60 TABLET | Refills: 2 | Status: SHIPPED | OUTPATIENT
Start: 2019-02-04 | End: 2019-05-20 | Stop reason: SDUPTHER

## 2019-02-08 DIAGNOSIS — E78.5 HYPERLIPIDEMIA, UNSPECIFIED HYPERLIPIDEMIA TYPE: ICD-10-CM

## 2019-02-11 RX ORDER — PRAVASTATIN SODIUM 20 MG/1
TABLET ORAL
Qty: 30 TABLET | Refills: 0 | Status: SHIPPED | OUTPATIENT
Start: 2019-02-11 | End: 2019-03-11 | Stop reason: SDUPTHER

## 2019-02-25 ENCOUNTER — LAB VISIT (OUTPATIENT)
Dept: LAB | Facility: HOSPITAL | Age: 64
End: 2019-02-25
Attending: NURSE PRACTITIONER
Payer: COMMERCIAL

## 2019-02-25 DIAGNOSIS — M94.9 DISORDER OF BONE AND CARTILAGE: ICD-10-CM

## 2019-02-25 DIAGNOSIS — D05.12 DUCTAL CARCINOMA IN SITU (DCIS) OF LEFT BREAST: ICD-10-CM

## 2019-02-25 DIAGNOSIS — M89.9 DISORDER OF BONE AND CARTILAGE: ICD-10-CM

## 2019-02-25 LAB
25(OH)D3+25(OH)D2 SERPL-MCNC: 34 NG/ML
ALBUMIN SERPL BCP-MCNC: 4.1 G/DL
ALP SERPL-CCNC: 59 U/L
ALT SERPL W/O P-5'-P-CCNC: 20 U/L
ANION GAP SERPL CALC-SCNC: 10 MMOL/L
AST SERPL-CCNC: 19 U/L
BASOPHILS # BLD AUTO: 0.06 K/UL
BASOPHILS NFR BLD: 0.8 %
BILIRUB SERPL-MCNC: 0.5 MG/DL
BUN SERPL-MCNC: 15 MG/DL
CALCIUM SERPL-MCNC: 10 MG/DL
CHLORIDE SERPL-SCNC: 103 MMOL/L
CO2 SERPL-SCNC: 28 MMOL/L
CREAT SERPL-MCNC: 0.8 MG/DL
DIFFERENTIAL METHOD: NORMAL
EOSINOPHIL # BLD AUTO: 0.2 K/UL
EOSINOPHIL NFR BLD: 2.2 %
ERYTHROCYTE [DISTWIDTH] IN BLOOD BY AUTOMATED COUNT: 14 %
EST. GFR  (AFRICAN AMERICAN): >60 ML/MIN/1.73 M^2
EST. GFR  (NON AFRICAN AMERICAN): >60 ML/MIN/1.73 M^2
GLUCOSE SERPL-MCNC: 99 MG/DL
HCT VFR BLD AUTO: 38.8 %
HGB BLD-MCNC: 13.4 G/DL
LDH SERPL L TO P-CCNC: 167 U/L
LYMPHOCYTES # BLD AUTO: 2.2 K/UL
LYMPHOCYTES NFR BLD: 30.6 %
MCH RBC QN AUTO: 28.7 PG
MCHC RBC AUTO-ENTMCNC: 34.5 G/DL
MCV RBC AUTO: 83 FL
MONOCYTES # BLD AUTO: 0.6 K/UL
MONOCYTES NFR BLD: 7.9 %
NEUTROPHILS # BLD AUTO: 4.2 K/UL
NEUTROPHILS NFR BLD: 58.5 %
PLATELET # BLD AUTO: 276 K/UL
PMV BLD AUTO: 10.4 FL
POTASSIUM SERPL-SCNC: 3.4 MMOL/L
PROT SERPL-MCNC: 7.6 G/DL
RBC # BLD AUTO: 4.67 M/UL
SODIUM SERPL-SCNC: 141 MMOL/L
WBC # BLD AUTO: 7.23 K/UL

## 2019-02-25 PROCEDURE — 80053 COMPREHEN METABOLIC PANEL: CPT | Mod: PO

## 2019-02-25 PROCEDURE — 83615 LACTATE (LD) (LDH) ENZYME: CPT | Mod: PO

## 2019-02-25 PROCEDURE — 36415 COLL VENOUS BLD VENIPUNCTURE: CPT | Mod: PO

## 2019-02-25 PROCEDURE — 82306 VITAMIN D 25 HYDROXY: CPT

## 2019-02-25 PROCEDURE — 85025 COMPLETE CBC W/AUTO DIFF WBC: CPT | Mod: PO

## 2019-02-28 ENCOUNTER — OFFICE VISIT (OUTPATIENT)
Dept: HEMATOLOGY/ONCOLOGY | Facility: CLINIC | Age: 64
End: 2019-02-28
Payer: COMMERCIAL

## 2019-02-28 VITALS
BODY MASS INDEX: 30.16 KG/M2 | RESPIRATION RATE: 18 BRPM | DIASTOLIC BLOOD PRESSURE: 78 MMHG | HEIGHT: 65 IN | WEIGHT: 181 LBS | HEART RATE: 73 BPM | SYSTOLIC BLOOD PRESSURE: 122 MMHG | TEMPERATURE: 99 F

## 2019-02-28 DIAGNOSIS — M94.9 DISORDER OF BONE AND CARTILAGE: ICD-10-CM

## 2019-02-28 DIAGNOSIS — E87.6 HYPOKALEMIA: ICD-10-CM

## 2019-02-28 DIAGNOSIS — D05.12 DUCTAL CARCINOMA IN SITU (DCIS) OF LEFT BREAST: Primary | ICD-10-CM

## 2019-02-28 DIAGNOSIS — M89.9 DISORDER OF BONE AND CARTILAGE: ICD-10-CM

## 2019-02-28 PROCEDURE — 99213 PR OFFICE/OUTPT VISIT, EST, LEVL III, 20-29 MIN: ICD-10-PCS | Mod: S$GLB,,, | Performed by: NURSE PRACTITIONER

## 2019-02-28 PROCEDURE — 3078F PR MOST RECENT DIASTOLIC BLOOD PRESSURE < 80 MM HG: ICD-10-PCS | Mod: CPTII,S$GLB,, | Performed by: NURSE PRACTITIONER

## 2019-02-28 PROCEDURE — 3074F SYST BP LT 130 MM HG: CPT | Mod: CPTII,S$GLB,, | Performed by: NURSE PRACTITIONER

## 2019-02-28 PROCEDURE — 3078F DIAST BP <80 MM HG: CPT | Mod: CPTII,S$GLB,, | Performed by: NURSE PRACTITIONER

## 2019-02-28 PROCEDURE — 99213 OFFICE O/P EST LOW 20 MIN: CPT | Mod: S$GLB,,, | Performed by: NURSE PRACTITIONER

## 2019-02-28 PROCEDURE — 3008F BODY MASS INDEX DOCD: CPT | Mod: CPTII,S$GLB,, | Performed by: NURSE PRACTITIONER

## 2019-02-28 PROCEDURE — 99999 PR PBB SHADOW E&M-EST. PATIENT-LVL IV: ICD-10-PCS | Mod: PBBFAC,,, | Performed by: NURSE PRACTITIONER

## 2019-02-28 PROCEDURE — 99999 PR PBB SHADOW E&M-EST. PATIENT-LVL IV: CPT | Mod: PBBFAC,,, | Performed by: NURSE PRACTITIONER

## 2019-02-28 PROCEDURE — 3074F PR MOST RECENT SYSTOLIC BLOOD PRESSURE < 130 MM HG: ICD-10-PCS | Mod: CPTII,S$GLB,, | Performed by: NURSE PRACTITIONER

## 2019-02-28 PROCEDURE — 3008F PR BODY MASS INDEX (BMI) DOCUMENTED: ICD-10-PCS | Mod: CPTII,S$GLB,, | Performed by: NURSE PRACTITIONER

## 2019-02-28 NOTE — PROGRESS NOTES
HISTORY OF PRESENT ILLNESS:  This is a 64-year-old black female known   to Dr. 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  She is status post lumpectomy, post-lumpectomy irradiation   and presently on adjuvant tamoxifen 20 mg daily.      She presents to the clinic today for her 36-month post-therapy evaluation without   complaints.  She denies any new breast complaints, bone pain, difficulties with   hot flashes, vaginal bleeding, abdominal discomfort/bloating, nausea, vomiting,   constipation, diarrhea, irregular heartbeat, chest pain, myalgias cramping, etc.    She remains compliant on Tamoxifen.  No other new complaints or pertinent   findings on a 14-point review of systems.    PHYSICAL EXAMINATION:  GENERAL:  Well-developed, well-nourished black female in no acute distress.    Alert and oriented x 3.  VITAL SIGNS:  Weight:  Gain of 7 1/2 pounds in 6 months  Wt Readings from Last 3 Encounters:   02/28/19 82.1 kg (180 lb 16 oz)   01/21/19 81.4 kg (179 lb 7.3 oz)   12/14/18 79.6 kg (175 lb 7.8 oz)     Temp Readings from Last 3 Encounters:   02/28/19 98.8 °F (37.1 °C)   08/29/18 98.4 °F (36.9 °C)   07/20/18 98.9 °F (37.2 °C) (Oral)     BP Readings from Last 3 Encounters:   02/28/19 122/78   01/21/19 112/84   10/25/18 124/84     Pulse Readings from Last 3 Encounters:   02/28/19 73   01/21/19 67   10/25/18 73     HEENT:  Normocephalic, atraumatic.  Oral mucosa pink and moist.  Lips without   lesions.  Tongue midline.  Oropharynx clear.  Nonicteric sclerae.  NECK:  Supple, no adenopathy.  No carotid bruits, thyromegaly or thyroid nodule.  HEART:  Regular rate and rhythm without murmur, gallop or rub.  LUNGS:  Clear to auscultation bilaterally.  Normal respiratory effort.  ABDOMEN:  Soft, nontender, nondistended with positive normoactive bowel sounds,   no hepatosplenomegaly.  EXTREMITIES:  No cyanosis, clubbing or edema.  Distal pulses are intact.  AXILLAE AND  GROIN:  No palpable pathologic lymphadenopathy is appreciated.  SKIN:  Intact/turgor normal.  NEUROLOGIC:  Cranial nerves II-XII grossly intact.  Motor:  Good muscle bulk and   tone.  Strength/sensory 5/5 throughout.  Gait stable.  BREASTS:  Right breast is soft, free of masses, tenderness, nipple discharge   or inversion.  Left breast with noted healed lumpectomy scar to the upper   outer region; no signs of local recurrence.    LABORATORY:    Lab Results   Component Value Date    WBC 7.23 02/25/2019    HGB 13.4 02/25/2019    HCT 38.8 02/25/2019    MCV 83 02/25/2019     02/25/2019     CMP  Sodium   Date Value Ref Range Status   02/25/2019 141 136 - 145 mmol/L Final     Potassium   Date Value Ref Range Status   02/25/2019 3.4 (L) 3.5 - 5.1 mmol/L Final     Chloride   Date Value Ref Range Status   02/25/2019 103 95 - 110 mmol/L Final     CO2   Date Value Ref Range Status   02/25/2019 28 23 - 29 mmol/L Final     Glucose   Date Value Ref Range Status   02/25/2019 99 70 - 110 mg/dL Final     BUN, Bld   Date Value Ref Range Status   02/25/2019 15 8 - 23 mg/dL Final     Creatinine   Date Value Ref Range Status   02/25/2019 0.8 0.5 - 1.4 mg/dL Final     Calcium   Date Value Ref Range Status   02/25/2019 10.0 8.7 - 10.5 mg/dL Final     Total Protein   Date Value Ref Range Status   02/25/2019 7.6 6.0 - 8.4 g/dL Final     Albumin   Date Value Ref Range Status   02/25/2019 4.1 3.5 - 5.2 g/dL Final     Total Bilirubin   Date Value Ref Range Status   02/25/2019 0.5 0.1 - 1.0 mg/dL Final     Comment:     For infants and newborns, interpretation of results should be based  on gestational age, weight and in agreement with clinical  observations.  Premature Infant recommended reference ranges:  Up to 24 hours.............<8.0 mg/dL  Up to 48 hours............<12.0 mg/dL  3-5 days..................<15.0 mg/dL  6-29 days.................<15.0 mg/dL       Alkaline Phosphatase   Date Value Ref Range Status   02/25/2019 59 55 -  135 U/L Final     AST   Date Value Ref Range Status   02/25/2019 19 10 - 40 U/L Final     ALT   Date Value Ref Range Status   02/25/2019 20 10 - 44 U/L Final     Anion Gap   Date Value Ref Range Status   02/25/2019 10 8 - 16 mmol/L Final     eGFR if    Date Value Ref Range Status   02/25/2019 >60 >60 mL/min/1.73 m^2 Final     eGFR if non    Date Value Ref Range Status   02/25/2019 >60 >60 mL/min/1.73 m^2 Final     Comment:     Calculation used to obtain the estimated glomerular filtration  rate (eGFR) is the CKD-EPI equation.        LDH:  167    Vitamin D:  34    RADIOLOGY:  CXR dated 12/14/2018:  Impression:  No radiographic evidence of active chest disease.    Mammogram dated 12/14/18:  Impression: No mammographic evidence of malignancy.   BI-RADS Category 1: Negative   Recommendation:  Routine screening mammogram in 1 year is recommended.    IMPRESSION:  1.  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  2.  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  3.  Hypokalemia - on supplementation; asymptomatic    PLAN:  1.  Continue Tamoxifen 20 mg daily.  2.  Add Vitamin D 1,000 units daily.  3.  Return to clinic in 6 months for 42 month post therapy evaluation with BMP Vitamin D prior.    Assessment/plan reviewed and approved by Dr. Nichols.

## 2019-03-11 DIAGNOSIS — E78.5 HYPERLIPIDEMIA, UNSPECIFIED HYPERLIPIDEMIA TYPE: ICD-10-CM

## 2019-03-12 RX ORDER — PRAVASTATIN SODIUM 20 MG/1
TABLET ORAL
Qty: 30 TABLET | Refills: 3 | Status: SHIPPED | OUTPATIENT
Start: 2019-03-12 | End: 2019-06-29 | Stop reason: SDUPTHER

## 2019-05-15 NOTE — PROGRESS NOTES
Assessment /Plan     For exam results, see Encounter Report.    Primary open angle glaucoma (POAG) of both eyes, severe stage    Iritis of right eye    Afferent pupillary defect, right    Nuclear sclerosis of left eye    Visual field loss    Pseudophakia, right eye    Glaucoma shunt device of right eye      S/P PHACO/IOL/AHMED - OD - 1/10/2017     F/U CYCLO G6 OD - 4/12/2017  REFERRED BY DR HILLMAN FOR SEVERE POORLY CONTROLLED POAG - 7/24/2015   Pt recently moved from [place] to [facility]     Previously referred to Dr. Hillman from [facility], review of outside records:    Tm 27/25.    Prior to that, IOP ranged mid-teens to low-20's on Combigan/Dorzolamide/Lumigan OU    C:D documented as 0.9 OD, 0.85 OS    Initial visit 3/29/12  IOP 10 OU on Combitan and Lumigan  C:D 0.85 OU  CCT 524OD, 531 OS    HVF's  2012 - superior defect affecting fixation OD with beginnings of early inferior arc; possible early superior arc OS  2013 - stable superior defect OD with early inferior arc; OS superior nasal step    Here for evaluation of POAG on MMT. Last IOP with Dr. Hillman 21//19. IOP today 21//20 on MMT. Patient has a family history of glaucoma in maternal grandfather. States that she is compliant with all Gtt's.        Glaucoma (type and duration)    Dx about 2002 - [place]    First HVF   First at [facility] 2015   First photos   First at [facility] 2015    Treatment / Drops started   2002              Family history    + mom / brother         Glaucoma meds    cosopt ou / brimonidine ou tid / lumigan ou q hs / bisi od tid / diamox / bisi ou        H/O adverse rxn to glaucoma drops    none        LASERS    SLT OD - 8/5/2013 - [place] // SLT os - [facility] 8/27/2015 (good resp 21-->16)        GLAUCOMA SURGERIES    CYCL G6 OD - 4/12/2017  / phaco/IOL / ahmed od 1/10/2018         OTHER EYE SURGERIES    Combined phaco/IOL / ahmed 1/10/2017         CDR    0.99/0.95        Tbase    ?  Off gtts (on gtts was up  to 28/25)         Tmax    27/25 - per outside record's (reviewed by Arik )             Ttarget    15 ou    (may need to be lower)           HVF   24-2 ss //  3 test [place]  [place] // SAD   10-2 ss - 3 test 2015 to 2017 - SALT/ IAD (progression) od // hint SAD / IAD os    (( 2 VF's from [place] - that were reviewed by Dr. IVEY - 2012 and 2013 -  SAD w/ fix invl . IAD od / early IAD os ))            Gonio    +3-4 ou         CCT    524/531        OCT    3 test 2016 to 2019 - RNFL - OD:dec. S/N/I // OS:dec. S/T/I, bord N        HRT    2 test 2015 to 2018  - MR -   Dec thru out   od // dec thru out  os /// CDR 0. 91 od // 0.88 os        Disc photos    2015, 2018      - Ttoday - 15//17 (last visit 10/16)  - Test done today    IOP check, OCT     2. + APD od     3.  NS ou    Minimal - VA 20/25 ou still     4. Papilloma OS   To be removed with Arik     5. ++ CME od    Decrease in VA from 20/70 to 20/200   New since last OCT 2/3/2017    S/p PHACO/IOL / AHMED  1/10/2018     PLAN   GLAUCOMA - ADVANCED - OD >OS   HVF's continue to progress - especially OD     S/P  - cyclo G6 - OD - 4/12/2017      S/p Phaco / IOL // phaco/ IOL / ahmed  OD Date: 1/10/2018 - general anesthesia // PCB00 23.0  POY 1   IOP  15 OFF GTTS      Cont all glaucoma gtts os -   cosopt  tid   Brimonidine tid   bisi tid   lumigan q hs     Pt is OFF the diamox for now (10/22/2018)     Repeat SLT os - only 180 degrees treated - too narrow sup and inf (9/20/2018) - steroid taper      IOP borderline OS   Pt has good vision and minimal cataract OS   May need trab with surgical PI OS   Pt has narrowing sup and inf on gonio and may do better with a surgical PI   No sign cataract at this time     OCT macula today 5/17/19: +++ CME od   OD: , Loss of foveal contour, intraretinal and subretinal fluid  OS: , NFC, no thickening    ADD PA TID OD   ADD ILEVRO TID OD     CONSULT RETINA - AMY SWIFT - IF THE CME PERSIST AND  DOES NOT RESPOND TO TOPICAL STEROIDS AND NSAIDS - ?? CONSIDER INTRVITRAL STEROIDS OR AVASTIN     F/U ME 2 MONTHS WITH REPEAT OCT

## 2019-05-17 ENCOUNTER — OFFICE VISIT (OUTPATIENT)
Dept: OPHTHALMOLOGY | Facility: CLINIC | Age: 64
End: 2019-05-17
Payer: COMMERCIAL

## 2019-05-17 DIAGNOSIS — H20.9 IRITIS OF RIGHT EYE: ICD-10-CM

## 2019-05-17 DIAGNOSIS — H35.351 CYSTOID MACULAR EDEMA OF RIGHT EYE: ICD-10-CM

## 2019-05-17 DIAGNOSIS — Z96.1 PSEUDOPHAKIA, RIGHT EYE: ICD-10-CM

## 2019-05-17 DIAGNOSIS — H40.1133 PRIMARY OPEN ANGLE GLAUCOMA (POAG) OF BOTH EYES, SEVERE STAGE: Primary | ICD-10-CM

## 2019-05-17 DIAGNOSIS — H25.12 NUCLEAR SCLEROSIS OF LEFT EYE: ICD-10-CM

## 2019-05-17 DIAGNOSIS — H21.561 AFFERENT PUPILLARY DEFECT, RIGHT: ICD-10-CM

## 2019-05-17 DIAGNOSIS — H53.40 VISUAL FIELD LOSS: ICD-10-CM

## 2019-05-17 PROCEDURE — 99999 PR PBB SHADOW E&M-EST. PATIENT-LVL II: CPT | Mod: PBBFAC,,, | Performed by: OPHTHALMOLOGY

## 2019-05-17 PROCEDURE — 92133 CPTRZD OPH DX IMG PST SGM ON: CPT | Mod: S$GLB,,, | Performed by: OPHTHALMOLOGY

## 2019-05-17 PROCEDURE — 99999 PR PBB SHADOW E&M-EST. PATIENT-LVL II: ICD-10-PCS | Mod: PBBFAC,,, | Performed by: OPHTHALMOLOGY

## 2019-05-17 PROCEDURE — 92012 INTRM OPH EXAM EST PATIENT: CPT | Mod: S$GLB,,, | Performed by: OPHTHALMOLOGY

## 2019-05-17 PROCEDURE — 92133 POSTERIOR SEGMENT OCT OPTIC NERVE(OCULAR COHERENCE TOMOGRAPHY) - OU - BOTH EYES: ICD-10-PCS | Mod: S$GLB,,, | Performed by: OPHTHALMOLOGY

## 2019-05-17 PROCEDURE — 92012 PR EYE EXAM, EST PATIENT,INTERMED: ICD-10-PCS | Mod: S$GLB,,, | Performed by: OPHTHALMOLOGY

## 2019-05-17 RX ORDER — PREDNISOLONE ACETATE 10 MG/ML
1 SUSPENSION/ DROPS OPHTHALMIC 3 TIMES DAILY
Qty: 1 BOTTLE | Refills: 3 | Status: SHIPPED | OUTPATIENT
Start: 2019-05-17 | End: 2019-12-02 | Stop reason: ALTCHOICE

## 2019-05-20 DIAGNOSIS — E87.6 HYPOKALEMIA: ICD-10-CM

## 2019-05-22 RX ORDER — POTASSIUM CHLORIDE 20 MEQ/1
TABLET, EXTENDED RELEASE ORAL
Qty: 60 TABLET | Refills: 2 | Status: SHIPPED | OUTPATIENT
Start: 2019-05-22 | End: 2019-08-25 | Stop reason: SDUPTHER

## 2019-06-16 NOTE — PROGRESS NOTES
HPI      S/P  MicroPulse G6 Laser Ciliary Body Destruction 12379 Treatment  To   all quadrants of the right eye 04/12/2017    1 day post op   No complaints    Dorzolamide / timolol - bid ou   Brimonidine tid ou  Lumigan q hs ou (( hold od post G6)   Pilocarpine tid ou (( hold od post G6)  Diamox 500 mg po bid        Last edited by Jacqueline Perez MD on 4/13/2017 12:23 PM.         Assessment /Plan     For exam results, see Encounter Report.    Encounter for postoperative care    Primary open angle glaucoma of both eyes, severe stage    Visual field loss    Afferent pupillary defect, right    Papilloma of left eyelid    Nuclear sclerosis of both eyes    Hyperopia with astigmatism and presbyopia, bilateral        F/U CYCLO G6 OD - 4/12/2017  REFERRED BY DR HILLMAN FOR SEVERE POORLY CONTROLLED POAG - 7/24/2015   Pt recently moved from [place] to [facility]     Previously referred to Dr. Hillman from [facility] [place], review of outside records:    Tm 27/25.    Prior to that, IOP ranged mid-teens to low-20's on Combigan/Dorzolamide/Lumigan OU    C:D documented as 0.9 OD, 0.85 OS    Initial visit 3/29/12  IOP 10 OU on Combitan and Lumigan  C:D 0.85 OU  CCT 524OD, 531 OS    HVF's  2012 - superior defect affecting fixation OD with beginnings of early inferior arc; possible early superior arc OS  2013 - stable superior defect OD with early inferior arc; OS superior nasal step    Here for evaluation of POAG on MMT. Last IOP with Dr. Hillman 21//19. IOP today 21//20 on MMT. Patient has a family history of glaucoma in maternal grandfather. States that she is compliant with all Gtt's.        Glaucoma (type and duration)    Dx about 2002 - [place]    First HVF   First at [facility] 2015   First photos   First at [facility] 2015    Treatment / Drops started   2002            Family history    + mom / brother         Glaucoma meds    cosopt ou / brimonidine ou tid / lumigan ou q hs / bisi od tid / diamox / bisi ou         H/O adverse rxn to glaucoma drops    none        LASERS    SLT OD - 8/5/2013 - [place] // SLT os - [facility] 8/27/2015 (good resp 21-->16)        GLAUCOMA SURGERIES    CYCL G6 OD - 4/12/2017        OTHER EYE SURGERIES    none        CDR    0.99/0.95        Tbase    ?  Off gtts (on gtts was up to 28/25)         Tmax    27/25 - per outside record's (reviewed by Arik )             Ttarget    15 ou    (may need to be lower)           HVF   24-2 ss //  2 test 2015 to  [place]  od // SAD   10-2 ss - 3 test 2015 to 2017 - SALT/ IAD (progression) od // hint SAD / IAD os    (( 2 VF's from [place] - that were reviewed by Dr. IVEY - 2012 and 2013 -  SAD w/ fix invl . IAD od / early IAD os ))            Gonio    +3-4 ou         CCT    524/531        OCT    2 test 2016 to 2017 - RNFL - OD:dec. S/T/I/N // OS:dec. S/I, pratik N/T        HRT    1 test 2015 to 2015  - MR - dec. S/N/I od // dec./ S/N/I os        Disc photos    2015     - Ttoday   10/16 ( POD #1 cyclo G6 od)    - Test done today    IOP 10/16    2. + APD od     3.  NS ou    Minimal - VA 20/25 ou still     4. Papilloma OS   To be removed with Arik       PLAN   Severe POAG ou   OD > OS  IOP still above target - even with diamox 500 po bid    Cont  bisi to 4% od tid - change to ou - 1/15/2016   Cont all other glaucoma gtts ou   Cont diamox 500 sequals po twice daily     HVF's continue to progress - especially OD     IOP still too high ou - rec trab with MMC od vs cyclo G6 od - very little sight to save on this eye   Once od stable - consider trab with MMC os or cyclo G6   No vis sign cataracts   Long discussion with patient about the irreversible nature of the glaucomatous visual field and vision loss.  Showed her how restricted her field in the right eye is using GIS Cloud master     Pts  is with her today 3/10/2017   Discussed at length the options of cyclo G6 od vs trab with MMC and PI - (pt is still phakic - minimal to no cataract) - a  bit shallow for a tube or mini shunt.   In view of the extremely poor potential od - they are leaning towards the cyclo G6 - but they will think about it for a wile and will let angle know which one they choose to go ahead with.    With the left eye - I think incisional surgery may be best option - as there is more vision to save long term     Pt has elected to proceed with cyclo G6 od   S/P cyclo G6 od 4/12/2017   POD #1   IOP 10  + iritis and conj hyperemia     Right eye   Begin PA qid   Begin atropine bid     Both eyes   Cont cosopt  - OU bid  Cont brimonidine tid OU    Left eye   bisi tid   lumigan q hs     Cont diamox 500 PO bid     F/U 1-2 weeks // IOP check ou // iritis check od     I have seen and personally examined the patient.  I agree with the findings, assessment and plan of the resident and/or fellow.     Jacqueline Perez MD     Your Primary Care Physician,   Phone: (   )    -  Fax: (   )    -  Follow Up Time: 1-3 Days

## 2019-06-17 ENCOUNTER — INITIAL CONSULT (OUTPATIENT)
Dept: OPHTHALMOLOGY | Facility: CLINIC | Age: 64
End: 2019-06-17
Payer: COMMERCIAL

## 2019-06-17 DIAGNOSIS — H43.393 FLOATERS, BILATERAL: ICD-10-CM

## 2019-06-17 DIAGNOSIS — H35.351 CYSTOID MACULAR EDEMA, RIGHT: Primary | ICD-10-CM

## 2019-06-17 PROCEDURE — 99999 PR PBB SHADOW E&M-EST. PATIENT-LVL II: ICD-10-PCS | Mod: PBBFAC,,, | Performed by: OPHTHALMOLOGY

## 2019-06-17 PROCEDURE — 92134 CPTRZ OPH DX IMG PST SGM RTA: CPT | Mod: S$GLB,,, | Performed by: OPHTHALMOLOGY

## 2019-06-17 PROCEDURE — 99999 PR PBB SHADOW E&M-EST. PATIENT-LVL II: CPT | Mod: PBBFAC,,, | Performed by: OPHTHALMOLOGY

## 2019-06-17 PROCEDURE — 92014 PR EYE EXAM, EST PATIENT,COMPREHESV: ICD-10-PCS | Mod: S$GLB,,, | Performed by: OPHTHALMOLOGY

## 2019-06-17 PROCEDURE — 92225 PR SPECIAL EYE EXAM, INITIAL: ICD-10-PCS | Mod: RT,S$GLB,, | Performed by: OPHTHALMOLOGY

## 2019-06-17 PROCEDURE — 92014 COMPRE OPH EXAM EST PT 1/>: CPT | Mod: S$GLB,,, | Performed by: OPHTHALMOLOGY

## 2019-06-17 PROCEDURE — 92134 POSTERIOR SEGMENT OCT RETINA (OCULAR COHERENCE TOMOGRAPHY)-BOTH EYES: ICD-10-PCS | Mod: S$GLB,,, | Performed by: OPHTHALMOLOGY

## 2019-06-17 PROCEDURE — 92225 PR SPECIAL EYE EXAM, INITIAL: CPT | Mod: RT,S$GLB,, | Performed by: OPHTHALMOLOGY

## 2019-06-17 NOTE — LETTER
June 17, 2019      Jacqueline Perez MD  [address]  [facility] [address]           [facility]  [address] [facility] [address] [place] [address]  Phone: [phone]  Fax: [phone]          Patient: Deb Tesfaye   MR Number: [id number]   YOB: 1955   Date of Visit: 6/17/2019       Dear Dr. Jacqueline Perez:    Thank you for referring Deb Tesfaye to me for evaluation. Attached you will find relevant portions of my assessment and plan of care.    If you have questions, please do not hesitate to call me. I look forward to following Deb Tesfaye along with you.    Sincerely,    UCHE Angel MD    Enclosure  CC:  No Recipients    If you would like to receive this communication electronically, please contact [email or web address] or [phone] to request more information on [facility] access.    For providers and/or their staff who would like to refer a patient to [facility], please contact us through our one-stop-shop provider referral line, [facility], at [phone].    If you feel you have received this communication in error or would no longer like to receive these types of communications, please e-mail [email or web address]

## 2019-06-17 NOTE — PROGRESS NOTES
HPI     Referred by dr morales for CME OD    No noticeable vision change  No pains  No flashes or floaters    COSOPT OS  TID  BRIMONIDIDNE OU TID  LUMIGAN OS QPM  JENNIFER TID OS   PRED TID OD  ILEVRO TID OD    POAG OU     -SHUNT OD  IRITIS OD  APD OD  NS CAT OS  VISUAL FIELD LOSS  PCIOL OD      Last edited by Merno Hammond on 6/17/2019  2:46 PM. (History)     OCT - CME OD improving  OS - no ME     A/P    1. CME OD  Va post phaco/tube 1/17 was 20/50-20/60 until 3/18 when it dropped to 20/200 - and has been that way since.  Started on Nev/PF TID 5/19.  CME improved, but no change in Va or functional vision per patient.    Significant central VF loss on last HVF may be culprit as well as chronicity of CME.    Will continue gtts to get retina Dry as possible, but will have a low threshold for conservative mgmt    If patient notices worsening, can resume or consider intravitreal therapy    2. Advanced POAG OD>OS  S/p ahmed 1/17    3. PCIOL OU      1 month OCT

## 2019-06-29 DIAGNOSIS — E78.5 HYPERLIPIDEMIA, UNSPECIFIED HYPERLIPIDEMIA TYPE: ICD-10-CM

## 2019-07-02 RX ORDER — PRAVASTATIN SODIUM 20 MG/1
TABLET ORAL
Qty: 30 TABLET | Refills: 5 | Status: SHIPPED | OUTPATIENT
Start: 2019-07-02 | End: 2019-12-23

## 2019-07-12 ENCOUNTER — LAB VISIT (OUTPATIENT)
Dept: LAB | Facility: HOSPITAL | Age: 64
End: 2019-07-12
Attending: FAMILY MEDICINE
Payer: COMMERCIAL

## 2019-07-12 DIAGNOSIS — E78.2 MIXED HYPERLIPIDEMIA: ICD-10-CM

## 2019-07-12 LAB
CHOLEST SERPL-MCNC: 181 MG/DL (ref 120–199)
CHOLEST/HDLC SERPL: 3.6 {RATIO} (ref 2–5)
HDLC SERPL-MCNC: 50 MG/DL (ref 40–75)
HDLC SERPL: 27.6 % (ref 20–50)
LDLC SERPL CALC-MCNC: 113.6 MG/DL (ref 63–159)
NONHDLC SERPL-MCNC: 131 MG/DL
TRIGL SERPL-MCNC: 87 MG/DL (ref 30–150)

## 2019-07-12 PROCEDURE — 80061 LIPID PANEL: CPT

## 2019-07-12 PROCEDURE — 36415 COLL VENOUS BLD VENIPUNCTURE: CPT | Mod: PO

## 2019-07-13 DIAGNOSIS — I10 ESSENTIAL HYPERTENSION: ICD-10-CM

## 2019-07-15 RX ORDER — TELMISARTAN AND HYDROCHLORTHIAZIDE 80; 25 MG/1; MG/1
TABLET ORAL
Qty: 90 TABLET | Refills: 0 | Status: SHIPPED | OUTPATIENT
Start: 2019-07-15 | End: 2019-09-04 | Stop reason: SDUPTHER

## 2019-07-25 NOTE — PROGRESS NOTES
HPI     DLS: 5/17/19    Pt here for 2 month check;    Meds:   Cosopt TID OS   Brimonidine TID OS   Bisi 4% TID OS   Lumigan QHS OS   PA TID OD  ILEVRO TID OD  1. POAG OD>OS   2. APD OD   3. NS OS   4. Papilloma OS   5. Steroid Responder   6. PCIOL OD      Last edited by Chapis York on 7/26/2019 12:25 PM. (History)              Assessment /Plan     For exam results, see Encounter Report.    Primary open angle glaucoma (POAG) of both eyes, severe stage    Afferent pupillary defect, right    Nuclear sclerosis of left eye    Cystoid macular edema, right    Floaters, bilateral    Visual field loss    Pseudophakia, right eye    Glaucoma shunt device of right eye        S/P PHACO/IOL/AHMED - OD - 1/10/2017     F/U CYCLO G6 OD - 4/12/2017  REFERRED BY DR ELISE FOR SEVERE POORLY CONTROLLED POAG - 7/24/2015   Pt recently moved from [place] to [facility]     Previously referred to Dr. Elise from [facility], review of outside records:    Tm 27/25.    Prior to that, IOP ranged mid-teens to low-20's on Combigan/Dorzolamide/Lumigan OU    C:D documented as 0.9 OD, 0.85 OS    Initial visit 3/29/12  IOP 10 OU on Combitan and Lumigan  C:D 0.85 OU  CCT 524OD, 531 OS    HVF's  2012 - superior defect affecting fixation OD with beginnings of early inferior arc; possible early superior arc OS  2013 - stable superior defect OD with early inferior arc; OS superior nasal step    Here for evaluation of POAG on MMT. Last IOP with Dr. Elise 21//19. IOP today 21//20 on MMT. Patient has a family history of glaucoma in maternal grandfather. States that she is compliant with all Gtt's.        Glaucoma (type and duration)    Dx about 2002 - [place]    First HVF   First at [facility] 2015   First photos   First at [facility] 2015    Treatment / Drops started   2002              Family history    + mom / brother         Glaucoma meds    cosopt ou / brimonidine ou tid / lumigan ou q hs / bisi od tid / diamox / bisi ou        H/O  adverse rxn to glaucoma drops    none        LASERS    SLT OD - 8/5/2013 - [place] // SLT os - [facility] 8/27/2015 (good resp 21-->16)        GLAUCOMA SURGERIES    CYCL G6 OD - 4/12/2017  / phaco/IOL / ahmed od 1/10/2018         OTHER EYE SURGERIES    Combined phaco/IOL / ahmed 1/10/2017         CDR    0.99/0.95        Tbase    ?  Off gtts (on gtts was up to 28/25)         Tmax    27/25 - per outside record's (reviewed by Arik )             Ttarget    15 ou    (may need to be lower)           HVF   24-2 ss //  3 test [place] to  [place]  od // SAD   10-2 ss - 3 test 2015 to 2017 - SALT/ IAD (progression) od // hint SAD / IAD os    (( 2 VF's from [place] - that were reviewed by Dr. IVEY - 2012 and 2013 -  SAD w/ fix invl . IAD od / early IAD os ))            Gonio    +3-4 ou         CCT    524/531        OCT    3 test 2016 to 2019 - RNFL - OD:dec. S/N/I // OS:dec. S/T/I, bord N        HRT    2 test 2015 to 2018  - MR -   Dec thru out   od // dec thru out  os /// CDR 0. 91 od // 0.88 os        Disc photos    2015, 2018      - Ttoday - 18/16  - Test done today    IOP check // OCT - CME check od     2. + APD od     3.  NS ou    Minimal - VA 20/25 ou still     4. Papilloma OS   To be removed with Arik     5. ++ CME od    Decrease in VA from 20/70 to 20/200   New since last OCT 2/3/2017    S/p PHACO/IOL / AHMED  1/10/2018     PLAN   GLAUCOMA - ADVANCED - OD >OS   HVF's continue to progress - especially OD     S/P  - cyclo G6 - OD - 4/12/2017      S/p Phaco / IOL // phaco/ IOL / ahmed  OD Date: 1/10/2018 - general anesthesia // PCB00 23.0  POY 1.5  IOP  18 OFF GTTS      Cont  glaucoma gtts   cosopt  tid - Change to OU - (7/26/2019 )   Brimonidine tid - cont os   bisi tid - cont os   lumigan q hs - cont os     Pt is OFF the diamox for now (10/22/2018)     Repeat SLT os - only 180 degrees treated - too narrow sup and inf (9/20/2018) - steroid taper      IOP borderline OS   Pt has good vision and minimal  cataract OS   May need trab with surgical PI OS   Pt has narrowing sup and inf on gonio and may do better with a surgical PI   No sign cataract at this time     OCT macula today 5/17/19: +++ CME od   OD: , Loss of foveal contour, intraretinal and subretinal fluid  OS: , NFC, no thickening    OCT - 7/26/2019 - CME improved / resolved // revoiew retina notes // monitor for recurrence of CME od      PA BID  OD - decrease to 1 x day   ILEVRO BID OD - decrease to 1 x day     Keep F/U with RETINA - AMY SWIFT - IF THE CME PERSIST AND DOES NOT RESPOND TO TOPICAL STEROIDS AND NSAIDS - ?? CONSIDER INTRVITRAL STEROIDS OR AVASTIN     F/U 3 months with HVF 10-2 stim V od and 24-2 ss os // DFE // OCT - send a recall   IF ANY VF PROG OS OR IF IOP  CREEPS UP / CONSIDER SOME FOR OF FILTERING SURGERY OS   ALSO - CONSIDER RHOPRESSA

## 2019-07-26 ENCOUNTER — OFFICE VISIT (OUTPATIENT)
Dept: OPHTHALMOLOGY | Facility: CLINIC | Age: 64
End: 2019-07-26
Payer: COMMERCIAL

## 2019-07-26 DIAGNOSIS — H43.393 FLOATERS, BILATERAL: ICD-10-CM

## 2019-07-26 DIAGNOSIS — H35.351 CYSTOID MACULAR EDEMA, RIGHT: ICD-10-CM

## 2019-07-26 DIAGNOSIS — H25.12 NUCLEAR SCLEROSIS OF LEFT EYE: ICD-10-CM

## 2019-07-26 DIAGNOSIS — H53.40 VISUAL FIELD LOSS: ICD-10-CM

## 2019-07-26 DIAGNOSIS — H40.1133 PRIMARY OPEN ANGLE GLAUCOMA (POAG) OF BOTH EYES, SEVERE STAGE: Primary | ICD-10-CM

## 2019-07-26 DIAGNOSIS — H21.561 AFFERENT PUPILLARY DEFECT, RIGHT: ICD-10-CM

## 2019-07-26 DIAGNOSIS — Z96.1 PSEUDOPHAKIA, RIGHT EYE: ICD-10-CM

## 2019-07-26 PROCEDURE — 92133 CPTRZD OPH DX IMG PST SGM ON: CPT | Mod: S$GLB,,, | Performed by: OPHTHALMOLOGY

## 2019-07-26 PROCEDURE — 92012 INTRM OPH EXAM EST PATIENT: CPT | Mod: S$GLB,,, | Performed by: OPHTHALMOLOGY

## 2019-07-26 PROCEDURE — 92133 POSTERIOR SEGMENT OCT OPTIC NERVE(OCULAR COHERENCE TOMOGRAPHY) - OU - BOTH EYES: ICD-10-PCS | Mod: S$GLB,,, | Performed by: OPHTHALMOLOGY

## 2019-07-26 PROCEDURE — 99999 PR PBB SHADOW E&M-EST. PATIENT-LVL II: CPT | Mod: PBBFAC,,, | Performed by: OPHTHALMOLOGY

## 2019-07-26 PROCEDURE — 92012 PR EYE EXAM, EST PATIENT,INTERMED: ICD-10-PCS | Mod: S$GLB,,, | Performed by: OPHTHALMOLOGY

## 2019-07-26 PROCEDURE — 99999 PR PBB SHADOW E&M-EST. PATIENT-LVL II: ICD-10-PCS | Mod: PBBFAC,,, | Performed by: OPHTHALMOLOGY

## 2019-07-29 ENCOUNTER — OFFICE VISIT (OUTPATIENT)
Dept: OPHTHALMOLOGY | Facility: CLINIC | Age: 64
End: 2019-07-29
Payer: COMMERCIAL

## 2019-07-29 DIAGNOSIS — H35.351 CYSTOID MACULAR EDEMA, RIGHT: Primary | ICD-10-CM

## 2019-07-29 DIAGNOSIS — H43.393 FLOATERS, BILATERAL: ICD-10-CM

## 2019-07-29 PROCEDURE — 92134 CPTRZ OPH DX IMG PST SGM RTA: CPT | Mod: S$GLB,,, | Performed by: OPHTHALMOLOGY

## 2019-07-29 PROCEDURE — 92226 PR SPECIAL EYE EXAM, SUBSEQUENT: ICD-10-PCS | Mod: LT,S$GLB,, | Performed by: OPHTHALMOLOGY

## 2019-07-29 PROCEDURE — 99999 PR PBB SHADOW E&M-EST. PATIENT-LVL II: ICD-10-PCS | Mod: PBBFAC,,, | Performed by: OPHTHALMOLOGY

## 2019-07-29 PROCEDURE — 92226 PR SPECIAL EYE EXAM, SUBSEQUENT: CPT | Mod: RT,S$GLB,, | Performed by: OPHTHALMOLOGY

## 2019-07-29 PROCEDURE — 92014 COMPRE OPH EXAM EST PT 1/>: CPT | Mod: S$GLB,,, | Performed by: OPHTHALMOLOGY

## 2019-07-29 PROCEDURE — 92014 PR EYE EXAM, EST PATIENT,COMPREHESV: ICD-10-PCS | Mod: S$GLB,,, | Performed by: OPHTHALMOLOGY

## 2019-07-29 PROCEDURE — 92134 POSTERIOR SEGMENT OCT RETINA (OCULAR COHERENCE TOMOGRAPHY)-BOTH EYES: ICD-10-PCS | Mod: S$GLB,,, | Performed by: OPHTHALMOLOGY

## 2019-07-29 PROCEDURE — 99999 PR PBB SHADOW E&M-EST. PATIENT-LVL II: CPT | Mod: PBBFAC,,, | Performed by: OPHTHALMOLOGY

## 2019-07-29 RX ORDER — DORZOLAMIDE HYDROCHLORIDE AND TIMOLOL MALEATE 20; 5 MG/ML; MG/ML
SOLUTION/ DROPS OPHTHALMIC
Refills: 10 | COMMUNITY
Start: 2019-06-18 | End: 2019-12-02 | Stop reason: SDUPTHER

## 2019-07-29 NOTE — PROGRESS NOTES
HPI     Concerns About Ocular Health      Additional comments: 6 wk chk              Comments         Meds:   Cosopt TID OU   Brimonidine TID OS   Sandro 4% TID OS   Lumigan QHS OS   PA QD OD  ILEVRO TID OD      OCT - CME OD improving  OS - no ME     A/P    1. CME OD  Va post phaco/tube 1/17 was 20/50-20/60 until 3/18 when it dropped to 20/200 - and has been that way since.  Started on Nev/PF TID 5/19.  CME improved, but no change in Va or functional vision per patient.    Significant central VF loss on last HVF may be culprit as well as chronicity of CME.    Will continue gtts to get retina Dry as possible, but will have a low threshold for conservative mgmt  7/19 - ME improved OD without change in Va  Glaucomatous loss likely main culprit in Va loss OD    If patient notices worsening, can resume or consider intravitreal therapy, otherwise convservative mgmt for ME OD  Ok to Take ilevro until out, then DC    2. Advanced POAG OD>OS  S/p ahmed 1/17    3. PCIOL OU      Retina PRN

## 2019-08-02 DIAGNOSIS — H40.1133 PRIMARY OPEN ANGLE GLAUCOMA OF BOTH EYES, SEVERE STAGE: ICD-10-CM

## 2019-08-02 RX ORDER — BRIMONIDINE TARTRATE 2 MG/ML
1 SOLUTION/ DROPS OPHTHALMIC 3 TIMES DAILY
Qty: 30 ML | Refills: 3 | Status: SHIPPED | OUTPATIENT
Start: 2019-08-02 | End: 2020-10-06 | Stop reason: SDUPTHER

## 2019-08-15 ENCOUNTER — TELEPHONE (OUTPATIENT)
Dept: HEMATOLOGY/ONCOLOGY | Facility: CLINIC | Age: 64
End: 2019-08-15

## 2019-08-21 ENCOUNTER — OFFICE VISIT (OUTPATIENT)
Dept: FAMILY MEDICINE | Facility: CLINIC | Age: 64
End: 2019-08-21
Payer: COMMERCIAL

## 2019-08-21 VITALS
HEART RATE: 63 BPM | DIASTOLIC BLOOD PRESSURE: 88 MMHG | WEIGHT: 183 LBS | SYSTOLIC BLOOD PRESSURE: 138 MMHG | HEIGHT: 65 IN | TEMPERATURE: 98 F | OXYGEN SATURATION: 99 % | BODY MASS INDEX: 30.49 KG/M2

## 2019-08-21 DIAGNOSIS — N60.99 ATYPICAL DUCTAL HYPERPLASIA OF BREAST: ICD-10-CM

## 2019-08-21 DIAGNOSIS — I10 ESSENTIAL HYPERTENSION: Primary | ICD-10-CM

## 2019-08-21 DIAGNOSIS — M19.041 ARTHRITIS OF BOTH HANDS: ICD-10-CM

## 2019-08-21 DIAGNOSIS — M19.042 ARTHRITIS OF BOTH HANDS: ICD-10-CM

## 2019-08-21 DIAGNOSIS — E78.2 MIXED HYPERLIPIDEMIA: ICD-10-CM

## 2019-08-21 PROCEDURE — 3008F PR BODY MASS INDEX (BMI) DOCUMENTED: ICD-10-PCS | Mod: CPTII,S$GLB,, | Performed by: FAMILY MEDICINE

## 2019-08-21 PROCEDURE — 90732 PPSV23 VACC 2 YRS+ SUBQ/IM: CPT | Mod: S$GLB,,, | Performed by: FAMILY MEDICINE

## 2019-08-21 PROCEDURE — 99999 PR PBB SHADOW E&M-EST. PATIENT-LVL III: ICD-10-PCS | Mod: PBBFAC,,, | Performed by: FAMILY MEDICINE

## 2019-08-21 PROCEDURE — 3079F DIAST BP 80-89 MM HG: CPT | Mod: CPTII,S$GLB,, | Performed by: FAMILY MEDICINE

## 2019-08-21 PROCEDURE — 3075F PR MOST RECENT SYSTOLIC BLOOD PRESS GE 130-139MM HG: ICD-10-PCS | Mod: CPTII,S$GLB,, | Performed by: FAMILY MEDICINE

## 2019-08-21 PROCEDURE — 90471 PNEUMOCOCCAL POLYSACCHARIDE VACCINE 23-VALENT =>2YO SQ IM: ICD-10-PCS | Mod: S$GLB,,, | Performed by: FAMILY MEDICINE

## 2019-08-21 PROCEDURE — 3079F PR MOST RECENT DIASTOLIC BLOOD PRESSURE 80-89 MM HG: ICD-10-PCS | Mod: CPTII,S$GLB,, | Performed by: FAMILY MEDICINE

## 2019-08-21 PROCEDURE — 99214 OFFICE O/P EST MOD 30 MIN: CPT | Mod: 25,S$GLB,, | Performed by: FAMILY MEDICINE

## 2019-08-21 PROCEDURE — 3008F BODY MASS INDEX DOCD: CPT | Mod: CPTII,S$GLB,, | Performed by: FAMILY MEDICINE

## 2019-08-21 PROCEDURE — 99214 PR OFFICE/OUTPT VISIT, EST, LEVL IV, 30-39 MIN: ICD-10-PCS | Mod: 25,S$GLB,, | Performed by: FAMILY MEDICINE

## 2019-08-21 PROCEDURE — 90732 PNEUMOCOCCAL POLYSACCHARIDE VACCINE 23-VALENT =>2YO SQ IM: ICD-10-PCS | Mod: S$GLB,,, | Performed by: FAMILY MEDICINE

## 2019-08-21 PROCEDURE — 90471 IMMUNIZATION ADMIN: CPT | Mod: S$GLB,,, | Performed by: FAMILY MEDICINE

## 2019-08-21 PROCEDURE — 3075F SYST BP GE 130 - 139MM HG: CPT | Mod: CPTII,S$GLB,, | Performed by: FAMILY MEDICINE

## 2019-08-21 PROCEDURE — 99999 PR PBB SHADOW E&M-EST. PATIENT-LVL III: CPT | Mod: PBBFAC,,, | Performed by: FAMILY MEDICINE

## 2019-08-21 NOTE — PROGRESS NOTES
"Subjective:       Patient ID: Deb Tesfaye is a 64 y.o. female.    Chief Complaint: Follow-up (6 month~pt c/o numbess in toes~tender feeling in hand )    Pt is known to me.  Pt is here for followup of chronic medical issues.  The pt has noticed some joint pain and stiffness of her hands.  It is worse when she is using hands as with cooking.  She has also noticed some numbness of 3 of her toes of her right foot.  She does not feel it everyday.  She has not noticed a pattern.  She had a cols last week that resolved with OTC products.  She continues to see Tony Berg in follow up of breast cancer--pt is 3.5 years s/p her diagnosis.    Review of Systems   Constitutional: Negative for activity change, appetite change, fatigue and unexpected weight change.   Eyes: Negative for visual disturbance.   Respiratory: Negative for cough, chest tightness and shortness of breath.    Cardiovascular: Negative for chest pain, palpitations and leg swelling.   Gastrointestinal: Negative for abdominal pain, constipation, diarrhea, nausea and vomiting.   Endocrine: Negative for cold intolerance, heat intolerance and polyuria.   Genitourinary: Negative for decreased urine volume and dysuria.   Musculoskeletal: Positive for arthralgias. Negative for back pain.   Skin: Negative for rash.   Neurological: Positive for numbness. Negative for headaches.       Objective:       Vitals:    08/21/19 1035   BP: 138/88   BP Location: Right arm   Patient Position: Sitting   BP Method: Medium (Manual)   Pulse: 63   Temp: 98.3 °F (36.8 °C)   TempSrc: Oral   SpO2: 99%   Weight: 83 kg (182 lb 15.7 oz)   Height: 5' 5" (1.651 m)     Physical Exam   Constitutional: She is oriented to person, place, and time. She appears well-developed and well-nourished.   Pt is overweight   HENT:   Head: Normocephalic.   Eyes: Pupils are equal, round, and reactive to light. Conjunctivae and EOM are normal.   Neck: Normal range of motion. Neck supple. No thyromegaly " present.   Cardiovascular: Normal rate, regular rhythm and normal heart sounds.   Pulmonary/Chest: Effort normal and breath sounds normal.   Abdominal: Soft. Bowel sounds are normal. There is no tenderness.   Musculoskeletal: Normal range of motion. She exhibits no tenderness or deformity.   Lymphadenopathy:     She has no cervical adenopathy.   Neurological: She is alert and oriented to person, place, and time. She displays normal reflexes. No cranial nerve deficit or sensory deficit (no deficit in toes). She exhibits normal muscle tone. Coordination normal.   Skin: Skin is warm and dry.   Psychiatric: She has a normal mood and affect. Her behavior is normal.       Assessment:       1. Essential hypertension    2. Mixed hyperlipidemia    3. Atypical ductal hyperplasia of breast    4. Arthritis of both hands        Plan:       Deb was seen today for follow-up.    Diagnoses and all orders for this visit:    Essential hypertension    Mixed hyperlipidemia    Atypical ductal hyperplasia of breast    Arthritis of both hands    Other orders  -     (In Office Administered) Pneumococcal Polysaccharide Vaccine (23 Valent) (SQ/IM)      During this visit, I reviewed the pt's history, medications, allergies, and problem list.    We reviewed her recent excellent lipid results.  Pt wants to just continue to use occasional Tylenol for her joint pain.

## 2019-08-25 DIAGNOSIS — E87.6 HYPOKALEMIA: ICD-10-CM

## 2019-08-26 RX ORDER — POTASSIUM CHLORIDE 20 MEQ/1
TABLET, EXTENDED RELEASE ORAL
Qty: 60 TABLET | Refills: 5 | Status: SHIPPED | OUTPATIENT
Start: 2019-08-26 | End: 2020-03-02

## 2019-08-30 ENCOUNTER — OFFICE VISIT (OUTPATIENT)
Dept: HEMATOLOGY/ONCOLOGY | Facility: CLINIC | Age: 64
End: 2019-08-30
Payer: COMMERCIAL

## 2019-08-30 ENCOUNTER — LAB VISIT (OUTPATIENT)
Dept: LAB | Facility: HOSPITAL | Age: 64
End: 2019-08-30
Attending: NURSE PRACTITIONER
Payer: COMMERCIAL

## 2019-08-30 VITALS
RESPIRATION RATE: 16 BRPM | HEART RATE: 63 BPM | TEMPERATURE: 99 F | BODY MASS INDEX: 31.44 KG/M2 | HEIGHT: 65 IN | OXYGEN SATURATION: 100 % | DIASTOLIC BLOOD PRESSURE: 73 MMHG | WEIGHT: 188.69 LBS | SYSTOLIC BLOOD PRESSURE: 131 MMHG

## 2019-08-30 DIAGNOSIS — E87.6 HYPOKALEMIA: ICD-10-CM

## 2019-08-30 DIAGNOSIS — D05.12 DUCTAL CARCINOMA IN SITU (DCIS) OF LEFT BREAST: Primary | ICD-10-CM

## 2019-08-30 DIAGNOSIS — M89.9 DISORDER OF BONE AND CARTILAGE: ICD-10-CM

## 2019-08-30 DIAGNOSIS — N60.99 ATYPICAL DUCTAL HYPERPLASIA OF BREAST: ICD-10-CM

## 2019-08-30 DIAGNOSIS — M94.9 DISORDER OF BONE AND CARTILAGE: ICD-10-CM

## 2019-08-30 DIAGNOSIS — Z79.810 USE OF TAMOXIFEN (NOLVADEX): ICD-10-CM

## 2019-08-30 LAB
25(OH)D3+25(OH)D2 SERPL-MCNC: 35 NG/ML (ref 30–96)
ANION GAP SERPL CALC-SCNC: 7 MMOL/L (ref 8–16)
BUN SERPL-MCNC: 18 MG/DL (ref 7–18)
CALCIUM SERPL-MCNC: 9.4 MG/DL (ref 8.4–10.2)
CHLORIDE SERPL-SCNC: 103 MMOL/L (ref 95–110)
CO2 SERPL-SCNC: 32 MMOL/L (ref 22–31)
CREAT SERPL-MCNC: 0.78 MG/DL (ref 0.5–1.4)
EST. GFR  (AFRICAN AMERICAN): >60 ML/MIN/1.73 M^2
EST. GFR  (NON AFRICAN AMERICAN): >60 ML/MIN/1.73 M^2
GLUCOSE SERPL-MCNC: 110 MG/DL (ref 70–110)
POTASSIUM SERPL-SCNC: 3.7 MMOL/L (ref 3.5–5.1)
SODIUM SERPL-SCNC: 142 MMOL/L (ref 136–145)

## 2019-08-30 PROCEDURE — 80048 BASIC METABOLIC PNL TOTAL CA: CPT

## 2019-08-30 PROCEDURE — 82306 VITAMIN D 25 HYDROXY: CPT | Mod: PN

## 2019-08-30 PROCEDURE — 3078F DIAST BP <80 MM HG: CPT | Mod: CPTII,S$GLB,, | Performed by: NURSE PRACTITIONER

## 2019-08-30 PROCEDURE — 99999 PR PBB SHADOW E&M-EST. PATIENT-LVL V: ICD-10-PCS | Mod: PBBFAC,,, | Performed by: NURSE PRACTITIONER

## 2019-08-30 PROCEDURE — 3078F PR MOST RECENT DIASTOLIC BLOOD PRESSURE < 80 MM HG: ICD-10-PCS | Mod: CPTII,S$GLB,, | Performed by: NURSE PRACTITIONER

## 2019-08-30 PROCEDURE — 99213 OFFICE O/P EST LOW 20 MIN: CPT | Mod: S$GLB,,, | Performed by: NURSE PRACTITIONER

## 2019-08-30 PROCEDURE — 80048 BASIC METABOLIC PNL TOTAL CA: CPT | Mod: PN

## 2019-08-30 PROCEDURE — 3008F PR BODY MASS INDEX (BMI) DOCUMENTED: ICD-10-PCS | Mod: CPTII,S$GLB,, | Performed by: NURSE PRACTITIONER

## 2019-08-30 PROCEDURE — 99999 PR PBB SHADOW E&M-EST. PATIENT-LVL V: CPT | Mod: PBBFAC,,, | Performed by: NURSE PRACTITIONER

## 2019-08-30 PROCEDURE — 36415 COLL VENOUS BLD VENIPUNCTURE: CPT | Mod: PN

## 2019-08-30 PROCEDURE — 82306 VITAMIN D 25 HYDROXY: CPT

## 2019-08-30 PROCEDURE — 99213 PR OFFICE/OUTPT VISIT, EST, LEVL III, 20-29 MIN: ICD-10-PCS | Mod: S$GLB,,, | Performed by: NURSE PRACTITIONER

## 2019-08-30 PROCEDURE — 3075F PR MOST RECENT SYSTOLIC BLOOD PRESS GE 130-139MM HG: ICD-10-PCS | Mod: CPTII,S$GLB,, | Performed by: NURSE PRACTITIONER

## 2019-08-30 PROCEDURE — 3008F BODY MASS INDEX DOCD: CPT | Mod: CPTII,S$GLB,, | Performed by: NURSE PRACTITIONER

## 2019-08-30 PROCEDURE — 3075F SYST BP GE 130 - 139MM HG: CPT | Mod: CPTII,S$GLB,, | Performed by: NURSE PRACTITIONER

## 2019-08-30 NOTE — PROGRESS NOTES
HISTORY OF PRESENT ILLNESS:  This is a 64-year-old black female known   to Dr. 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  She is status post lumpectomy, post-lumpectomy irradiation   and presently on adjuvant tamoxifen 20 mg daily (initiated 02/2016).      She presents to the clinic today for her 42-month post-therapy evaluation without   complaints.  She remains active and well.  She denies any new breast complaints,   bone pain, difficulties with hot flashes, vaginal bleeding, abdominal discomfort/bloating,   nausea, vomiting, constipation, diarrhea, irregular heartbeat, chest pain, myalgias   cramping, etc.  She remains compliant on Tamoxifen.  No other new complaints   or pertinent findings on a 14-point review of systems.    PHYSICAL EXAMINATION:  GENERAL:  Well-developed, well-nourished black female in no acute distress.    Alert and oriented x 3.  VITAL SIGNS:  Weight:  Gain of 7 1/2 pounds in 6 months  Wt Readings from Last 3 Encounters:   08/30/19 85.6 kg (188 lb 11.4 oz)   08/21/19 83 kg (182 lb 15.7 oz)   02/28/19 82.1 kg (181 lb)     Temp Readings from Last 3 Encounters:   08/30/19 98.8 °F (37.1 °C) (Other (see comments))   08/21/19 98.3 °F (36.8 °C) (Oral)   02/28/19 98.8 °F (37.1 °C)     BP Readings from Last 3 Encounters:   08/30/19 131/73   08/21/19 138/88   02/28/19 122/78     Pulse Readings from Last 3 Encounters:   08/30/19 63   08/21/19 63   02/28/19 73     HEENT:  Normocephalic, atraumatic.  Oral mucosa pink and moist.  Lips without   lesions.  Tongue midline.  Oropharynx clear.  Nonicteric sclerae.  NECK:  Supple, no adenopathy.  No carotid bruits, thyromegaly or thyroid nodule.  HEART:  Regular rate and rhythm without murmur, gallop or rub.  LUNGS:  Clear to auscultation bilaterally.  Normal respiratory effort.  ABDOMEN:  Soft, nontender, nondistended with positive normoactive bowel sounds,   no hepatosplenomegaly.  EXTREMITIES:  No  cyanosis, clubbing or edema.  Distal pulses are intact.  AXILLAE AND GROIN:  No palpable pathologic lymphadenopathy is appreciated.  SKIN:  Intact/turgor normal.  NEUROLOGIC:  Cranial nerves II-XII grossly intact.  Motor:  Good muscle bulk and   tone.  Strength/sensory 5/5 throughout.  Gait stable.  BREASTS:  Right breast is soft, free of masses, tenderness, nipple discharge   or inversion.  Left breast with noted healed lumpectomy scar to the upper   outer region; no signs of local recurrence.    LABORATORY:    BMP  Lab Results   Component Value Date     08/30/2019    K 3.7 08/30/2019     08/30/2019    CO2 32 (H) 08/30/2019    BUN 18 08/30/2019    CREATININE 0.78 08/30/2019    CALCIUM 9.4 08/30/2019    ANIONGAP 7 (L) 08/30/2019    ESTGFRAFRICA >60 08/30/2019    EGFRNONAA >60 08/30/2019     Vitamin D:  35    IMPRESSION:  1.  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  2.  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  3.  Hypokalemia - on supplementation; stable    PLAN:  1.  Continue Tamoxifen 20 mg daily.  2.  Continue calcium & Vitamin D supplementation.  3.  Return to clinic in 6 months for 48 month post therapy evaluation with CBC, CMP, LDH, VIT D, CXR, BMD & Mammo    (on or after 12/14/2019) prior.    Assessment/plan reviewed and approved by Dr. Rider.

## 2019-09-04 DIAGNOSIS — I10 ESSENTIAL HYPERTENSION: ICD-10-CM

## 2019-09-05 RX ORDER — TELMISARTAN AND HYDROCHLORTHIAZIDE 80; 25 MG/1; MG/1
1 TABLET ORAL DAILY
Qty: 90 TABLET | Refills: 0 | Status: SHIPPED | OUTPATIENT
Start: 2019-09-05 | End: 2019-12-02 | Stop reason: SDUPTHER

## 2019-09-30 DIAGNOSIS — I10 ESSENTIAL HYPERTENSION: ICD-10-CM

## 2019-09-30 DIAGNOSIS — H40.1133 PRIMARY OPEN ANGLE GLAUCOMA (POAG) OF BOTH EYES, SEVERE STAGE: Primary | ICD-10-CM

## 2019-09-30 DIAGNOSIS — D05.12 DUCTAL CARCINOMA IN SITU (DCIS) OF LEFT BREAST: ICD-10-CM

## 2019-09-30 RX ORDER — TELMISARTAN AND HYDROCHLORTHIAZIDE 80; 25 MG/1; MG/1
1 TABLET ORAL DAILY
Qty: 90 TABLET | Refills: 0 | Status: CANCELLED | OUTPATIENT
Start: 2019-09-30

## 2019-09-30 RX ORDER — BIMATOPROST 0.3 MG/ML
1 SOLUTION/ DROPS OPHTHALMIC NIGHTLY
COMMUNITY
Start: 2019-09-30 | End: 2019-09-30 | Stop reason: SDUPTHER

## 2019-09-30 RX ORDER — BIMATOPROST 0.3 MG/ML
1 SOLUTION/ DROPS OPHTHALMIC NIGHTLY
Qty: 2.5 ML | Refills: 12 | Status: SHIPPED | OUTPATIENT
Start: 2019-09-30 | End: 2020-10-27

## 2019-10-07 RX ORDER — TAMOXIFEN CITRATE 20 MG/1
20 TABLET ORAL DAILY
Qty: 90 TABLET | Refills: 3 | Status: SHIPPED | OUTPATIENT
Start: 2019-10-07 | End: 2020-12-29 | Stop reason: SDUPTHER

## 2019-10-14 DIAGNOSIS — I10 ESSENTIAL HYPERTENSION: ICD-10-CM

## 2019-10-14 RX ORDER — TELMISARTAN AND HYDROCHLORTHIAZIDE 80; 25 MG/1; MG/1
TABLET ORAL
Qty: 90 TABLET | Refills: 1 | Status: SHIPPED | OUTPATIENT
Start: 2019-10-14 | End: 2020-01-15

## 2019-10-18 DIAGNOSIS — H40.1133 PRIMARY OPEN ANGLE GLAUCOMA OF BOTH EYES, SEVERE STAGE: ICD-10-CM

## 2019-10-18 RX ORDER — DORZOLAMIDE HYDROCHLORIDE AND TIMOLOL MALEATE 20; 5 MG/ML; MG/ML
1 SOLUTION/ DROPS OPHTHALMIC 3 TIMES DAILY
Qty: 10 ML | Refills: 12 | Status: SHIPPED | OUTPATIENT
Start: 2019-10-18 | End: 2019-12-02

## 2019-10-22 ENCOUNTER — PATIENT OUTREACH (OUTPATIENT)
Dept: ADMINISTRATIVE | Facility: OTHER | Age: 64
End: 2019-10-22

## 2019-11-04 ENCOUNTER — IMMUNIZATION (OUTPATIENT)
Dept: PHARMACY | Facility: CLINIC | Age: 64
End: 2019-11-04
Payer: COMMERCIAL

## 2019-12-02 ENCOUNTER — OFFICE VISIT (OUTPATIENT)
Dept: OPHTHALMOLOGY | Facility: CLINIC | Age: 64
End: 2019-12-02
Payer: COMMERCIAL

## 2019-12-02 ENCOUNTER — CLINICAL SUPPORT (OUTPATIENT)
Dept: OPHTHALMOLOGY | Facility: CLINIC | Age: 64
End: 2019-12-02
Payer: COMMERCIAL

## 2019-12-02 DIAGNOSIS — Z96.1 PSEUDOPHAKIA, RIGHT EYE: ICD-10-CM

## 2019-12-02 DIAGNOSIS — H25.12 NUCLEAR SCLEROSIS OF LEFT EYE: ICD-10-CM

## 2019-12-02 DIAGNOSIS — H21.561 AFFERENT PUPILLARY DEFECT, RIGHT: ICD-10-CM

## 2019-12-02 DIAGNOSIS — H53.40 VISUAL FIELD LOSS: ICD-10-CM

## 2019-12-02 DIAGNOSIS — H35.351 CYSTOID MACULAR EDEMA OF RIGHT EYE: ICD-10-CM

## 2019-12-02 DIAGNOSIS — H40.1133 PRIMARY OPEN ANGLE GLAUCOMA (POAG) OF BOTH EYES, SEVERE STAGE: ICD-10-CM

## 2019-12-02 DIAGNOSIS — H40.1133 PRIMARY OPEN ANGLE GLAUCOMA (POAG) OF BOTH EYES, SEVERE STAGE: Primary | ICD-10-CM

## 2019-12-02 PROCEDURE — 92014 PR EYE EXAM, EST PATIENT,COMPREHESV: ICD-10-PCS | Mod: S$GLB,,, | Performed by: OPHTHALMOLOGY

## 2019-12-02 PROCEDURE — 99999 PR PBB SHADOW E&M-EST. PATIENT-LVL III: ICD-10-PCS | Mod: PBBFAC,,, | Performed by: OPHTHALMOLOGY

## 2019-12-02 PROCEDURE — 92014 COMPRE OPH EXAM EST PT 1/>: CPT | Mod: S$GLB,,, | Performed by: OPHTHALMOLOGY

## 2019-12-02 PROCEDURE — 92133 CPTRZD OPH DX IMG PST SGM ON: CPT | Mod: S$GLB,,, | Performed by: OPHTHALMOLOGY

## 2019-12-02 PROCEDURE — 92083 EXTENDED VISUAL FIELD XM: CPT | Mod: S$GLB,,, | Performed by: OPHTHALMOLOGY

## 2019-12-02 PROCEDURE — 92083 HUMPHREY VISUAL FIELD - OU - BOTH EYES: ICD-10-PCS | Mod: S$GLB,,, | Performed by: OPHTHALMOLOGY

## 2019-12-02 PROCEDURE — 92133 POSTERIOR SEGMENT OCT OPTIC NERVE(OCULAR COHERENCE TOMOGRAPHY) - OU - BOTH EYES: ICD-10-PCS | Mod: S$GLB,,, | Performed by: OPHTHALMOLOGY

## 2019-12-02 PROCEDURE — 99999 PR PBB SHADOW E&M-EST. PATIENT-LVL III: CPT | Mod: PBBFAC,,, | Performed by: OPHTHALMOLOGY

## 2019-12-02 RX ORDER — ACETAZOLAMIDE 500 MG/1
500 CAPSULE, EXTENDED RELEASE ORAL 2 TIMES DAILY
Qty: 180 CAPSULE | Refills: 3 | Status: SHIPPED | OUTPATIENT
Start: 2019-12-02 | End: 2021-03-17 | Stop reason: SDUPTHER

## 2019-12-02 NOTE — PROGRESS NOTES
HPI     Glaucoma      Additional comments: HVF and OCT review today              Comments     DLS: 7/26/19    1. POAG OD>OS  2. APD OD  3. NS OS  4. Papilloma OS  5. CME OD  6. PCIOL OD    MEDS:  Timolol BID OU (Subsituted for Cosopt)  Dorzolamide TID OU  (Subsituted for Cosopt)  Sandro 4% TID OS  Brimonidine TID OS  Lumigan QHS OS            Last edited by Bharati Graham MA on 12/2/2019 11:56 AM. (History)            Assessment /Plan     For exam results, see Encounter Report.    Primary open angle glaucoma (POAG) of both eyes, severe stage    Afferent pupillary defect, right    Visual field loss    Nuclear sclerosis of left eye    Pseudophakia, right eye    Glaucoma shunt device of right eye    Cystoid macular edema of right eye        S/P PHACO/IOL/AHMED - OD - 1/10/2017 - developed CME post op    Rx with steroid gtts and ilevro - had responded -   Recurrent CME od - 12/2/2019 - off PA and Ilevro - however  the vision is stable     F/U CYCLO G6 OD - 4/12/2017  REFERRED BY DR HILLMAN FOR SEVERE POORLY CONTROLLED POAG - 7/24/2015   Pt recently moved from [place] to the [facility]     Previously referred to Dr. Hillman from [facility], review of outside records:    Tm 27/25.    Prior to that, IOP ranged mid-teens to low-20's on Combigan/Dorzolamide/Lumigan OU    C:D documented as 0.9 OD, 0.85 OS    Initial visit 3/29/12  IOP 10 OU on Combitan and Lumigan  C:D 0.85 OU  CCT 524OD, 531 OS    HVF's  2012 - superior defect affecting fixation OD with beginnings of early inferior arc; possible early superior arc OS  2013 - stable superior defect OD with early inferior arc; OS superior nasal step    Here for evaluation of POAG on MMT. Last IOP with Dr. Hillman 21//19. IOP today 21//20 on MMT. Patient has a family history of glaucoma in maternal grandfather. States that she is compliant with all Gtt's.        Glaucoma (type and duration)    Dx about 2002 - [place]    First HVF   First at [facility]  2015   First photos   First at [facility] 2015    Treatment / Drops started   2002              Family history    + mom / brother         Glaucoma meds    cosopt ou / brimonidine ou tid / lumigan ou q hs / bisi od tid / diamox / bisi ou        H/O adverse rxn to glaucoma drops    none        LASERS    SLT OD - 8/5/2013 - [place] // SLT os - [facility] 8/27/2015 (good resp 21-->16)        GLAUCOMA SURGERIES    CYCL G6 OD - 4/12/2017  / phaco/IOL / ahmed od 1/10/2018         OTHER EYE SURGERIES    Combined phaco/IOL / ahmed  - od - 1/10/2018         CDR    0.99/0.95        Tbase    ?  Off gtts (on gtts was up to 28/25)         Tmax    27/25 - per outside record's (reviewed by Arik )             Ttarget    15 ou    (may need to be lower)           HVF   24-2 ss //  3 test 2015 to  2018 [place] [facility]  od // SAD   10-2 ss - 3 test 2015 to 2017 - SALT/ IAD (progression) od // hint SAD / IAD os    (( 2 VF's from [place] - that were reviewed by Dr. IVEY - 2012 and 2013 -  SAD w/ fix invl . IAD od / early IAD os ))            Gonio    +3-4 ou         CCT    524/531        OCT    3 test 2016 to 2019 - RNFL - OD:dec. S/N/I // OS:dec. S/T/I, bord N        HRT    2 test 2015 to 2018  - MR -   Dec thru out   od // dec thru out  os /// CDR 0. 91 od // 0.88 os        Disc photos    2015, 2018      - Ttoday - 13 / 17   (on 7/26/2019 was 18/16)   - Test done today    IOP check // OCT - CME check od - ++ recurrent CME od - off ilevro     2. + APD od     3.  NS ou    Minimal - [facility] 20/25 ou still     4. Papilloma OS   To be removed with Arik     5. ++ CME od    Decrease in [place] from 20/70 to 20/200   New since last OCT 2/3/2017    S/p PHACO/IOL / AHMED  1/10/2018    Had resolved by 7.29/2019 - on ilevro and PA   -recurrent on 12/2/2019 - off ilevro     PLAN   GLAUCOMA - ADVANCED - OD >OS   HVF's continue to progress - especially OD     S/P  - cyclo G6 - OD - 4/12/2017      S/p Phaco / IOL // phaco/ IOL / ahmed  OD Date: 1/10/2018 -  general anesthesia // PCB00 23.0  POY 1.75  IOP  13/17    Cont  glaucoma gtts - adjust   Dorzolamide tid ou - STOP 12/2/2019 - restart diamox 500 mg sequal - po bid - has tolerated in past   timolol bid ou   bisi tid os   Brim tid os   latanoprost os     Pt is back  the diamox  500 po bid starting 12/2/2019 - IOP creeping up os and + VF prog os     Repeat SLT os - only 180 degrees treated - too narrow sup and inf (9/20/2018) - steroid taper      IOP creeping up os   Pt has good vision and minimal cataract OS   May need trab with surgical PI OS   Pt has narrowing sup and inf on gonio and may do better with a surgical PI   No sign cataract at this time     OCT macula 5/17/19: +++ CME od   OD: , Loss of foveal contour, intraretinal and subretinal fluid  OS: , NFC, no thickening    OCT - 7/26/2019 - CME improved / resolved // review retina notes // monitor for recurrence of CME od     OCT 12/2/2019 - increase - recurrent CME od - 488 - off the ilevro (did not appear to affect her vision)     ILEVRO - restart od - tid (12/2/2019)     If the CME does not respond again to the ilevro can re-consult  - AMY SWIFT  - For  CONSIDERATION of  INTRVITRAL STEROIDS OR AVASTIN     + VF PROG OS OR IF IOP  CREEPS UP / CONSIDER SOME FOR OF FILTERING SURGERY OS   ALSO - CONSIDER RHOPRESSA    F/U IOP check back on diamox 500 po bid - 2 months    Check on cme od - OCT macula    Check on IOP os

## 2019-12-02 NOTE — PROGRESS NOTES
hvf done;rel/fix good od fair os coop. Good ou./chart checked for latex allergy.-[facility]

## 2019-12-04 ENCOUNTER — TELEPHONE (OUTPATIENT)
Dept: OPHTHALMOLOGY | Facility: CLINIC | Age: 64
End: 2019-12-04

## 2019-12-13 NOTE — PROGRESS NOTES
Addended by: ERICH BAINS on: 12/13/2019 08:20 AM     Modules accepted: Orders     HPI     Post-op Evaluation    Additional comments: Pt states no complaints today           Comments   S/p Combined (Phaco IOL and Ahmed) OD 1/10/18    MEDS:  PA QID OD  Cosopt TID OS  Brimonidine TID OS  Bisi 4% TID OS  Lumigan QHS OS         Last edited by Bharati Graham MA on 3/2/2018 11:44 AM. (History)            Assessment /Plan     For exam results, see Encounter Report.    Encounter for postoperative care    Primary open angle glaucoma of both eyes, severe stage    Iritis of right eye    Afferent pupillary defect, right    Nuclear sclerosis of left eye    Pseudophakia, right eye    History of glaucoma tube shunt procedure        HERE FOR Post -OP PHACO/IOL/AHMED - OD - 1/10/2017     F/U CYCLO G6 OD - 4/12/2017  REFERRED BY DR HILLMAN FOR SEVERE POORLY CONTROLLED POAG - 7/24/2015   Pt recently moved from [place] to [facility]     Previously referred to Dr. Hillman from [facility], review of outside records:    Tm 27/25.    Prior to that, IOP ranged mid-teens to low-20's on Combigan/Dorzolamide/Lumigan OU    C:D documented as 0.9 OD, 0.85 OS    Initial visit 3/29/12  IOP 10 OU on Combitan and Lumigan  C:D 0.85 OU  CCT 524OD, 531 OS    HVF's  2012 - superior defect affecting fixation OD with beginnings of early inferior arc; possible early superior arc OS  2013 - stable superior defect OD with early inferior arc; OS superior nasal step    Here for evaluation of POAG on MMT. Last IOP with Dr. Hillman 21//19. IOP today 21//20 on MMT. Patient has a family history of glaucoma in maternal grandfather. States that she is compliant with all Gtt's.        Glaucoma (type and duration)    Dx about 2002 - [place]    First HVF   First at [facility] 2015   First photos   First at [facility] 2015    Treatment / Drops started   2002              Family history    + mom / brother         Glaucoma meds    cosopt ou / brimonidine ou tid / lumigan ou q hs / bisi od tid / diamox / bisi ou        H/O adverse rxn to  "glaucoma drops    none        LASERS    SLT OD - 8/5/2013 - [facility] // SLT os - [facility] 8/27/2015 (good resp 21-->16)        GLAUCOMA SURGERIES    CYCL G6 OD - 4/12/2017  / phaco/IOL / ahmed od 1/10/2017         OTHER EYE SURGERIES    Combined phaco/IOL / ahmed 1/10/2017         CDR    0.99/0.95        Tbase    ?  Off gtts (on gtts was up to 28/25)         Tmax    27/25 - per outside record's (reviewed by Arik )             Ttarget    15 ou    (may need to be lower)           HVF   24-2 ss //  2 test 2015 to  [place] [facility]  [place] // SAD   10-2 ss - 3 test 2015 to 2017 - SALT/ IAD (progression) od // hint SAD / IAD os    (( 2 VF's from [place] - that were reviewed by Dr. IVEY - 2012 and 2013 -  SAD w/ fix invl . IAD od / early IAD os ))            Gonio    +3-4 ou         CCT    524/531        OCT    2 test 2016 to 2017 - RNFL - OD:dec. S/T/I/N // OS:dec. S/I, bord N/T        HRT    1 test 2015 to 2015  - MR - dec. S/N/I od // dec./ S/N/I os        Disc photos    2015     - Ttoday - 6/13 (POD #1 - phaco/IOL ahmed od)   - Test done today    Post -op phaco/IOL and ahmed OD  - 1/10/2017     2. + APD od     3.  NS ou    Minimal - [facility] 20/25 ou still     4. Papilloma OS   To be removed with Arik       HVF's continue to progress - especially OD       S/P  - cyclo G6 - OD - 4/12/2017   "IOP still too high od - very little sight to save on this eye     - IOP back up and persistent iritis   Rec phaco / IOL / GDD - ? ahmed od  (increase in cataract od post cyclo G6 and slightly shallow AC)     With the left eye - I think incisional surgery may be best option - as there is more vision to save long term "  IOP seems ok for now on Maximum drops and diamox       Pt likely will need trab os - but IOP is reasonably good  today at 16 -19    Phaco / IOL // phaco/ IOL / ahmed  OD Date: 1/10/2017 - general anesthesia // PCB00 23.0  POW #  6    Doing well - ++ iritis - had pre-op post G6 laser   Begin Pred Acetate -  Taper " tid x 2 weeks then bid till next visit     HOLD all glaucoma gtts od   Hold diamox     Cont all glaucoma gtts os   cosopt tid  ( or timolol and dorzolamide or azopt)   Brimonidine tid   bisi tid   lumigan q hs     No lifting   No bending   No eye rubbing     Pt understands that even with surgery she may loose all the sight in the right eye   Pt is at very high risk for Snuff    F/U 3-4 weeks - if IOP goes up can add cosopt or brimonidine back

## 2019-12-16 ENCOUNTER — HOSPITAL ENCOUNTER (OUTPATIENT)
Dept: RADIOLOGY | Facility: HOSPITAL | Age: 64
Discharge: HOME | End: 2019-12-16
Attending: NURSE PRACTITIONER
Payer: COMMERCIAL

## 2019-12-16 DIAGNOSIS — D05.12 DUCTAL CARCINOMA IN SITU (DCIS) OF LEFT BREAST: ICD-10-CM

## 2019-12-16 PROCEDURE — 77066 DX MAMMO INCL CAD BI: CPT | Mod: TC,PO

## 2019-12-16 PROCEDURE — 77066 DX MAMMO INCL CAD BI: CPT | Mod: 26,,, | Performed by: RADIOLOGY

## 2019-12-16 PROCEDURE — 77062 BREAST TOMOSYNTHESIS BI: CPT | Mod: 26,,, | Performed by: RADIOLOGY

## 2019-12-16 PROCEDURE — 77066 MAMMO DIGITAL DIAGNOSTIC BILAT WITH TOMOSYNTHESIS_CAD: ICD-10-PCS | Mod: 26,,, | Performed by: RADIOLOGY

## 2019-12-16 PROCEDURE — 77062 MAMMO DIGITAL DIAGNOSTIC BILAT WITH TOMOSYNTHESIS_CAD: ICD-10-PCS | Mod: 26,,, | Performed by: RADIOLOGY

## 2019-12-18 ENCOUNTER — OFFICE VISIT (OUTPATIENT)
Dept: OBSTETRICS AND GYNECOLOGY | Facility: CLINIC | Age: 64
End: 2019-12-18
Payer: COMMERCIAL

## 2019-12-18 VITALS
DIASTOLIC BLOOD PRESSURE: 74 MMHG | BODY MASS INDEX: 30.71 KG/M2 | HEIGHT: 65 IN | SYSTOLIC BLOOD PRESSURE: 120 MMHG | WEIGHT: 184.31 LBS

## 2019-12-18 DIAGNOSIS — N76.2 ACUTE VULVITIS: ICD-10-CM

## 2019-12-18 DIAGNOSIS — Z01.419 ROUTINE GYNECOLOGICAL EXAMINATION: Primary | ICD-10-CM

## 2019-12-18 DIAGNOSIS — Z79.810 USE OF TAMOXIFEN (NOLVADEX): ICD-10-CM

## 2019-12-18 PROCEDURE — 99396 PREV VISIT EST AGE 40-64: CPT | Mod: S$GLB,,, | Performed by: OBSTETRICS & GYNECOLOGY

## 2019-12-18 PROCEDURE — 3074F SYST BP LT 130 MM HG: CPT | Mod: CPTII,S$GLB,, | Performed by: OBSTETRICS & GYNECOLOGY

## 2019-12-18 PROCEDURE — 99999 PR PBB SHADOW E&M-EST. PATIENT-LVL IV: CPT | Mod: PBBFAC,,, | Performed by: OBSTETRICS & GYNECOLOGY

## 2019-12-18 PROCEDURE — 3078F PR MOST RECENT DIASTOLIC BLOOD PRESSURE < 80 MM HG: ICD-10-PCS | Mod: CPTII,S$GLB,, | Performed by: OBSTETRICS & GYNECOLOGY

## 2019-12-18 PROCEDURE — 87661 TRICHOMONAS VAGINALIS AMPLIF: CPT

## 2019-12-18 PROCEDURE — 99999 PR PBB SHADOW E&M-EST. PATIENT-LVL IV: ICD-10-PCS | Mod: PBBFAC,,, | Performed by: OBSTETRICS & GYNECOLOGY

## 2019-12-18 PROCEDURE — 3074F PR MOST RECENT SYSTOLIC BLOOD PRESSURE < 130 MM HG: ICD-10-PCS | Mod: CPTII,S$GLB,, | Performed by: OBSTETRICS & GYNECOLOGY

## 2019-12-18 PROCEDURE — 99396 PR PREVENTIVE VISIT,EST,40-64: ICD-10-PCS | Mod: S$GLB,,, | Performed by: OBSTETRICS & GYNECOLOGY

## 2019-12-18 PROCEDURE — 3078F DIAST BP <80 MM HG: CPT | Mod: CPTII,S$GLB,, | Performed by: OBSTETRICS & GYNECOLOGY

## 2019-12-18 PROCEDURE — 87481 CANDIDA DNA AMP PROBE: CPT | Mod: 59

## 2019-12-18 NOTE — PROGRESS NOTES
Chief Complaint   Patient presents with    Well Woman       History of Present Illness: Deb Tesfaye is a 64 y.o. female that presents today 12/18/2019 for well gyn visit.    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  Mother with colon cancer.    COLONOSCOPY  12/2010    COLONOSCOPY N/A 7/20/2016    Procedure: COLONOSCOPY;  Surgeon: Janak De Leon Jr., MD;  Location: [facility];  Service: Endoscopy;  Laterality: N/A;    EYE SURGERY      SELECTIVE LASER TRABECULPALSTY Right 8/13    OS DONE IN [place], [place]    SELECTIVE LASER TRABECUPLASTY Left 8/27/15    OS WITH     TUBAL LIGATION         Current Outpatient Medications   Medication Sig Dispense Refill    acetaZOLAMIDE (DIAMOX) 500 mg CpSR Take 1 capsule (500 mg total) by mouth 2 (two) times daily. Please discontinue the dorzolamide eye drops 180 capsule 3    azelastine (ASTELIN) 137 mcg (0.1 %) nasal spray 1 spray (137 mcg total) by Nasal route 2 (two) times daily. 30 mL 11    bimatoprost (LUMIGAN) 0.03 % ophthalmic drops Place 1 drop into both eyes every evening. 2.5 mL 12    brimonidine 0.2% (ALPHAGAN) 0.2 % Drop Place 1 drop into both eyes 3 (three) times daily. 30 mL 3    ERGOCALCIFEROL, VITAMIN D2, (VITAMIN D ORAL) Take 1,000 Units by mouth once daily.       KRILL OIL ORAL Take 1 capsule by mouth once daily.       MULTIVITS,CA,MINERALS/IRON/FA (ONE-A-DAY WOMENS FORMULA ORAL) Take 1 capsule by mouth once daily.      nepafenac (ILEVRO) 0.3 % DrpS Place 1 drop into  the right eye 3 (three) times daily. 3 mL 6    pilocarpine HCL 4% (PILOCAR) 4 % ophthalmic solution Place 1 drop into the left eye 3 (three) times daily. (Patient taking differently: Place 1 drop into both eyes 3 (three) times daily. ) 15 mL 12    potassium chloride SA (K-DUR,KLOR-CON) 20 MEQ tablet TAKE 1 TABLET BY MOUTH TWICE DAILY 60 tablet 5    pravastatin (PRAVACHOL) 20 MG tablet TAKE 1 TABLET(20 MG) BY MOUTH EVERY DAY 30 tablet 5    tamoxifen (NOLVADEX) 20 MG Tab Take 1 tablet (20 mg total) by mouth once daily. 90 tablet 3    telmisartan-hydrochlorothiazide (MICARDIS HCT) 80-25 mg per tablet TAKE 1 TABLET BY MOUTH EVERY DAY 90 tablet 1    timolol maleate 0.5% (TIMOPTIC) 0.5 % Drop Place 1 drop into both eyes 2 (two) times daily.       No current facility-administered medications for this visit.        Review of patient's allergies indicates:  No Known Allergies    Family History   Problem Relation Age of Onset    Cancer Mother 54        colon cancer    Cancer Maternal Grandfather     Glaucoma Maternal Grandfather     Cataracts Maternal Grandfather     Hypertension Father     Amblyopia Neg Hx     Blindness Neg Hx     Diabetes Neg Hx     Macular degeneration Neg Hx     Retinal detachment Neg Hx     Stroke Neg Hx     Strabismus Neg Hx     Thyroid disease Neg Hx        Social History     Socioeconomic History    Marital status:      Spouse name: Not on file    Number of children: 3    Years of education: Not on file    Highest education level: Not on file   Occupational History    Not on file   Social Needs    Financial resource strain: Not on file    Food insecurity:     Worry: Not on file     Inability: Not on file    Transportation needs:     Medical: Not on file     Non-medical: Not on file   Tobacco Use    Smoking status: Never Smoker    Smokeless tobacco: Never Used   Substance and Sexual Activity    Alcohol use: No    Drug use: No    Sexual activity: Yes     Partners:  "Male   Lifestyle    Physical activity:     Days per week: Not on file     Minutes per session: Not on file    Stress: Not on file   Relationships    Social connections:     Talks on phone: Not on file     Gets together: Not on file     Attends Yazidi service: Not on file     Active member of club or organization: Not on file     Attends meetings of clubs or organizations: Not on file     Relationship status: Not on file   Other Topics Concern    Not on file   Social History Narrative    Not on file       OB History    Para Term  AB Living   7 6 3   1 3   SAB TAB Ectopic Multiple Live Births   1              # Outcome Date GA Lbr Reji/2nd Weight Sex Delivery Anes PTL Lv   7 Term            6 Term            5 Term            4 SAB            3 Para      Vag-Spont      2 Para      Vag-Spont      1 Para      Vag-Spont          Review of Symptoms:  GENERAL: Denies weight gain or weight loss. Feeling well overall.   SKIN: Denies rash or lesions.   HEAD: Denies head injury or headache.   NODES: Denies enlarged lymph nodes.   CHEST: Denies chest pain or shortness of breath.   CARDIOVASCULAR: Denies palpitations or left sided chest pain.   ABDOMEN: No abdominal pain, constipation, diarrhea, nausea, vomiting or rectal bleeding.   URINARY: No frequency, dysuria, hematuria, or burning on urination.  HEMATOLOGIC: No easy bruisability or excessive bleeding.   MUSCULOSKELETAL: Denies joint pain or swelling.     /74   Ht 5' 5" (1.651 m)   Wt 83.6 kg (184 lb 4.9 oz)   Physical Exam:  APPEARANCE: Well nourished, well developed, in no acute distress.  SKIN: Normal skin turgor, no lesions.  NECK: Neck symmetric without masses   RESPIRATORY: Normal respiratory effort with no retractions or use of accessory muscles  CARDIOVASCULAR: Peripheral vascular system with no swelling no varicosities and palpation of pulses normal  LYMPHATIC: No enlargements of the lymph nodes noted in the neck, axillae, or " groin  ABDOMEN: Soft. No tenderness or masses. No hepatosplenomegaly. No hernias.  BREASTS: Symmetrical, no skin changes or visible lesions. No palpable masses, nipple discharge or adenopathy bilaterally.  PELVIC: Normal external female genitalia without lesions. Normal hair distribution. Adequate perineal body, normal urethral meatus. Urethra with no masses.  Bladder nontender. Vagina moist and well rugated without lesions or discharge. Cervix pink and without lesions. No significant cystocele or rectocele. Bimanual exam showed uterus normal size, shape, position, mobile and nontender. Adnexa without masses or tenderness. Urethra and bladder normal.   EXTREMITIES: No clubbing cyanosis or edema.    ASSESSMENT/PLAN:  Routine gynecological examination    Acute vulvitis  -     Vaginosis Screen by DNA Probe    Use of tamoxifen (Nolvadex)          Patient was counseled today on Pelvic exams and Pap Smear guidelines.   We discussed STD screening if at high risk for a STD.  We discussed recommendation for breast cancer screening with mammogram every other year after the age of 40 and annually after the age of 50.    We discussed colon cancer screening when indicated.   Osteoporosis screening discussed when indicated.   She was advised to see her primary care physician for all other health maintenance.     FOLLOW-UP with me for next routine visit.

## 2019-12-20 ENCOUNTER — PATIENT MESSAGE (OUTPATIENT)
Dept: OBSTETRICS AND GYNECOLOGY | Facility: CLINIC | Age: 64
End: 2019-12-20

## 2019-12-20 LAB
BACTERIAL VAGINOSIS DNA: NEGATIVE
CANDIDA GLABRATA DNA: NEGATIVE
CANDIDA KRUSEI DNA: NEGATIVE
CANDIDA RRNA VAG QL PROBE: POSITIVE
T VAGINALIS RRNA GENITAL QL PROBE: NEGATIVE

## 2019-12-20 RX ORDER — FLUCONAZOLE 150 MG/1
150 TABLET ORAL ONCE
Qty: 1 TABLET | Refills: 0 | Status: SHIPPED | OUTPATIENT
Start: 2019-12-20 | End: 2019-12-20

## 2019-12-23 DIAGNOSIS — E78.5 HYPERLIPIDEMIA, UNSPECIFIED HYPERLIPIDEMIA TYPE: ICD-10-CM

## 2019-12-23 RX ORDER — PRAVASTATIN SODIUM 20 MG/1
TABLET ORAL
Qty: 30 TABLET | Refills: 5 | Status: SHIPPED | OUTPATIENT
Start: 2019-12-23 | End: 2020-06-25

## 2019-12-24 DIAGNOSIS — J32.9 SINUSITIS, UNSPECIFIED CHRONICITY, UNSPECIFIED LOCATION: ICD-10-CM

## 2019-12-24 DIAGNOSIS — J40 BRONCHITIS: ICD-10-CM

## 2019-12-24 RX ORDER — AZELASTINE 1 MG/ML
SPRAY, METERED NASAL
Qty: 30 ML | Refills: 11 | Status: SHIPPED | OUTPATIENT
Start: 2019-12-24

## 2019-12-26 ENCOUNTER — PATIENT MESSAGE (OUTPATIENT)
Dept: OPHTHALMOLOGY | Facility: CLINIC | Age: 64
End: 2019-12-26

## 2019-12-27 DIAGNOSIS — H40.1130 PRIMARY OPEN ANGLE GLAUCOMA OF BOTH EYES, UNSPECIFIED GLAUCOMA STAGE: Primary | ICD-10-CM

## 2019-12-27 RX ORDER — TIMOLOL MALEATE 5 MG/ML
1 SOLUTION/ DROPS OPHTHALMIC 2 TIMES DAILY
Qty: 10 ML | Refills: 6 | Status: SHIPPED | OUTPATIENT
Start: 2019-12-27 | End: 2021-02-23 | Stop reason: SDUPTHER

## 2020-01-14 DIAGNOSIS — I10 ESSENTIAL HYPERTENSION: ICD-10-CM

## 2020-01-15 RX ORDER — TELMISARTAN AND HYDROCHLORTHIAZIDE 80; 25 MG/1; MG/1
TABLET ORAL
Qty: 90 TABLET | Refills: 0 | Status: SHIPPED | OUTPATIENT
Start: 2020-01-15 | End: 2020-06-16

## 2020-01-15 NOTE — PROGRESS NOTES
Refill Authorization Note     is requesting a refill authorization.    Brief assessment and rationale for refill: APPROVE: prr                                         Comments:   Requested Prescriptions   Pending Prescriptions Disp Refills    telmisartan-hydrochlorothiazide (MICARDIS HCT) 80-25 mg per tablet [Pharmacy Med Name: TELMISARTAN/HCTZ 80-25MG  TABS] 90 tablet 0     Sig: TAKE 1 TABLET BY MOUTH EVERY DAY       Cardiovascular: ARB + Diuretic Combos Passed - 1/14/2020  3:59 AM        Passed - Patient is at least 18 years old        Passed - Last BP in normal range within 360 days     BP Readings from Last 3 Encounters:   12/18/19 120/74   08/30/19 131/73   08/21/19 138/88              Passed - Office visit in past 12 months or future 90 days     Recent Outpatient Visits            4 weeks ago Routine gynecological examination    [facility] Rosendo Yadav MD    1 month ago Primary open angle glaucoma (POAG) of both eyes, severe stage    [facility] Jacqueline Perez MD    4 months ago Ductal carcinoma in situ (DCIS) of left breast    [facility] Tony Berg NP    4 months ago Essential hypertension    [facility] KARIN Nichols MD    5 months ago Cystoid macular edema, right    [place] - Ophthalmology D. Avery Angel MD          Future Appointments              In 2 weeks MD [facility], [facility]    In 1 month [facility] DEXA1 [facility]    In 1 month [facility] XR2 [facility]    In 1 month [place], [place] [facility] [place]    In 1 month KARIN Nichols MD [facility]    In 1 month Tony Berg NP [facility]                Passed - K in normal range and within 180 days     Potassium   Date Value Ref Range Status   08/30/2019 3.7 3.5 - 5.1 mmol/L  Final   02/25/2019 3.4 (L) 3.5 - 5.1 mmol/L Final   01/15/2018 3.6 3.5 - 5.1 mmol/L Final              Passed - Na in normal range and within 180 days     Sodium   Date Value Ref Range Status   08/30/2019 142 136 - 145 mmol/L Final   02/25/2019 141 136 - 145 mmol/L Final   01/15/2018 140 136 - 145 mmol/L Final              Passed - Cr is 1.3 or below and within 180 days     Creatinine   Date Value Ref Range Status   08/30/2019 0.78 0.50 - 1.40 mg/dL Final   02/25/2019 0.8 0.5 - 1.4 mg/dL Final   01/15/2018 0.8 0.5 - 1.4 mg/dL Final              Passed - Ca in normal range and within 360 days     Calcium   Date Value Ref Range Status   08/30/2019 9.4 8.4 - 10.2 mg/dL Final   02/25/2019 10.0 8.7 - 10.5 mg/dL Final   01/15/2018 9.2 8.7 - 10.5 mg/dL Final              Passed - eGFR within 180 days     eGFR if non    Date Value Ref Range Status   08/30/2019 >60 >60 mL/min/1.73 m^2 Final     Comment:     Calculation used to obtain the estimated glomerular filtration  rate (eGFR) is the CKD-EPI equation.      02/25/2019 >60 >60 mL/min/1.73 m^2 Final     Comment:     Calculation used to obtain the estimated glomerular filtration  rate (eGFR) is the CKD-EPI equation.      01/15/2018 >60.0 >60 mL/min/1.73 m^2 Final     Comment:     Calculation used to obtain the estimated glomerular filtration  rate (eGFR) is the CKD-EPI equation.        eGFR if    Date Value Ref Range Status   08/30/2019 >60 >60 mL/min/1.73 m^2 Final   02/25/2019 >60 >60 mL/min/1.73 m^2 Final   01/15/2018 >60.0 >60 mL/min/1.73 m^2 Final

## 2020-02-04 ENCOUNTER — OFFICE VISIT (OUTPATIENT)
Dept: OPHTHALMOLOGY | Facility: CLINIC | Age: 65
End: 2020-02-04
Payer: COMMERCIAL

## 2020-02-04 DIAGNOSIS — H40.1133 PRIMARY OPEN ANGLE GLAUCOMA (POAG) OF BOTH EYES, SEVERE STAGE: Primary | ICD-10-CM

## 2020-02-04 DIAGNOSIS — H25.12 NUCLEAR SCLEROSIS OF LEFT EYE: ICD-10-CM

## 2020-02-04 DIAGNOSIS — H53.40 VISUAL FIELD LOSS: ICD-10-CM

## 2020-02-04 DIAGNOSIS — H20.9 IRITIS OF RIGHT EYE: ICD-10-CM

## 2020-02-04 DIAGNOSIS — Z96.1 PSEUDOPHAKIA, RIGHT EYE: ICD-10-CM

## 2020-02-04 DIAGNOSIS — H21.561 AFFERENT PUPILLARY DEFECT, RIGHT: ICD-10-CM

## 2020-02-04 DIAGNOSIS — H40.1133 PRIMARY OPEN ANGLE GLAUCOMA OF BOTH EYES, SEVERE STAGE: ICD-10-CM

## 2020-02-04 DIAGNOSIS — H35.351 CYSTOID MACULAR EDEMA OF RIGHT EYE: ICD-10-CM

## 2020-02-04 PROCEDURE — 99999 PR PBB SHADOW E&M-EST. PATIENT-LVL II: ICD-10-PCS | Mod: PBBFAC,,, | Performed by: OPHTHALMOLOGY

## 2020-02-04 PROCEDURE — 92134 CPTRZ OPH DX IMG PST SGM RTA: CPT | Mod: S$GLB,,, | Performed by: OPHTHALMOLOGY

## 2020-02-04 PROCEDURE — 92012 INTRM OPH EXAM EST PATIENT: CPT | Mod: S$GLB,,, | Performed by: OPHTHALMOLOGY

## 2020-02-04 PROCEDURE — 99999 PR PBB SHADOW E&M-EST. PATIENT-LVL II: CPT | Mod: PBBFAC,,, | Performed by: OPHTHALMOLOGY

## 2020-02-04 PROCEDURE — 92012 PR EYE EXAM, EST PATIENT,INTERMED: ICD-10-PCS | Mod: S$GLB,,, | Performed by: OPHTHALMOLOGY

## 2020-02-04 PROCEDURE — 92134 POSTERIOR SEGMENT OCT RETINA (OCULAR COHERENCE TOMOGRAPHY)-BOTH EYES: ICD-10-PCS | Mod: S$GLB,,, | Performed by: OPHTHALMOLOGY

## 2020-02-04 NOTE — PROGRESS NOTES
HPI     DLS: 12/02/19    Pt here for 2 month check;    Meds:   Timolol BID OU (Subsituted for Cosopt)   Dorzolamide TID OU  (Subsituted for Cosopt)   Sandro 4% TID OS   Brimonidine TID OS   Lumigan QHS OS   Ilevro TID OD     1. POAG OD>OS   2. APD OD   3. NS OS   4. Papilloma OS   5. CME OD   6. PCIOL OD     Last edited by Chapis York on 2/4/2020 11:43 AM. (History)              Assessment /Plan     For exam results, see Encounter Report.    Primary open angle glaucoma (POAG) of both eyes, severe stage    Afferent pupillary defect, right    Visual field loss    Nuclear sclerosis of left eye    Pseudophakia, right eye    Glaucoma shunt device of right eye    Cystoid macular edema of right eye    Iritis of right eye        S/P PHACO/IOL/AHMED - OD - 1/10/2017 - developed CME post op    Rx with steroid gtts and ilevro - had responded -   Recurrent CME od - 12/2/2019 - off PA and Ilevro - however  the vision is stable     F/U CYCLO G6 OD - 4/12/2017  REFERRED BY DR ELISE FOR SEVERE POORLY CONTROLLED POAG - 7/24/2015   Pt recently moved from [place] to [facility]     Previously referred to Dr. Elise from [facility], review of outside records:    Tm 27/25.    Prior to that, IOP ranged mid-teens to low-20's on Combigan/Dorzolamide/Lumigan OU    C:D documented as 0.9 OD, 0.85 OS    Initial visit 3/29/12  IOP 10 OU on Combitan and Lumigan  C:D 0.85 OU  CCT 524OD, 531 OS    HVF's  2012 - superior defect affecting fixation OD with beginnings of early inferior arc; possible early superior arc OS  2013 - stable superior defect OD with early inferior arc; OS superior nasal step    Here for evaluation of POAG on MMT. Last IOP with Dr. Elise 21//19. IOP today 21//20 on MMT. Patient has a family history of glaucoma in maternal grandfather. States that she is compliant with all Gtt's.        Glaucoma (type and duration)    Dx about 2002 - [place]    First HVF   [facility] at [facility] 2015   First photos   [facility]    Treatment / Drops started   2002              Family history    + mom / brother         Glaucoma meds    cosopt ou / brimonidine ou tid / lumigan ou q hs / bisi od tid / diamox / bisi ou        H/O adverse rxn to glaucoma drops    none        LASERS    SLT OD - 8/5/2013 - [facility] // SLT os - [facility] 8/27/2015 (good resp 21-->16)        GLAUCOMA SURGERIES    CYCL G6 OD - 4/12/2017  / phaco/IOL / ahmed od 1/10/2018         OTHER EYE SURGERIES    Combined phaco/IOL / ahmed  - od - 1/10/2018         CDR    0.99/0.95        Tbase    ?  Off gtts (on gtts was up to 28/25)         Tmax    27/25 - per outside record's (reviewed by Arik )             Ttarget    15 ou    (may need to be lower)           HVF   24-2 ss //  3 test 2015 to  2018 [place]  od // SAD   10-2 ss - 3 test 2015 to 2017 - SALT/ IAD (progression) od // hint SAD / IAD os    (( 2 VF's from [facility] - that were reviewed by Dr. IVEY - 2012 and 2013 -  SAD w/ fix invl . IAD od / early IAD os ))            Gonio    +3-4 ou         CCT    524/531        OCT    3 test 2016 to 2019 - RNFL - OD:dec. S/N/I // OS:dec. S/T/Ipratik        HRT    2 test 2015 to 2018  - MR -   Dec thru out   od // dec thru out  os /// CDR 0. 91 od // 0.88 os        Disc photos    2015, 2018      - Ttoday - 09/14  - Test done today    IOP check // OCT - CME check od - ++ recurrent CME od - off ilevro     2. + APD od     3.  NS ou    Minimal - [facility] 20/25 ou still     4. Papilloma OS   To be removed with Arik     5. ++ CME od    Decrease in [facility] from 20/70 to 20/200   New since last OCT 2/3/2017    S/p PHACO/IOL / AHMED  1/10/2018    Had resolved by 7.29/2019 - on ilevro and PA   -recurrent on 12/2/2019 - off ilevro     PLAN   GLAUCOMA - ADVANCED - OD >OS   HVF's continue to progress - especially OD     S/P  - cyclo G6 - OD - 4/12/2017      S/p Phaco / IOL // phaco/ IOL / ahmed  OD Date: 1/10/2018 - general anesthesia // PCB00 23.0  POY 1.75  IOP   13/17    Cont  glaucoma gtts - adjust   Dorzolamide tid ou - STOP 12/2/2019 - restart diamox 500 mg sequal - po bid - has tolerated in past   timolol bid ou   bisi tid os   Brim tid os   latanoprost os  Diamox (acetazolamide pills) 500 PO bid     Ketorolac bid od (Ilevro - too expensive) - restarted - 12/12/2019 - for recurrent CME od     Pt is back  the diamox  500 po bid starting 12/2/2019 - IOP creeping up os and + VF prog os     Repeat SLT os - only 180 degrees treated - too narrow sup and inf (9/20/2018) - steroid taper      IOP creeping up os   Pt has good vision and minimal cataract OS   May need trab with surgical PI OS   Pt has narrowing sup and inf on gonio and may do better with a surgical PI   No sign cataract at this time     OCT macula 5/17/19: +++ CME od   OD: , Loss of foveal contour, intraretinal and subretinal fluid  OS: , NFC, no thickening    OCT - 7/26/2019 - CME improved / resolved // review retina notes // monitor for recurrence of CME od     OCT 12/2/2019 - increase - recurrent CME od - 488 - off the ilevro (did not appear to affect her vision)     ILEVRO - restarted od - tid (12/2/2019) // had to change to acular bid - 2/2 cost-  (2/4/2020)   If the CME does not respond again to the ilevro / ketorolac  can re-consult  - AMY SWIFT  - For  CONSIDERATION of  INTRVITRAL STEROIDS OR AVASTIN     + VF PROG OS OR IF IOP  CREEPS UP / CONSIDER SOME FOR OF FILTERING SURGERY OS   ALSO - CONSIDER RHOPRESSA    F/U IOP check back on diamox 500 po bid - 2 months ar/mr OU AND oct MACULA OU    Check on cme od - OCT macula    Check on IOP os    Addendum - 4/2/2020 - pt has to push appt back 2/2 covid pandemic - all eye medications reviewed and pt will cont with same glaucoma medications and with ketorolac for recurrent CME od

## 2020-02-21 ENCOUNTER — HOSPITAL ENCOUNTER (OUTPATIENT)
Dept: RADIOLOGY | Facility: HOSPITAL | Age: 65
Discharge: HOME | End: 2020-02-21
Attending: NURSE PRACTITIONER
Payer: COMMERCIAL

## 2020-02-21 DIAGNOSIS — D05.12 DUCTAL CARCINOMA IN SITU (DCIS) OF LEFT BREAST: ICD-10-CM

## 2020-02-21 PROCEDURE — 71046 X-RAY EXAM CHEST 2 VIEWS: CPT | Mod: TC,FY,PO

## 2020-02-21 PROCEDURE — 77080 DEXA BONE DENSITY SPINE HIP: ICD-10-PCS | Mod: 26,,, | Performed by: RADIOLOGY

## 2020-02-21 PROCEDURE — 71046 X-RAY EXAM CHEST 2 VIEWS: CPT | Mod: 26,,, | Performed by: RADIOLOGY

## 2020-02-21 PROCEDURE — 71046 XR CHEST PA AND LATERAL: ICD-10-PCS | Mod: 26,,, | Performed by: RADIOLOGY

## 2020-02-21 PROCEDURE — 77080 DXA BONE DENSITY AXIAL: CPT | Mod: 26,,, | Performed by: RADIOLOGY

## 2020-02-21 PROCEDURE — 77080 DXA BONE DENSITY AXIAL: CPT | Mod: TC,PO

## 2020-02-28 ENCOUNTER — OFFICE VISIT (OUTPATIENT)
Dept: HEMATOLOGY/ONCOLOGY | Facility: CLINIC | Age: 65
End: 2020-02-28
Payer: COMMERCIAL

## 2020-02-28 VITALS
BODY MASS INDEX: 30.71 KG/M2 | OXYGEN SATURATION: 99 % | TEMPERATURE: 98 F | HEIGHT: 65 IN | DIASTOLIC BLOOD PRESSURE: 78 MMHG | WEIGHT: 184.31 LBS | RESPIRATION RATE: 16 BRPM | SYSTOLIC BLOOD PRESSURE: 123 MMHG | HEART RATE: 64 BPM

## 2020-02-28 DIAGNOSIS — Z79.810 USE OF TAMOXIFEN (NOLVADEX): ICD-10-CM

## 2020-02-28 DIAGNOSIS — D05.12 DUCTAL CARCINOMA IN SITU (DCIS) OF LEFT BREAST: Primary | ICD-10-CM

## 2020-02-28 DIAGNOSIS — E87.6 HYPOKALEMIA: ICD-10-CM

## 2020-02-28 PROCEDURE — 3008F PR BODY MASS INDEX (BMI) DOCUMENTED: ICD-10-PCS | Mod: CPTII,S$GLB,, | Performed by: NURSE PRACTITIONER

## 2020-02-28 PROCEDURE — 3008F BODY MASS INDEX DOCD: CPT | Mod: CPTII,S$GLB,, | Performed by: NURSE PRACTITIONER

## 2020-02-28 PROCEDURE — 99213 PR OFFICE/OUTPT VISIT, EST, LEVL III, 20-29 MIN: ICD-10-PCS | Mod: S$GLB,,, | Performed by: NURSE PRACTITIONER

## 2020-02-28 PROCEDURE — 3078F DIAST BP <80 MM HG: CPT | Mod: CPTII,S$GLB,, | Performed by: NURSE PRACTITIONER

## 2020-02-28 PROCEDURE — 3074F PR MOST RECENT SYSTOLIC BLOOD PRESSURE < 130 MM HG: ICD-10-PCS | Mod: CPTII,S$GLB,, | Performed by: NURSE PRACTITIONER

## 2020-02-28 PROCEDURE — 99999 PR PBB SHADOW E&M-EST. PATIENT-LVL IV: CPT | Mod: PBBFAC,,, | Performed by: NURSE PRACTITIONER

## 2020-02-28 PROCEDURE — 3074F SYST BP LT 130 MM HG: CPT | Mod: CPTII,S$GLB,, | Performed by: NURSE PRACTITIONER

## 2020-02-28 PROCEDURE — 3078F PR MOST RECENT DIASTOLIC BLOOD PRESSURE < 80 MM HG: ICD-10-PCS | Mod: CPTII,S$GLB,, | Performed by: NURSE PRACTITIONER

## 2020-02-28 PROCEDURE — 99999 PR PBB SHADOW E&M-EST. PATIENT-LVL IV: ICD-10-PCS | Mod: PBBFAC,,, | Performed by: NURSE PRACTITIONER

## 2020-02-28 PROCEDURE — 99213 OFFICE O/P EST LOW 20 MIN: CPT | Mod: S$GLB,,, | Performed by: NURSE PRACTITIONER

## 2020-02-28 NOTE — PROGRESS NOTES
HISTORY OF PRESENT ILLNESS:  This is a 65-year-old black female known to Dr. 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  She is status post lumpectomy, post-lumpectomy irradiation and presently on adjuvant tamoxifen 20 mg daily (initiated 02/2016).      She presents to the clinic today for her 48-month post-therapy evaluation without complaints.  She remains active and well.  She is active with her grandkids.  She denies any new breast complaints, bone pain, difficulties with hot flashes, vaginal bleeding, abdominal discomfort/bloating, nausea, vomiting, constipation, diarrhea, irregular heartbeat, chest pain, myalgias, cramping, etc.  She remains compliant on Tamoxifen.  No other new complaintsor pertinent findings on a 14-point review of systems.    PHYSICAL EXAMINATION:  GENERAL:  Well-developed, well-nourished black female in no acute distress.  Alert and oriented x 3.  VITAL SIGNS:  Weight:  Loss of 4 pounds in 6 months  Wt Readings from Last 3 Encounters:   02/28/20 83.6 kg (184 lb 4.9 oz)   12/18/19 83.6 kg (184 lb 4.9 oz)   08/30/19 85.6 kg (188 lb 11.4 oz)     Temp Readings from Last 3 Encounters:   02/28/20 98 °F (36.7 °C)   08/30/19 98.8 °F (37.1 °C) (Other (see comments))   08/21/19 98.3 °F (36.8 °C) (Oral)     BP Readings from Last 3 Encounters:   02/28/20 123/78   12/18/19 120/74   08/30/19 131/73     Pulse Readings from Last 3 Encounters:   02/28/20 64   08/30/19 63   08/21/19 63     HEENT:  Normocephalic, atraumatic.  Oral mucosa pink and moist.  Lips without lesions.  Tongue midline.  Oropharynx clear.  Nonicteric sclerae.  NECK:  Supple, no adenopathy.  No carotid bruits, thyromegaly or thyroid nodule.  HEART:  Regular rate and rhythm without murmur, gallop or rub.  LUNGS:  Clear to auscultation bilaterally.  Normal respiratory effort.  ABDOMEN:  Soft, nontender, nondistended with positive normoactive bowel sounds, no  hepatosplenomegaly.  EXTREMITIES:  No cyanosis, clubbing or edema.  Distal pulses are intact.  AXILLAE AND GROIN:  No palpable pathologic lymphadenopathy is appreciated.  SKIN:  Intact/turgor normal.  NEUROLOGIC:  Cranial nerves II-XII grossly intact.  Motor:  Good muscle bulk and tone.  Strength/sensory 5/5 throughout.  Gait stable.  BREASTS:  Right breast is soft, free of masses, tenderness, nipple discharge or inversion.  Left breast with noted healed lumpectomy scar to the upper outer region; no signs of local recurrence.    LABORATORY:    Lab Results   Component Value Date    WBC 7.41 02/21/2020    RBC 4.70 02/21/2020    HGB 13.5 02/21/2020    HCT 39.5 02/21/2020    MCV 84 02/21/2020    MCH 28.7 02/21/2020    MCHC 34.2 02/21/2020    RDW 14.3 02/21/2020     02/21/2020    MPV 10.5 02/21/2020    GRAN 4.6 02/21/2020    GRAN 61.5 02/21/2020    LYMPH 2.2 02/21/2020    LYMPH 29.7 02/21/2020    MONO 0.5 02/21/2020    MONO 6.5 02/21/2020    EOS 0.1 02/21/2020    BASO 0.04 02/21/2020    EOSINOPHIL 1.8 02/21/2020    BASOPHIL 0.5 02/21/2020     CMP  Sodium   Date Value Ref Range Status   02/21/2020 142 136 - 145 mmol/L Final     Potassium   Date Value Ref Range Status   02/21/2020 3.3 (L) 3.5 - 5.1 mmol/L Final     Chloride   Date Value Ref Range Status   02/21/2020 109 95 - 110 mmol/L Final     CO2   Date Value Ref Range Status   02/21/2020 24 23 - 29 mmol/L Final     Glucose   Date Value Ref Range Status   02/21/2020 108 70 - 110 mg/dL Final     BUN, Bld   Date Value Ref Range Status   02/21/2020 16 8 - 23 mg/dL Final     Creatinine   Date Value Ref Range Status   02/21/2020 0.9 0.5 - 1.4 mg/dL Final     Calcium   Date Value Ref Range Status   02/21/2020 9.4 8.7 - 10.5 mg/dL Final     Total Protein   Date Value Ref Range Status   02/21/2020 7.3 6.0 - 8.4 g/dL Final     Albumin   Date Value Ref Range Status   02/21/2020 3.9 3.5 - 5.2 g/dL Final     Total Bilirubin   Date Value Ref Range Status   02/21/2020 0.3 0.1  - 1.0 mg/dL Final     Comment:     For infants and newborns, interpretation of results should be based  on gestational age, weight and in agreement with clinical  observations.  Premature Infant recommended reference ranges:  Up to 24 hours.............<8.0 mg/dL  Up to 48 hours............<12.0 mg/dL  3-5 days..................<15.0 mg/dL  6-29 days.................<15.0 mg/dL       Alkaline Phosphatase   Date Value Ref Range Status   02/21/2020 67 55 - 135 U/L Final     AST   Date Value Ref Range Status   02/21/2020 20 10 - 40 U/L Final     ALT   Date Value Ref Range Status   02/21/2020 22 10 - 44 U/L Final     Anion Gap   Date Value Ref Range Status   02/21/2020 9 8 - 16 mmol/L Final     eGFR if    Date Value Ref Range Status   02/21/2020 >60 >60 mL/min/1.73 m^2 Final     eGFR if non    Date Value Ref Range Status   02/21/2020 >60 >60 mL/min/1.73 m^2 Final     Comment:     Calculation used to obtain the estimated glomerular filtration  rate (eGFR) is the CKD-EPI equation.        Vitamin D:  34 (35)  LDH:  160    RADIOLOGY:    BMD dated 02/21/2020:  Impression:  Normal bone  CXR dated 02/21/2020:  Impression:  Normal    Mammogram dated 12/16/2019:  Impression:  No mammographic evidence of malignancy.  BI-RADS Category 1: Negative  Recommendation:  Routine screening mammogram in 1 year is recommended.    IMPRESSION:  1.  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  2.  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  3.  Hypokalemia - on supplementation; compliant with 20 meq bid; on Diamox; will copy PCP Dr. Batsheva Nichols    PLAN:  1.  Continue Tamoxifen 20 mg daily.  2.  Continue calcium & Vitamin D supplementation.  Increase Vitamin D to 2,000 units/day.  3.  Return to clinic in 6 months for 54 month post therapy evaluation with BMP, Vitamin D prior.    Assessment/plan reviewed and approved by Dr. Wisdom.

## 2020-03-01 DIAGNOSIS — E87.6 HYPOKALEMIA: ICD-10-CM

## 2020-03-02 RX ORDER — POTASSIUM CHLORIDE 20 MEQ/1
TABLET, EXTENDED RELEASE ORAL
Qty: 180 TABLET | Refills: 1 | Status: SHIPPED | OUTPATIENT
Start: 2020-03-02 | End: 2020-03-20 | Stop reason: SDUPTHER

## 2020-03-02 NOTE — PROGRESS NOTES
Refill Authorization Note     is requesting a refill authorization.    Brief assessment and rationale for refill: APPROVE: prr          Medication Therapy Plan: approve 6 more    Medication reconciliation completed: No                         Comments:   Requested Prescriptions   Pending Prescriptions Disp Refills    potassium chloride SA (K-DUR,KLOR-CON) 20 MEQ tablet [Pharmacy Med Name: POTASSIUM CL 20MEQ ER TABLETS] 180 tablet 1     Sig: TAKE 1 TABLET BY MOUTH TWICE DAILY       Endocrinology:  Minerals - Potassium Supplementation Failed - 3/2/2020  3:19 PM        Failed - K in normal range and within 180 days     Potassium   Date Value Ref Range Status   02/21/2020 3.3 (L) 3.5 - 5.1 mmol/L Final   08/30/2019 3.7 3.5 - 5.1 mmol/L Final   02/25/2019 3.4 (L) 3.5 - 5.1 mmol/L Final              Passed - Patient is at least 18 years old        Passed - Office visit in past 12 months or future 90 days.     Recent Outpatient Visits            3 days ago Ductal carcinoma in situ (DCIS) of left breast    [facility] Tony Berg NP    3 weeks ago Primary open angle glaucoma (POAG) of both eyes, severe stage    [facility] Jacqueline Perez MD    2 months ago [facility]    3 months ago Primary open angle glaucoma (POAG) of both eyes, severe stage    [facility] Jacqueline Perez MD    6 months ago Ductal carcinoma in situ (DCIS) of left breast    [facility] Tony Berg NP          Future Appointments              In 3 days KARIN Nichols MD [place] [facility] [place]    In 1 month MD David Sepulveda - Ophthalmology, [facility]    In 5 months LAB, [facility] DRAW STATION [facility] - Laboratory, [facility]    In 5 months Tony Berg NP [facility], [facility]                Passed - Cr is 1.3 or  below and within 180 days     Creatinine   Date Value Ref Range Status   02/21/2020 0.9 0.5 - 1.4 mg/dL Final   08/30/2019 0.78 0.50 - 1.40 mg/dL Final   02/25/2019 0.8 0.5 - 1.4 mg/dL Final              Passed - eGFR within 180 days     eGFR if non    Date Value Ref Range Status   02/21/2020 >60 >60 mL/min/1.73 m^2 Final     Comment:     Calculation used to obtain the estimated glomerular filtration  rate (eGFR) is the CKD-EPI equation.      08/30/2019 >60 >60 mL/min/1.73 m^2 Final     Comment:     Calculation used to obtain the estimated glomerular filtration  rate (eGFR) is the CKD-EPI equation.      02/25/2019 >60 >60 mL/min/1.73 m^2 Final     Comment:     Calculation used to obtain the estimated glomerular filtration  rate (eGFR) is the CKD-EPI equation.        eGFR if    Date Value Ref Range Status   02/21/2020 >60 >60 mL/min/1.73 m^2 Final   08/30/2019 >60 >60 mL/min/1.73 m^2 Final   02/25/2019 >60 >60 mL/min/1.73 m^2 Final               Appointments  past 12m or future 3m with PCP    Date Provider   Last Visit   8/21/2019 KARIN Nichols MD   Next Visit   3/5/2020 KARIN Nichols MD   .  ED visits in past 90 days: 0       Note composed:3:28 PM 03/02/2020

## 2020-03-05 ENCOUNTER — OFFICE VISIT (OUTPATIENT)
Dept: FAMILY MEDICINE | Facility: CLINIC | Age: 65
End: 2020-03-05
Payer: COMMERCIAL

## 2020-03-05 ENCOUNTER — LAB VISIT (OUTPATIENT)
Dept: LAB | Facility: HOSPITAL | Age: 65
End: 2020-03-05
Attending: FAMILY MEDICINE
Payer: COMMERCIAL

## 2020-03-05 VITALS
HEIGHT: 65 IN | HEART RATE: 67 BPM | BODY MASS INDEX: 30.81 KG/M2 | DIASTOLIC BLOOD PRESSURE: 89 MMHG | WEIGHT: 184.94 LBS | OXYGEN SATURATION: 99 % | SYSTOLIC BLOOD PRESSURE: 122 MMHG

## 2020-03-05 DIAGNOSIS — E87.6 HYPOKALEMIA: ICD-10-CM

## 2020-03-05 DIAGNOSIS — E78.2 MIXED HYPERLIPIDEMIA: ICD-10-CM

## 2020-03-05 DIAGNOSIS — I10 ESSENTIAL HYPERTENSION: Primary | ICD-10-CM

## 2020-03-05 DIAGNOSIS — D05.12 DUCTAL CARCINOMA IN SITU (DCIS) OF LEFT BREAST: ICD-10-CM

## 2020-03-05 PROCEDURE — 84132 ASSAY OF SERUM POTASSIUM: CPT

## 2020-03-05 PROCEDURE — 3074F PR MOST RECENT SYSTOLIC BLOOD PRESSURE < 130 MM HG: ICD-10-PCS | Mod: CPTII,S$GLB,, | Performed by: FAMILY MEDICINE

## 2020-03-05 PROCEDURE — 3008F PR BODY MASS INDEX (BMI) DOCUMENTED: ICD-10-PCS | Mod: CPTII,S$GLB,, | Performed by: FAMILY MEDICINE

## 2020-03-05 PROCEDURE — 36415 COLL VENOUS BLD VENIPUNCTURE: CPT | Mod: PO

## 2020-03-05 PROCEDURE — 3079F DIAST BP 80-89 MM HG: CPT | Mod: CPTII,S$GLB,, | Performed by: FAMILY MEDICINE

## 2020-03-05 PROCEDURE — 99214 PR OFFICE/OUTPT VISIT, EST, LEVL IV, 30-39 MIN: ICD-10-PCS | Mod: S$GLB,,, | Performed by: FAMILY MEDICINE

## 2020-03-05 PROCEDURE — 99214 OFFICE O/P EST MOD 30 MIN: CPT | Mod: S$GLB,,, | Performed by: FAMILY MEDICINE

## 2020-03-05 PROCEDURE — 3008F BODY MASS INDEX DOCD: CPT | Mod: CPTII,S$GLB,, | Performed by: FAMILY MEDICINE

## 2020-03-05 PROCEDURE — 99999 PR PBB SHADOW E&M-EST. PATIENT-LVL III: ICD-10-PCS | Mod: PBBFAC,,, | Performed by: FAMILY MEDICINE

## 2020-03-05 PROCEDURE — 3074F SYST BP LT 130 MM HG: CPT | Mod: CPTII,S$GLB,, | Performed by: FAMILY MEDICINE

## 2020-03-05 PROCEDURE — 3079F PR MOST RECENT DIASTOLIC BLOOD PRESSURE 80-89 MM HG: ICD-10-PCS | Mod: CPTII,S$GLB,, | Performed by: FAMILY MEDICINE

## 2020-03-05 PROCEDURE — 99999 PR PBB SHADOW E&M-EST. PATIENT-LVL III: CPT | Mod: PBBFAC,,, | Performed by: FAMILY MEDICINE

## 2020-03-05 NOTE — PROGRESS NOTES
"Subjective:       Patient ID: Deb Tesfaye is a 65 y.o. female.    Chief Complaint: Follow-up (6 month)    Pt is known to me.  Pt is here for followup of chronic medical issues.  The pt continues follow up with Tony Berg for breast cancer--she remains on tamoxifen.  Her recent labs showed a potassium of 3.3.  She is on a potassium supplement.  She is having some postnasal drainage, runny nose and scratchy throat.    Review of Systems   Constitutional: Negative for activity change, appetite change, fatigue, fever and unexpected weight change.   HENT: Positive for postnasal drip, rhinorrhea and sore throat.    Eyes: Negative for visual disturbance.   Respiratory: Negative for cough, chest tightness and shortness of breath.    Cardiovascular: Negative for chest pain, palpitations and leg swelling.   Gastrointestinal: Negative for abdominal pain, constipation, diarrhea, nausea and vomiting.   Endocrine: Negative for cold intolerance, heat intolerance and polyuria.   Genitourinary: Negative for decreased urine volume and dysuria.   Musculoskeletal: Positive for arthralgias. Negative for back pain.   Skin: Negative for rash.   Neurological: Positive for numbness. Negative for headaches.       Objective:       Vitals:    03/05/20 1025   BP: 122/89   BP Location: Left arm   Patient Position: Sitting   BP Method: Medium (Manual)   Pulse: 67   SpO2: 99%   Weight: 83.9 kg (184 lb 15.5 oz)   Height: 5' 5" (1.651 m)     Physical Exam   Constitutional: She is oriented to person, place, and time. She appears well-developed and well-nourished.   Pt is overweight   HENT:   Head: Normocephalic.   Eyes: Pupils are equal, round, and reactive to light. Conjunctivae and EOM are normal.   Neck: Normal range of motion. Neck supple. No thyromegaly present.   Cardiovascular: Normal rate, regular rhythm and normal heart sounds.   Pulmonary/Chest: Effort normal and breath sounds normal.   Abdominal: Soft. Bowel sounds are normal. " There is no tenderness.   Musculoskeletal: Normal range of motion. She exhibits no tenderness or deformity.   Lymphadenopathy:     She has no cervical adenopathy.   Neurological: She is alert and oriented to person, place, and time. She displays normal reflexes. No cranial nerve deficit or sensory deficit (no deficit in toes). She exhibits normal muscle tone. Coordination normal.   Skin: Skin is warm and dry.   Psychiatric: She has a normal mood and affect. Her behavior is normal.       Assessment:       1. Essential hypertension    2. Mixed hyperlipidemia    3. Hypokalemia    4. Ductal carcinoma in situ (DCIS) of left breast        Plan:       Deb was seen today for follow-up.    Diagnoses and all orders for this visit:    Essential hypertension    Mixed hyperlipidemia    Hypokalemia  -     Potassium; Future    Ductal carcinoma in situ (DCIS) of left breast      During this visit, I reviewed the pt's history, medications, allergies, and problem list.    May need to adjust HCTZ dose

## 2020-03-06 ENCOUNTER — PATIENT MESSAGE (OUTPATIENT)
Dept: FAMILY MEDICINE | Facility: CLINIC | Age: 65
End: 2020-03-06

## 2020-03-06 DIAGNOSIS — E87.6 HYPOKALEMIA: Primary | ICD-10-CM

## 2020-03-06 LAB — POTASSIUM SERPL-SCNC: 3.1 MMOL/L (ref 3.5–5.1)

## 2020-03-19 ENCOUNTER — LAB VISIT (OUTPATIENT)
Dept: LAB | Facility: HOSPITAL | Age: 65
End: 2020-03-19
Attending: FAMILY MEDICINE
Payer: COMMERCIAL

## 2020-03-19 DIAGNOSIS — E87.6 HYPOKALEMIA: ICD-10-CM

## 2020-03-19 LAB
MAGNESIUM SERPL-MCNC: 2.1 MG/DL (ref 1.6–2.6)
POTASSIUM SERPL-SCNC: 3.2 MMOL/L (ref 3.5–5.1)

## 2020-03-19 PROCEDURE — 84132 ASSAY OF SERUM POTASSIUM: CPT

## 2020-03-19 PROCEDURE — 83735 ASSAY OF MAGNESIUM: CPT

## 2020-03-19 PROCEDURE — 36415 COLL VENOUS BLD VENIPUNCTURE: CPT | Mod: PO

## 2020-03-20 ENCOUNTER — PATIENT MESSAGE (OUTPATIENT)
Dept: FAMILY MEDICINE | Facility: CLINIC | Age: 65
End: 2020-03-20

## 2020-03-20 DIAGNOSIS — E87.6 HYPOKALEMIA: Primary | ICD-10-CM

## 2020-03-20 RX ORDER — POTASSIUM CHLORIDE 20 MEQ/1
40 TABLET, EXTENDED RELEASE ORAL 2 TIMES DAILY
Qty: 360 TABLET | Refills: 1 | Status: SHIPPED | OUTPATIENT
Start: 2020-03-20 | End: 2020-10-15

## 2020-03-20 NOTE — TELEPHONE ENCOUNTER
Pt is req a new script to be sent to the pharmacy reflecting the new potassium instructions. Please advise.

## 2020-03-20 NOTE — TELEPHONE ENCOUNTER
Let pt know to add 1 more potassium pill to one of her three doses--so 2 pills in the morning, then 1, then 1.

## 2020-03-31 DIAGNOSIS — E87.6 HYPOKALEMIA: ICD-10-CM

## 2020-04-02 RX ORDER — PILOCARPINE HYDROCHLORIDE 40 MG/ML
1 SOLUTION/ DROPS OPHTHALMIC 3 TIMES DAILY
Qty: 15 ML | Refills: 12
Start: 2020-04-02 | End: 2020-12-29 | Stop reason: SDUPTHER

## 2020-04-02 RX ORDER — KETOROLAC TROMETHAMINE 5 MG/ML
1 SOLUTION OPHTHALMIC 2 TIMES DAILY
Qty: 5 ML | Refills: 0 | Status: SHIPPED | OUTPATIENT
Start: 2020-04-02 | End: 2020-04-18

## 2020-04-03 RX ORDER — POTASSIUM CHLORIDE 20 MEQ/1
TABLET, EXTENDED RELEASE ORAL
Qty: 60 TABLET | OUTPATIENT
Start: 2020-04-03

## 2020-04-03 NOTE — PROGRESS NOTES
Refill Authorization Note     is requesting a refill authorization.    Brief assessment and rationale for refill: QUICK DC: Duplicate    Comments:   Pended Medication(s)   Requested Prescriptions     Pending Prescriptions Disp Refills    potassium chloride SA (K-DUR,KLOR-CON) 20 MEQ tablet [Pharmacy Med Name: POTASSIUM CL 20MEQ ER TABLETS] 60 tablet      Sig: TAKE 1 TABLET(20 MEQ) BY MOUTH TWICE DAILY        Duplicate Pended Encounter(s)/ Last Prescribed Details:    Ordering User:  KARIN Nichols MD            Diagnosis Association: Hypokalemia (E87.6)      Original Order:  potassium chloride SA (K-DUR,KLOR-CON) 20 MEQ tablet [[id number]]      Pharmacy:  [id number] [address] [facility] [address] [facility] #:  [id number]     Pharmacy Comments:  --          Fill quantity remaining:  -- Fill quantity used:  -- Next fill due: --       Outpatient Medication Detail      Disp Refills Start End    potassium chloride SA (K-DUR,KLOR-CON) 20 MEQ tablet 360 tablet 1 3/20/2020     Sig - Route: Take 2 tablets (40 mEq total) by mouth 2 (two) times daily. - Oral    Sent to pharmacy as: potassium chloride SA (K-DUR,KLOR-CON) 20 MEQ tablet    Notes to Pharmacy: Please inactivate all prior scripts with same name and strength including any scripts on hold.    E-Prescribing Status: Receipt confirmed by pharmacy (3/20/2020  4:01 PM CDT)    Ordering Encounter Report     Associated Reports   View Encounter          Note composed:11:50 AM 04/03/2020

## 2020-04-18 RX ORDER — KETOROLAC TROMETHAMINE 5 MG/ML
SOLUTION OPHTHALMIC
Qty: 5 ML | Refills: 0 | Status: SHIPPED | OUTPATIENT
Start: 2020-04-18 | End: 2020-09-17

## 2020-05-05 ENCOUNTER — PATIENT MESSAGE (OUTPATIENT)
Dept: ADMINISTRATIVE | Facility: HOSPITAL | Age: 65
End: 2020-05-05

## 2020-06-10 NOTE — PROGRESS NOTES
HPI     DLS: 2/04/20    Pt here for 2 month check;  F/U POAG - severe stage     Meds:   Timolol BID OU   Sandro 4% TID OS   Brimonidine TID OS   Lumigan QHS OS   Keterolac TID OD  Diamox 500 BID PO      1. POAG OD>OS   2. APD OD   3. NS OS   4. Papilloma OS   5. CME OD   6. PCIOL OD  7. S/P phaco/IOL / ahmed - OD - 1/10/2018   8. S/P G6 laser od - 4/12/20`17     Last edited by Jacqueline Perez MD on 6/12/2020 12:32 PM. (History)            Assessment /Plan     For exam results, see Encounter Report.    Primary open angle glaucoma (POAG) of both eyes, severe stage    Afferent pupillary defect, right    Visual field loss    Nuclear sclerosis of left eye    Pseudophakia, right eye    Glaucoma shunt device of right eye    Cystoid macular edema, right  -     Posterior Segment OCT Retina-Both eyes        S/P PHACO/IOL/AHMED - OD - 1/10/2017 - developed CME post op    Rx with steroid gtts and ilevro - had responded -   Recurrent CME od - 12/2/2019 - off PA and Ilevro - however  the vision is stable     F/U CYCLO G6 OD - 4/12/2017  REFERRED BY DR HILLMAN FOR SEVERE POORLY CONTROLLED POAG - 7/24/2015   Pt recently moved from [place] to [facility]     Previously referred to Dr. Hillman from [facility], review of outside records:    Tm 27/25.    Prior to that, IOP ranged mid-teens to low-20's on Combigan/Dorzolamide/Lumigan OU    C:D documented as 0.9 OD, 0.85 OS    Initial visit 3/29/12  IOP 10 OU on Combitan and Lumigan  C:D 0.85 OU  CCT 524OD, 531 OS    HVF's  2012 - superior defect affecting fixation OD with beginnings of early inferior arc; possible early superior arc OS  2013 - stable superior defect OD with early inferior arc; OS superior nasal step    Here for evaluation of POAG on MMT. Last IOP with Dr. Hillman 21//19. IOP today 21//20 on MMT. Patient has a family history of glaucoma in maternal grandfather. States that she is compliant with all Gtt's.        Glaucoma (type and duration)     Dx about 2002 - [facility]    First HVF   First at [facility] 2015   First photos   First at [facility] 2015    Treatment / Drops started   2002              Family history    + mom / brother         Glaucoma meds    cosopt ou / brimonidine ou tid / lumigan ou q hs / bisi od tid / diamox / bisi ou        H/O adverse rxn to glaucoma drops    none        LASERS    SLT OD - 8/5/2013 - [facility] // SLT os - [facility] 8/27/2015 (good resp 21-->16)        GLAUCOMA SURGERIES    CYCL G6 OD - 4/12/2017  / phaco/IOL / ahmed od 1/10/2018         OTHER EYE SURGERIES    Combined phaco/IOL / ahmed  - od - 1/10/2018         CDR    0.99/0.95        Tbase    ?  Off gtts (on gtts was up to 28/25)         Tmax    27/25 - per outside record's (reviewed by Arik )             Ttarget    15 ou    (may need to be lower)           HVF   24-2 ss //  3 test 2015 to  2018 [place]  od // SAD   10-2 ss - 3 test 2015 to 2017 - SALT/ IAD (progression) od // hint SAD / IAD os    (( 2 VF's from [facility] - that were reviewed by Dr. IVEY - 2012 and 2013 -  SAD w/ fix invl . IAD od / early IAD os ))            Gonio    +3-4 ou         CCT    524/531        OCT    3 test 2016 to 2019 - RNFL - OD:dec. S/N/I // OS:dec. S/T/I, bord N        HRT    2 test 2015 to 2018  - MR -   Dec thru out   od // dec thru out  os /// CDR 0. 91 od // 0.88 os        Disc photos    2015, 2018      - Ttoday - 22/14 (up od)    - Test done today    IOP check // OCT - CME check od -  Resolved     2. + APD od     3.  NS ou    Minimal - [facility] 20/25 ou still     4. Papilloma OS   To be removed with [facility]k     5. ++ CME od    Decrease in [place] from 20/70 to 20/200   New since last OCT 2/3/2017    S/p PHACO/IOL / AHMED  1/10/2018    Had resolved by 7.29/2019 - on ilevro and PA   -recurrent on 12/2/2019 - off ilevro    - resolved by 6/12/2020 - on ketorolac tid od     PLAN   GLAUCOMA - ADVANCED - OD >OS   HVF's continue to progress - especially OD     S/P  - cyclo G6 - OD - 4/12/2017       S/p Phaco / IOL // phaco/ IOL / ahmed  OD Date: 1/10/2018 - general anesthesia // PCB00 23.0  POY 1.75  IOP  13/17    IOP up od today - at 22/14  glaucoma gtts - adjust   Dorzolamide tid ou - STOP 12/2/2019 - restart diamox 500 mg sequal - po bid - has tolerated in past   timolol bid ou   Brim ou tid (use ou now)   bisi tid os   latanoprost os (hold od - get recurrent CME )   Diamox (acetazolamide pills) 500 PO bid - tolerating ok     Ketorolac 2-3  od (Ilevro - too expensive) - restarted - 12/12/2019 - for recurrent CME od     Repeat SLT os - only 180 degrees treated - too narrow sup and inf (9/20/2018) - steroid taper      OCT macula 5/17/19: +++ CME od   OD: , Loss of foveal contour, intraretinal and subretinal fluid  OS: , NFC, no thickening    OCT - 7/26/2019 - CME improved / resolved // review retina notes // monitor for recurrence of CME od     OCT 12/2/2019 - increase - recurrent CME od - 488 - off the ilevro (did not appear to affect her vision)     Oct 6/12/2020 - no cme OD - RESOLVED     + VF PROG OS OR IF IOP  CREEPS UP / CONSIDER SOME FORm OF FILTERING SURGERY OS   ALSO - CONSIDER RHOPRESSA    F/U 3 MONTHS  With HVF / DFE / OCT - ? If IOP better od with addition of brimonidine - cont to monitor the intermittent recurrent CME od

## 2020-06-12 ENCOUNTER — OFFICE VISIT (OUTPATIENT)
Dept: OPHTHALMOLOGY | Facility: CLINIC | Age: 65
End: 2020-06-12
Payer: COMMERCIAL

## 2020-06-12 DIAGNOSIS — H25.12 NUCLEAR SCLEROSIS OF LEFT EYE: ICD-10-CM

## 2020-06-12 DIAGNOSIS — H21.561 AFFERENT PUPILLARY DEFECT, RIGHT: ICD-10-CM

## 2020-06-12 DIAGNOSIS — H35.351 CYSTOID MACULAR EDEMA, RIGHT: ICD-10-CM

## 2020-06-12 DIAGNOSIS — H40.1133 PRIMARY OPEN ANGLE GLAUCOMA (POAG) OF BOTH EYES, SEVERE STAGE: Primary | ICD-10-CM

## 2020-06-12 DIAGNOSIS — H53.40 VISUAL FIELD LOSS: ICD-10-CM

## 2020-06-12 DIAGNOSIS — Z96.1 PSEUDOPHAKIA, RIGHT EYE: ICD-10-CM

## 2020-06-12 PROCEDURE — 99999 PR PBB SHADOW E&M-EST. PATIENT-LVL II: ICD-10-PCS | Mod: PBBFAC,,, | Performed by: OPHTHALMOLOGY

## 2020-06-12 PROCEDURE — 92012 PR EYE EXAM, EST PATIENT,INTERMED: ICD-10-PCS | Mod: S$GLB,,, | Performed by: OPHTHALMOLOGY

## 2020-06-12 PROCEDURE — 92134 CPTRZ OPH DX IMG PST SGM RTA: CPT | Mod: S$GLB,,, | Performed by: OPHTHALMOLOGY

## 2020-06-12 PROCEDURE — 92134 POSTERIOR SEGMENT OCT RETINA (OCULAR COHERENCE TOMOGRAPHY)-BOTH EYES: ICD-10-PCS | Mod: S$GLB,,, | Performed by: OPHTHALMOLOGY

## 2020-06-12 PROCEDURE — 92012 INTRM OPH EXAM EST PATIENT: CPT | Mod: S$GLB,,, | Performed by: OPHTHALMOLOGY

## 2020-06-12 PROCEDURE — 99999 PR PBB SHADOW E&M-EST. PATIENT-LVL II: CPT | Mod: PBBFAC,,, | Performed by: OPHTHALMOLOGY

## 2020-06-25 DIAGNOSIS — E78.5 HYPERLIPIDEMIA, UNSPECIFIED HYPERLIPIDEMIA TYPE: ICD-10-CM

## 2020-06-25 RX ORDER — PRAVASTATIN SODIUM 20 MG/1
TABLET ORAL
Qty: 90 TABLET | Refills: 0 | Status: SHIPPED | OUTPATIENT
Start: 2020-06-25 | End: 2020-09-24

## 2020-06-25 NOTE — PROGRESS NOTES
Refill Authorization Note    is requesting a refill authorization.    Brief assessment and rationale for refill: APPROVE: PRR               Medication reconciliation completed: No                         Comments:      Requested Prescriptions   Pending Prescriptions Disp Refills    pravastatin (PRAVACHOL) 20 MG tablet [Pharmacy Med Name: PRAVASTATIN 20MG TABLETS] 90 tablet 0     Sig: TAKE 1 TABLET(20 MG) BY MOUTH EVERY DAY       Cardiovascular:  Antilipid - Statins Passed - 6/25/2020  6:11 AM        Passed - Patient is at least 18 years old        Passed - Office visit in past 12 months or future 90 days.     Recent Outpatient Visits            1 week ago Primary open angle glaucoma (POAG) of both eyes, severe stage    [facility] Jacqueline Perez MD    3 months ago Essential hypertension    [facility] KARIN Nichols MD    3 months ago Ductal carcinoma in situ (DCIS) of left breast    [facility] Tony Berg NP    4 months ago Primary open angle glaucoma (POAG) of both eyes, severe stage    [facility] Jacqueline Perez MD    6 months ago Routine gynecological examination    [facility] Rosendo Yadav MD          Future Appointments              In 2 months LAB, [facility] O[facility] DRAW STATION [facility] - Laboratory, [facility] at [facility]    In 2 months Tony Berg NP [facility], [facility] at [facility]    In 3 months PERIMETRY, [facility] - [facility], [facility]    In 3 months MD [facility] - [facility], [facility]                Passed - Lipid Panel completed in last 360 days     Lab Results   Component Value Date    CHOL 181 07/12/2019    HDL 50 07/12/2019    LDLCALC 113.6 07/12/2019    TRIG 87 07/12/2019             Passed - ALT is 94 or below and within 360 days     ALT   Date Value Ref Range Status   02/21/2020 22 10 - 44 U/L Final   02/25/2019 20 10 - 44 U/L Final   12/04/2017  26 10 - 44 U/L Final              Passed - AST is 54 or below and within 360 days     AST   Date Value Ref Range Status   02/21/2020 20 10 - 40 U/L Final   02/25/2019 19 10 - 40 U/L Final   12/04/2017 28 10 - 40 U/L Final                  Appointments  past 12m or future 3m with PCP    Date Provider   Last Visit   3/5/2020 KARIN Nichols MD   Next Visit   Visit date not found KARIN Nichols MD   ED visits in past 90 days: 0     Note composed:9:27 AM 06/25/2020

## 2020-08-28 ENCOUNTER — LAB VISIT (OUTPATIENT)
Dept: LAB | Facility: HOSPITAL | Age: 65
End: 2020-08-28
Attending: NURSE PRACTITIONER
Payer: COMMERCIAL

## 2020-08-28 ENCOUNTER — OFFICE VISIT (OUTPATIENT)
Dept: HEMATOLOGY/ONCOLOGY | Facility: CLINIC | Age: 65
End: 2020-08-28
Payer: COMMERCIAL

## 2020-08-28 VITALS
OXYGEN SATURATION: 100 % | SYSTOLIC BLOOD PRESSURE: 125 MMHG | DIASTOLIC BLOOD PRESSURE: 77 MMHG | BODY MASS INDEX: 30.89 KG/M2 | HEIGHT: 65 IN | RESPIRATION RATE: 20 BRPM | HEART RATE: 82 BPM | TEMPERATURE: 97 F | WEIGHT: 185.44 LBS

## 2020-08-28 DIAGNOSIS — D05.12 DUCTAL CARCINOMA IN SITU (DCIS) OF LEFT BREAST: ICD-10-CM

## 2020-08-28 DIAGNOSIS — E87.6 HYPOKALEMIA: ICD-10-CM

## 2020-08-28 DIAGNOSIS — Z79.810 USE OF TAMOXIFEN (NOLVADEX): ICD-10-CM

## 2020-08-28 DIAGNOSIS — N60.99 ATYPICAL DUCTAL HYPERPLASIA OF BREAST: ICD-10-CM

## 2020-08-28 DIAGNOSIS — D05.12 DUCTAL CARCINOMA IN SITU (DCIS) OF LEFT BREAST: Primary | ICD-10-CM

## 2020-08-28 LAB
25(OH)D3+25(OH)D2 SERPL-MCNC: 47 NG/ML (ref 30–96)
ANION GAP SERPL CALC-SCNC: 6 MMOL/L (ref 8–16)
BUN SERPL-MCNC: 17 MG/DL (ref 7–18)
CALCIUM SERPL-MCNC: 9.5 MG/DL (ref 8.4–10.2)
CHLORIDE SERPL-SCNC: 108 MMOL/L (ref 95–110)
CO2 SERPL-SCNC: 26 MMOL/L (ref 22–31)
CREAT SERPL-MCNC: 0.9 MG/DL (ref 0.5–1.4)
EST. GFR  (AFRICAN AMERICAN): >60 ML/MIN/1.73 M^2
EST. GFR  (NON AFRICAN AMERICAN): >60 ML/MIN/1.73 M^2
GLUCOSE SERPL-MCNC: 113 MG/DL (ref 70–110)
POTASSIUM SERPL-SCNC: 3.5 MMOL/L (ref 3.5–5.1)
SODIUM SERPL-SCNC: 140 MMOL/L (ref 136–145)

## 2020-08-28 PROCEDURE — 3078F PR MOST RECENT DIASTOLIC BLOOD PRESSURE < 80 MM HG: ICD-10-PCS | Mod: CPTII,S$GLB,, | Performed by: NURSE PRACTITIONER

## 2020-08-28 PROCEDURE — 99999 PR PBB SHADOW E&M-EST. PATIENT-LVL V: CPT | Mod: PBBFAC,,, | Performed by: NURSE PRACTITIONER

## 2020-08-28 PROCEDURE — 99213 OFFICE O/P EST LOW 20 MIN: CPT | Mod: S$GLB,,, | Performed by: NURSE PRACTITIONER

## 2020-08-28 PROCEDURE — 82306 VITAMIN D 25 HYDROXY: CPT

## 2020-08-28 PROCEDURE — 36415 COLL VENOUS BLD VENIPUNCTURE: CPT | Mod: PN

## 2020-08-28 PROCEDURE — 3074F SYST BP LT 130 MM HG: CPT | Mod: CPTII,S$GLB,, | Performed by: NURSE PRACTITIONER

## 2020-08-28 PROCEDURE — 99213 PR OFFICE/OUTPT VISIT, EST, LEVL III, 20-29 MIN: ICD-10-PCS | Mod: S$GLB,,, | Performed by: NURSE PRACTITIONER

## 2020-08-28 PROCEDURE — 80048 BASIC METABOLIC PNL TOTAL CA: CPT

## 2020-08-28 PROCEDURE — 3008F BODY MASS INDEX DOCD: CPT | Mod: CPTII,S$GLB,, | Performed by: NURSE PRACTITIONER

## 2020-08-28 PROCEDURE — 82306 VITAMIN D 25 HYDROXY: CPT | Mod: PN

## 2020-08-28 PROCEDURE — 99999 PR PBB SHADOW E&M-EST. PATIENT-LVL V: ICD-10-PCS | Mod: PBBFAC,,, | Performed by: NURSE PRACTITIONER

## 2020-08-28 PROCEDURE — 3074F PR MOST RECENT SYSTOLIC BLOOD PRESSURE < 130 MM HG: ICD-10-PCS | Mod: CPTII,S$GLB,, | Performed by: NURSE PRACTITIONER

## 2020-08-28 PROCEDURE — 3078F DIAST BP <80 MM HG: CPT | Mod: CPTII,S$GLB,, | Performed by: NURSE PRACTITIONER

## 2020-08-28 PROCEDURE — 3008F PR BODY MASS INDEX (BMI) DOCUMENTED: ICD-10-PCS | Mod: CPTII,S$GLB,, | Performed by: NURSE PRACTITIONER

## 2020-08-28 PROCEDURE — 80048 BASIC METABOLIC PNL TOTAL CA: CPT | Mod: PN

## 2020-08-28 NOTE — PROGRESS NOTES
HISTORY OF PRESENT ILLNESS:  This is a 65-year-old black female known to Dr. 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  She is status post lumpectomy, post-lumpectomy irradiation and presently on adjuvant tamoxifen 20 mg daily (initiated 02/2016).      She presents to the clinic today for her 54-month post-therapy evaluation in good spirits.  She remains active and well.  She is compliant with medication, Tamoxifen.  She states she has been taking the increased dose of Vitamin D, 2000 units. She denies any new breast complaints, bone pain, difficulties with hot flashes, vaginal bleeding, abdominal discomfort/bloating, nausea, vomiting, constipation, diarrhea, irregular heartbeat, chest pain, myalgias, cramping, etc.  No other new complaintsor pertinent findings on a 14-point review of systems.    PHYSICAL EXAMINATION:  GENERAL:  Well-developed, well-nourished black female in no acute distress.  Alert and oriented x 3.  VITAL SIGNS:  Weight:  Gain of 1 pound in 6 months  Wt Readings from Last 3 Encounters:   08/28/20 84.1 kg (185 lb 6.5 oz)   03/05/20 83.9 kg (184 lb 15.5 oz)   02/28/20 83.6 kg (184 lb 4.9 oz)     Temp Readings from Last 3 Encounters:   08/28/20 97 °F (36.1 °C) (Temporal)   02/28/20 98 °F (36.7 °C)   08/30/19 98.8 °F (37.1 °C) (Other (see comments))     BP Readings from Last 3 Encounters:   08/28/20 125/77   03/05/20 122/89   02/28/20 123/78     Pulse Readings from Last 3 Encounters:   08/28/20 82   03/05/20 67   02/28/20 64     HEENT:  Normocephalic, atraumatic.  Oral mucosa pink and moist.  Lips without lesions.  Tongue midline.  Oropharynx clear.  Nonicteric sclerae.  NECK:  Supple, no adenopathy.  No carotid bruits, thyromegaly or thyroid nodule.  HEART:  Regular rate and rhythm without murmur, gallop or rub.  LUNGS:  Clear to auscultation bilaterally.  Normal respiratory effort.  ABDOMEN:  Soft, nontender, nondistended with positive  normoactive bowel sounds, no hepatosplenomegaly.  EXTREMITIES:  No cyanosis, clubbing or edema.  Distal pulses are intact.  AXILLAE AND GROIN:  No palpable pathologic lymphadenopathy is appreciated.  SKIN:  Intact/turgor normal.  NEUROLOGIC:  Cranial nerves II-XII grossly intact.  Motor:  Good muscle bulk and tone.  Strength/sensory 5/5 throughout.  Gait stable.  BREASTS:  Right breast is soft, free of masses, tenderness, nipple discharge or inversion.  Left breast with noted healed lumpectomy scar to the upper outer region; no signs of local recurrence.    LABORATORY:    BMP  Lab Results   Component Value Date     08/28/2020    K 3.5 08/28/2020     08/28/2020    CO2 26 08/28/2020    BUN 17 08/28/2020    CREATININE 0.90 08/28/2020    CALCIUM 9.5 08/28/2020    ANIONGAP 6 (L) 08/28/2020    ESTGFRAFRICA >60 08/28/2020    EGFRNONAA >60 08/28/2020     Vitamin D:  47 (34, 35)    RADIOLOGY:    BMD dated 02/21/2020:  Impression:  Normal bone  CXR dated 02/21/2020:  Impression:  Normal    Mammogram dated 12/16/2019:  Impression:  No mammographic evidence of malignancy.  BI-RADS Category 1: Negative  Recommendation:  Routine screening mammogram in 1 year is recommended.    IMPRESSION:  1.  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  2.  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  3.  Hypokalemia - on supplementation; compliant with 20 meq bid; stable    PLAN:  1.  Continue Tamoxifen 20 mg daily.  2.  Continue calcium & Vitamin D supplementation.  Continue Vitamin D at 2,000 units/day.  3.  Return to clinic in 6 months for 60 month post therapy evaluation with CBC, CMP, LDH, VIT D prior; CXR & MAMMO tbd on or after 12/16/2020.    Assessment/plan reviewed and approved by Dr. Rider.

## 2020-08-28 NOTE — Clinical Note
Cxr & mAMMO on or after 12/16/2020 - [place] location about mid morning please;  f/u in 6 months for 60 month post breast with CBC, CMP, LDH, VIT D prior

## 2020-09-09 ENCOUNTER — PATIENT MESSAGE (OUTPATIENT)
Dept: FAMILY MEDICINE | Facility: CLINIC | Age: 65
End: 2020-09-09

## 2020-09-13 DIAGNOSIS — I10 ESSENTIAL HYPERTENSION: ICD-10-CM

## 2020-09-15 RX ORDER — TELMISARTAN AND HYDROCHLORTHIAZIDE 80; 25 MG/1; MG/1
TABLET ORAL
Qty: 90 TABLET | Refills: 1 | Status: SHIPPED | OUTPATIENT
Start: 2020-09-15 | End: 2020-12-18 | Stop reason: SDUPTHER

## 2020-09-15 NOTE — TELEPHONE ENCOUNTER
Refill Authorization Note    is requesting a refill authorization.    Brief assessment and rationale for refill: APPROVE: needs labs     Medication-related problems identified: Requires labs    Medication Therapy Plan: BRYCE CLAVO(LIPIDS)    Medication reconciliation completed: No                    Comments:      Orders Placed This Encounter    Lipid Panel    telmisartan-hydrochlorothiazide (MICARDIS HCT) 80-25 mg per tablet      Requested Prescriptions   Signed Prescriptions Disp Refills    telmisartan-hydrochlorothiazide (MICARDIS HCT) 80-25 mg per tablet 90 tablet 1     Sig: TAKE 1 TABLET BY MOUTH EVERY DAY       Cardiovascular: ARB + Diuretic Combos Passed - 9/13/2020  6:27 AM        Passed - Patient is at least 18 years old        Passed - Last BP in normal range within 360 days.     BP Readings from Last 3 Encounters:   08/28/20 125/77   03/05/20 122/89   02/28/20 123/78              Passed - Office visit in past 12 months or future 90 days.     Recent Outpatient Visits            2 weeks ago Ductal carcinoma in situ (DCIS) of left breast    [facility] Tony Berg NP    3 months ago Primary open angle glaucoma (POAG) of both eyes, severe stage    [facility] [address] [address] Jacqueline Perez MD    6 months ago Essential hypertension    [facility] KARIN Nichols MD    6 months ago Ductal carcinoma in situ (DCIS) of left breast    [facility] Tony Berg NP    7 months ago Primary open angle glaucoma (POAG) of both eyes, severe stage    [facility] Jacqueline Perez MD          Future Appointments              In 2 weeks PERIMETRY, [facility] [address] [address] [address] [address]    In 2 weeks Jacqueline Perez MD [facility] [address] [facility] [address] [address] [address]    In 3 months [facility] MAMMO1 [facility]    In 5 months [facility] XR3 [facility]    In 5  months LAB, [facility] OHS DRAW STATION [facility] - Laboratory, [facility] at [facility]    In 5 months Tony Berg NP [facility], [facility] at [facility]                Passed - K in normal range and within 180 days     Potassium   Date Value Ref Range Status   08/28/2020 3.5 3.5 - 5.1 mmol/L Final   03/19/2020 3.2 (L) 3.5 - 5.1 mmol/L Final   03/05/2020 3.1 (L) 3.5 - 5.1 mmol/L Final              Passed - Na is between 130 and 148 and within 180 days     Sodium   Date Value Ref Range Status   08/28/2020 140 136 - 145 mmol/L Final   02/21/2020 142 136 - 145 mmol/L Final   08/30/2019 142 136 - 145 mmol/L Final              Passed - Cr is 1.3 or below and within 180 days     Creatinine   Date Value Ref Range Status   08/28/2020 0.90 0.50 - 1.40 mg/dL Final   02/21/2020 0.9 0.5 - 1.4 mg/dL Final   08/30/2019 0.78 0.50 - 1.40 mg/dL Final              Passed - Ca in normal range and within 360 days     Calcium   Date Value Ref Range Status   08/28/2020 9.5 8.4 - 10.2 mg/dL Final   02/21/2020 9.4 8.7 - 10.5 mg/dL Final   08/30/2019 9.4 8.4 - 10.2 mg/dL Final              Passed - eGFR within 180 days     eGFR if non    Date Value Ref Range Status   08/28/2020 >60 >60 mL/min/1.73 m^2 Final     Comment:     Calculation used to obtain the estimated glomerular filtration  rate (eGFR) is the CKD-EPI equation.      02/21/2020 >60 >60 mL/min/1.73 m^2 Final     Comment:     Calculation used to obtain the estimated glomerular filtration  rate (eGFR) is the CKD-EPI equation.      08/30/2019 >60 >60 mL/min/1.73 m^2 Final     Comment:     Calculation used to obtain the estimated glomerular filtration  rate (eGFR) is the CKD-EPI equation.        eGFR if    Date Value Ref Range Status   08/28/2020 >60 >60 mL/min/1.73 m^2 Final   02/21/2020 >60 >60 mL/min/1.73 m^2 Final   08/30/2019 >60 >60 mL/min/1.73 m^2 Final                  Appointments  past 12m or future 3m with PCP    Date Provider   Last  Visit   3/5/2020 KARIN Nichols MD   Next Visit   Visit date not found KARIN Nichols MD   ED visits in past 90 days: [unfilled]     Note composed:11:57 AM 09/15/2020

## 2020-09-15 NOTE — TELEPHONE ENCOUNTER
Provider Staff:     Please schedule patient for Labs (LIPIDS)    Thanks!  [facility]     Appointments  past 12m or future 3m with PCP    Date Provider   Last Visit   3/5/2020 KARIN Nichols MD   Next Visit   Visit date not found KARIN Nichols MD     Note composed:11:57 AM 09/15/2020

## 2020-09-24 DIAGNOSIS — E78.5 HYPERLIPIDEMIA, UNSPECIFIED HYPERLIPIDEMIA TYPE: ICD-10-CM

## 2020-09-24 RX ORDER — PRAVASTATIN SODIUM 20 MG/1
TABLET ORAL
Qty: 90 TABLET | Refills: 0 | Status: SHIPPED | OUTPATIENT
Start: 2020-09-24 | End: 2020-12-24 | Stop reason: SDUPTHER

## 2020-09-24 NOTE — TELEPHONE ENCOUNTER
No new care gaps identified.  Powered by [employer]. Reference number: [id number]. 9/24/2020 3:58:23 AM WARDT

## 2020-09-24 NOTE — PROGRESS NOTES
Refill Authorization Note   Deb Tesfaye is requesting a refill authorization.     Brief assessment and rationale for refill: APPROVE: prr          Medication Therapy Plan: CDMR: FLOS: approve 3 more    Medication reconciliation completed: No                    Comments:      Orders Placed This Encounter    pravastatin (PRAVACHOL) 20 MG tablet      Requested Prescriptions   Signed Prescriptions Disp Refills    pravastatin (PRAVACHOL) 20 MG tablet 90 tablet 0     Sig: TAKE 1 TABLET(20 MG) BY MOUTH EVERY DAY       Cardiovascular:  Antilipid - Statins Failed - 9/24/2020  3:57 AM        Failed - Total Cholesterol within 360 days     Cholesterol   Date Value Ref Range Status   07/12/2019 181 120 - 199 mg/dL Final     Comment:     The National Cholesterol Education Program (NCEP) has set the  following guidelines (reference ranges) for Cholesterol:  Optimal.....................<200 mg/dL  Borderline High.............200-239 mg/dL  High........................> or = 240 mg/dL     08/03/2018 163 120 - 199 mg/dL Final     Comment:     The National Cholesterol Education Program (NCEP) has set the  following guidelines (reference ranges) for Cholesterol:  Optimal.....................<200 mg/dL  Borderline High.............200-239 mg/dL  High........................> or = 240 mg/dL     04/25/2017 176 120 - 199 mg/dL Final     Comment:     The National Cholesterol Education Program (NCEP) has set the  following guidelines (reference ranges) for Cholesterol:  Optimal.....................<200 mg/dL  Borderline High.............200-239 mg/dL  High........................> or = 240 mg/dL                Failed - LDL within 360 days     LDL Cholesterol   Date Value Ref Range Status   07/12/2019 113.6 63.0 - 159.0 mg/dL Final     Comment:     The National Cholesterol Education Program (NCEP) has set the  following guidelines (reference values) for LDL Cholesterol:  Optimal.......................<130 mg/dL  Borderline  High...............130-159 mg/dL  High..........................160-189 mg/dL  Very High.....................>190 mg/dL              Failed - HDL within 360 days     HDL   Date Value Ref Range Status   07/12/2019 50 40 - 75 mg/dL Final     Comment:     The National Cholesterol Education Program (NCEP) has set the  following guidelines (reference values) for HDL Cholesterol:  Low...............<40 mg/dL  Optimal...........>60 mg/dL              Failed - Triglycerides within 360 days     Triglycerides   Date Value Ref Range Status   07/12/2019 87 30 - 150 mg/dL Final     Comment:     The National Cholesterol Education Program (NCEP) has set the  following guidelines (reference values) for triglycerides:  Normal......................<150 mg/dL  Borderline High.............150-199 mg/dL  High........................200-499 mg/dL     08/03/2018 71 30 - 150 mg/dL Final     Comment:     The National Cholesterol Education Program (NCEP) has set the  following guidelines (reference values) for triglycerides:  Normal......................<150 mg/dL  Borderline High.............150-199 mg/dL  High........................200-499 mg/dL     04/25/2017 84 30 - 150 mg/dL Final     Comment:     The National Cholesterol Education Program (NCEP) has set the  following guidelines (reference values) for triglycerides:  Normal......................<150 mg/dL  Borderline High.............150-199 mg/dL  High........................200-499 mg/dL                Passed - Patient is at least 18 years old        Passed - Office Visit within last 12 months or future 90 days.     Recent Outpatient Visits            3 weeks ago Ductal carcinoma in situ (DCIS) of left breast    [facility] Tony Berg NP    3 months ago Primary open angle glaucoma (POAG) of both eyes, severe stage    [facility] [address] Jacqueline Perez MD    6 months ago Essential hypertension    [facility] KARIN Herman  MD Simone    6 months ago Ductal carcinoma in situ (DCIS) of left breast    [facility] Tony Berg NP    7 months ago Primary open angle glaucoma (POAG) of both eyes, severe stage    [facility] Jacqueline Perez MD          Future Appointments              In 6 days LAB, [place] [facility]    In 6 days FLUONE, [facility]    In 1 week PERIMETRY, [facility] [address] [address] [address]    In 1 week Jacqueline Perez MD [facility] [address] [facility] [address] [address] [address]    In 2 months [facility] MAMMO1 [facility]    In 5 months [facility] XR3 [facility]    In 5 months LAB, [place] [facility] DRAW STATION [facility]    In 5 months Tony Berg NP [facility], [facility]                Passed - ALT is 94 or below and within 360 days     ALT   Date Value Ref Range Status   02/21/2020 22 10 - 44 U/L Final   02/25/2019 20 10 - 44 U/L Final   12/04/2017 26 10 - 44 U/L Final              Passed - AST is 54 or below and within 360 days     AST   Date Value Ref Range Status   02/21/2020 20 10 - 40 U/L Final   02/25/2019 19 10 - 40 U/L Final   12/04/2017 28 10 - 40 U/L Final                  Appointments  past 12m or future 3m with PCP    Date Provider   Last Visit   3/5/2020 KARIN Nichols MD   Next Visit   Visit date not found KARIN Nichols MD   ED visits in past 90 days: 0     Note composed:4:42 PM 09/24/2020

## 2020-09-30 ENCOUNTER — IMMUNIZATION (OUTPATIENT)
Dept: PHARMACY | Facility: CLINIC | Age: 65
End: 2020-09-30
Payer: COMMERCIAL

## 2020-09-30 ENCOUNTER — PATIENT OUTREACH (OUTPATIENT)
Dept: ADMINISTRATIVE | Facility: OTHER | Age: 65
End: 2020-09-30

## 2020-09-30 ENCOUNTER — LAB VISIT (OUTPATIENT)
Dept: LAB | Facility: HOSPITAL | Age: 65
End: 2020-09-30
Attending: FAMILY MEDICINE
Payer: COMMERCIAL

## 2020-09-30 DIAGNOSIS — I10 ESSENTIAL HYPERTENSION: ICD-10-CM

## 2020-09-30 LAB
CHOLEST SERPL-MCNC: 195 MG/DL (ref 120–199)
CHOLEST/HDLC SERPL: 4 {RATIO} (ref 2–5)
HDLC SERPL-MCNC: 49 MG/DL (ref 40–75)
HDLC SERPL: 25.1 % (ref 20–50)
LDLC SERPL CALC-MCNC: 129.2 MG/DL (ref 63–159)
NONHDLC SERPL-MCNC: 146 MG/DL
TRIGL SERPL-MCNC: 84 MG/DL (ref 30–150)

## 2020-09-30 PROCEDURE — 80061 LIPID PANEL: CPT

## 2020-09-30 PROCEDURE — 36415 COLL VENOUS BLD VENIPUNCTURE: CPT | Mod: PO

## 2020-09-30 NOTE — PROGRESS NOTES
Chart reviewed.   Immunizations: updated  Orders placed: n/a  Upcoming appts to satisfy ARTIS topics: mammogram 12/08

## 2020-10-01 NOTE — PROGRESS NOTES
HPI     DLS: 6/12/20    Pt here for HVF review;    Meds:   Timolol BID OU   Sandro 4% TID OS   Brimonidine TID OS   Lumigan QHS OS   Keterolac TID OD   Diamox 500 BID PO     1. POAG OD>OS   2. APD OD   3. NS OS   4. Papilloma OS   5. CME OD   6. PCIOL OD   7. S/P phaco/IOL / ahmed - OD - 1/10/2018   8. S/P G6 laser od - 4/12/20`17    Last edited by Chapis York on 10/2/2020 12:00 PM. (History)            Assessment /Plan     For exam results, see Encounter Report.    Primary open angle glaucoma (POAG) of both eyes, severe stage    Afferent pupillary defect, right    Visual field loss    Nuclear sclerosis of left eye    Pseudophakia, right eye    Glaucoma shunt device of right eye    Cystoid macular edema, right    Iritis of right eye            S/P PHACO/IOL/AHMED - OD - 1/10/2017 - developed CME post op    Rx with steroid gtts and ilevro - had responded -   Recurrent CME od - 12/2/2019 - off PA and Ilevro - however  the vision is stable     F/U CYCLO G6 OD - 4/12/2017  REFERRED BY DR ELISE FOR SEVERE POORLY CONTROLLED POAG - 7/24/2015   Pt recently moved from [place] to the [facility]     Previously referred to Dr. Elise from [facility], review of outside records:    Tm 27/25.    Prior to that, IOP ranged mid-teens to low-20's on Combigan/Dorzolamide/Lumigan OU    C:D documented as 0.9 OD, 0.85 OS    Initial visit 3/29/12  IOP 10 OU on Combitan and Lumigan  C:D 0.85 OU  CCT 524OD, 531 OS    HVF's  2012 - superior defect affecting fixation OD with beginnings of early inferior arc; possible early superior arc OS  2013 - stable superior defect OD with early inferior arc; OS superior nasal step    Here for evaluation of POAG on MMT. Last IOP with Dr. Elise 21//19. IOP today 21//20 on MMT. Patient has a family history of glaucoma in maternal grandfather. States that she is compliant with all Gtt's.        Glaucoma (type and duration)    Dx about 2002 - [facility]    First HVF   First at [facility]  2015   First photos   First at [facility] 2015    Treatment / Drops started   2002              Family history    + mom / brother         Glaucoma meds    cosopt ou / brimonidine ou tid / lumigan ou q hs / bisi od tid / diamox / bisi ou        H/O adverse rxn to glaucoma drops    none        LASERS    SLT OD - 8/5/2013 - [facility] // SLT os - [facility] 8/27/2015 (good resp 21-->16)        GLAUCOMA SURGERIES    CYCL G6 OD - 4/12/2017  / phaco/IOL / ahmed od 1/10/2018         OTHER EYE SURGERIES    Combined phaco/IOL / ahmed  - od - 1/10/2018         CDR    0.99/0.95        Tbase    ?  Off gtts (on gtts was up to 28/25)         Tmax    27/25 - per outside record's (reviewed by Arik )             Ttarget    15 ou    (may need to be lower)           HVF  - OD 10-2 ss - 3 test 2015 to 2017 - SALT/ IAD (progression)    OS 24-2 ss - 5 test 2016 to 2020 -   SAD - sup paracentral defect  / IAD    (( 2 VF's from [facility] - that were reviewed by Dr. IVEY - 2012 and 2013 -  SAD w/ fix invl . IAD od / early IAD os ))            Gonio    +3-4 ou         CCT    524/531        OCT    4 test 2016 to 2020 - RNFL - OD:dec. S/N/I // OS:dec. S/T/I, pratik N        HRT    2 test 2015 to 2018  - MR -   Dec thru out   od // dec thru out  os /// CDR 0. 91 od // 0.88 os        Disc photos    2015, 2018      - Ttoday - 16/15   - Test done today    IOP check // HVF / DFE / OCT     2. + APD od     3.  NS ou    Minimal - [facility] 20/25 ou still     4. Papilloma OS   To be removed with Arik     5. ++ CME od    Decrease in [place] from 20/70 to 20/200   New since last OCT 2/3/2017    S/p PHACO/IOL / AHMED  1/10/2018    Had resolved by 7.29/2019 - on ilevro and PA   -recurrent on 12/2/2019 - off ilevro    - resolved by 6/12/2020 - on ketorolac tid od    -STILL RESOLVED 10/2/2020    PLAN   GLAUCOMA - ADVANCED - OD >OS   HVF's continue to progress - especially OD     S/P  - cyclo G6 - OD - 4/12/2017      S/p Phaco / IOL // phaco/ IOL / ahmed  OD Date: 1/10/2018 -  general anesthesia // PCB00 23.0  POY 1.75  IOP  13/17    IOP  16/15 today 10/2/2020   glaucoma gtts  timolol bid ou   Brim ou tid (use ou now)   bisi tid os   lumigan  os (hold od - get recurrent CME )   Diamox (acetazolamide pills) 500 PO bid - tolerating ok     Ketorolac 2-3  od (Ilevro - too expensive) - restarted - 12/12/2019 - for recurrent CME od     Repeat SLT os - only 180 degrees treated - too narrow sup and inf (9/20/2018) - steroid taper      OCT macula 5/17/19: +++ CME od /  OD: , Loss of foveal contour, intraretinal and subretinal fluid  OS: , NFC, no thickening    OCT - 7/26/2019 - CME improved / resolved // review retina notes // monitor for recurrence of CME od     OCT 12/2/2019 - increase - recurrent CME od - 488 - off the ilevro (did not appear to affect her vision)     Oct 6/12/2020 - no cme OD - RESOLVED     OCT - 10/2/2020 - NO CME     + VF PROG OS OR IF IOP  CREEPS UP / CONSIDER SOME FORm OF FILTERING SURGERY OS   ALSO - CONSIDER RHOPRESSA    F/U 3 MONTHS  With HVF / DFE / OCT - ? If IOP better od with addition of brimonidine - cont to monitor the intermittent recurrent CME od     IN FUTURE ALWAYS DO A 10-2 STIM V OD AND A 24-2 SS OS

## 2020-10-02 ENCOUNTER — CLINICAL SUPPORT (OUTPATIENT)
Dept: OPHTHALMOLOGY | Facility: CLINIC | Age: 65
End: 2020-10-02
Payer: COMMERCIAL

## 2020-10-02 ENCOUNTER — OFFICE VISIT (OUTPATIENT)
Dept: OPHTHALMOLOGY | Facility: CLINIC | Age: 65
End: 2020-10-02
Payer: COMMERCIAL

## 2020-10-02 DIAGNOSIS — H53.40 VISUAL FIELD LOSS: ICD-10-CM

## 2020-10-02 DIAGNOSIS — H20.9 IRITIS OF RIGHT EYE: ICD-10-CM

## 2020-10-02 DIAGNOSIS — H40.1133 PRIMARY OPEN ANGLE GLAUCOMA (POAG) OF BOTH EYES, SEVERE STAGE: Primary | ICD-10-CM

## 2020-10-02 DIAGNOSIS — H25.12 NUCLEAR SCLEROSIS OF LEFT EYE: ICD-10-CM

## 2020-10-02 DIAGNOSIS — Z96.1 PSEUDOPHAKIA, RIGHT EYE: ICD-10-CM

## 2020-10-02 DIAGNOSIS — H21.561 AFFERENT PUPILLARY DEFECT, RIGHT: ICD-10-CM

## 2020-10-02 DIAGNOSIS — H40.1133 PRIMARY OPEN ANGLE GLAUCOMA (POAG) OF BOTH EYES, SEVERE STAGE: ICD-10-CM

## 2020-10-02 PROCEDURE — 92014 PR EYE EXAM, EST PATIENT,COMPREHESV: ICD-10-PCS | Mod: S$GLB,,, | Performed by: OPHTHALMOLOGY

## 2020-10-02 PROCEDURE — 99999 PR PBB SHADOW E&M-EST. PATIENT-LVL III: CPT | Mod: PBBFAC,,, | Performed by: OPHTHALMOLOGY

## 2020-10-02 PROCEDURE — 92133 CPTRZD OPH DX IMG PST SGM ON: CPT | Mod: S$GLB,,, | Performed by: OPHTHALMOLOGY

## 2020-10-02 PROCEDURE — 92014 COMPRE OPH EXAM EST PT 1/>: CPT | Mod: S$GLB,,, | Performed by: OPHTHALMOLOGY

## 2020-10-02 PROCEDURE — 92133 POSTERIOR SEGMENT OCT OPTIC NERVE(OCULAR COHERENCE TOMOGRAPHY) - OU - BOTH EYES: ICD-10-PCS | Mod: S$GLB,,, | Performed by: OPHTHALMOLOGY

## 2020-10-02 PROCEDURE — 99999 PR PBB SHADOW E&M-EST. PATIENT-LVL III: ICD-10-PCS | Mod: PBBFAC,,, | Performed by: OPHTHALMOLOGY

## 2020-10-02 NOTE — PROGRESS NOTES
24-2  SS DONE O     REL & FIX =  POOR OD                         GOOD OS         COOP =    GOOD     PATIENT DENIES ANY ALLERGIES TO LATEX/ADHESIVES AT THIS TIME  PATIENT HAD A FEW FIXATION LOSS WITH OD     JTHOMAS      MRX    -.25 + .25 X 129  OD  +.25 + .75 X 115  OS

## 2020-10-15 DIAGNOSIS — E87.6 HYPOKALEMIA: ICD-10-CM

## 2020-10-15 RX ORDER — POTASSIUM CHLORIDE 20 MEQ/1
TABLET, EXTENDED RELEASE ORAL
Qty: 360 TABLET | Refills: 1 | Status: SHIPPED | OUTPATIENT
Start: 2020-10-15 | End: 2021-03-04 | Stop reason: SDUPTHER

## 2020-10-15 NOTE — PROGRESS NOTES
Refill Authorization Note   Deb Tesfaye is requesting a refill authorization.  Brief assessment and rationale for refill: Approve     Medication Therapy Plan: CDMR        Comments:   Orders Placed This Encounter    potassium chloride SA (K-DUR,KLOR-CON) 20 MEQ tablet      Requested Prescriptions   Signed Prescriptions Disp Refills    potassium chloride SA (K-DUR,KLOR-CON) 20 MEQ tablet 360 tablet 1     Sig: TAKE 2 TABLETS(40 MEQ) BY MOUTH TWICE DAILY       Endocrinology:  Minerals - Potassium Supplementation Passed - 10/15/2020  7:26 AM        Passed - Patient is at least 18 years old        Passed - Office Visit within last 12 months or future 90 days.     Recent Outpatient Visits            1 week ago Primary open angle glaucoma (POAG) of both eyes, severe stage    [facility] Jacqueline Perez MD    1 month ago Ductal carcinoma in situ (DCIS) of left breast    [facility] Tony Berg NP    4 months ago Primary open angle glaucoma (POAG) of both eyes, severe stage    [facility] Jacqueline Perez MD    7 months ago Essential hypertension    [facility] KARIN Nichols MD    7 months ago Ductal carcinoma in situ (DCIS) of left breast    [facility] Tony Berg NP          Future Appointments              In 2 months [facility] [id number] [place]    In 4 months [facility] XR3 [facility] [place]    In 4 months LAB, [facility] DRAW STATION [facility] - Laboratory, [facility]    In 4 months Tony Berg NP [facility], [facility]                Passed - K in normal range and within 180 days     Potassium   Date Value Ref Range Status   08/28/2020 3.5 3.5 - 5.1 mmol/L Final   03/19/2020 3.2 (L) 3.5 - 5.1 mmol/L Final   03/05/2020 3.1 (L) 3.5 - 5.1 mmol/L Final              Passed - Cr is 1.3 or below and within 180 days      Creatinine   Date Value Ref Range Status   08/28/2020 0.90 0.50 - 1.40 mg/dL Final   02/21/2020 0.9 0.5 - 1.4 mg/dL Final   08/30/2019 0.78 0.50 - 1.40 mg/dL Final              Passed - eGFR within 180 days     eGFR if non    Date Value Ref Range Status   08/28/2020 >60 >60 mL/min/1.73 m^2 Final     Comment:     Calculation used to obtain the estimated glomerular filtration  rate (eGFR) is the CKD-EPI equation.      02/21/2020 >60 >60 mL/min/1.73 m^2 Final     Comment:     Calculation used to obtain the estimated glomerular filtration  rate (eGFR) is the CKD-EPI equation.      08/30/2019 >60 >60 mL/min/1.73 m^2 Final     Comment:     Calculation used to obtain the estimated glomerular filtration  rate (eGFR) is the CKD-EPI equation.        eGFR if    Date Value Ref Range Status   08/28/2020 >60 >60 mL/min/1.73 m^2 Final   02/21/2020 >60 >60 mL/min/1.73 m^2 Final   08/30/2019 >60 >60 mL/min/1.73 m^2 Final                  Appointments  past 12m or future 3m with PCP    Date Provider   Last Visit   3/5/2020 KARIN Nichols MD   Next Visit   Visit date not found KARIN Nichols MD   ED visits in past 90 days: 0     Note composed:4:21 PM 10/15/2020

## 2020-10-15 NOTE — TELEPHONE ENCOUNTER
No new care gaps identified.  Powered by [employer]. Reference number: [id number]. 10/15/2020 7:26:37 AM   WARDT

## 2020-12-18 ENCOUNTER — HOSPITAL ENCOUNTER (OUTPATIENT)
Dept: RADIOLOGY | Facility: HOSPITAL | Age: 65
Discharge: HOME | End: 2020-12-18
Attending: NURSE PRACTITIONER
Payer: COMMERCIAL

## 2020-12-18 DIAGNOSIS — D05.12 DUCTAL CARCINOMA IN SITU (DCIS) OF LEFT BREAST: ICD-10-CM

## 2020-12-18 DIAGNOSIS — I10 ESSENTIAL HYPERTENSION: ICD-10-CM

## 2020-12-18 PROCEDURE — 77067 SCR MAMMO BI INCL CAD: CPT | Mod: 26,,, | Performed by: RADIOLOGY

## 2020-12-18 PROCEDURE — 77063 MAMMO DIGITAL SCREENING BILAT WITH TOMOSYNTHESIS_CAD: ICD-10-PCS | Mod: 26,,, | Performed by: RADIOLOGY

## 2020-12-18 PROCEDURE — 77063 BREAST TOMOSYNTHESIS BI: CPT | Mod: 26,,, | Performed by: RADIOLOGY

## 2020-12-18 PROCEDURE — 77067 SCR MAMMO BI INCL CAD: CPT | Mod: TC,PO

## 2020-12-18 PROCEDURE — 77067 MAMMO DIGITAL SCREENING BILAT WITH TOMOSYNTHESIS_CAD: ICD-10-PCS | Mod: 26,,, | Performed by: RADIOLOGY

## 2020-12-18 RX ORDER — TELMISARTAN AND HYDROCHLORTHIAZIDE 80; 25 MG/1; MG/1
1 TABLET ORAL DAILY
Qty: 90 TABLET | Refills: 1 | Status: SHIPPED | OUTPATIENT
Start: 2020-12-18 | End: 2021-06-10

## 2020-12-18 NOTE — TELEPHONE ENCOUNTER
Care Due:                  Date            Visit Type   Department     Provider  --------------------------------------------------------------------------------                                ESTABLISHED                              PATIENT      [facility] FAMILY  Last Visit: 03-      [facility]        ELOISA WRIGHT  Next Visit: None Scheduled  None         None Found                                                            Last  Test          Frequency    Reason                     Performed    Due Date  --------------------------------------------------------------------------------    Office Visit  12 months..  pravastatin,               03- 02-                             telmisartan-hydrochloroth                             duncan...................    CMP.........  12 months..  pravastatin..............  02-   02-    Powered by [employer]). Reference number: [id number]. 12/18/2020 5:00:21 PM   CST

## 2020-12-24 DIAGNOSIS — E78.5 HYPERLIPIDEMIA, UNSPECIFIED HYPERLIPIDEMIA TYPE: ICD-10-CM

## 2020-12-24 RX ORDER — PRAVASTATIN SODIUM 20 MG/1
20 TABLET ORAL DAILY
Qty: 90 TABLET | Refills: 0 | Status: SHIPPED | OUTPATIENT
Start: 2020-12-24 | End: 2021-03-29

## 2020-12-29 DIAGNOSIS — H40.1133 PRIMARY OPEN ANGLE GLAUCOMA OF BOTH EYES, SEVERE STAGE: ICD-10-CM

## 2020-12-29 RX ORDER — PILOCARPINE HYDROCHLORIDE 40 MG/ML
1 SOLUTION/ DROPS OPHTHALMIC 3 TIMES DAILY
Qty: 15 ML | Refills: 12 | Status: SHIPPED | OUTPATIENT
Start: 2020-12-29 | End: 2022-03-11 | Stop reason: SDUPTHER

## 2021-01-12 ENCOUNTER — OFFICE VISIT (OUTPATIENT)
Dept: OBSTETRICS AND GYNECOLOGY | Facility: CLINIC | Age: 66
End: 2021-01-12
Payer: COMMERCIAL

## 2021-01-12 VITALS
BODY MASS INDEX: 30.97 KG/M2 | DIASTOLIC BLOOD PRESSURE: 80 MMHG | SYSTOLIC BLOOD PRESSURE: 124 MMHG | WEIGHT: 185.88 LBS | HEIGHT: 65 IN

## 2021-01-12 DIAGNOSIS — Z01.419 GYNECOLOGIC EXAM NORMAL: Primary | ICD-10-CM

## 2021-01-12 DIAGNOSIS — D05.12 DUCTAL CARCINOMA IN SITU (DCIS) OF LEFT BREAST: ICD-10-CM

## 2021-01-12 DIAGNOSIS — Z79.810 USE OF TAMOXIFEN (NOLVADEX): ICD-10-CM

## 2021-01-12 PROCEDURE — 3079F DIAST BP 80-89 MM HG: CPT | Mod: CPTII,S$GLB,, | Performed by: OBSTETRICS & GYNECOLOGY

## 2021-01-12 PROCEDURE — 1126F PR PAIN SEVERITY QUANTIFIED, NO PAIN PRESENT: ICD-10-PCS | Mod: S$GLB,,, | Performed by: OBSTETRICS & GYNECOLOGY

## 2021-01-12 PROCEDURE — 3074F SYST BP LT 130 MM HG: CPT | Mod: CPTII,S$GLB,, | Performed by: OBSTETRICS & GYNECOLOGY

## 2021-01-12 PROCEDURE — 99999 PR PBB SHADOW E&M-EST. PATIENT-LVL IV: CPT | Mod: PBBFAC,,, | Performed by: OBSTETRICS & GYNECOLOGY

## 2021-01-12 PROCEDURE — 99396 PR PREVENTIVE VISIT,EST,40-64: ICD-10-PCS | Mod: S$GLB,,, | Performed by: OBSTETRICS & GYNECOLOGY

## 2021-01-12 PROCEDURE — 99999 PR PBB SHADOW E&M-EST. PATIENT-LVL IV: ICD-10-PCS | Mod: PBBFAC,,, | Performed by: OBSTETRICS & GYNECOLOGY

## 2021-01-12 PROCEDURE — 3008F BODY MASS INDEX DOCD: CPT | Mod: CPTII,S$GLB,, | Performed by: OBSTETRICS & GYNECOLOGY

## 2021-01-12 PROCEDURE — 99396 PREV VISIT EST AGE 40-64: CPT | Mod: S$GLB,,, | Performed by: OBSTETRICS & GYNECOLOGY

## 2021-01-12 PROCEDURE — 1126F AMNT PAIN NOTED NONE PRSNT: CPT | Mod: S$GLB,,, | Performed by: OBSTETRICS & GYNECOLOGY

## 2021-01-12 PROCEDURE — 3008F PR BODY MASS INDEX (BMI) DOCUMENTED: ICD-10-PCS | Mod: CPTII,S$GLB,, | Performed by: OBSTETRICS & GYNECOLOGY

## 2021-01-12 PROCEDURE — 88175 CYTOPATH C/V AUTO FLUID REDO: CPT

## 2021-01-12 PROCEDURE — 3079F PR MOST RECENT DIASTOLIC BLOOD PRESSURE 80-89 MM HG: ICD-10-PCS | Mod: CPTII,S$GLB,, | Performed by: OBSTETRICS & GYNECOLOGY

## 2021-01-12 PROCEDURE — 3074F PR MOST RECENT SYSTOLIC BLOOD PRESSURE < 130 MM HG: ICD-10-PCS | Mod: CPTII,S$GLB,, | Performed by: OBSTETRICS & GYNECOLOGY

## 2021-01-26 LAB
FINAL PATHOLOGIC DIAGNOSIS: NORMAL
Lab: NORMAL

## 2021-02-09 ENCOUNTER — PATIENT MESSAGE (OUTPATIENT)
Dept: FAMILY MEDICINE | Facility: CLINIC | Age: 66
End: 2021-02-09

## 2021-02-09 ENCOUNTER — HOSPITAL ENCOUNTER (OUTPATIENT)
Dept: RADIOLOGY | Facility: HOSPITAL | Age: 66
Discharge: HOME | End: 2021-02-09
Attending: NURSE PRACTITIONER
Payer: COMMERCIAL

## 2021-02-09 DIAGNOSIS — D05.12 DUCTAL CARCINOMA IN SITU (DCIS) OF LEFT BREAST: ICD-10-CM

## 2021-02-09 PROCEDURE — 71046 X-RAY EXAM CHEST 2 VIEWS: CPT | Mod: 26,,, | Performed by: RADIOLOGY

## 2021-02-09 PROCEDURE — 71046 X-RAY EXAM CHEST 2 VIEWS: CPT | Mod: TC,FY,PO

## 2021-02-09 PROCEDURE — 71046 XR CHEST PA AND LATERAL: ICD-10-PCS | Mod: 26,,, | Performed by: RADIOLOGY

## 2021-02-11 RX ORDER — KETOROLAC TROMETHAMINE 5 MG/ML
1 SOLUTION OPHTHALMIC 2 TIMES DAILY
Qty: 5 ML | Refills: 6 | Status: SHIPPED | OUTPATIENT
Start: 2021-02-11 | End: 2022-02-01 | Stop reason: SDUPTHER

## 2021-02-11 RX ORDER — KETOROLAC TROMETHAMINE 5 MG/ML
1 SOLUTION OPHTHALMIC 2 TIMES DAILY
Qty: 5 ML | Refills: 6 | Status: SHIPPED | OUTPATIENT
Start: 2021-02-11 | End: 2021-02-17

## 2021-02-17 ENCOUNTER — LAB VISIT (OUTPATIENT)
Dept: LAB | Facility: HOSPITAL | Age: 66
End: 2021-02-17
Attending: NURSE PRACTITIONER
Payer: COMMERCIAL

## 2021-02-17 ENCOUNTER — OFFICE VISIT (OUTPATIENT)
Dept: HEMATOLOGY/ONCOLOGY | Facility: CLINIC | Age: 66
End: 2021-02-17
Payer: COMMERCIAL

## 2021-02-17 ENCOUNTER — PATIENT MESSAGE (OUTPATIENT)
Dept: HEMATOLOGY/ONCOLOGY | Facility: CLINIC | Age: 66
End: 2021-02-17

## 2021-02-17 VITALS
HEART RATE: 67 BPM | BODY MASS INDEX: 30.96 KG/M2 | RESPIRATION RATE: 18 BRPM | TEMPERATURE: 98 F | SYSTOLIC BLOOD PRESSURE: 133 MMHG | WEIGHT: 186.06 LBS | DIASTOLIC BLOOD PRESSURE: 67 MMHG | OXYGEN SATURATION: 100 %

## 2021-02-17 DIAGNOSIS — Z79.810 USE OF TAMOXIFEN (NOLVADEX): ICD-10-CM

## 2021-02-17 DIAGNOSIS — D05.12 DUCTAL CARCINOMA IN SITU (DCIS) OF LEFT BREAST: Primary | ICD-10-CM

## 2021-02-17 DIAGNOSIS — D05.12 DUCTAL CARCINOMA IN SITU (DCIS) OF LEFT BREAST: ICD-10-CM

## 2021-02-17 LAB
25(OH)D3+25(OH)D2 SERPL-MCNC: 52 NG/ML (ref 30–96)
ALBUMIN SERPL BCP-MCNC: 4.2 G/DL (ref 3.5–5.2)
ALP SERPL-CCNC: 58 U/L (ref 38–145)
ALT SERPL W/O P-5'-P-CCNC: 33 U/L (ref 0–35)
ANION GAP SERPL CALC-SCNC: 8 MMOL/L (ref 8–16)
AST SERPL-CCNC: 35 U/L (ref 14–36)
BASOPHILS # BLD AUTO: 0.03 K/UL (ref 0–0.2)
BASOPHILS NFR BLD: 0.5 % (ref 0–1.9)
BILIRUB SERPL-MCNC: 0.5 MG/DL (ref 0.2–1.3)
CALCIUM SERPL-MCNC: 9.4 MG/DL (ref 8.4–10.2)
CHLORIDE SERPL-SCNC: 109 MMOL/L (ref 95–110)
CO2 SERPL-SCNC: 25 MMOL/L (ref 22–31)
CREAT SERPL-MCNC: 0.87 MG/DL (ref 0.5–1.4)
DIFFERENTIAL METHOD: NORMAL
EOSINOPHIL # BLD AUTO: 0.1 K/UL (ref 0–0.5)
EOSINOPHIL NFR BLD: 1.4 % (ref 0–8)
ERYTHROCYTE [DISTWIDTH] IN BLOOD BY AUTOMATED COUNT: 13.6 % (ref 11.5–14.5)
EST. GFR  (AFRICAN AMERICAN): >60 ML/MIN/1.73 M^2
EST. GFR  (NON AFRICAN AMERICAN): >60 ML/MIN/1.73 M^2
GLUCOSE SERPL-MCNC: 109 MG/DL (ref 70–110)
HCT VFR BLD AUTO: 39.6 % (ref 37–48.5)
HGB BLD-MCNC: 13.1 G/DL (ref 12–16)
IMM GRANULOCYTES # BLD AUTO: 0.03 K/UL (ref 0–0.04)
IMM GRANULOCYTES NFR BLD AUTO: 0.5 % (ref 0–0.5)
LDH SERPL L TO P-CCNC: 448 U/L (ref 313–618)
LYMPHOCYTES # BLD AUTO: 2.2 K/UL (ref 1–4.8)
LYMPHOCYTES NFR BLD: 34.5 % (ref 18–48)
MCH RBC QN AUTO: 28.4 PG (ref 27–31)
MCHC RBC AUTO-ENTMCNC: 33.1 G/DL (ref 32–36)
MCV RBC AUTO: 86 FL (ref 82–98)
MONOCYTES # BLD AUTO: 0.6 K/UL (ref 0.3–1)
MONOCYTES NFR BLD: 9.4 % (ref 4–15)
NEUTROPHILS # BLD AUTO: 3.4 K/UL (ref 1.8–7.7)
NEUTROPHILS NFR BLD: 53.7 % (ref 38–73)
NRBC BLD-RTO: 0 /100 WBC
PLATELET # BLD AUTO: 239 K/UL (ref 150–350)
PMV BLD AUTO: 10.2 FL (ref 9.2–12.9)
POTASSIUM SERPL-SCNC: 3.8 MMOL/L (ref 3.5–5.1)
PROT SERPL-MCNC: 7.1 G/DL (ref 6–8.4)
RBC # BLD AUTO: 4.62 M/UL (ref 4–5.4)
SODIUM SERPL-SCNC: 142 MMOL/L (ref 136–145)
UUN UR-MCNC: 18 MG/DL (ref 7–18)
WBC # BLD AUTO: 6.4 K/UL (ref 3.9–12.7)

## 2021-02-17 PROCEDURE — 85025 COMPLETE CBC W/AUTO DIFF WBC: CPT | Mod: PN

## 2021-02-17 PROCEDURE — 80053 COMPREHEN METABOLIC PANEL: CPT

## 2021-02-17 PROCEDURE — 36415 COLL VENOUS BLD VENIPUNCTURE: CPT | Mod: PN

## 2021-02-17 PROCEDURE — 1126F AMNT PAIN NOTED NONE PRSNT: CPT | Mod: S$GLB,,, | Performed by: NURSE PRACTITIONER

## 2021-02-17 PROCEDURE — 83615 LACTATE (LD) (LDH) ENZYME: CPT

## 2021-02-17 PROCEDURE — 3008F PR BODY MASS INDEX (BMI) DOCUMENTED: ICD-10-PCS | Mod: CPTII,S$GLB,, | Performed by: NURSE PRACTITIONER

## 2021-02-17 PROCEDURE — 83615 LACTATE (LD) (LDH) ENZYME: CPT | Mod: PN

## 2021-02-17 PROCEDURE — 82306 VITAMIN D 25 HYDROXY: CPT | Mod: PN

## 2021-02-17 PROCEDURE — 3075F SYST BP GE 130 - 139MM HG: CPT | Mod: CPTII,S$GLB,, | Performed by: NURSE PRACTITIONER

## 2021-02-17 PROCEDURE — 80053 COMPREHEN METABOLIC PANEL: CPT | Mod: PN

## 2021-02-17 PROCEDURE — 99213 PR OFFICE/OUTPT VISIT, EST, LEVL III, 20-29 MIN: ICD-10-PCS | Mod: S$GLB,,, | Performed by: NURSE PRACTITIONER

## 2021-02-17 PROCEDURE — 85025 COMPLETE CBC W/AUTO DIFF WBC: CPT

## 2021-02-17 PROCEDURE — 99999 PR PBB SHADOW E&M-EST. PATIENT-LVL V: ICD-10-PCS | Mod: PBBFAC,,, | Performed by: NURSE PRACTITIONER

## 2021-02-17 PROCEDURE — 3008F BODY MASS INDEX DOCD: CPT | Mod: CPTII,S$GLB,, | Performed by: NURSE PRACTITIONER

## 2021-02-17 PROCEDURE — 99213 OFFICE O/P EST LOW 20 MIN: CPT | Mod: S$GLB,,, | Performed by: NURSE PRACTITIONER

## 2021-02-17 PROCEDURE — 99999 PR PBB SHADOW E&M-EST. PATIENT-LVL V: CPT | Mod: PBBFAC,,, | Performed by: NURSE PRACTITIONER

## 2021-02-17 PROCEDURE — 82306 VITAMIN D 25 HYDROXY: CPT

## 2021-02-17 PROCEDURE — 3078F DIAST BP <80 MM HG: CPT | Mod: CPTII,S$GLB,, | Performed by: NURSE PRACTITIONER

## 2021-02-17 PROCEDURE — 3078F PR MOST RECENT DIASTOLIC BLOOD PRESSURE < 80 MM HG: ICD-10-PCS | Mod: CPTII,S$GLB,, | Performed by: NURSE PRACTITIONER

## 2021-02-17 PROCEDURE — 3075F PR MOST RECENT SYSTOLIC BLOOD PRESS GE 130-139MM HG: ICD-10-PCS | Mod: CPTII,S$GLB,, | Performed by: NURSE PRACTITIONER

## 2021-02-17 PROCEDURE — 1126F PR PAIN SEVERITY QUANTIFIED, NO PAIN PRESENT: ICD-10-PCS | Mod: S$GLB,,, | Performed by: NURSE PRACTITIONER

## 2021-02-23 DIAGNOSIS — H40.1130 PRIMARY OPEN ANGLE GLAUCOMA OF BOTH EYES, UNSPECIFIED GLAUCOMA STAGE: ICD-10-CM

## 2021-02-23 RX ORDER — TIMOLOL MALEATE 5 MG/ML
1 SOLUTION/ DROPS OPHTHALMIC 2 TIMES DAILY
Qty: 10 ML | Refills: 6 | Status: SHIPPED | OUTPATIENT
Start: 2021-02-23 | End: 2021-02-28 | Stop reason: SDUPTHER

## 2021-02-26 ENCOUNTER — OFFICE VISIT (OUTPATIENT)
Dept: OPHTHALMOLOGY | Facility: CLINIC | Age: 66
End: 2021-02-26
Payer: COMMERCIAL

## 2021-02-26 DIAGNOSIS — H40.1133 PRIMARY OPEN ANGLE GLAUCOMA (POAG) OF BOTH EYES, SEVERE STAGE: Primary | ICD-10-CM

## 2021-02-26 DIAGNOSIS — H53.40 VISUAL FIELD LOSS: ICD-10-CM

## 2021-02-26 DIAGNOSIS — H20.9 IRITIS OF RIGHT EYE: ICD-10-CM

## 2021-02-26 DIAGNOSIS — H21.561 AFFERENT PUPILLARY DEFECT, RIGHT: ICD-10-CM

## 2021-02-26 DIAGNOSIS — Z96.1 PSEUDOPHAKIA, RIGHT EYE: ICD-10-CM

## 2021-02-26 DIAGNOSIS — H25.12 NUCLEAR SCLEROSIS OF LEFT EYE: ICD-10-CM

## 2021-02-26 PROCEDURE — 92012 PR EYE EXAM, EST PATIENT,INTERMED: ICD-10-PCS | Mod: S$GLB,,, | Performed by: OPHTHALMOLOGY

## 2021-02-26 PROCEDURE — 1126F AMNT PAIN NOTED NONE PRSNT: CPT | Mod: S$GLB,,, | Performed by: OPHTHALMOLOGY

## 2021-02-26 PROCEDURE — 99999 PR PBB SHADOW E&M-EST. PATIENT-LVL III: CPT | Mod: PBBFAC,,, | Performed by: OPHTHALMOLOGY

## 2021-02-26 PROCEDURE — 1126F PR PAIN SEVERITY QUANTIFIED, NO PAIN PRESENT: ICD-10-PCS | Mod: S$GLB,,, | Performed by: OPHTHALMOLOGY

## 2021-02-26 PROCEDURE — 92133 HEIDELBERG RETINA TOMOGRAPHY (HRT) - OU - BOTH EYES: ICD-10-PCS | Mod: S$GLB,,, | Performed by: OPHTHALMOLOGY

## 2021-02-26 PROCEDURE — 99999 PR PBB SHADOW E&M-EST. PATIENT-LVL III: ICD-10-PCS | Mod: PBBFAC,,, | Performed by: OPHTHALMOLOGY

## 2021-02-26 PROCEDURE — 92133 CPTRZD OPH DX IMG PST SGM ON: CPT | Mod: S$GLB,,, | Performed by: OPHTHALMOLOGY

## 2021-02-26 PROCEDURE — 92012 INTRM OPH EXAM EST PATIENT: CPT | Mod: S$GLB,,, | Performed by: OPHTHALMOLOGY

## 2021-02-26 RX ORDER — NETARSUDIL 0.2 MG/ML
1 SOLUTION/ DROPS OPHTHALMIC; TOPICAL DAILY
Qty: 2.5 ML | Refills: 3 | Status: SHIPPED | OUTPATIENT
Start: 2021-02-26 | End: 2021-04-09 | Stop reason: SDUPTHER

## 2021-02-28 DIAGNOSIS — H40.1130 PRIMARY OPEN ANGLE GLAUCOMA OF BOTH EYES, UNSPECIFIED GLAUCOMA STAGE: ICD-10-CM

## 2021-03-01 RX ORDER — TIMOLOL MALEATE 5 MG/ML
1 SOLUTION/ DROPS OPHTHALMIC 2 TIMES DAILY
Qty: 10 ML | Refills: 6 | Status: SHIPPED | OUTPATIENT
Start: 2021-03-01 | End: 2021-12-29 | Stop reason: SDUPTHER

## 2021-03-04 DIAGNOSIS — E87.6 HYPOKALEMIA: ICD-10-CM

## 2021-03-04 RX ORDER — POTASSIUM CHLORIDE 20 MEQ/1
40 TABLET, EXTENDED RELEASE ORAL 2 TIMES DAILY
Qty: 360 TABLET | Refills: 0 | Status: SHIPPED | OUTPATIENT
Start: 2021-03-04 | End: 2021-07-26 | Stop reason: SDUPTHER

## 2021-03-17 DIAGNOSIS — H40.1133 PRIMARY OPEN ANGLE GLAUCOMA (POAG) OF BOTH EYES, SEVERE STAGE: ICD-10-CM

## 2021-03-17 RX ORDER — ACETAZOLAMIDE 500 MG/1
500 CAPSULE, EXTENDED RELEASE ORAL 2 TIMES DAILY
Qty: 180 CAPSULE | Refills: 3 | Status: SHIPPED | OUTPATIENT
Start: 2021-03-17 | End: 2022-05-15 | Stop reason: SDUPTHER

## 2021-03-26 DIAGNOSIS — E78.5 HYPERLIPIDEMIA, UNSPECIFIED HYPERLIPIDEMIA TYPE: ICD-10-CM

## 2021-03-29 RX ORDER — PRAVASTATIN SODIUM 20 MG/1
TABLET ORAL
Qty: 90 TABLET | Refills: 0 | Status: SHIPPED | OUTPATIENT
Start: 2021-03-29 | End: 2021-06-28

## 2021-04-07 ENCOUNTER — OFFICE VISIT (OUTPATIENT)
Dept: FAMILY MEDICINE | Facility: CLINIC | Age: 66
End: 2021-04-07
Payer: COMMERCIAL

## 2021-04-07 VITALS
TEMPERATURE: 98 F | HEIGHT: 64 IN | OXYGEN SATURATION: 98 % | BODY MASS INDEX: 31.47 KG/M2 | SYSTOLIC BLOOD PRESSURE: 132 MMHG | WEIGHT: 184.31 LBS | DIASTOLIC BLOOD PRESSURE: 78 MMHG | HEART RATE: 75 BPM

## 2021-04-07 DIAGNOSIS — E66.9 OBESITY (BMI 30-39.9): ICD-10-CM

## 2021-04-07 DIAGNOSIS — D05.12 DUCTAL CARCINOMA IN SITU (DCIS) OF LEFT BREAST: ICD-10-CM

## 2021-04-07 DIAGNOSIS — I10 ESSENTIAL HYPERTENSION: Primary | ICD-10-CM

## 2021-04-07 DIAGNOSIS — E78.2 MIXED HYPERLIPIDEMIA: ICD-10-CM

## 2021-04-07 DIAGNOSIS — E87.6 HYPOKALEMIA: ICD-10-CM

## 2021-04-07 PROCEDURE — 99999 PR PBB SHADOW E&M-EST. PATIENT-LVL IV: ICD-10-PCS | Mod: PBBFAC,,, | Performed by: FAMILY MEDICINE

## 2021-04-07 PROCEDURE — 99214 PR OFFICE/OUTPT VISIT, EST, LEVL IV, 30-39 MIN: ICD-10-PCS | Mod: S$GLB,,, | Performed by: FAMILY MEDICINE

## 2021-04-07 PROCEDURE — 1126F PR PAIN SEVERITY QUANTIFIED, NO PAIN PRESENT: ICD-10-PCS | Mod: S$GLB,,, | Performed by: FAMILY MEDICINE

## 2021-04-07 PROCEDURE — 3075F PR MOST RECENT SYSTOLIC BLOOD PRESS GE 130-139MM HG: ICD-10-PCS | Mod: CPTII,S$GLB,, | Performed by: FAMILY MEDICINE

## 2021-04-07 PROCEDURE — 3008F BODY MASS INDEX DOCD: CPT | Mod: CPTII,S$GLB,, | Performed by: FAMILY MEDICINE

## 2021-04-07 PROCEDURE — 3078F PR MOST RECENT DIASTOLIC BLOOD PRESSURE < 80 MM HG: ICD-10-PCS | Mod: CPTII,S$GLB,, | Performed by: FAMILY MEDICINE

## 2021-04-07 PROCEDURE — 3078F DIAST BP <80 MM HG: CPT | Mod: CPTII,S$GLB,, | Performed by: FAMILY MEDICINE

## 2021-04-07 PROCEDURE — 99214 OFFICE O/P EST MOD 30 MIN: CPT | Mod: S$GLB,,, | Performed by: FAMILY MEDICINE

## 2021-04-07 PROCEDURE — 3008F PR BODY MASS INDEX (BMI) DOCUMENTED: ICD-10-PCS | Mod: CPTII,S$GLB,, | Performed by: FAMILY MEDICINE

## 2021-04-07 PROCEDURE — 99999 PR PBB SHADOW E&M-EST. PATIENT-LVL IV: CPT | Mod: PBBFAC,,, | Performed by: FAMILY MEDICINE

## 2021-04-07 PROCEDURE — 3075F SYST BP GE 130 - 139MM HG: CPT | Mod: CPTII,S$GLB,, | Performed by: FAMILY MEDICINE

## 2021-04-07 PROCEDURE — 1126F AMNT PAIN NOTED NONE PRSNT: CPT | Mod: S$GLB,,, | Performed by: FAMILY MEDICINE

## 2021-04-09 ENCOUNTER — OFFICE VISIT (OUTPATIENT)
Dept: OPHTHALMOLOGY | Facility: CLINIC | Age: 66
End: 2021-04-09
Payer: COMMERCIAL

## 2021-04-09 DIAGNOSIS — H43.393 FLOATERS, BILATERAL: ICD-10-CM

## 2021-04-09 DIAGNOSIS — H35.351 CYSTOID MACULAR EDEMA, RIGHT: ICD-10-CM

## 2021-04-09 DIAGNOSIS — Z96.1 PSEUDOPHAKIA, RIGHT EYE: ICD-10-CM

## 2021-04-09 DIAGNOSIS — H40.1133 PRIMARY OPEN ANGLE GLAUCOMA (POAG) OF BOTH EYES, SEVERE STAGE: Primary | ICD-10-CM

## 2021-04-09 DIAGNOSIS — H53.40 VISUAL FIELD LOSS: ICD-10-CM

## 2021-04-09 DIAGNOSIS — H25.12 NUCLEAR SCLEROSIS OF LEFT EYE: ICD-10-CM

## 2021-04-09 DIAGNOSIS — H20.9 IRITIS OF RIGHT EYE: ICD-10-CM

## 2021-04-09 DIAGNOSIS — H21.561 AFFERENT PUPILLARY DEFECT, RIGHT: ICD-10-CM

## 2021-04-09 PROCEDURE — 99999 PR PBB SHADOW E&M-EST. PATIENT-LVL III: ICD-10-PCS | Mod: PBBFAC,,, | Performed by: OPHTHALMOLOGY

## 2021-04-09 PROCEDURE — 1126F PR PAIN SEVERITY QUANTIFIED, NO PAIN PRESENT: ICD-10-PCS | Mod: S$GLB,,, | Performed by: OPHTHALMOLOGY

## 2021-04-09 PROCEDURE — 1126F AMNT PAIN NOTED NONE PRSNT: CPT | Mod: S$GLB,,, | Performed by: OPHTHALMOLOGY

## 2021-04-09 PROCEDURE — 99999 PR PBB SHADOW E&M-EST. PATIENT-LVL III: CPT | Mod: PBBFAC,,, | Performed by: OPHTHALMOLOGY

## 2021-04-09 PROCEDURE — 92012 INTRM OPH EXAM EST PATIENT: CPT | Mod: S$GLB,,, | Performed by: OPHTHALMOLOGY

## 2021-04-09 PROCEDURE — 92012 PR EYE EXAM, EST PATIENT,INTERMED: ICD-10-PCS | Mod: S$GLB,,, | Performed by: OPHTHALMOLOGY

## 2021-04-09 RX ORDER — NETARSUDIL 0.2 MG/ML
1 SOLUTION/ DROPS OPHTHALMIC; TOPICAL DAILY
Qty: 2.5 ML | Refills: 11 | OUTPATIENT
Start: 2021-04-09 | End: 2021-04-09 | Stop reason: SDUPTHER

## 2021-04-09 RX ORDER — NETARSUDIL 0.2 MG/ML
1 SOLUTION/ DROPS OPHTHALMIC; TOPICAL DAILY
Qty: 2.5 ML | Refills: 11 | Status: SHIPPED | OUTPATIENT
Start: 2021-04-09 | End: 2022-02-09 | Stop reason: SDUPTHER

## 2021-06-09 ENCOUNTER — PATIENT MESSAGE (OUTPATIENT)
Dept: FAMILY MEDICINE | Facility: CLINIC | Age: 66
End: 2021-06-09

## 2021-06-09 DIAGNOSIS — I10 ESSENTIAL HYPERTENSION: ICD-10-CM

## 2021-06-10 RX ORDER — TELMISARTAN AND HYDROCHLORTHIAZIDE 80; 25 MG/1; MG/1
TABLET ORAL
Qty: 90 TABLET | Refills: 2 | Status: SHIPPED | OUTPATIENT
Start: 2021-06-10 | End: 2022-09-12 | Stop reason: SDUPTHER

## 2021-06-26 DIAGNOSIS — E78.5 HYPERLIPIDEMIA, UNSPECIFIED HYPERLIPIDEMIA TYPE: ICD-10-CM

## 2021-06-28 RX ORDER — PRAVASTATIN SODIUM 20 MG/1
TABLET ORAL
Qty: 90 TABLET | Refills: 1 | Status: SHIPPED | OUTPATIENT
Start: 2021-06-28 | End: 2021-10-21

## 2021-07-28 ENCOUNTER — PATIENT MESSAGE (OUTPATIENT)
Dept: OPHTHALMOLOGY | Facility: CLINIC | Age: 66
End: 2021-07-28

## 2021-07-29 ENCOUNTER — TELEPHONE (OUTPATIENT)
Dept: OPHTHALMOLOGY | Facility: CLINIC | Age: 66
End: 2021-07-29

## 2021-08-12 ENCOUNTER — PATIENT OUTREACH (OUTPATIENT)
Dept: ADMINISTRATIVE | Facility: OTHER | Age: 66
End: 2021-08-12

## 2021-08-13 ENCOUNTER — PATIENT MESSAGE (OUTPATIENT)
Dept: OPHTHALMOLOGY | Facility: CLINIC | Age: 66
End: 2021-08-13

## 2021-08-13 ENCOUNTER — OFFICE VISIT (OUTPATIENT)
Dept: OPHTHALMOLOGY | Facility: CLINIC | Age: 66
End: 2021-08-13
Payer: COMMERCIAL

## 2021-08-13 DIAGNOSIS — H25.12 NUCLEAR SCLEROSIS OF LEFT EYE: ICD-10-CM

## 2021-08-13 DIAGNOSIS — H40.1133 PRIMARY OPEN ANGLE GLAUCOMA (POAG) OF BOTH EYES, SEVERE STAGE: Primary | ICD-10-CM

## 2021-08-13 DIAGNOSIS — Z96.1 PSEUDOPHAKIA, RIGHT EYE: ICD-10-CM

## 2021-08-13 DIAGNOSIS — H21.561 AFFERENT PUPILLARY DEFECT, RIGHT: ICD-10-CM

## 2021-08-13 DIAGNOSIS — H53.40 VISUAL FIELD LOSS: ICD-10-CM

## 2021-08-13 PROCEDURE — 1126F AMNT PAIN NOTED NONE PRSNT: CPT | Mod: CPTII,S$GLB,, | Performed by: OPHTHALMOLOGY

## 2021-08-13 PROCEDURE — 1126F PR PAIN SEVERITY QUANTIFIED, NO PAIN PRESENT: ICD-10-PCS | Mod: CPTII,S$GLB,, | Performed by: OPHTHALMOLOGY

## 2021-08-13 PROCEDURE — 99999 PR PBB SHADOW E&M-EST. PATIENT-LVL III: ICD-10-PCS | Mod: PBBFAC,,, | Performed by: OPHTHALMOLOGY

## 2021-08-13 PROCEDURE — 99999 PR PBB SHADOW E&M-EST. PATIENT-LVL III: CPT | Mod: PBBFAC,,, | Performed by: OPHTHALMOLOGY

## 2021-08-13 PROCEDURE — 1159F PR MEDICATION LIST DOCUMENTED IN MEDICAL RECORD: ICD-10-PCS | Mod: CPTII,S$GLB,, | Performed by: OPHTHALMOLOGY

## 2021-08-13 PROCEDURE — 92012 PR EYE EXAM, EST PATIENT,INTERMED: ICD-10-PCS | Mod: S$GLB,,, | Performed by: OPHTHALMOLOGY

## 2021-08-13 PROCEDURE — 1159F MED LIST DOCD IN RCRD: CPT | Mod: CPTII,S$GLB,, | Performed by: OPHTHALMOLOGY

## 2021-08-13 PROCEDURE — 92012 INTRM OPH EXAM EST PATIENT: CPT | Mod: S$GLB,,, | Performed by: OPHTHALMOLOGY

## 2021-10-21 DIAGNOSIS — E78.5 HYPERLIPIDEMIA, UNSPECIFIED HYPERLIPIDEMIA TYPE: ICD-10-CM

## 2021-10-21 RX ORDER — PRAVASTATIN SODIUM 20 MG/1
TABLET ORAL
Qty: 90 TABLET | Refills: 0 | Status: SHIPPED | OUTPATIENT
Start: 2021-10-21 | End: 2022-01-09

## 2021-10-25 DIAGNOSIS — H40.1133 PRIMARY OPEN ANGLE GLAUCOMA OF BOTH EYES, SEVERE STAGE: ICD-10-CM

## 2021-10-25 RX ORDER — BRIMONIDINE TARTRATE 2 MG/ML
1 SOLUTION/ DROPS OPHTHALMIC 3 TIMES DAILY
Qty: 30 ML | Refills: 3 | Status: SHIPPED | OUTPATIENT
Start: 2021-10-25 | End: 2023-01-17

## 2021-10-27 ENCOUNTER — PATIENT MESSAGE (OUTPATIENT)
Dept: HEMATOLOGY/ONCOLOGY | Facility: CLINIC | Age: 66
End: 2021-10-27
Payer: COMMERCIAL

## 2021-11-18 DIAGNOSIS — H40.1133 PRIMARY OPEN ANGLE GLAUCOMA (POAG) OF BOTH EYES, SEVERE STAGE: ICD-10-CM

## 2021-11-19 RX ORDER — BIMATOPROST 0.3 MG/ML
1 SOLUTION/ DROPS OPHTHALMIC NIGHTLY
Qty: 2.5 ML | Refills: 12 | Status: SHIPPED | OUTPATIENT
Start: 2021-11-19 | End: 2022-02-24 | Stop reason: CLARIF

## 2021-12-03 ENCOUNTER — OFFICE VISIT (OUTPATIENT)
Dept: OPHTHALMOLOGY | Facility: CLINIC | Age: 66
End: 2021-12-03
Payer: COMMERCIAL

## 2021-12-03 ENCOUNTER — CLINICAL SUPPORT (OUTPATIENT)
Dept: OPHTHALMOLOGY | Facility: CLINIC | Age: 66
End: 2021-12-03
Payer: COMMERCIAL

## 2021-12-03 DIAGNOSIS — H25.12 NUCLEAR SCLEROSIS OF LEFT EYE: ICD-10-CM

## 2021-12-03 DIAGNOSIS — H43.393 FLOATERS, BILATERAL: ICD-10-CM

## 2021-12-03 DIAGNOSIS — H40.1133 PRIMARY OPEN ANGLE GLAUCOMA (POAG) OF BOTH EYES, SEVERE STAGE: ICD-10-CM

## 2021-12-03 DIAGNOSIS — H53.40 VISUAL FIELD LOSS: ICD-10-CM

## 2021-12-03 DIAGNOSIS — Z96.1 PSEUDOPHAKIA, RIGHT EYE: ICD-10-CM

## 2021-12-03 DIAGNOSIS — H35.351 CYSTOID MACULAR EDEMA, RIGHT: ICD-10-CM

## 2021-12-03 DIAGNOSIS — H21.561 AFFERENT PUPILLARY DEFECT, RIGHT: ICD-10-CM

## 2021-12-03 DIAGNOSIS — H40.1133 PRIMARY OPEN ANGLE GLAUCOMA (POAG) OF BOTH EYES, SEVERE STAGE: Primary | ICD-10-CM

## 2021-12-03 PROCEDURE — 99999 PR PBB SHADOW E&M-EST. PATIENT-LVL III: ICD-10-PCS | Mod: PBBFAC,,, | Performed by: OPHTHALMOLOGY

## 2021-12-03 PROCEDURE — 92014 PR EYE EXAM, EST PATIENT,COMPREHESV: ICD-10-PCS | Mod: S$GLB,,, | Performed by: OPHTHALMOLOGY

## 2021-12-03 PROCEDURE — 92014 COMPRE OPH EXAM EST PT 1/>: CPT | Mod: S$GLB,,, | Performed by: OPHTHALMOLOGY

## 2021-12-03 PROCEDURE — 92133 POSTERIOR SEGMENT OCT OPTIC NERVE(OCULAR COHERENCE TOMOGRAPHY) - OU - BOTH EYES: ICD-10-PCS | Mod: S$GLB,,, | Performed by: OPHTHALMOLOGY

## 2021-12-03 PROCEDURE — 99999 PR PBB SHADOW E&M-EST. PATIENT-LVL III: CPT | Mod: PBBFAC,,, | Performed by: OPHTHALMOLOGY

## 2021-12-03 PROCEDURE — 92133 CPTRZD OPH DX IMG PST SGM ON: CPT | Mod: S$GLB,,, | Performed by: OPHTHALMOLOGY

## 2021-12-20 ENCOUNTER — HOSPITAL ENCOUNTER (OUTPATIENT)
Dept: RADIOLOGY | Facility: HOSPITAL | Age: 66
Discharge: HOME | End: 2021-12-20
Attending: NURSE PRACTITIONER
Payer: COMMERCIAL

## 2021-12-20 DIAGNOSIS — D05.12 DUCTAL CARCINOMA IN SITU (DCIS) OF LEFT BREAST: ICD-10-CM

## 2021-12-20 DIAGNOSIS — Z12.31 SCREENING MAMMOGRAM, ENCOUNTER FOR: ICD-10-CM

## 2021-12-20 PROCEDURE — 77063 MAMMO DIGITAL SCREENING BILAT WITH TOMO: ICD-10-PCS | Mod: 26,,, | Performed by: RADIOLOGY

## 2021-12-20 PROCEDURE — 77067 SCR MAMMO BI INCL CAD: CPT | Mod: 26,,, | Performed by: RADIOLOGY

## 2021-12-20 PROCEDURE — 77067 MAMMO DIGITAL SCREENING BILAT WITH TOMO: ICD-10-PCS | Mod: 26,,, | Performed by: RADIOLOGY

## 2021-12-20 PROCEDURE — 77063 BREAST TOMOSYNTHESIS BI: CPT | Mod: 26,,, | Performed by: RADIOLOGY

## 2021-12-20 PROCEDURE — 77067 SCR MAMMO BI INCL CAD: CPT | Mod: TC,PO

## 2022-01-04 ENCOUNTER — OFFICE VISIT (OUTPATIENT)
Dept: FAMILY MEDICINE | Facility: CLINIC | Age: 67
End: 2022-01-04
Payer: COMMERCIAL

## 2022-01-04 ENCOUNTER — TELEPHONE (OUTPATIENT)
Dept: FAMILY MEDICINE | Facility: CLINIC | Age: 67
End: 2022-01-04
Payer: COMMERCIAL

## 2022-01-04 DIAGNOSIS — J32.9 SINUSITIS, UNSPECIFIED CHRONICITY, UNSPECIFIED LOCATION: Primary | ICD-10-CM

## 2022-01-04 LAB
CTP QC/QA: YES
SARS-COV-2 RDRP RESP QL NAA+PROBE: NEGATIVE

## 2022-01-04 PROCEDURE — 1159F MED LIST DOCD IN RCRD: CPT | Mod: CPTII,95,, | Performed by: NURSE PRACTITIONER

## 2022-01-04 PROCEDURE — 1160F PR REVIEW ALL MEDS BY PRESCRIBER/CLIN PHARMACIST DOCUMENTED: ICD-10-PCS | Mod: CPTII,95,, | Performed by: NURSE PRACTITIONER

## 2022-01-04 PROCEDURE — 1160F RVW MEDS BY RX/DR IN RCRD: CPT | Mod: CPTII,95,, | Performed by: NURSE PRACTITIONER

## 2022-01-04 PROCEDURE — 99213 PR OFFICE/OUTPT VISIT, EST, LEVL III, 20-29 MIN: ICD-10-PCS | Mod: 95,,, | Performed by: NURSE PRACTITIONER

## 2022-01-04 PROCEDURE — 1159F PR MEDICATION LIST DOCUMENTED IN MEDICAL RECORD: ICD-10-PCS | Mod: CPTII,95,, | Performed by: NURSE PRACTITIONER

## 2022-01-04 PROCEDURE — 99213 OFFICE O/P EST LOW 20 MIN: CPT | Mod: 95,,, | Performed by: NURSE PRACTITIONER

## 2022-01-04 RX ORDER — CEFDINIR 300 MG/1
300 CAPSULE ORAL 2 TIMES DAILY
Qty: 20 CAPSULE | Refills: 0 | Status: SHIPPED | OUTPATIENT
Start: 2022-01-04 | End: 2022-01-14

## 2022-01-04 RX ORDER — PREDNISONE 20 MG/1
20 TABLET ORAL DAILY
Qty: 5 TABLET | Refills: 0 | Status: SHIPPED | OUTPATIENT
Start: 2022-01-04 | End: 2022-01-09

## 2022-01-04 RX ORDER — VALACYCLOVIR HYDROCHLORIDE 1 G/1
1000 TABLET, FILM COATED ORAL 3 TIMES DAILY
Qty: 15 TABLET | Refills: 0 | Status: SHIPPED | OUTPATIENT
Start: 2022-01-04 | End: 2023-02-23 | Stop reason: ALTCHOICE

## 2022-01-04 RX ORDER — FLUTICASONE PROPIONATE 50 MCG
1 SPRAY, SUSPENSION (ML) NASAL DAILY
Qty: 16 G | Refills: 0 | Status: SHIPPED | OUTPATIENT
Start: 2022-01-04

## 2022-01-04 NOTE — PROGRESS NOTES
This test utilizes isothermal nucleic acid amplification   technology to detect the SARS-CoV-2 RdRp nucleic acid segment.   The analytical sensitivity (limit of detection) is 125 genome   equivalents/mL.   A POSITIVE result implies infection with the SARS-CoV-2 virus;   the patient is presumed to be contagious.     A NEGATIVE result means that SARS-CoV-2 nucleic acids are not   present above the limit of detection. A NEGATIVE result should be   treated as presumptive. It does not rule out the possibility of   COVID-19 and should not be the sole basis for treatment decisions.   If COVID-19 is strongly suspected based on clinical and exposure   history, re-testing using an alternate molecular assay should be   considered.   This test is only for use under the Food and Drug   Administration s Emergency Use Authorization (EUA).   Commercial kits are provided by [employer].   Performance characteristics of the EUA have been independently   verified by [facility] Department of   Pathology and Laboratory Medicine.   _________________________________________________________________   The authorized Fact Sheet for Healthcare Providers and the authorized Fact   Sheet for Patients of the ID NOW COVID-19 are available on the FDA   website:     https://www.fda.gov/media/903876/download  https://www.fda.gov/media/393272/download

## 2022-01-04 NOTE — PROGRESS NOTES
Answers for HPI/ROS submitted by the patient on 1/4/2022  activity change: No  unexpected weight change: No  neck pain: No  hearing loss: No  rhinorrhea: Yes  trouble swallowing: No  eye discharge: No  visual disturbance: No  chest tightness: No  wheezing: No  chest pain: No  palpitations: No  blood in stool: No  constipation: No  vomiting: No  diarrhea: No  polydipsia: No  polyuria: Yes  difficulty urinating: No  hematuria: No  menstrual problem: No  dysuria: No  joint swelling: No  arthralgias: No  headaches: Yes  weakness: No  confusion: No  dysphoric mood: No

## 2022-01-04 NOTE — PROGRESS NOTES
Subjective:       Patient ID: Deb Tesfaye is a 66 y.o. female.    Chief Complaint: No chief complaint on file.    The patient location is: [place], [place]  The chief complaint leading to consultation is: sinus    Visit type: audiovisual    Face to Face time with patient: 13  15 minutes of total time spent on the encounter, which includes face to face time and non-face to face time preparing to see the patient (eg, review of tests), Obtaining and/or reviewing separately obtained history, Documenting clinical information in the electronic or other health record, Independently interpreting results (not separately reported) and communicating results to the patient/family/caregiver, or Care coordination (not separately reported).         Each patient to whom he or she provides medical services by telemedicine is:  (1) informed of the relationship between the physician and patient and the respective role of any other health care provider with respect to management of the patient; and (2) notified that he or she may decline to receive medical services by telemedicine and may withdraw from such care at any time.    Notes:   Sinus drainage, yellow mucus since last Thursday. Also had some cough and sinus drainage. Taking coricidin. Symptoms have improved but not resolved.    Past Medical History:  No date: Breast cancer  No date: Cataract  07/20/2016: Colon polyp  1/11/2016: Ductal carcinoma in situ (DCIS) of left breast      Comment:  left   No date: Fibrocystic breast  No date: Glaucoma  No date: Hypertension    Past Surgical History:  01/10/2018: AHMED GLAUCOMA VALVE; Right      Comment:  WITH ZAK ()  No date: BREAST BIOPSY  No date: BREAST LUMPECTOMY; Left      Comment:  with radiation  01/10/2018: CATARACT EXTRACTION W/  INTRAOCULAR LENS IMPLANT; Right      Comment:  with Buddy ()  2002: COLONOSCOPY      Comment:  due for every 5.  Mother with colon cancer.  12/2010:  COLONOSCOPY  7/20/2016: COLONOSCOPY; N/A      Comment:  Procedure: COLONOSCOPY;  Surgeon: Janak De Leon Jr., MD;  [facility];  Service: Endoscopy;                 Laterality: N/A;  No date: EYE SURGERY  8/13: SELECTIVE LASER TRABECULPALSTY; Right      Comment:  OS DONE IN [place], [place]  8/27/15: SELECTIVE LASER TRABECUPLASTY; Left      Comment:  OS WITH   No date: TUBAL LIGATION    Review of patient's family history indicates:  Problem: Cancer      Relation: Mother          Age of Onset: 54          Comment: colon cancer  Problem: Cancer      Relation: Maternal Grandfather          Age of Onset: (Not Specified)  Problem: Glaucoma      Relation: Maternal Grandfather          Age of Onset: (Not Specified)  Problem: Cataracts      Relation: Maternal Grandfather          Age of Onset: (Not Specified)  Problem: Hypertension      Relation: Father          Age of Onset: (Not Specified)  Problem: Amblyopia      Relation: Neg Hx          Age of Onset: (Not Specified)  Problem: Blindness      Relation: Neg Hx          Age of Onset: (Not Specified)  Problem: Diabetes      Relation: Neg Hx          Age of Onset: (Not Specified)  Problem: Macular degeneration      Relation: Neg Hx          Age of Onset: (Not Specified)  Problem: Retinal detachment      Relation: Neg Hx          Age of Onset: (Not Specified)  Problem: Stroke      Relation: Neg Hx          Age of Onset: (Not Specified)  Problem: Strabismus      Relation: Neg Hx          Age of Onset: (Not Specified)  Problem: Thyroid disease      Relation: Neg Hx          Age of Onset: (Not Specified)      Social History    Socioeconomic History      Marital status:       Number of children: 3    Tobacco Use      Smoking status: Never Smoker      Smokeless tobacco: Never Used    Substance and Sexual Activity      Alcohol use: No      Drug use: No      Sexual activity: Yes        Partners: Male      Current Outpatient  Medications:  acetaZOLAMIDE (DIAMOX) 500 mg CpSR, Take 1 capsule (500 mg total) by mouth 2 (two) times daily. Please discontinue the dorzolamide eye drops, Disp: 180 capsule, Rfl: 3  azelastine (ASTELIN) 137 mcg (0.1 %) nasal spray, SHAKE WELL AND USE 1 SPRAY IN EACH NOSTRIL TWICE DAILY, Disp: 30 mL, Rfl: 11  bimatoprost (LUMIGAN) 0.03 % ophthalmic drops, Place 1 drop into both eyes nightly. (Patient taking differently: Place 1 drop into the left eye nightly.), Disp: 2.5 mL, Rfl: 12  brimonidine 0.2% (ALPHAGAN) 0.2 % Drop, Place 1 drop into both eyes 3 (three) times daily., Disp: 30 mL, Rfl: 3  ERGOCALCIFEROL, VITAMIN D2, (VITAMIN D ORAL), Take 1,000 Units by mouth once daily. , Disp: , Rfl:   ketorolac 0.5% (ACULAR) 0.5 % Drop, Place 1 drop into the right eye 2 (two) times daily., Disp: 5 mL, Rfl: 6  KRILL OIL ORAL, Take 1 capsule by mouth once daily. , Disp: , Rfl:   MULTIVITS,CA,MINERALS/IRON/FA (ONE-A-DAY WOMENS FORMULA ORAL), Take 1 capsule by mouth once daily., Disp: , Rfl:   netarsudiL (RHOPRESSA) 0.02 % ophthalmic solution, Place 1 drop into both eyes once daily., Disp: 2.5 mL, Rfl: 11  pilocarpine HCL 4% (PILOCAR) 4 % ophthalmic solution, Place 1 drop into the left eye 3 (three) times daily., Disp: 15 mL, Rfl: 12  potassium chloride SA (K-DUR,KLOR-CON) 20 MEQ tablet, Take 2 tablets (40 mEq total) by mouth 2 (two) times daily., Disp: 360 tablet, Rfl: 0  pravastatin (PRAVACHOL) 20 MG tablet, TAKE 1 TABLET(20 MG) BY MOUTH EVERY DAY, Disp: 90 tablet, Rfl: 0  telmisartan-hydrochlorothiazide (MICARDIS HCT) 80-25 mg per tablet, TAKE 1 TABLET BY MOUTH EVERY DAY, Disp: 90 tablet, Rfl: 2  timolol maleate 0.5% (TIMOPTIC) 0.5 % Drop, Place 1 drop into both eyes 2 (two) times daily., Disp: 10 mL, Rfl: 6    No current facility-administered medications for this visit.      Review of patient's allergies indicates:  No Known Allergies    Review of Systems   Constitutional: Positive for fatigue. Negative for activity change,  chills, diaphoresis, fever and unexpected weight change.   HENT: Positive for postnasal drip and rhinorrhea. Negative for hearing loss, sinus pressure/congestion and trouble swallowing.    Eyes: Negative for discharge and visual disturbance.   Respiratory: Positive for cough. Negative for chest tightness and wheezing.    Cardiovascular: Negative for chest pain and palpitations.   Gastrointestinal: Negative for blood in stool, constipation, diarrhea and vomiting.   Endocrine: Positive for polyuria. Negative for polydipsia.   Genitourinary: Negative for difficulty urinating, dysuria, hematuria and menstrual problem.   Musculoskeletal: Negative for arthralgias, joint swelling and neck pain.   Neurological: Positive for headaches. Negative for weakness.   Psychiatric/Behavioral: Negative for confusion and dysphoric mood.         Objective:      Physical Exam  Constitutional:       General: She is not in acute distress.     Appearance: Normal appearance. She is ill-appearing. She is not toxic-appearing.   HENT:      Head: Normocephalic and atraumatic.      Nose: Congestion present.   Pulmonary:      Effort: Pulmonary effort is normal. No respiratory distress.   Neurological:      Mental Status: She is alert.   Psychiatric:         Mood and Affect: Mood normal.         Behavior: Behavior normal.         Assessment:       Problem List Items Addressed This Visit    None     Visit Diagnoses     Sinusitis, unspecified chronicity, unspecified location    -  Primary    Relevant Medications    cefdinir (OMNICEF) 300 MG capsule    fluticasone propionate (FLONASE) 50 mcg/actuation nasal spray    predniSONE (DELTASONE) 20 MG tablet    Other Relevant Orders    POCT COVID-19 Rapid Screening (Completed)    POCT COVID-19 Rapid Screening          Plan:       1. Sinusitis, unspecified chronicity, unspecified location  COVID 19 testing negative, reviewed with patient, treat as below, follow up if not resolving or for worsening.  - POCT  COVID-19 Rapid Screening  - POCT COVID-19 Rapid Screening  - cefdinir (OMNICEF) 300 MG capsule; Take 1 capsule (300 mg total) by mouth 2 (two) times daily. for 10 days  Dispense: 20 capsule; Refill: 0  - fluticasone propionate (FLONASE) 50 mcg/actuation nasal spray; 1 spray (50 mcg total) by Each Nostril route once daily.  Dispense: 16 g; Refill: 0  - predniSONE (DELTASONE) 20 MG tablet; Take 1 tablet (20 mg total) by mouth once daily. for 5 days  Dispense: 5 tablet; Refill: 0

## 2022-01-09 DIAGNOSIS — E78.5 HYPERLIPIDEMIA, UNSPECIFIED HYPERLIPIDEMIA TYPE: ICD-10-CM

## 2022-01-09 RX ORDER — PRAVASTATIN SODIUM 20 MG/1
TABLET ORAL
Qty: 90 TABLET | Refills: 0 | Status: SHIPPED | OUTPATIENT
Start: 2022-01-09 | End: 2022-01-11 | Stop reason: SDUPTHER

## 2022-01-09 NOTE — TELEPHONE ENCOUNTER
No new care gaps identified.  Powered by [employer]. Reference number: [id number].   1/09/2022 5:53:38 PM CST

## 2022-01-11 DIAGNOSIS — E78.5 HYPERLIPIDEMIA, UNSPECIFIED HYPERLIPIDEMIA TYPE: ICD-10-CM

## 2022-01-11 RX ORDER — PRAVASTATIN SODIUM 20 MG/1
TABLET ORAL
Qty: 90 TABLET | Refills: 0 | Status: SHIPPED | OUTPATIENT
Start: 2022-01-11 | End: 2022-04-06 | Stop reason: SDUPTHER

## 2022-01-11 NOTE — TELEPHONE ENCOUNTER
Care Due:                  Date            Visit Type   Department     Provider  --------------------------------------------------------------------------------                                MYCHART                              FOLLOWUP/OF  [facility] FAMILY  Last Visit: 04-      FICE VISIT   MEDICINE       KARIN WRIGHT  Next Visit: None Scheduled  None         None Found                                                            Last  Test          Frequency    Reason                     Performed    Due Date  --------------------------------------------------------------------------------    Office Visit  12 months..  potassium, pravastatin,    04- 04-                             telmisartan-hydrochloroth                             iazide...................    Powered by [employer]. Reference number: [id number].   1/11/2022 8:03:20 AM CST

## 2022-02-01 RX ORDER — KETOROLAC TROMETHAMINE 5 MG/ML
1 SOLUTION OPHTHALMIC 2 TIMES DAILY
Qty: 5 ML | Refills: 12 | Status: SHIPPED | OUTPATIENT
Start: 2022-02-01 | End: 2023-07-21

## 2022-02-09 ENCOUNTER — HOSPITAL ENCOUNTER (OUTPATIENT)
Dept: RADIOLOGY | Facility: HOSPITAL | Age: 67
Discharge: HOME | End: 2022-02-09
Attending: NURSE PRACTITIONER
Payer: MEDICARE

## 2022-02-09 DIAGNOSIS — H40.1133 PRIMARY OPEN ANGLE GLAUCOMA (POAG) OF BOTH EYES, SEVERE STAGE: ICD-10-CM

## 2022-02-09 DIAGNOSIS — D05.12 DUCTAL CARCINOMA IN SITU (DCIS) OF LEFT BREAST: ICD-10-CM

## 2022-02-09 PROCEDURE — 71046 X-RAY EXAM CHEST 2 VIEWS: CPT | Mod: TC,FY,PO

## 2022-02-09 PROCEDURE — 71046 XR CHEST PA AND LATERAL: ICD-10-PCS | Mod: 26,,, | Performed by: RADIOLOGY

## 2022-02-09 PROCEDURE — 71046 X-RAY EXAM CHEST 2 VIEWS: CPT | Mod: 26,,, | Performed by: RADIOLOGY

## 2022-02-10 RX ORDER — NETARSUDIL 0.2 MG/ML
1 SOLUTION/ DROPS OPHTHALMIC; TOPICAL DAILY
Qty: 2.5 ML | Refills: 11 | Status: SHIPPED | OUTPATIENT
Start: 2022-02-10 | End: 2023-05-14 | Stop reason: SDUPTHER

## 2022-02-10 RX ORDER — NETARSUDIL 0.2 MG/ML
1 SOLUTION/ DROPS OPHTHALMIC; TOPICAL DAILY
Qty: 2.5 ML | Refills: 11 | Status: SHIPPED | OUTPATIENT
Start: 2022-02-10 | End: 2022-02-10 | Stop reason: SDUPTHER

## 2022-02-17 DIAGNOSIS — E87.6 HYPOKALEMIA: ICD-10-CM

## 2022-02-17 RX ORDER — POTASSIUM CHLORIDE 20 MEQ/1
TABLET, EXTENDED RELEASE ORAL
Qty: 360 TABLET | Refills: 0 | Status: SHIPPED | OUTPATIENT
Start: 2022-02-17 | End: 2022-05-30

## 2022-02-17 NOTE — TELEPHONE ENCOUNTER
Care Due:                  Date            Visit Type   Department     Provider  --------------------------------------------------------------------------------                                MYCHART                              FOLLOWUP/OF  [facility] FAMILY  Last Visit: 04-      FICE VISIT   ELOISA WRIGHT  Next Visit: None Scheduled  None         None Found                                                            Last  Test          Frequency    Reason                     Performed    Due Date  --------------------------------------------------------------------------------    CMP.........  12 months..  potassium, pravastatin,    02- 02-                             telmisartan-hydrochloroth                             iazide...................    Lipid Panel.  12 months..  pravastatin..............  09- 09-    Powered by [employer]. Reference number: [id number].   2/17/2022 11:19:18 AM CST

## 2022-02-21 ENCOUNTER — HOSPITAL ENCOUNTER (OUTPATIENT)
Dept: RADIOLOGY | Facility: HOSPITAL | Age: 67
Discharge: HOME | End: 2022-02-21
Attending: NURSE PRACTITIONER
Payer: MEDICARE

## 2022-02-21 DIAGNOSIS — Z79.810 USE OF TAMOXIFEN (NOLVADEX): ICD-10-CM

## 2022-02-21 PROCEDURE — 77080 DXA BONE DENSITY AXIAL: CPT | Mod: TC,PO

## 2022-02-21 PROCEDURE — 77080 DXA BONE DENSITY AXIAL: CPT | Mod: 26,,, | Performed by: RADIOLOGY

## 2022-02-21 PROCEDURE — 77080 DEXA BONE DENSITY SPINE HIP: ICD-10-PCS | Mod: 26,,, | Performed by: RADIOLOGY

## 2022-02-23 ENCOUNTER — OFFICE VISIT (OUTPATIENT)
Dept: HEMATOLOGY/ONCOLOGY | Facility: CLINIC | Age: 67
End: 2022-02-23
Payer: MEDICARE

## 2022-02-23 ENCOUNTER — PATIENT MESSAGE (OUTPATIENT)
Dept: HEMATOLOGY/ONCOLOGY | Facility: CLINIC | Age: 67
End: 2022-02-23

## 2022-02-23 ENCOUNTER — LAB VISIT (OUTPATIENT)
Dept: LAB | Facility: HOSPITAL | Age: 67
End: 2022-02-23
Attending: NURSE PRACTITIONER
Payer: MEDICARE

## 2022-02-23 VITALS
WEIGHT: 180.56 LBS | BODY MASS INDEX: 30.83 KG/M2 | HEART RATE: 71 BPM | DIASTOLIC BLOOD PRESSURE: 88 MMHG | RESPIRATION RATE: 16 BRPM | SYSTOLIC BLOOD PRESSURE: 140 MMHG | HEIGHT: 64 IN | TEMPERATURE: 98 F | OXYGEN SATURATION: 99 %

## 2022-02-23 DIAGNOSIS — I10 ESSENTIAL HYPERTENSION: ICD-10-CM

## 2022-02-23 DIAGNOSIS — D05.12 DUCTAL CARCINOMA IN SITU (DCIS) OF LEFT BREAST: ICD-10-CM

## 2022-02-23 DIAGNOSIS — Z86.000 HISTORY OF DUCTAL CARCINOMA IN SITU (DCIS) OF BREAST: Primary | ICD-10-CM

## 2022-02-23 DIAGNOSIS — Z12.31 ENCOUNTER FOR SCREENING MAMMOGRAM FOR MALIGNANT NEOPLASM OF BREAST: ICD-10-CM

## 2022-02-23 PROBLEM — Z79.810 USE OF TAMOXIFEN (NOLVADEX): Status: RESOLVED | Noted: 2019-08-30 | Resolved: 2022-02-23

## 2022-02-23 LAB
ALBUMIN SERPL BCP-MCNC: 4 G/DL (ref 3.5–5.2)
ALP SERPL-CCNC: 76 U/L (ref 55–135)
ALT SERPL W/O P-5'-P-CCNC: 26 U/L (ref 10–44)
ANION GAP SERPL CALC-SCNC: 10 MMOL/L (ref 8–16)
AST SERPL-CCNC: 20 U/L (ref 10–40)
BASOPHILS # BLD AUTO: 0.08 K/UL (ref 0–0.2)
BASOPHILS NFR BLD: 1 % (ref 0–1.9)
BILIRUB SERPL-MCNC: 0.5 MG/DL (ref 0.1–1)
BUN SERPL-MCNC: 20 MG/DL (ref 8–23)
CALCIUM SERPL-MCNC: 9.4 MG/DL (ref 8.7–10.5)
CHLORIDE SERPL-SCNC: 108 MMOL/L (ref 95–110)
CO2 SERPL-SCNC: 24 MMOL/L (ref 23–29)
CREAT SERPL-MCNC: 0.8 MG/DL (ref 0.5–1.4)
DIFFERENTIAL METHOD: NORMAL
EOSINOPHIL # BLD AUTO: 0.1 K/UL (ref 0–0.5)
EOSINOPHIL NFR BLD: 1.7 % (ref 0–8)
ERYTHROCYTE [DISTWIDTH] IN BLOOD BY AUTOMATED COUNT: 14.5 % (ref 11.5–14.5)
EST. GFR  (AFRICAN AMERICAN): >60 ML/MIN/1.73 M^2
EST. GFR  (NON AFRICAN AMERICAN): >60 ML/MIN/1.73 M^2
GLUCOSE SERPL-MCNC: 100 MG/DL (ref 70–110)
HCT VFR BLD AUTO: 40 % (ref 37–48.5)
HGB BLD-MCNC: 13.3 G/DL (ref 12–16)
IMM GRANULOCYTES # BLD AUTO: 0.03 K/UL (ref 0–0.04)
IMM GRANULOCYTES NFR BLD AUTO: 0.4 % (ref 0–0.5)
LDH SERPL L TO P-CCNC: 189 U/L (ref 110–260)
LYMPHOCYTES # BLD AUTO: 2.5 K/UL (ref 1–4.8)
LYMPHOCYTES NFR BLD: 31.4 % (ref 18–48)
MCH RBC QN AUTO: 28.3 PG (ref 27–31)
MCHC RBC AUTO-ENTMCNC: 33.3 G/DL (ref 32–36)
MCV RBC AUTO: 85 FL (ref 82–98)
MONOCYTES # BLD AUTO: 0.5 K/UL (ref 0.3–1)
MONOCYTES NFR BLD: 6.9 % (ref 4–15)
NEUTROPHILS # BLD AUTO: 4.6 K/UL (ref 1.8–7.7)
NEUTROPHILS NFR BLD: 58.6 % (ref 38–73)
NRBC BLD-RTO: 0 /100 WBC
PLATELET # BLD AUTO: 260 K/UL (ref 150–450)
PMV BLD AUTO: 10.4 FL (ref 9.2–12.9)
POTASSIUM SERPL-SCNC: 3.5 MMOL/L (ref 3.5–5.1)
PROT SERPL-MCNC: 7.3 G/DL (ref 6–8.4)
RBC # BLD AUTO: 4.7 M/UL (ref 4–5.4)
SODIUM SERPL-SCNC: 142 MMOL/L (ref 136–145)
WBC # BLD AUTO: 7.8 K/UL (ref 3.9–12.7)

## 2022-02-23 PROCEDURE — 85025 COMPLETE CBC W/AUTO DIFF WBC: CPT | Mod: PN | Performed by: NURSE PRACTITIONER

## 2022-02-23 PROCEDURE — 80053 COMPREHEN METABOLIC PANEL: CPT | Mod: PN | Performed by: NURSE PRACTITIONER

## 2022-02-23 PROCEDURE — 1160F RVW MEDS BY RX/DR IN RCRD: CPT | Mod: CPTII,S$GLB,, | Performed by: NURSE PRACTITIONER

## 2022-02-23 PROCEDURE — 3077F SYST BP >= 140 MM HG: CPT | Mod: CPTII,S$GLB,, | Performed by: NURSE PRACTITIONER

## 2022-02-23 PROCEDURE — 99999 PR PBB SHADOW E&M-EST. PATIENT-LVL IV: CPT | Mod: PBBFAC,,, | Performed by: NURSE PRACTITIONER

## 2022-02-23 PROCEDURE — 36415 COLL VENOUS BLD VENIPUNCTURE: CPT | Mod: PN | Performed by: NURSE PRACTITIONER

## 2022-02-23 PROCEDURE — 99213 OFFICE O/P EST LOW 20 MIN: CPT | Mod: S$GLB,,, | Performed by: NURSE PRACTITIONER

## 2022-02-23 PROCEDURE — 1101F PR PT FALLS ASSESS DOC 0-1 FALLS W/OUT INJ PAST YR: ICD-10-PCS | Mod: CPTII,S$GLB,, | Performed by: NURSE PRACTITIONER

## 2022-02-23 PROCEDURE — 3077F PR MOST RECENT SYSTOLIC BLOOD PRESSURE >= 140 MM HG: ICD-10-PCS | Mod: CPTII,S$GLB,, | Performed by: NURSE PRACTITIONER

## 2022-02-23 PROCEDURE — 3288F PR FALLS RISK ASSESSMENT DOCUMENTED: ICD-10-PCS | Mod: CPTII,S$GLB,, | Performed by: NURSE PRACTITIONER

## 2022-02-23 PROCEDURE — 99999 PR PBB SHADOW E&M-EST. PATIENT-LVL IV: ICD-10-PCS | Mod: PBBFAC,,, | Performed by: NURSE PRACTITIONER

## 2022-02-23 PROCEDURE — 3079F DIAST BP 80-89 MM HG: CPT | Mod: CPTII,S$GLB,, | Performed by: NURSE PRACTITIONER

## 2022-02-23 PROCEDURE — 99213 PR OFFICE/OUTPT VISIT, EST, LEVL III, 20-29 MIN: ICD-10-PCS | Mod: S$GLB,,, | Performed by: NURSE PRACTITIONER

## 2022-02-23 PROCEDURE — 1159F PR MEDICATION LIST DOCUMENTED IN MEDICAL RECORD: ICD-10-PCS | Mod: CPTII,S$GLB,, | Performed by: NURSE PRACTITIONER

## 2022-02-23 PROCEDURE — 3079F PR MOST RECENT DIASTOLIC BLOOD PRESSURE 80-89 MM HG: ICD-10-PCS | Mod: CPTII,S$GLB,, | Performed by: NURSE PRACTITIONER

## 2022-02-23 PROCEDURE — 1160F PR REVIEW ALL MEDS BY PRESCRIBER/CLIN PHARMACIST DOCUMENTED: ICD-10-PCS | Mod: CPTII,S$GLB,, | Performed by: NURSE PRACTITIONER

## 2022-02-23 PROCEDURE — 3008F BODY MASS INDEX DOCD: CPT | Mod: CPTII,S$GLB,, | Performed by: NURSE PRACTITIONER

## 2022-02-23 PROCEDURE — 1101F PT FALLS ASSESS-DOCD LE1/YR: CPT | Mod: CPTII,S$GLB,, | Performed by: NURSE PRACTITIONER

## 2022-02-23 PROCEDURE — 83615 LACTATE (LD) (LDH) ENZYME: CPT | Mod: PN | Performed by: NURSE PRACTITIONER

## 2022-02-23 PROCEDURE — 3288F FALL RISK ASSESSMENT DOCD: CPT | Mod: CPTII,S$GLB,, | Performed by: NURSE PRACTITIONER

## 2022-02-23 PROCEDURE — 3008F PR BODY MASS INDEX (BMI) DOCUMENTED: ICD-10-PCS | Mod: CPTII,S$GLB,, | Performed by: NURSE PRACTITIONER

## 2022-02-23 PROCEDURE — 1159F MED LIST DOCD IN RCRD: CPT | Mod: CPTII,S$GLB,, | Performed by: NURSE PRACTITIONER

## 2022-02-23 NOTE — PROGRESS NOTES
Subjective:       Patient ID: Deb Tesfaye is a 67 y.o. female.    Chief Complaint: Annual breast evaluation - 72 month post treatment for DCIS left breast    HPI This is a 67-year-old black female known to Dr. 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  She is status post lumpectomy, post-lumpectomy irradiation and 60 months of Tamoxifen.       She presents to the clinic today for her 72-month post-therapy evaluation.  She remains active and well. She remains compliant with supplemental Vitamin D. She denies any new breast complaints, bone pain, difficulties with hot flashes, vaginal bleeding, abdominal discomfort/bloating, nausea, vomiting, constipation, diarrhea, irregular heartbeat, chest pain, myalgias, cramping, etc.  No other new complaints or pertinent findings on a 14-point review of systems.    Oncology History    No history exists.     Past Medical History:   Diagnosis Date    Breast cancer     Cataract     Colon polyp 07/20/2016    Ductal carcinoma in situ (DCIS) of left breast 1/11/2016    left     Fibrocystic breast     Glaucoma     Hypertension      Past Surgical History:   Procedure Laterality Date    AHMED GLAUCOMA VALVE Right 01/10/2018    WITH CE ()    BREAST BIOPSY      BREAST LUMPECTOMY Left     with radiation    CATARACT EXTRACTION W/  INTRAOCULAR LENS IMPLANT Right 01/10/2018    with Ahmed ()    COLONOSCOPY  2002    due for every 5.  Mother with colon cancer.    COLONOSCOPY  12/2010    COLONOSCOPY N/A 7/20/2016    Procedure: COLONOSCOPY;  Surgeon: Janak De Leon Jr., MD;  Location: [facility];  Service: Endoscopy;  Laterality: N/A;    EYE SURGERY      SELECTIVE LASER TRABECULPALSTY Right 8/13    OS DONE IN [place], [place]    SELECTIVE LASER TRABECUPLASTY Left 8/27/15    OS WITH     TUBAL LIGATION       Current Outpatient Medications on File Prior to Visit   Medication Sig  Dispense Refill    acetaZOLAMIDE (DIAMOX) 500 mg CpSR Take 1 capsule (500 mg total) by mouth 2 (two) times daily. Please discontinue the dorzolamide eye drops 180 capsule 3    azelastine (ASTELIN) 137 mcg (0.1 %) nasal spray SHAKE WELL AND USE 1 SPRAY IN EACH NOSTRIL TWICE DAILY 30 mL 11    bimatoprost (LUMIGAN) 0.03 % ophthalmic drops Place 1 drop into both eyes nightly. (Patient taking differently: Place 1 drop into the left eye nightly.) 2.5 mL 12    brimonidine 0.2% (ALPHAGAN) 0.2 % Drop Place 1 drop into both eyes 3 (three) times daily. 30 mL 3    ERGOCALCIFEROL, VITAMIN D2, (VITAMIN D ORAL) Take 1,000 Units by mouth once daily.       fluticasone propionate (FLONASE) 50 mcg/actuation nasal spray 1 spray (50 mcg total) by Each Nostril route once daily. 16 g 0    ketorolac 0.5% (ACULAR) 0.5 % Drop Place 1 drop into the right eye 2 (two) times daily. 5 mL 12    KRILL OIL ORAL Take 1 capsule by mouth once daily.       MULTIVITS,CA,MINERALS/IRON/FA (ONE-A-DAY WOMENS FORMULA ORAL) Take 1 capsule by mouth once daily.      netarsudiL (RHOPRESSA) 0.02 % ophthalmic solution Place 1 drop into both eyes once daily. 2.5 mL 11    pilocarpine HCL 4% (PILOCAR) 4 % ophthalmic solution Place 1 drop into the left eye 3 (three) times daily. 15 mL 12    potassium chloride SA (K-DUR,KLOR-CON) 20 MEQ tablet TAKE 2 TABLETS(40 MEQ) BY MOUTH TWICE DAILY 360 tablet 0    pravastatin (PRAVACHOL) 20 MG tablet TAKE 1 TABLET(20 MG) BY MOUTH EVERY DAY 90 tablet 0    telmisartan-hydrochlorothiazide (MICARDIS HCT) 80-25 mg per tablet TAKE 1 TABLET BY MOUTH EVERY DAY 90 tablet 2    timolol maleate 0.5% (TIMOPTIC) 0.5 % Drop Place 1 drop into both eyes 2 (two) times daily. 10 mL 6    valACYclovir (VALTREX) 1000 MG tablet Take 1 tablet (1,000 mg total) by mouth 3 (three) times daily. for 3 days 15 tablet 0     No current facility-administered medications on file prior to visit.     Review of Systems   Constitutional: Negative for  "appetite change and unexpected weight change.   HENT: Negative for mouth sores.    Eyes: Negative for visual disturbance.   Respiratory: Negative for cough and shortness of breath.    Cardiovascular: Negative for chest pain.   Gastrointestinal: Negative for abdominal pain and diarrhea.   Genitourinary: Negative for frequency.   Musculoskeletal: Negative for back pain.   Integumentary:  Negative for rash.   Neurological: Negative for headaches.   Hematological: Negative for adenopathy.   Psychiatric/Behavioral: The patient is not nervous/anxious.          Objective:     Weight:  Loss of 5 1/2 pounds in 1 year with diet  Vitals:    02/23/22 1003   BP: (!) 140/88   BP Location: Left arm   Patient Position: Sitting   BP Method: Medium (Automatic)   Pulse: 71   Resp: 16   Temp: 97.7 °F (36.5 °C)   TempSrc: Temporal   SpO2: 99%   Weight: 81.9 kg (180 lb 8.9 oz)   Height: 5' 4" (1.626 m)       Physical Exam  Vitals reviewed.   Constitutional:       Appearance: Normal appearance.   HENT:      Head: Normocephalic and atraumatic.      Mouth/Throat:      Mouth: Mucous membranes are moist.   Eyes:      Conjunctiva/sclera: Conjunctivae normal.      Pupils: Pupils are equal, round, and reactive to light.   Cardiovascular:      Rate and Rhythm: Normal rate and regular rhythm.      Pulses: Normal pulses.      Heart sounds: Normal heart sounds.   Pulmonary:      Effort: Pulmonary effort is normal.      Breath sounds: Normal breath sounds.   Chest:      Comments: BREASTS:  Right breast is soft, free of masses, tenderness, nipple discharge or inversion.  Left breast with noted healed lumpectomy scar to the upper outer region; no signs of local recurrence.  Abdominal:      General: Bowel sounds are normal.      Palpations: Abdomen is soft.   Musculoskeletal:         General: Normal range of motion.      Cervical back: Normal range of motion.   Lymphadenopathy:      Cervical: No cervical adenopathy.   Skin:     General: Skin is warm and " dry.      Capillary Refill: Capillary refill takes less than 2 seconds.   Neurological:      Mental Status: She is alert and oriented to person, place, and time.   Psychiatric:         Mood and Affect: Mood normal.         Behavior: Behavior normal.         Thought Content: Thought content normal.         Judgment: Judgment normal.         Lab Results   Component Value Date    WBC 7.80 02/23/2022    RBC 4.70 02/23/2022    HGB 13.3 02/23/2022    HCT 40.0 02/23/2022    MCV 85 02/23/2022    MCH 28.3 02/23/2022    MCHC 33.3 02/23/2022    RDW 14.5 02/23/2022     02/23/2022    MPV 10.4 02/23/2022    GRAN 4.6 02/23/2022    GRAN 58.6 02/23/2022    LYMPH 2.5 02/23/2022    LYMPH 31.4 02/23/2022    MONO 0.5 02/23/2022    MONO 6.9 02/23/2022    EOS 0.1 02/23/2022    BASO 0.08 02/23/2022    EOSINOPHIL 1.7 02/23/2022    BASOPHIL 1.0 02/23/2022     CMP  Sodium   Date Value Ref Range Status   02/23/2022 142 136 - 145 mmol/L Final     Potassium   Date Value Ref Range Status   02/23/2022 3.5 3.5 - 5.1 mmol/L Final     Chloride   Date Value Ref Range Status   02/23/2022 108 95 - 110 mmol/L Final     CO2   Date Value Ref Range Status   02/23/2022 24 23 - 29 mmol/L Final     Glucose   Date Value Ref Range Status   02/23/2022 100 70 - 110 mg/dL Final     BUN   Date Value Ref Range Status   02/23/2022 20 8 - 23 mg/dL Final     Creatinine   Date Value Ref Range Status   02/23/2022 0.8 0.5 - 1.4 mg/dL Final     Calcium   Date Value Ref Range Status   02/23/2022 9.4 8.7 - 10.5 mg/dL Final     Total Protein   Date Value Ref Range Status   02/23/2022 7.3 6.0 - 8.4 g/dL Final     Albumin   Date Value Ref Range Status   02/23/2022 4.0 3.5 - 5.2 g/dL Final     Total Bilirubin   Date Value Ref Range Status   02/23/2022 0.5 0.1 - 1.0 mg/dL Final     Comment:     For infants and newborns, interpretation of results should be based  on gestational age, weight and in agreement with clinical  observations.    Premature Infant recommended  reference ranges:  Up to 24 hours.............<8.0 mg/dL  Up to 48 hours............<12.0 mg/dL  3-5 days..................<15.0 mg/dL  6-29 days.................<15.0 mg/dL       Alkaline Phosphatase   Date Value Ref Range Status   02/23/2022 76 55 - 135 U/L Final     AST   Date Value Ref Range Status   02/23/2022 20 10 - 40 U/L Final     ALT   Date Value Ref Range Status   02/23/2022 26 10 - 44 U/L Final     Anion Gap   Date Value Ref Range Status   02/23/2022 10 8 - 16 mmol/L Final     eGFR if    Date Value Ref Range Status   02/23/2022 >60 >60 mL/min/1.73 m^2 Final     eGFR if non    Date Value Ref Range Status   02/23/2022 >60 >60 mL/min/1.73 m^2 Final     Comment:     Calculation used to obtain the estimated glomerular filtration  rate (eGFR) is the CKD-EPI equation.            Mammogram dated 12/20/2021:  Impression:  No mammographic evidence of malignancy.  BI-RADS Category 1: Negative  Recommendation:  Routine screening mammogram in 1 year is recommended.    CXR dated 02/09/2022:  Impression:   No acute radiographic abnormality or evidence of significant interval detrimental change.    BMD dated 02/21/2022:  Normal bone density     Assessment:       Problem List Items Addressed This Visit     Essential hypertension    History of ductal carcinoma in situ (DCIS) of breast - Primary    Relevant Orders    CBC Auto Differential    Comprehensive Metabolic Panel    Lactate Dehydrogenase    CXR    Mammo Digital Screening Bilat w/ Adam      Other Visit Diagnoses     Encounter for screening mammogram for malignant neoplasm of breast         Relevant Orders    Mammo Digital Screening Bilat w/ Adam          Plan:       1.  Continue Calcium & Vitamin D supplementation.  Continue Vitamin D at 2,000 units/day.  2.  Return to clinic in 1 year for 84 month post therapy evaluation with CBC, CMP, LDH, CXR & MAMMO prior.  3.  Follow up with PCP (KARIN Nichols MD) in regards to HTN/K+ &  Dr. Carol Yadav for GYN.  4.  Call for any concerns.     Assessment/plan reviewed and approved by Dr. Rider.

## 2022-02-23 NOTE — Clinical Note
F/u in 1 year for 84 month post breast surveillance with CBC, CMP, LDH, CXR & MAMMO (APPT tbd 10:30 -11:00)

## 2022-02-23 NOTE — PROGRESS NOTES
Answers for HPI/ROS submitted by the patient on 2/22/2022  appetite change : No  unexpected weight change: No  mouth sores: No  visual disturbance: No  cough: No  shortness of breath: No  chest pain: No  abdominal pain: No  diarrhea: No  frequency: No  back pain: No  rash: No  headaches: No  adenopathy: No  nervous/ anxious: No

## 2022-02-24 ENCOUNTER — PATIENT MESSAGE (OUTPATIENT)
Dept: OPHTHALMOLOGY | Facility: CLINIC | Age: 67
End: 2022-02-24
Payer: MEDICARE

## 2022-02-24 DIAGNOSIS — H40.1133 PRIMARY OPEN ANGLE GLAUCOMA (POAG) OF BOTH EYES, SEVERE STAGE: Primary | ICD-10-CM

## 2022-02-24 RX ORDER — BIMATOPROST 0.1 MG/ML
1 SOLUTION/ DROPS OPHTHALMIC NIGHTLY
Qty: 2.5 ML | Refills: 12 | Status: SHIPPED | OUTPATIENT
Start: 2022-02-24 | End: 2023-03-17 | Stop reason: CLARIF

## 2022-02-24 RX ORDER — BIMATOPROST 0.1 MG/ML
1 SOLUTION/ DROPS OPHTHALMIC NIGHTLY
COMMUNITY
Start: 2022-02-24 | End: 2022-02-24 | Stop reason: SDUPTHER

## 2022-03-10 NOTE — PROGRESS NOTES
HPI     DLS: 12/03/2021    Pt here for IOP check;  Pt states no eye pain or discomfort.     Meds;  Ketorolac BID OD - for CME   Timolol BID OU   Brimonidine TID OU   Rhopressa QDAY OU   Bisi 4% TID OS   Lumigan QHS OS   Diamox 500mg BID PO     1. POAG OD>OS   2. APD OD   3. NS OS   4. Papilloma OS   5. CME OD   6. PCIOL OD     Last edited by Chapis York on 3/11/2022 11:41 AM. (History)            Assessment /Plan     For exam results, see Encounter Report.    Primary open angle glaucoma (POAG) of both eyes, severe stage    Afferent pupillary defect, right    Visual field loss    Nuclear sclerosis of left eye    Cystoid macular edema, right    Floaters, bilateral    Pseudophakia, right eye            S/P PHACO/IOL/AHMED - OD - 1/10/2017 - developed CME post op    Rx with steroid gtts and ilevro - had responded -   Recurrent CME od - 12/2/2019 - off PA and Ilevro - however  the vision is stable     S/P  CYCLO G6 OD - 4/12/2017  REFERRED BY DR ELISE FOR SEVERE POORLY CONTROLLED POAG - 7/24/2015   Pt recently moved from [place] to the [facility]     Previously referred to Dr. Elise from [facility], review of outside records:    Tm 27/25.    Initial visit 3/29/12  IOP 10 OU on Combitan and Lumigan  C:D 0.85 OU  CCT 524OD, 531 OS       Glaucoma (type and duration)    Dx about 2002 - [place]    First HVF   First at [facility] 2015   First photos   First at [facility] 2015    Treatment / Drops started   2002              Family history    + mom / brother         Glaucoma meds    cosopt ou / brimonidine ou tid / lumigan ou q hs / bisi od tid / diamox / bisi ou        H/O adverse rxn to glaucoma drops    none        LASERS    SLT OD - 8/5/2013 - [place] // SLT os - [facility] 8/27/2015 (good resp 21-->16)        GLAUCOMA SURGERIES    CYCL G6 OD - 4/12/2017  / phaco/IOL / ahmed od 1/10/2018         OTHER EYE SURGERIES    Combined phaco/IOL / ahmed  - od - 1/10/2018         CDR    0.99/0.95        Tbase    ?   Off gtts (on gtts was up to 28/25)         Tmax    27/25 - per outside record's (reviewed by Arik )             Ttarget    15-16  ou    (may need to be lower)           HVF  - OD 10-2 ss - 3 test 2015 to 2017 - SALT/ IAD (progression)     Change to  10-2 stim V 2 test 2018 to 2021  - SALT / IAD od   OS 24-2 ss - 6 test 2016 to 2021 -   SAD - sup paracentral defect  / IAD    (( 2 VF's from [place] - that were reviewed by Dr. IVEY - 2012 and 2013 -  SAD w/ fix invl . IAD od / early IAD os ))            Gonio    +3-4 ou         CCT    524/531        OCT    5 test 2016 to 2021 - RNFL - OD:dec. S/N/I // OS:dec. S/T/I, bord N        HRT    2 test 2015 to 2018  - MR -   Dec thru out   od // dec thru out  os /// CDR 0. 91 od // 0.88 os        Disc photos    2015, 2018      - Ttoday - 13/16   - Test done today    IOP // gonio     2. + APD od     3.  NS ou    Minimal - [place] 20/25 ou still     4. Papilloma OS   To be removed with Arik     5. ++ CME od    Decrease in [place] from 20/70 to 20/200   New since prior OCT 2/3/2017    S/p PHACO/IOL / AHMED  1/10/2018    Had resolved by 7.29/2019 - on ilevro and PA   -recurrent on 12/2/2019 - off ilevro    - resolved by 6/12/2020 - on ketorolac tid od    -STILL RESOLVED 10/2/2020 - still on ketorolac    PLAN   GLAUCOMA - ADVANCED - OD >OS   HVF's continue to progress - especially OD     S/P  - cyclo G6 - OD - 4/12/2017      S/p Phaco / IOL // phaco/ IOL / ahmed  OD Date: 1/10/2018 - general anesthesia // PCB00 23.0  POY 3   IOP  13/16    glaucoma gtts  timolol bid ou   Brim ou tid   rhopressa ou   bisi tid os (do not use od - may cause iritis and cme)   lumigan  os (hold od - get recurrent CME )   Diamox (acetazolamide pills) 500 PO bid - tolerating ok     Ketorolac 2 x day   od (Ilevro - too expensive) - restarted - 12/12/2019 - for recurrent CME od    Repeat SLT os - only 180 degrees treated - too narrow sup and inf (9/20/2018) - steroid taper      OCT macula 5/17/19: +++ CME od  /  OD: , Loss of foveal contour, intraretinal and subretinal fluid  OS: , NFC, no thickening    OCT - 7/26/2019 - CME improved / resolved // review retina notes // monitor for recurrence of CME od     OCT 12/2/2019 - increase - recurrent CME od - 488 - off the ilevro (did not appear to affect her vision)     Oct 6/12/2020 - no cme OD - RESOLVED     OCT - 10/2/2020 - NO CME    OCT 12/3/2021 - no cme     IN FUTURE ALWAYS DO A 10-2 STIM V OD AND A 24-2 SS OS     Photo file update 3/11/2022    Pt wants a letter stating that she has glaucoma and has great difficulty  Driving  at night .    F/U 3-4 months with IOP

## 2022-03-11 ENCOUNTER — OFFICE VISIT (OUTPATIENT)
Dept: OPHTHALMOLOGY | Facility: CLINIC | Age: 67
End: 2022-03-11
Payer: MEDICARE

## 2022-03-11 DIAGNOSIS — Z96.1 PSEUDOPHAKIA, RIGHT EYE: ICD-10-CM

## 2022-03-11 DIAGNOSIS — H40.1133 PRIMARY OPEN ANGLE GLAUCOMA (POAG) OF BOTH EYES, SEVERE STAGE: Primary | ICD-10-CM

## 2022-03-11 DIAGNOSIS — H43.393 FLOATERS, BILATERAL: ICD-10-CM

## 2022-03-11 DIAGNOSIS — H25.12 NUCLEAR SCLEROSIS OF LEFT EYE: ICD-10-CM

## 2022-03-11 DIAGNOSIS — H40.1133 PRIMARY OPEN ANGLE GLAUCOMA OF BOTH EYES, SEVERE STAGE: ICD-10-CM

## 2022-03-11 DIAGNOSIS — H53.40 VISUAL FIELD LOSS: ICD-10-CM

## 2022-03-11 DIAGNOSIS — H35.351 CYSTOID MACULAR EDEMA, RIGHT: ICD-10-CM

## 2022-03-11 DIAGNOSIS — H21.561 AFFERENT PUPILLARY DEFECT, RIGHT: ICD-10-CM

## 2022-03-11 PROCEDURE — 3288F FALL RISK ASSESSMENT DOCD: CPT | Mod: CPTII,S$GLB,, | Performed by: OPHTHALMOLOGY

## 2022-03-11 PROCEDURE — 92012 INTRM OPH EXAM EST PATIENT: CPT | Mod: S$GLB,,, | Performed by: OPHTHALMOLOGY

## 2022-03-11 PROCEDURE — 1160F RVW MEDS BY RX/DR IN RCRD: CPT | Mod: CPTII,S$GLB,, | Performed by: OPHTHALMOLOGY

## 2022-03-11 PROCEDURE — 99999 PR PBB SHADOW E&M-EST. PATIENT-LVL III: CPT | Mod: PBBFAC,,, | Performed by: OPHTHALMOLOGY

## 2022-03-11 PROCEDURE — 92020 PR SPECIAL EYE EVAL,GONIOSCOPY: ICD-10-PCS | Mod: S$GLB,,, | Performed by: OPHTHALMOLOGY

## 2022-03-11 PROCEDURE — 99999 PR PBB SHADOW E&M-EST. PATIENT-LVL III: ICD-10-PCS | Mod: PBBFAC,,, | Performed by: OPHTHALMOLOGY

## 2022-03-11 PROCEDURE — 3288F PR FALLS RISK ASSESSMENT DOCUMENTED: ICD-10-PCS | Mod: CPTII,S$GLB,, | Performed by: OPHTHALMOLOGY

## 2022-03-11 PROCEDURE — 1159F MED LIST DOCD IN RCRD: CPT | Mod: CPTII,S$GLB,, | Performed by: OPHTHALMOLOGY

## 2022-03-11 PROCEDURE — 1101F PR PT FALLS ASSESS DOC 0-1 FALLS W/OUT INJ PAST YR: ICD-10-PCS | Mod: CPTII,S$GLB,, | Performed by: OPHTHALMOLOGY

## 2022-03-11 PROCEDURE — 92020 GONIOSCOPY: CPT | Mod: S$GLB,,, | Performed by: OPHTHALMOLOGY

## 2022-03-11 PROCEDURE — 1160F PR REVIEW ALL MEDS BY PRESCRIBER/CLIN PHARMACIST DOCUMENTED: ICD-10-PCS | Mod: CPTII,S$GLB,, | Performed by: OPHTHALMOLOGY

## 2022-03-11 PROCEDURE — 1101F PT FALLS ASSESS-DOCD LE1/YR: CPT | Mod: CPTII,S$GLB,, | Performed by: OPHTHALMOLOGY

## 2022-03-11 PROCEDURE — 1159F PR MEDICATION LIST DOCUMENTED IN MEDICAL RECORD: ICD-10-PCS | Mod: CPTII,S$GLB,, | Performed by: OPHTHALMOLOGY

## 2022-03-11 PROCEDURE — 92012 PR EYE EXAM, EST PATIENT,INTERMED: ICD-10-PCS | Mod: S$GLB,,, | Performed by: OPHTHALMOLOGY

## 2022-03-11 RX ORDER — PILOCARPINE HYDROCHLORIDE 40 MG/ML
1 SOLUTION/ DROPS OPHTHALMIC 3 TIMES DAILY
Qty: 15 ML | Refills: 12 | Status: SHIPPED | OUTPATIENT
Start: 2022-03-11 | End: 2022-11-01

## 2022-04-05 ENCOUNTER — PATIENT MESSAGE (OUTPATIENT)
Dept: FAMILY MEDICINE | Facility: CLINIC | Age: 67
End: 2022-04-05
Payer: MEDICARE

## 2022-04-05 DIAGNOSIS — Z80.0 FAMILY HISTORY OF COLON CANCER IN MOTHER: ICD-10-CM

## 2022-04-05 DIAGNOSIS — K63.5 POLYP OF COLON, UNSPECIFIED PART OF COLON, UNSPECIFIED TYPE: ICD-10-CM

## 2022-04-05 DIAGNOSIS — Z12.11 COLON CANCER SCREENING: Primary | ICD-10-CM

## 2022-04-06 DIAGNOSIS — E78.5 HYPERLIPIDEMIA, UNSPECIFIED HYPERLIPIDEMIA TYPE: ICD-10-CM

## 2022-04-06 NOTE — TELEPHONE ENCOUNTER
Care Due:                  Date            Visit Type   Department     Provider  --------------------------------------------------------------------------------                                MYCHART                              FOLLOWUP/OF  [facility] FAMILY  Last Visit: 04-      FICE VISIT   ELOISA WRIGHT  Next Visit: None Scheduled  None         None Found                                                            Last  Test          Frequency    Reason                     Performed    Due Date  --------------------------------------------------------------------------------    Office Visit  12 months..  pravastatin,               04- 04-                             telmisartan-hydrochloroth                             iazide...................    Powered by [employer] website. Reference number: [id number].   4/06/2022 10:12:38 AM CDT

## 2022-04-07 ENCOUNTER — TELEPHONE (OUTPATIENT)
Dept: GASTROENTEROLOGY | Facility: CLINIC | Age: 67
End: 2022-04-07
Payer: MEDICARE

## 2022-04-07 NOTE — TELEPHONE ENCOUNTER
Refill Routing Note   Medication(s) are not appropriate for processing by [facility] for the following reason(s):      - Required laboratory values are outdated    Medication-related problems identified: Requires appointment          Medication reconciliation completed: No     Appointments  past 12m or future 3m with PCP    Date Provider   Last Visit   4/7/2021 KARIN Nichols MD   Next Visit   Visit date not found KARIN Nichols MD

## 2022-04-11 RX ORDER — PRAVASTATIN SODIUM 20 MG/1
20 TABLET ORAL DAILY
Qty: 90 TABLET | Refills: 0 | Status: SHIPPED | OUTPATIENT
Start: 2022-04-11 | End: 2022-10-17 | Stop reason: SDUPTHER

## 2022-05-09 ENCOUNTER — PATIENT MESSAGE (OUTPATIENT)
Dept: SMOKING CESSATION | Facility: CLINIC | Age: 67
End: 2022-05-09
Payer: MEDICARE

## 2022-05-15 DIAGNOSIS — H40.1133 PRIMARY OPEN ANGLE GLAUCOMA (POAG) OF BOTH EYES, SEVERE STAGE: ICD-10-CM

## 2022-05-16 RX ORDER — ACETAZOLAMIDE 500 MG/1
500 CAPSULE, EXTENDED RELEASE ORAL 2 TIMES DAILY
Qty: 180 CAPSULE | Refills: 3 | Status: SHIPPED | OUTPATIENT
Start: 2022-05-16 | End: 2023-06-19 | Stop reason: SDUPTHER

## 2022-05-30 DIAGNOSIS — E87.6 HYPOKALEMIA: ICD-10-CM

## 2022-05-30 RX ORDER — POTASSIUM CHLORIDE 20 MEQ/1
TABLET, EXTENDED RELEASE ORAL
Qty: 360 TABLET | Refills: 0 | Status: SHIPPED | OUTPATIENT
Start: 2022-05-30 | End: 2022-10-14

## 2022-05-30 NOTE — TELEPHONE ENCOUNTER
Care Due:                  Date            Visit Type   Department     Provider  --------------------------------------------------------------------------------                                MYCHART                              FOLLOWUP/OF  [facility] FAMILY  Last Visit: 04-      FICE VISIT   MEDICINE       KARIN WRIGHT  Next Visit: None Scheduled  None         None Found                                                            Last  Test          Frequency    Reason                     Performed    Due Date  --------------------------------------------------------------------------------    Lipid Panel.  12 months..  pravastatin..............  09- 09-    [facility] Embedded Care Gaps. Reference number: [id number]. 5/30/2022   9:33:49 AM CDT

## 2022-05-30 NOTE — TELEPHONE ENCOUNTER
Refill Authorization Note   Deb Tesfaye  is requesting a refill authorization.  Brief Assessment and Rationale for Refill:  Approve    -Medication-Related Problems Identified: Requires labs  Medication Therapy Plan:       Medication Reconciliation Completed: No   Comments:     No Care Gaps recommended.     Note composed:9:39 AM 05/30/2022

## 2022-07-06 NOTE — PROGRESS NOTES
"  HPI     Glaucoma     Comments: 4 month ck and pt states she had a "sticky" feeling in the   corner of right eye but denies any pain or other symptoms              Comments     DLS: 3/11/22    1. POAG OD>OS  2. APD OD  3. NS OS  4. Papilloma OS  5. CME OD  6. PCIOL OD    MEDS:  Ketorolac BID OD   Timolol BID OU   Brimonidine TID OU  Rhopressa QDAY OU   Bisi 4% TID OS  Lumigan QHS OS  Diamox 500mg BID PO            Last edited by Bharati Graham MA on 7/8/2022 11:35 AM. (History)            Assessment /Plan     For exam results, see Encounter Report.    Primary open angle glaucoma (POAG) of both eyes, severe stage    Afferent pupillary defect, right    Visual field loss    Nuclear sclerosis of left eye    Cystoid macular edema, right    Floaters, bilateral    Pseudophakia, right eye            S/P PHACO/IOL/AHMED - OD - 1/10/2017 - developed CME post op    Rx with steroid gtts and ilevro - had responded -   Recurrent CME od - 12/2/2019 - off PA and Ilevro - however  the vision is stable     S/P  CYCLO G6 OD - 4/12/2017  REFERRED BY DR HILLMAN FOR SEVERE POORLY CONTROLLED POAG - 7/24/2015   Pt recently moved from [place] to the [facility]     Previously referred to Dr. Hillman from [facility], review of outside records:    Tm 27/25.    Initial visit 3/29/12  IOP 10 OU on Combitan and Lumigan  C:D 0.85 OU  CCT 524OD, 531 OS       Glaucoma (type and duration)    Dx about 2002 - [place]    First HVF   First at [facility] 2015   First photos   First at [facility] 2015    Treatment / Drops started   2002              Family history    + mom / brother         Glaucoma meds    cosopt ou / brimonidine ou tid / lumigan ou q hs / bisi od tid / diamox / bisi ou        H/O adverse rxn to glaucoma drops    none        LASERS    SLT OD - 8/5/2013 - [place] // SLT os - [facility] 8/27/2015 (good resp 21-->16)        GLAUCOMA SURGERIES    CYCL G6 OD - 4/12/2017  / phaco/IOL / ahmed od 1/10/2018         OTHER EYE SURGERIES    " Combined phaco/IOL / ahmed  - od - 1/10/2018         CDR    0.99/0.95        Tbase    ?  Off gtts (on gtts was up to 28/25)         Tmax    27/25 - per outside record's (reviewed by Arik )             Ttarget    15-16  ou    (may need to be lower)           HVF  - OD 10-2 ss - 3 test 2015 to 2017 - SALT/ IAD (progression)     Change to  10-2 stim V 2 test 2018 to 2021  - SALT / IAD od   OS 24-2 ss - 6 test 2016 to 2021 -   SAD - sup paracentral defect  / IAD    (( 2 VF's from [place] - that were reviewed by Dr. IVEY - 2012 and 2013 -  SAD w/ fix invl . IAD od / early IAD os ))            Gonio    +3-4 ou         CCT    524/531        OCT    5 test 2016 to 2021 - RNFL - OD:dec. S/N/I // OS:dec. S/T/I, bord N        HRT    2 test 2015 to 2018  - MR -   Dec thru out   od // dec thru out  os /// CDR 0. 91 od // 0.88 os        Disc photos    2015, 2018      - Ttoday - 8/13   - Test done today    IOP //     2. + APD od     3.  NS ou    Minimal - [place] 20/25 ou still     4. Papilloma OS   To be removed with Arik     5. ++ CME od    Decrease in [place] from 20/70 to 20/200   New since prior OCT 2/3/2017    S/p PHACO/IOL / AHMED  1/10/2018    Had resolved by 7.29/2019 - on ilevro and PA   -recurrent on 12/2/2019 - off ilevro    - resolved by 6/12/2020 - on ketorolac tid od    -STILL RESOLVED 10/2/2020 - still on ketorolac    PLAN   GLAUCOMA - ADVANCED - OD >OS   HVF's continue to progress - especially OD     S/P  - cyclo G6 - OD - 4/12/2017      S/p Phaco / IOL // phaco/ IOL / ahmed  OD Date: 1/10/2018 - general anesthesia // PCB00 23.0  POY > 4     glaucoma gtts  timolol bid ou   Brim ou tid   rhopressa ou   bisi tid os (do not use od - may cause iritis and cme)   lumigan  os (hold od - get recurrent CME )   Diamox (acetazolamide pills) 500 PO bid - tolerating ok     Ketorolac 2 x day   od (Ilevro - too expensive) - restarted - 12/12/2019 - for recurrent CME od    Repeat SLT os - only 180 degrees treated - too narrow sup and  inf (9/20/2018) - steroid taper      OCT macula 5/17/19: +++ CME od /  OD: , Loss of foveal contour, intraretinal and subretinal fluid  OS: , NFC, no thickening    OCT - 7/26/2019 - CME improved / resolved // review retina notes // monitor for recurrence of CME od     OCT 12/2/2019 - increase - recurrent CME od - 488 - off the ilevro (did not appear to affect her vision)     Oct 6/12/2020 - no cme OD - RESOLVED     OCT - 10/2/2020 - NO CME    OCT 12/3/2021 - no cme     IN FUTURE ALWAYS DO A 10-2 STIM V OD AND A 24-2 SS OS     Photo file update 37/8/2022    Pt wants a letter stating that she has glaucoma and has great difficulty  Driving  at night .    F/U 3-4 months with HVF - 10-2 stim V od and 24-2 ss os // DFE // OCT

## 2022-07-08 ENCOUNTER — OFFICE VISIT (OUTPATIENT)
Dept: OPHTHALMOLOGY | Facility: CLINIC | Age: 67
End: 2022-07-08
Payer: MEDICARE

## 2022-07-08 DIAGNOSIS — H53.40 VISUAL FIELD LOSS: ICD-10-CM

## 2022-07-08 DIAGNOSIS — H25.12 NUCLEAR SCLEROSIS OF LEFT EYE: ICD-10-CM

## 2022-07-08 DIAGNOSIS — H35.351 CYSTOID MACULAR EDEMA, RIGHT: ICD-10-CM

## 2022-07-08 DIAGNOSIS — H43.393 FLOATERS, BILATERAL: ICD-10-CM

## 2022-07-08 DIAGNOSIS — H21.561 AFFERENT PUPILLARY DEFECT, RIGHT: ICD-10-CM

## 2022-07-08 DIAGNOSIS — Z96.1 PSEUDOPHAKIA, RIGHT EYE: ICD-10-CM

## 2022-07-08 DIAGNOSIS — H40.1133 PRIMARY OPEN ANGLE GLAUCOMA (POAG) OF BOTH EYES, SEVERE STAGE: Primary | ICD-10-CM

## 2022-07-08 PROCEDURE — 1160F PR REVIEW ALL MEDS BY PRESCRIBER/CLIN PHARMACIST DOCUMENTED: ICD-10-PCS | Mod: CPTII,S$GLB,, | Performed by: OPHTHALMOLOGY

## 2022-07-08 PROCEDURE — 1101F PR PT FALLS ASSESS DOC 0-1 FALLS W/OUT INJ PAST YR: ICD-10-PCS | Mod: CPTII,S$GLB,, | Performed by: OPHTHALMOLOGY

## 2022-07-08 PROCEDURE — 92012 PR EYE EXAM, EST PATIENT,INTERMED: ICD-10-PCS | Mod: S$GLB,,, | Performed by: OPHTHALMOLOGY

## 2022-07-08 PROCEDURE — 1159F PR MEDICATION LIST DOCUMENTED IN MEDICAL RECORD: ICD-10-PCS | Mod: CPTII,S$GLB,, | Performed by: OPHTHALMOLOGY

## 2022-07-08 PROCEDURE — 92012 INTRM OPH EXAM EST PATIENT: CPT | Mod: S$GLB,,, | Performed by: OPHTHALMOLOGY

## 2022-07-08 PROCEDURE — 3288F PR FALLS RISK ASSESSMENT DOCUMENTED: ICD-10-PCS | Mod: CPTII,S$GLB,, | Performed by: OPHTHALMOLOGY

## 2022-07-08 PROCEDURE — 1159F MED LIST DOCD IN RCRD: CPT | Mod: CPTII,S$GLB,, | Performed by: OPHTHALMOLOGY

## 2022-07-08 PROCEDURE — 1101F PT FALLS ASSESS-DOCD LE1/YR: CPT | Mod: CPTII,S$GLB,, | Performed by: OPHTHALMOLOGY

## 2022-07-08 PROCEDURE — 99999 PR PBB SHADOW E&M-EST. PATIENT-LVL III: ICD-10-PCS | Mod: PBBFAC,,, | Performed by: OPHTHALMOLOGY

## 2022-07-08 PROCEDURE — 99999 PR PBB SHADOW E&M-EST. PATIENT-LVL III: CPT | Mod: PBBFAC,,, | Performed by: OPHTHALMOLOGY

## 2022-07-08 PROCEDURE — 1160F RVW MEDS BY RX/DR IN RCRD: CPT | Mod: CPTII,S$GLB,, | Performed by: OPHTHALMOLOGY

## 2022-07-08 PROCEDURE — 3288F FALL RISK ASSESSMENT DOCD: CPT | Mod: CPTII,S$GLB,, | Performed by: OPHTHALMOLOGY

## 2022-07-19 NOTE — H&P
History & Physical - Short Stay  Gastroenterology      SUBJECTIVE:     Procedure: Colonoscopy    Chief Complaint/Indication for Procedure: Screening.  FHx of colon cancer (Mother).    History of Present Illness:  Asymptomatic    Meron Tuttle MA   to Deb Tesfaye   4/5/2022  7:54 AM  Good Morning,    According to your chart, you should be having them every 5 years. You were due for one in July of 2021. I will send Dr. Nichols a message requesting an order for one be placed in your chart so that you may schedule. If there is anything else we can assist you with, please let us know.    Thanks,   Meron Tuttle MA      See last Colonoscopy 7/29/2016:  Indications:         Colon cancer screening in patient at increased risk:                        Colorectal cancer in mother, Last colonoscopy:                        December 2010 ([place])   Comorbidities        Hypercholesterolemia, History of breast cancer        - !!! USE ADULT COLONOSCOPE FOR FUTURE EXAMS. !!!   Impression:  - One 1 mm polyp in the cecum. Resected and retrieved.                        - Internal hemorrhoids.                        - Redundant colon.                        - The examination was otherwise normal.                        - The examined portion of the ileum was normal.   Recommendation:      - Discharge patient to home.                        - Await pathology results.                        - High fiber diet.                        - Take a PROBIOTIC, such as a carton of GREEK YOGURT                        (Chobani or Oikos, or Activia or Dannon); or tablets                        of ALIGN or CULTURELLE or NISHA-Q (all                        non-prescription), every day for a month.                        - Repeat colonoscopy in 5 years for screening purposes.                        - !!! USE ADULT COLONOSCOPE FOR FUTURE EXAMS. !!!                        - Continue present medications.                        - Return to  normal activities tomorrow.   Janak De Leon MD   7/20/2016     FINAL PATHOLOGIC DIAGNOSIS   Cecum, polyp, biopsy: Tubular adenoma.            PTA Medications   Medication Sig    acetaZOLAMIDE (DIAMOX) 500 mg CpSR Take 1 capsule (500 mg total) by mouth 2 (two) times daily. Please discontinue the dorzolamide eye drops    azelastine (ASTELIN) 137 mcg (0.1 %) nasal spray SHAKE WELL AND USE 1 SPRAY IN EACH NOSTRIL TWICE DAILY    brimonidine 0.2% (ALPHAGAN) 0.2 % Drop Place 1 drop into both eyes 3 (three) times daily.    ERGOCALCIFEROL, VITAMIN D2, (VITAMIN D ORAL) Take 1,000 Units by mouth once daily.     fluticasone propionate (FLONASE) 50 mcg/actuation nasal spray 1 spray (50 mcg total) by Each Nostril route once daily.    ketorolac 0.5% (ACULAR) 0.5 % Drop Place 1 drop into the right eye 2 (two) times daily.    KRILL OIL ORAL Take 1 capsule by mouth once daily.     LUMIGAN 0.01 % Drop Place 1 drop into both eyes every evening. (Patient taking differently: Place 1 drop into the left eye every evening.)    MULTIVITS,CA,MINERALS/IRON/FA (ONE-A-DAY WOMENS FORMULA ORAL) Take 1 capsule by mouth once daily.    netarsudiL (RHOPRESSA) 0.02 % ophthalmic solution Place 1 drop into both eyes once daily.    pilocarpine HCL 4% (PILOCAR) 4 % ophthalmic solution Place 1 drop into the left eye 3 (three) times daily.    potassium chloride SA (K-DUR,KLOR-CON) 20 MEQ tablet TAKE 2 TABLETS(40 MEQ) BY MOUTH TWICE DAILY    pravastatin (PRAVACHOL) 20 MG tablet Take 1 tablet (20 mg total) by mouth once daily.    telmisartan-hydrochlorothiazide (MICARDIS HCT) 80-25 mg per tablet TAKE 1 TABLET BY MOUTH EVERY DAY    timolol maleate 0.5% (TIMOPTIC) 0.5 % Drop Place 1 drop into both eyes 2 (two) times daily.    valACYclovir (VALTREX) 1000 MG tablet Take 1 tablet (1,000 mg total) by mouth 3 (three) times daily. 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  Mother with colon cancer.    COLONOSCOPY  12/2010    COLONOSCOPY N/A 7/20/2016    Procedure: COLONOSCOPY;  Surgeon: Janak De Leon Jr., MD;  [facility];  Service: Endoscopy;  Laterality: N/A;    EYE SURGERY      SELECTIVE LASER TRABECULPALSTY Right 8/13    OS DONE IN [place]    SELECTIVE LASER TRABECUPLASTY Left 8/27/15    OS WITH     TUBAL LIGATION       Family History   Problem Relation Age of Onset    Cancer Mother 54        colon cancer    Cancer Maternal Grandfather     Glaucoma Maternal Grandfather     Cataracts Maternal Grandfather     Hypertension Father     Amblyopia Neg Hx     Blindness Neg Hx     Diabetes Neg Hx     Macular degeneration Neg Hx     Retinal detachment Neg Hx     Stroke Neg Hx     Strabismus Neg Hx     Thyroid disease Neg Hx      Social History     Tobacco Use    Smoking status: Never Smoker    Smokeless tobacco: Never Used   Substance Use Topics    Alcohol use: No    Drug use: No         OBJECTIVE:     Vital Signs (Most Recent)  Temp: 97 °F (36.1 °C) (07/20/22 1053)  Pulse: 60 (07/20/22 1053)  Resp: 18 (07/20/22 1053)  BP: 110/62 (07/20/22 1053)  SpO2: 99 % (07/20/22 1053)    Physical Exam:  : Ht: 5' 5" (165.1 cm)   Wt: 77.1 kg (170 lb)   BMI: 28.29 kg/m²                                                         GENERAL:  Comfortable, in no acute distress.                                 HEENT EXAM:  Nonicteric.  No " adenopathy.  Oropharynx is clear.               NECK:  Supple.                                                               LUNGS:  Clear.                                                               CARDIAC:  Regular rate and rhythm.  S1, S2.  No murmur.                      ABDOMEN:  Soft, positive bowel sounds, nontender.  No hepatosplenomegaly or masses.  No rebound or guarding.                                             EXTREMITIES:  No edema.     MENTAL STATUS:  Alert and oriented.    ASSESSMENT/PLAN:     Assessment: Colorectal cancer screening.  FHx of colon cancer.    Plan: Colonoscopy    Anesthesia Plan:   MAC / General Anaesthesia    ASA Grade: ASA 2 - Patient with mild systemic disease with no functional limitations    MALLAMPATI SCORE: I (soft palate, uvula, fauces, and tonsillar pillars visible)

## 2022-07-20 ENCOUNTER — ANESTHESIA (OUTPATIENT)
Dept: ENDOSCOPY | Facility: HOSPITAL | Age: 67
End: 2022-07-20
Payer: MEDICARE

## 2022-07-20 ENCOUNTER — HOSPITAL ENCOUNTER (OUTPATIENT)
Facility: HOSPITAL | Age: 67
Discharge: HOME | End: 2022-07-20
Attending: INTERNAL MEDICINE | Admitting: INTERNAL MEDICINE
Payer: MEDICARE

## 2022-07-20 ENCOUNTER — ANESTHESIA EVENT (OUTPATIENT)
Dept: ENDOSCOPY | Facility: HOSPITAL | Age: 67
End: 2022-07-20
Payer: MEDICARE

## 2022-07-20 DIAGNOSIS — Z80.0 FAMILY HISTORY OF COLON CANCER: ICD-10-CM

## 2022-07-20 PROBLEM — Z12.11 COLON CANCER SCREENING: Status: ACTIVE | Noted: 2022-07-20

## 2022-07-20 PROCEDURE — D9220A PRA ANESTHESIA: Mod: ANES,,, | Performed by: ANESTHESIOLOGY

## 2022-07-20 PROCEDURE — 25000003 PHARM REV CODE 250: Mod: PO | Performed by: NURSE ANESTHETIST, CERTIFIED REGISTERED

## 2022-07-20 PROCEDURE — G0105 COLORECTAL SCRN; HI RISK IND: ICD-10-PCS | Mod: ,,, | Performed by: INTERNAL MEDICINE

## 2022-07-20 PROCEDURE — D9220A PRA ANESTHESIA: Mod: CRNA,,, | Performed by: NURSE ANESTHETIST, CERTIFIED REGISTERED

## 2022-07-20 PROCEDURE — D9220A PRA ANESTHESIA: ICD-10-PCS | Mod: ANES,,, | Performed by: ANESTHESIOLOGY

## 2022-07-20 PROCEDURE — G0105 COLORECTAL SCRN; HI RISK IND: HCPCS | Mod: ,,, | Performed by: INTERNAL MEDICINE

## 2022-07-20 PROCEDURE — 37000009 HC ANESTHESIA EA ADD 15 MINS: Mod: PO | Performed by: INTERNAL MEDICINE

## 2022-07-20 PROCEDURE — G0105 COLORECTAL SCRN; HI RISK IND: HCPCS | Mod: PO | Performed by: INTERNAL MEDICINE

## 2022-07-20 PROCEDURE — 63600175 PHARM REV CODE 636 W HCPCS: Mod: PO | Performed by: INTERNAL MEDICINE

## 2022-07-20 PROCEDURE — 63600175 PHARM REV CODE 636 W HCPCS: Mod: PO | Performed by: NURSE ANESTHETIST, CERTIFIED REGISTERED

## 2022-07-20 PROCEDURE — 25000003 PHARM REV CODE 250: Mod: PO | Performed by: INTERNAL MEDICINE

## 2022-07-20 PROCEDURE — 37000008 HC ANESTHESIA 1ST 15 MINUTES: Mod: PO | Performed by: INTERNAL MEDICINE

## 2022-07-20 PROCEDURE — D9220A PRA ANESTHESIA: ICD-10-PCS | Mod: CRNA,,, | Performed by: NURSE ANESTHETIST, CERTIFIED REGISTERED

## 2022-07-20 RX ORDER — PROPOFOL 10 MG/ML
VIAL (ML) INTRAVENOUS
Status: DISCONTINUED | OUTPATIENT
Start: 2022-07-20 | End: 2022-07-20

## 2022-07-20 RX ORDER — SODIUM CHLORIDE 0.9 % (FLUSH) 0.9 %
10 SYRINGE (ML) INJECTION
Status: DISCONTINUED | OUTPATIENT
Start: 2022-07-20 | End: 2022-07-20 | Stop reason: HOSPADM

## 2022-07-20 RX ORDER — LIDOCAINE HYDROCHLORIDE 20 MG/ML
INJECTION INTRAVENOUS
Status: DISCONTINUED | OUTPATIENT
Start: 2022-07-20 | End: 2022-07-20

## 2022-07-20 RX ORDER — PROPOFOL 10 MG/ML
VIAL (ML) INTRAVENOUS CONTINUOUS PRN
Status: DISCONTINUED | OUTPATIENT
Start: 2022-07-20 | End: 2022-07-20

## 2022-07-20 RX ORDER — SODIUM CHLORIDE, SODIUM LACTATE, POTASSIUM CHLORIDE, CALCIUM CHLORIDE 600; 310; 30; 20 MG/100ML; MG/100ML; MG/100ML; MG/100ML
INJECTION, SOLUTION INTRAVENOUS CONTINUOUS
Status: DISCONTINUED | OUTPATIENT
Start: 2022-07-20 | End: 2022-07-20 | Stop reason: HOSPADM

## 2022-07-20 RX ORDER — LIDOCAINE HYDROCHLORIDE 10 MG/ML
1 INJECTION INFILTRATION; PERINEURAL ONCE
Status: COMPLETED | OUTPATIENT
Start: 2022-07-20 | End: 2022-07-20

## 2022-07-20 RX ADMIN — PROPOFOL 100 MG: 10 INJECTION, EMULSION INTRAVENOUS at 11:07

## 2022-07-20 RX ADMIN — LIDOCAINE HYDROCHLORIDE 50 MG: 20 INJECTION, SOLUTION INTRAVENOUS at 11:07

## 2022-07-20 RX ADMIN — PROPOFOL 150 MCG/KG/MIN: 10 INJECTION, EMULSION INTRAVENOUS at 11:07

## 2022-07-20 RX ADMIN — LIDOCAINE HYDROCHLORIDE 1 ML: 10 INJECTION, SOLUTION EPIDURAL; INFILTRATION; INTRACAUDAL; PERINEURAL at 11:07

## 2022-07-20 RX ADMIN — SODIUM CHLORIDE, SODIUM LACTATE, POTASSIUM CHLORIDE, AND CALCIUM CHLORIDE: .6; .31; .03; .02 INJECTION, SOLUTION INTRAVENOUS at 11:07

## 2022-07-20 NOTE — TRANSFER OF CARE
"Anesthesia Transfer of Care Note    Patient: Deb Tesfaye    Procedure(s) Performed: Procedure(s) (LRB):  COLONOSCOPY (N/A)    Patient location: PACU    Anesthesia Type: general    Transport from OR: Transported from OR on room air with adequate spontaneous ventilation    Post pain: adequate analgesia    Post assessment: no apparent anesthetic complications and tolerated procedure well    Post vital signs: stable    Level of consciousness: sedated    Nausea/Vomiting: no nausea/vomiting    Complications: none    Transfer of care protocol was followed      Last vitals:   Visit Vitals  /62 (BP Location: Right arm, Patient Position: Lying)   Pulse 60   Temp 36.1 °C (97 °F)   Resp 18   Ht 5' 5" (1.651 m)   Wt 77.1 kg (170 lb)   SpO2 99%   Breastfeeding No   BMI 28.29 kg/m²     "

## 2022-07-20 NOTE — BRIEF OP NOTE
Discharge Note  Short Stay      SUMMARY     Admit Date: 7/20/2022    Attending Physician: Janak De Leon Jr., MD     Discharge Physician: Janak De Leon Jr., MD    Discharge Date: 7/20/2022 12:04 PM    Final Diagnosis: Colon cancer screening [Z12.11]  Family history of colon cancer in mother [Z80.0]  Polyp of colon, unspecified part of colon, unspecified type [K63.5]      Impression:            - Non-bleeding internal hemorrhoids.                          - Redundant colon.                          - The examination was otherwise normal.                          - No specimens collected.   Recommendation:        - Discharge patient to home.                          - High fiber diet.                          - Use fiber, for example Citrucel, Fibercon,                          Konsyl or Metamucil.                          - Take a PROBIOTIC, such as a carton of GREEK                          YOGURT (Chobani or Oikos, or Activia or Dannon);                          or tablets of ALIGN or CULTURELLE or NISHA-Q (all                          non-prescription), every day for a month.                          - Repeat colonoscopy in 5 years for surveillance.                          - !!! USE ADULT COLONOSCOPE FOR FUTURE EXAMS. !!!                          - Continue present medications.                          - Patient has a contact number available for                          emergencies. The signs and symptoms of potential                          delayed complications were discussed with the                          patient. Return to normal activities tomorrow.                          Written discharge instructions were provided to                          the patient.                          - Return to normal activities tomorrow.   Janak De Leon MD   7/20/2022       Disposition: HOME OR SELF CARE    Patient Instructions:   Current Discharge Medication List      CONTINUE these medications which have  NOT CHANGED    Details   acetaZOLAMIDE (DIAMOX) 500 mg CpSR Take 1 capsule (500 mg total) by mouth 2 (two) times daily. Please discontinue the dorzolamide eye drops  Qty: 180 capsule, Refills: 3    Associated Diagnoses: Primary open angle glaucoma (POAG) of both eyes, severe stage      azelastine (ASTELIN) 137 mcg (0.1 %) nasal spray SHAKE WELL AND USE 1 SPRAY IN EACH NOSTRIL TWICE DAILY  Qty: 30 mL, Refills: 11    Associated Diagnoses: Sinusitis, unspecified chronicity, unspecified location; Bronchitis      brimonidine 0.2% (ALPHAGAN) 0.2 % Drop Place 1 drop into both eyes 3 (three) times daily.  Qty: 30 mL, Refills: 3    Associated Diagnoses: Primary open angle glaucoma of both eyes, severe stage      ERGOCALCIFEROL, VITAMIN D2, (VITAMIN D ORAL) Take 1,000 Units by mouth once daily.       fluticasone propionate (FLONASE) 50 mcg/actuation nasal spray 1 spray (50 mcg total) by Each Nostril route once daily.  Qty: 16 g, Refills: 0    Associated Diagnoses: Sinusitis, unspecified chronicity, unspecified location      ketorolac 0.5% (ACULAR) 0.5 % Drop Place 1 drop into the right eye 2 (two) times daily.  Qty: 5 mL, Refills: 12      KRILL OIL ORAL Take 1 capsule by mouth once daily.       LUMIGAN 0.01 % Drop Place 1 drop into both eyes every evening.  Qty: 2.5 mL, Refills: 12    Associated Diagnoses: Primary open angle glaucoma (POAG) of both eyes, severe stage      MULTIVITS,CA,MINERALS/IRON/FA (ONE-A-DAY WOMENS FORMULA ORAL) Take 1 capsule by mouth once daily.      netarsudiL (RHOPRESSA) 0.02 % ophthalmic solution Place 1 drop into both eyes once daily.  Qty: 2.5 mL, Refills: 11    Associated Diagnoses: Primary open angle glaucoma (POAG) of both eyes, severe stage      pilocarpine HCL 4% (PILOCAR) 4 % ophthalmic solution Place 1 drop into the left eye 3 (three) times daily.  Qty: 15 mL, Refills: 12    Associated Diagnoses: Primary open angle glaucoma of both eyes, severe stage      potassium chloride SA  (K-DUR,KLOR-CON) 20 MEQ tablet TAKE 2 TABLETS(40 MEQ) BY MOUTH TWICE DAILY  Qty: 360 tablet, Refills: 0    Associated Diagnoses: Hypokalemia      pravastatin (PRAVACHOL) 20 MG tablet Take 1 tablet (20 mg total) by mouth once daily.  Qty: 90 tablet, Refills: 0    Comments: ZERO refills remain on this prescription. Your patient is requesting advance approval of refills for this medication to PREVENT ANY MISSED DOSES  Associated Diagnoses: Hyperlipidemia, unspecified hyperlipidemia type      telmisartan-hydrochlorothiazide (MICARDIS HCT) 80-25 mg per tablet TAKE 1 TABLET BY MOUTH EVERY DAY  Qty: 90 tablet, Refills: 2    Comments: Please inactivate all prior scripts with same name and strength including on holds.  Associated Diagnoses: Essential hypertension      timolol maleate 0.5% (TIMOPTIC) 0.5 % Drop Place 1 drop into both eyes 2 (two) times daily.  Qty: 10 mL, Refills: 6    Associated Diagnoses: Primary open angle glaucoma of both eyes, unspecified glaucoma stage      valACYclovir (VALTREX) 1000 MG tablet Take 1 tablet (1,000 mg total) by mouth 3 (three) times daily. for 3 days  Qty: 15 tablet, Refills: 0             Discharge Procedure Orders (must include Diet, Follow-up, Activity)    Follow Up:  Follow up with PCP as per your routine.  Please follow a high fiber diet.  Activity as tolerated.    No driving day of procedure.    PROBIOTICS:  Now that your colon is so cleaned out, now is a good time for a round of PROBIOTICS.  Eat a container of Greek Yogurt, such as OIKOS or CHOBANI,  Or Activia or Dannon    Greek Yogurt.    Or Take a similar Probiotic product such as Align or Culturelle or Marlena-Q, every day for a month.                  (The products listed are non-prescription, but you may need to ask the pharmacist for their location.)  Repeat this at least 5-6 times a year.

## 2022-07-20 NOTE — [employer] PATIENT INSTRUCTIONS
Discharge Summary/Instructions for after Colonoscopy without   Biopsy/Polypectomy  Deb Tesfaye    Wednesday, July 20, 2022  Janak De Leon MD  RESTRICTIONS ON ACTIVITY:  - Do not drive a car or operate machinery until the day after the procedure.      - The following day: return to full activity including work.  - For  3 days: No heavy lifting, straining or running.  - Diet: You may eat solid foods, but no gassy foods (i.e. beans, broccoli,   cabbage, etc).  TREATMENT FOR COMMON SIDE EFFECTS:  - Mild abdominal pain and bloating or excessive gas: rest, eat lightly and   use a heating pad.  SYMPTOMS TO WATCH FOR AND REPORT TO YOUR PHYSICIAN:  1. Severe abdominal pain.  2. Fever within 24 hours after a procedure.  3. A large amount of rectal bleeding. (A small amount of blood from the   rectum is not serious, especially if hemorrhoids are present.  3.  Because air was put into your colon during the procedure, expelling   large amounts of air from your rectum is normal.  4.  You may not have a bowel movement for 1-3 days because of the   colonoscopy prep.  This is normal.  5.  Call immediately if you notice any of the following:   Chills and/or fever over 101   Persistent vomiting   Severe abdominal pain, other than gas cramps   Severe chest pain   Black, tarry stools   Any bleeding - exceeding one tablespoon  Your doctor recommends these additional instructions:  Eat a high fiber diet.   Take a fiber supplement, for example Citrucel, Fibercon, Konsyl or   Metamucil.   Take a PROBIOTIC, such as a carton of GREEK YOGURT (Chobani or Oikos,  or   Activia or Dannon);  or tablets of ALIGN or CULTURELLE or NISHA-Q (all   non-prescription), every day for a month.   And repeat this at least 5-6   times a year.  Your physician has recommended a repeat colonoscopy in 5 years for   surveillance.  None  If you have any questions or problems, please call your physician.  EMERGENCY PHONE NUMBER: [phone]  LAB RESULTS:  Call in two (2) weeks for lab results, [phone]  ___________________________________________  Nurse Signature  ___________________________________________  Patient/Designated Responsible Party Signature  Janak De Leon MD  7/20/2022 12:02:13 PM  This report has been verified and signed electronically.  Dear patient,  As a result of recent federal legislation (The Federal Cures Act), you may   receive lab or pathology results from your procedure in your [facility]   account before your physician is able to contact you. Your physician or   their representative will relay the results to you with their   recommendations at their soonest availability.  Thank you.  [employer]ATION

## 2022-07-20 NOTE — ANESTHESIA PREPROCEDURE EVALUATION
07/20/2022  Deb Tesfaye is a 67 y.o., female.    Pre-op Assessment    I have reviewed the Patient Summary Reports.    I have reviewed the Nursing Notes. I have reviewed the NPO Status.   I have reviewed the Medications.     Review of Systems  Anesthesia Hx:  No problems with previous Anesthesia    Social:  Non-Smoker, No Alcohol Use    Hematology/Oncology:  Hematology Normal      Current/Recent Cancer. (2015, DCIS, lumpectomy) Breast left no axillary node dissection no lymphedema radiation  Oncology Comments: FRANCISCO JAVIER now     Cardiovascular:   Hypertension, well controlled Denies MI.    Denies Angina. Very active without limitation, 3 hours in mall at times,  Takes care of 3 grandkids during the day./   Pulmonary:  Pulmonary Normal  Denies COPD.  Denies Asthma.  Denies Shortness of breath.    Renal/:   Denies Chronic Renal Disease.     Hepatic/GI:   Bowel Prep. Denies Liver Disease.    Neurological:   Denies TIA. Denies CVA. Denies Seizures.    Endocrine:   Denies Diabetes.        Physical Exam  General:  Well nourished      Airway/Jaw/Neck:  Airway Findings: Mouth Opening: Normal   Tongue: Normal   General Airway Assessment: Adult, Average Mallampati: II  TM Distance: Normal, at least 6 cm   Jaw/Neck Findings:  Neck ROM: Normal ROM        Chest/Lungs:  Chest/Lungs Findings: Clear to auscultation      Heart/Vascular:  Heart Findings: Rate: Normal  Rhythm: Regular Rhythm  Sounds: Normal        Mental Status:  Mental Status Findings:  Cooperative, Alert and Oriented         Anesthesia Plan  Type of Anesthesia, risks & benefits discussed:  Anesthesia Type:  general    Patient's Preference:   Plan Factors:          Intra-op Monitoring Plan: Standard ASA Monitors  Intra-op Monitoring Plan Comments:   Post Op Pain Control Plan:   Post Op Pain Control Plan Comments: As per surgeon's plan    Induction:    IV  Beta Blocker:  Patient is not currently on a Beta-Blocker (No further documentation required).       Informed Consent: Informed consent signed with the Patient and all parties understand the risks and agree with anesthesia plan.  All questions answered.  Anesthesia consent signed with patient.  ASA Score: 2     Day of Surgery Review of History & Physical: I have interviewed and examined the patient. I have reviewed the patient's H&P dated:              Ready For Surgery From Anesthesia Perspective.       Placement Date: 01/25/16; Placement Time: 0945; Inserted by: CRNA; Airway Device: LMA; Mask Ventilation: Easy; Intubated: Postinduction; Airway Device Size: 4.0; Style: Cuffed; Cuff Inflation: Minimal occlusive pressure; Inflation Amount: 28; Placement Verified By: Auscultation, Capnometry; Complicating Factors: None; Intubation Findings: Positive EtCO2, Bilateral breath sounds, Atraumatic/Condition of teeth unchanged; Securment: Lips; Complications: None; Breath Sounds: Equal Bilateral; Insertion Attempts: 1; Removal Date: 01/25/16;  Removal Time: 1026      Physical Exam  General: Well nourished    Airway:  Mallampati: II   Mouth Opening: Normal  TM Distance: Normal, at least 6 cm  Tongue: Normal  Neck ROM: Normal ROM    Chest/Lungs:  Clear to auscultation    Heart:  Rate: Normal  Rhythm: Regular Rhythm  Sounds: Normal          Anesthesia Plan  Type of Anesthesia, risks & benefits discussed:    Anesthesia Type: general  Intra-op Monitoring Plan: Standard ASA Monitors  Post Op Pain Control Plan: As per surgeon's plan  Induction:  IV  Informed Consent: Informed consent signed with the Patient and all parties understand the risks and agree with anesthesia plan.  All questions answered.   ASA Score: 2  Day of Surgery Review of History & Physical: I have interviewed and examined the patient. I have reviewed the patient's H&P dated:     Ready For Surgery From Anesthesia Perspective.       .

## 2022-07-20 NOTE — PLAN OF CARE
Patient is being discharged. Patient remained free from falls, injuries, or accidents during stay. Patient education provided by this nurse and has been given discharge paperwork containing follow up orders and education.

## 2022-07-20 NOTE — PATIENT INSTRUCTIONS
Recovery After Procedural Sedation (Adult)   You have been given medicine by vein to make you sleep during your surgery. This may have included both a pain medicine and sleeping medicine. Most of the effects have worn off. But you may still have some drowsiness for the next 6 to 8 hours.  Home care  Follow these guidelines when you get home:  For the next 8 hours, you should be watched by a responsible adult. This person should make sure your condition is not getting worse.  Don't drink any alcohol for the next 24 hours.  Don't drive, operate dangerous machinery, or make important business or personal decisions during the next 24 hours.  To prevent injury or falls, use caution when standing and walking for at least 24 hours after your procedure.  Note: Your healthcare provider may tell you not to take any medicine by mouth for pain or sleep in the next 4 hours. These medicines may react with the medicines you were given in the hospital. This could cause a much stronger response than usual.  Follow-up care  Follow up with your healthcare provider if you are not alert and back to your usual level of activity within 12 hours.  When to seek medical advice  Call your healthcare provider right away if any of these occur:  Drowsiness gets worse  Weakness or dizziness gets worse  Repeated vomiting  You can't be awakened  Fever  New rash  [employer] last reviewed this educational content on 9/1/2019  © 2676-3455 [employer]. [address] [place] [address], [place] [id number]. All rights reserved. This information is not intended as a substitute for professional medical care. Always follow your healthcare professional's instructions       High-Fiber Diet  Fiber is in fruits, vegetables, cereals, and grains. Fiber passes through your body undigested. A high-fiber diet helps food move through your intestinal tract. The added bulk is helpful in preventing constipation. In people with diverticulosis, fiber helps clean out the  pouches along the colon wall. It also prevents new pouches from forming. A high-fiber diet reduces the risk of colon cancer. It also lowers blood cholesterol and prevents high blood sugar in people with diabetes.    The fiber-rich foods listed below should be part of your diet. If you are not used to high-fiber foods, start with 1 or 2 foods from this list. Every 3 to 4 days add a new one to your diet. Do this until you are eating 4 high-fiber foods per day. This should give you 20 to 35 grams of fiber a day. It is also important to drink a lot of water when you are on this diet. You should have 6 to 8 glasses of water a day. Water makes the fiber swell and increases the benefit.  Foods high in dietary fiber  The following foods are high in dietary fiber:  Breads. Breads made with 100% whole-wheat flour; marimar, wheat, or rye crackers; whole-grain tortillas, bran muffins.  Cereals. Whole-grain and bran cereals with bran (shredded wheat, wheat flakes, raisin bran, corn bran); oatmeal, rolled oats, granola, and brown rice.  Fruits. Fresh fruits and their edible skins (pears, prunes, raisins, berries, apples, and apricots); bananas, citrus fruit, mangoes, pineapple; and prune juice.  Nuts. Any nuts and seeds.  Vegetables. Best served raw or lightly cooked. All types, especially: green peas, celery, eggplant, potatoes, spinach, broccoli, [place] sprouts, winter squash, carrots, cauliflower, soybeans, lentils, and fresh and dried beans of all kinds.  Other. Popcorn, any spices.  Date Last Reviewed: 8/1/2016  © 6882-6639 [employer]. [address] [place] [address], [place] [address]. All rights reserved. This information is not intended as a substitute for professional medical care. Always follow your healthcare professional's instructions.

## 2022-07-20 NOTE — ANESTHESIA POSTPROCEDURE EVALUATION
Anesthesia Post Evaluation    Patient: Deb Tesfaye    Procedure(s) Performed: Procedure(s) (LRB):  COLONOSCOPY (N/A)    Final Anesthesia Type: general      Patient location during evaluation: PACU  Patient participation: Yes- Able to Participate  Level of consciousness: sedated and awake  Post-procedure vital signs: reviewed and stable  Pain management: adequate  Airway patency: patent    PONV status at discharge: No PONV  Anesthetic complications: no      Cardiovascular status: blood pressure returned to baseline  Respiratory status: spontaneous ventilation  Hydration status: euvolemic  Follow-up not needed.          Vitals Value Taken Time   BP 92/57 07/20/22 1204   Temp 36.1 °C (97 °F) 07/20/22 1147   Pulse 58 07/20/22 1204   Resp 18 07/20/22 1204   SpO2 99 % 07/20/22 1204         Event Time   Out of Recovery 12:20:51         Pain/Luke Score: Luke Score: 10 (7/20/2022 12:19 PM)

## 2022-07-20 NOTE — BRIEF OP NOTE
Discharge Note  Short Stay      SUMMARY     Admit Date: 7/20/2022    Attending Physician: Janak De Leon Jr., MD     Discharge Physician: Janak De Leon Jr., MD    Discharge Date: 7/20/2022 12:04 PM    Final Diagnosis: Colon cancer screening [Z12.11]  Family history of colon cancer in mother [Z80.0]  Polyp of colon, unspecified part of colon, unspecified type [K63.5]      PROBIOTICS:  Now that your colon is so cleaned out, now is a good time for a round of PROBIOTICS.  Eat a container of Greek Yogurt, such as OIKOS or CHOBANI,  Or Activia or Dannon    Greek Yogurt.    Or Take a similar Probiotic product such as Align or Culturelle or Marlena-Q, every day for a month.                  (The products listed are non-prescription, but you may need to ask the pharmacist for their location.)  Repeat this at least 5-6 times a year.      Disposition: HOME OR SELF CARE    Patient Instructions:   Current Discharge Medication List      CONTINUE these medications which have NOT CHANGED    Details   acetaZOLAMIDE (DIAMOX) 500 mg CpSR Take 1 capsule (500 mg total) by mouth 2 (two) times daily. Please discontinue the dorzolamide eye drops  Qty: 180 capsule, Refills: 3    Associated Diagnoses: Primary open angle glaucoma (POAG) of both eyes, severe stage      azelastine (ASTELIN) 137 mcg (0.1 %) nasal spray SHAKE WELL AND USE 1 SPRAY IN EACH NOSTRIL TWICE DAILY  Qty: 30 mL, Refills: 11    Associated Diagnoses: Sinusitis, unspecified chronicity, unspecified location; Bronchitis      brimonidine 0.2% (ALPHAGAN) 0.2 % Drop Place 1 drop into both eyes 3 (three) times daily.  Qty: 30 mL, Refills: 3    Associated Diagnoses: Primary open angle glaucoma of both eyes, severe stage      ERGOCALCIFEROL, VITAMIN D2, (VITAMIN D ORAL) Take 1,000 Units by mouth once daily.       fluticasone propionate (FLONASE) 50 mcg/actuation nasal spray 1 spray (50 mcg total) by Each Nostril route once daily.  Qty: 16 g, Refills: 0    Associated Diagnoses:  Sinusitis, unspecified chronicity, unspecified location      ketorolac 0.5% (ACULAR) 0.5 % Drop Place 1 drop into the right eye 2 (two) times daily.  Qty: 5 mL, Refills: 12      KRILL OIL ORAL Take 1 capsule by mouth once daily.       LUMIGAN 0.01 % Drop Place 1 drop into both eyes every evening.  Qty: 2.5 mL, Refills: 12    Associated Diagnoses: Primary open angle glaucoma (POAG) of both eyes, severe stage      MULTIVITS,CA,MINERALS/IRON/FA (ONE-A-DAY WOMENS FORMULA ORAL) Take 1 capsule by mouth once daily.      netarsudiL (RHOPRESSA) 0.02 % ophthalmic solution Place 1 drop into both eyes once daily.  Qty: 2.5 mL, Refills: 11    Associated Diagnoses: Primary open angle glaucoma (POAG) of both eyes, severe stage      pilocarpine HCL 4% (PILOCAR) 4 % ophthalmic solution Place 1 drop into the left eye 3 (three) times daily.  Qty: 15 mL, Refills: 12    Associated Diagnoses: Primary open angle glaucoma of both eyes, severe stage      potassium chloride SA (K-DUR,KLOR-CON) 20 MEQ tablet TAKE 2 TABLETS(40 MEQ) BY MOUTH TWICE DAILY  Qty: 360 tablet, Refills: 0    Associated Diagnoses: Hypokalemia      pravastatin (PRAVACHOL) 20 MG tablet Take 1 tablet (20 mg total) by mouth once daily.  Qty: 90 tablet, Refills: 0    Comments: ZERO refills remain on this prescription. Your patient is requesting advance approval of refills for this medication to PREVENT ANY MISSED DOSES  Associated Diagnoses: Hyperlipidemia, unspecified hyperlipidemia type      telmisartan-hydrochlorothiazide (MICARDIS HCT) 80-25 mg per tablet TAKE 1 TABLET BY MOUTH EVERY DAY  Qty: 90 tablet, Refills: 2    Comments: Please inactivate all prior scripts with same name and strength including on holds.  Associated Diagnoses: Essential hypertension      timolol maleate 0.5% (TIMOPTIC) 0.5 % Drop Place 1 drop into both eyes 2 (two) times daily.  Qty: 10 mL, Refills: 6    Associated Diagnoses: Primary open angle glaucoma of both eyes, unspecified glaucoma stage       valACYclovir (VALTREX) 1000 MG tablet Take 1 tablet (1,000 mg total) by mouth 3 (three) times daily. for 3 days  Qty: 15 tablet, Refills: 0             Discharge Procedure Orders (must include Diet, Follow-up, Activity)    Follow Up:  Follow up with PCP as per your routine.  Please follow a high fiber diet.  Activity as tolerated.    No driving day of procedure.

## 2022-07-21 VITALS
BODY MASS INDEX: 28.32 KG/M2 | HEIGHT: 65 IN | TEMPERATURE: 97 F | OXYGEN SATURATION: 99 % | DIASTOLIC BLOOD PRESSURE: 57 MMHG | RESPIRATION RATE: 18 BRPM | SYSTOLIC BLOOD PRESSURE: 92 MMHG | WEIGHT: 170 LBS | HEART RATE: 58 BPM

## 2022-07-29 ENCOUNTER — PATIENT MESSAGE (OUTPATIENT)
Dept: ADMINISTRATIVE | Facility: HOSPITAL | Age: 67
End: 2022-07-29
Payer: MEDICARE

## 2022-08-01 ENCOUNTER — PATIENT OUTREACH (OUTPATIENT)
Dept: ADMINISTRATIVE | Facility: HOSPITAL | Age: 67
End: 2022-08-01
Payer: MEDICARE

## 2022-08-01 ENCOUNTER — TELEPHONE (OUTPATIENT)
Dept: FAMILY MEDICINE | Facility: CLINIC | Age: 67
End: 2022-08-01
Payer: MEDICARE

## 2022-08-01 VITALS — DIASTOLIC BLOOD PRESSURE: 74 MMHG | SYSTOLIC BLOOD PRESSURE: 124 MMHG

## 2022-08-01 NOTE — PROGRESS NOTES
Outreach to patient for hypertension management.     Portal message received from patient with updated BP

## 2022-10-11 ENCOUNTER — PATIENT MESSAGE (OUTPATIENT)
Dept: FAMILY MEDICINE | Facility: CLINIC | Age: 67
End: 2022-10-11
Payer: MEDICARE

## 2022-10-12 ENCOUNTER — IMMUNIZATION (OUTPATIENT)
Dept: FAMILY MEDICINE | Facility: CLINIC | Age: 67
End: 2022-10-12
Payer: MEDICARE

## 2022-10-12 DIAGNOSIS — Z23 NEED FOR VACCINATION: Primary | ICD-10-CM

## 2022-10-12 PROCEDURE — 91312 COVID-19, MRNA, LNP-S, BIVALENT BOOSTER, PF, 30 MCG/0.3 ML DOSE: CPT | Mod: S$GLB,,, | Performed by: FAMILY MEDICINE

## 2022-10-12 PROCEDURE — 91312 COVID-19, MRNA, LNP-S, BIVALENT BOOSTER, PF, 30 MCG/0.3 ML DOSE: ICD-10-PCS | Mod: S$GLB,,, | Performed by: FAMILY MEDICINE

## 2022-10-12 PROCEDURE — 0124A COVID-19, MRNA, LNP-S, BIVALENT BOOSTER, PF, 30 MCG/0.3 ML DOSE: CPT | Mod: PBBFAC | Performed by: FAMILY MEDICINE

## 2022-10-14 DIAGNOSIS — E87.6 HYPOKALEMIA: ICD-10-CM

## 2022-10-14 RX ORDER — POTASSIUM CHLORIDE 20 MEQ/1
40 TABLET, EXTENDED RELEASE ORAL 2 TIMES DAILY
Qty: 120 TABLET | Refills: 0 | Status: SHIPPED | OUTPATIENT
Start: 2022-10-14 | End: 2023-04-03

## 2022-10-14 NOTE — TELEPHONE ENCOUNTER
Refill Routing Note   Medication(s) are not appropriate for processing by [facility] for the following reason(s):      - Patient has not been seen in over 15 months by PCP    [facility] action(s):  Defer Medication-related problems identified:   Requires appointment  Requires labs     Medication Therapy Plan:  Due for annual with PCP, Labs (lipid panel) outdated, need to be added to currently scheduled labs  Medication reconciliation completed: No     Appointments  past 12m or future 3m with PCP    Date Provider   Last Visit   4/7/2021 KARIN Nichols MD   Next Visit   Visit date not found KARIN Nichols MD   ED visits in past 90 days: 0        Note composed:4:00 PM 10/14/2022

## 2022-10-14 NOTE — TELEPHONE ENCOUNTER
Care Due:                  Date            Visit Type   Department     Provider  --------------------------------------------------------------------------------                                MYCHART                              FOLLOWUP/OF  [facility]  Last Visit: 04-      FICE VISIT   MEDICINE       KARIN WRIGHT  Next Visit: None Scheduled  None         None Found                                                            Last  Test          Frequency    Reason                     Performed    Due Date  --------------------------------------------------------------------------------    Office Visit  12 months..  pravastatin,               04- 04-                             telmisartan-hydrochloroth                             iazide...................    Lipid Panel.  12 months..  pravastatin..............  Not Found    Overdue    [employer] Embedded Care Gaps. Reference number: [id number]. 10/14/2022   8:03:27 AM CDT

## 2022-10-17 DIAGNOSIS — E78.5 HYPERLIPIDEMIA, UNSPECIFIED HYPERLIPIDEMIA TYPE: ICD-10-CM

## 2022-10-17 RX ORDER — PRAVASTATIN SODIUM 20 MG/1
20 TABLET ORAL DAILY
Qty: 30 TABLET | Refills: 0 | Status: SHIPPED | OUTPATIENT
Start: 2022-10-17 | End: 2022-11-01

## 2022-10-17 NOTE — TELEPHONE ENCOUNTER
No new care gaps identified.  [facility] Embedded Care Gaps. Reference number: [id number]. 10/17/2022   12:32:33 PM CDT

## 2022-10-17 NOTE — TELEPHONE ENCOUNTER
LOV 4/2021 with PCP  LOV with LORRAINE 1/2022    Pended for short term supply while we work to schedule patient.

## 2022-11-01 DIAGNOSIS — H40.1133 PRIMARY OPEN ANGLE GLAUCOMA OF BOTH EYES, SEVERE STAGE: ICD-10-CM

## 2022-11-01 DIAGNOSIS — E78.5 HYPERLIPIDEMIA, UNSPECIFIED HYPERLIPIDEMIA TYPE: ICD-10-CM

## 2022-11-01 RX ORDER — PILOCARPINE HYDROCHLORIDE 40 MG/ML
1 SOLUTION/ DROPS OPHTHALMIC 3 TIMES DAILY
Qty: 15 ML | Refills: 12 | Status: SHIPPED | OUTPATIENT
Start: 2022-11-01 | End: 2023-02-23 | Stop reason: ALTCHOICE

## 2022-11-01 RX ORDER — PRAVASTATIN SODIUM 20 MG/1
20 TABLET ORAL DAILY
Qty: 90 TABLET | Refills: 0 | Status: SHIPPED | OUTPATIENT
Start: 2022-11-01 | End: 2023-08-03 | Stop reason: SDUPTHER

## 2022-11-01 NOTE — TELEPHONE ENCOUNTER
No new care gaps identified.  [facility] Embedded Care Gaps. Reference number: [id number]. 11/01/2022   11:34:47 AM WARDT

## 2022-11-02 ENCOUNTER — PATIENT MESSAGE (OUTPATIENT)
Dept: FAMILY MEDICINE | Facility: CLINIC | Age: 67
End: 2022-11-02
Payer: MEDICARE

## 2022-11-09 ENCOUNTER — OFFICE VISIT (OUTPATIENT)
Dept: FAMILY MEDICINE | Facility: CLINIC | Age: 67
End: 2022-11-09
Payer: MEDICARE

## 2022-11-09 VITALS
DIASTOLIC BLOOD PRESSURE: 82 MMHG | WEIGHT: 182.13 LBS | SYSTOLIC BLOOD PRESSURE: 136 MMHG | HEART RATE: 81 BPM | HEIGHT: 65 IN | OXYGEN SATURATION: 95 % | BODY MASS INDEX: 30.34 KG/M2

## 2022-11-09 DIAGNOSIS — E78.2 MIXED HYPERLIPIDEMIA: ICD-10-CM

## 2022-11-09 DIAGNOSIS — Z86.000 HISTORY OF DUCTAL CARCINOMA IN SITU (DCIS) OF BREAST: ICD-10-CM

## 2022-11-09 DIAGNOSIS — E66.9 OBESITY (BMI 30-39.9): ICD-10-CM

## 2022-11-09 DIAGNOSIS — I10 ESSENTIAL HYPERTENSION: Primary | ICD-10-CM

## 2022-11-09 DIAGNOSIS — J00 ACUTE NASOPHARYNGITIS: ICD-10-CM

## 2022-11-09 PROCEDURE — 3075F SYST BP GE 130 - 139MM HG: CPT | Mod: CPTII,S$GLB,, | Performed by: FAMILY MEDICINE

## 2022-11-09 PROCEDURE — 3008F PR BODY MASS INDEX (BMI) DOCUMENTED: ICD-10-PCS | Mod: CPTII,S$GLB,, | Performed by: FAMILY MEDICINE

## 2022-11-09 PROCEDURE — 3288F PR FALLS RISK ASSESSMENT DOCUMENTED: ICD-10-PCS | Mod: CPTII,S$GLB,, | Performed by: FAMILY MEDICINE

## 2022-11-09 PROCEDURE — 99214 OFFICE O/P EST MOD 30 MIN: CPT | Mod: S$GLB,,, | Performed by: FAMILY MEDICINE

## 2022-11-09 PROCEDURE — G0008 FLU VACCINE - QUADRIVALENT - ADJUVANTED: ICD-10-PCS | Mod: S$GLB,,, | Performed by: FAMILY MEDICINE

## 2022-11-09 PROCEDURE — 1159F MED LIST DOCD IN RCRD: CPT | Mod: CPTII,S$GLB,, | Performed by: FAMILY MEDICINE

## 2022-11-09 PROCEDURE — 1160F RVW MEDS BY RX/DR IN RCRD: CPT | Mod: CPTII,S$GLB,, | Performed by: FAMILY MEDICINE

## 2022-11-09 PROCEDURE — 1101F PT FALLS ASSESS-DOCD LE1/YR: CPT | Mod: CPTII,S$GLB,, | Performed by: FAMILY MEDICINE

## 2022-11-09 PROCEDURE — G0008 ADMIN INFLUENZA VIRUS VAC: HCPCS | Mod: S$GLB,,, | Performed by: FAMILY MEDICINE

## 2022-11-09 PROCEDURE — 3008F BODY MASS INDEX DOCD: CPT | Mod: CPTII,S$GLB,, | Performed by: FAMILY MEDICINE

## 2022-11-09 PROCEDURE — 3075F PR MOST RECENT SYSTOLIC BLOOD PRESS GE 130-139MM HG: ICD-10-PCS | Mod: CPTII,S$GLB,, | Performed by: FAMILY MEDICINE

## 2022-11-09 PROCEDURE — 90694 VACC AIIV4 NO PRSRV 0.5ML IM: CPT | Mod: S$GLB,,, | Performed by: FAMILY MEDICINE

## 2022-11-09 PROCEDURE — 1101F PR PT FALLS ASSESS DOC 0-1 FALLS W/OUT INJ PAST YR: ICD-10-PCS | Mod: CPTII,S$GLB,, | Performed by: FAMILY MEDICINE

## 2022-11-09 PROCEDURE — 1159F PR MEDICATION LIST DOCUMENTED IN MEDICAL RECORD: ICD-10-PCS | Mod: CPTII,S$GLB,, | Performed by: FAMILY MEDICINE

## 2022-11-09 PROCEDURE — 99999 PR PBB SHADOW E&M-EST. PATIENT-LVL IV: ICD-10-PCS | Mod: PBBFAC,,, | Performed by: FAMILY MEDICINE

## 2022-11-09 PROCEDURE — 1160F PR REVIEW ALL MEDS BY PRESCRIBER/CLIN PHARMACIST DOCUMENTED: ICD-10-PCS | Mod: CPTII,S$GLB,, | Performed by: FAMILY MEDICINE

## 2022-11-09 PROCEDURE — 99214 PR OFFICE/OUTPT VISIT, EST, LEVL IV, 30-39 MIN: ICD-10-PCS | Mod: S$GLB,,, | Performed by: FAMILY MEDICINE

## 2022-11-09 PROCEDURE — 3079F PR MOST RECENT DIASTOLIC BLOOD PRESSURE 80-89 MM HG: ICD-10-PCS | Mod: CPTII,S$GLB,, | Performed by: FAMILY MEDICINE

## 2022-11-09 PROCEDURE — 90694 FLU VACCINE - QUADRIVALENT - ADJUVANTED: ICD-10-PCS | Mod: S$GLB,,, | Performed by: FAMILY MEDICINE

## 2022-11-09 PROCEDURE — 3288F FALL RISK ASSESSMENT DOCD: CPT | Mod: CPTII,S$GLB,, | Performed by: FAMILY MEDICINE

## 2022-11-09 PROCEDURE — 99999 PR PBB SHADOW E&M-EST. PATIENT-LVL IV: CPT | Mod: PBBFAC,,, | Performed by: FAMILY MEDICINE

## 2022-11-09 PROCEDURE — 3079F DIAST BP 80-89 MM HG: CPT | Mod: CPTII,S$GLB,, | Performed by: FAMILY MEDICINE

## 2022-11-09 RX ORDER — IPRATROPIUM BROMIDE 42 UG/1
2 SPRAY, METERED NASAL 3 TIMES DAILY
Qty: 15 ML | Refills: 2 | Status: SHIPPED | OUTPATIENT
Start: 2022-11-09

## 2022-11-09 NOTE — PROGRESS NOTES
Subjective:       Patient ID: Deb Tesfaye is a 67 y.o. female.    Chief Complaint: Annual Exam and Sore Throat    Pt is known to me.    Pt is here for followup of chronic medical issues.  The pt has been doing well in general.  The pt is 6 years cancer free from breast cancer and sees Tony Berg annually.  The pt has good BP control.   We reviewed health maintenance.  The pt has a scratchy throat--not a sore throat.  She has an occasional cough.  She has nasal congestion.  She has had no fever.  The pt is walking for exercise.    Sore Throat     Review of Systems   HENT:  Positive for sore throat.    All other systems reviewed and are negative.    Objective:      Physical Exam  Vitals and nursing note reviewed.   Constitutional:       Appearance: She is well-developed. She is obese.      Comments: Pt is overweight   HENT:      Head: Normocephalic.      Nose: Congestion and rhinorrhea present.      Mouth/Throat:      Mouth: Mucous membranes are moist.      Pharynx: Oropharynx is clear.   Eyes:      Conjunctiva/sclera: Conjunctivae normal.      Pupils: Pupils are equal, round, and reactive to light.   Neck:      Thyroid: No thyromegaly.   Cardiovascular:      Rate and Rhythm: Normal rate and regular rhythm.      Heart sounds: Normal heart sounds.   Pulmonary:      Effort: Pulmonary effort is normal.      Breath sounds: Normal breath sounds.   Abdominal:      General: Bowel sounds are normal.      Palpations: Abdomen is soft.      Tenderness: There is no abdominal tenderness.   Musculoskeletal:         General: No tenderness or deformity. Normal range of motion.      Cervical back: Normal range of motion and neck supple.      Right lower leg: No edema.      Left lower leg: No edema.   Lymphadenopathy:      Cervical: No cervical adenopathy.   Skin:     General: Skin is warm and dry.   Neurological:      General: No focal deficit present.      Mental Status: She is alert and oriented to person, place, and time.       Cranial Nerves: No cranial nerve deficit.      Sensory: No sensory deficit (no deficit in toes).      Motor: No abnormal muscle tone.      Coordination: Coordination normal.      Deep Tendon Reflexes: Reflexes normal.   Psychiatric:         Mood and Affect: Mood normal.         Behavior: Behavior normal.       Assessment:       1. Essential hypertension    2. Mixed hyperlipidemia    3. History of ductal carcinoma in situ (DCIS) of breast    4. Obesity (BMI 30-39.9)    5. Acute nasopharyngitis        Plan:       Deb was seen today for annual exam and sore throat.    Diagnoses and all orders for this visit:    Essential hypertension    Mixed hyperlipidemia  -     Lipid Panel; Future    History of ductal carcinoma in situ (DCIS) of breast    Obesity (BMI 30-39.9)    Acute nasopharyngitis  -     ipratropium (ATROVENT) 42 mcg (0.06 %) nasal spray; 2 sprays by Nasal route 3 (three) times daily.    During this visit, I reviewed the pt's history, medications, allergies, and problem list.      The patient's BMI has been recorded in the chart. The patient has been provided educational materials regarding the benefits of attaining and maintaining a normal weight. We will continue to address and follow this issue during follow up visits.

## 2022-11-17 NOTE — PROGRESS NOTES
HPI    DLS: 7/08/2022    Pt here for HVF review/OCT;  Pt states no eye pain or discomfort.    Meds;  Ketorolac BID OD   Timolol BID OU   Brimonidine TID OU   Rhopressa QDAY OU   Bisi 4% TID OS   Lumigan QHS OS   Diamox 500mg BID PO     1. POAG OD>OS   2. APD OD   3. NS OS   4. Papilloma OS   5. CME OD   6. PCIOL OD       Last edited by Chapis York on 11/18/2022 11:48 AM.            Assessment /Plan     For exam results, see Encounter Report.    Primary open angle glaucoma (POAG) of both eyes, severe stage    Afferent pupillary defect, right    Visual field loss    Nuclear sclerosis of left eye    Cystoid macular edema, right    Floaters, bilateral        S/P PHACO/IOL/AHMED - OD - 1/10/2017 - developed CME post op    Rx with steroid gtts and ilevro - had responded -   Recurrent CME od - 12/2/2019 - off PA and Ilevro - however  the vision is stable     S/P  CYCLO G6 OD - 4/12/2017  REFERRED BY DR ELISE FOR SEVERE POORLY CONTROLLED POAG - 7/24/2015   Pt recently moved from [place] to [facility]     Previously referred to Dr. Elise from [facility], review of outside records:    Tm 27/25.    Initial visit 3/29/12  IOP 10 OU on Combitan and Lumigan  C:D 0.85 OU  CCT 524OD, 531 OS       Glaucoma (type and duration)    Dx about 2002 - [place]    First HVF   First at [facility] 2015   First photos   First at [facility] 2015    Treatment / Drops started   2002              Family history    + mom / brother         Glaucoma meds    cosopt ou / brimonidine ou tid / lumigan ou q hs / bisi od tid / diamox / bisi ou        H/O adverse rxn to glaucoma drops    none        LASERS    SLT OD - 8/5/2013 - [place] // SLT os - [facility] 8/27/2015 (good resp 21-->16)        GLAUCOMA SURGERIES    CYCL G6 OD - 4/12/2017  / phaco/IOL / ahmed od 1/10/2018         OTHER EYE SURGERIES    Combined phaco/IOL / ahmed  - od - 1/10/2018         CDR    0.99/0.95        Tbase    ?  Off gtts (on gtts was up to 28/25)         Tmax     27/25 - per outside record's (reviewed by Arik )             Ttarget    15-16  ou    (may need to be lower)           HVF  - OD 10-2 ss - 3 test 2015 to 2017 - SALT/ IAD (progression)     Change to  10-2 stim V 23test 2018 to 2022  - SALT / IAD od (?ext od 11/2022)    OS 24-2 ss - 7 test 2016 to 2022 -   SAD - sup paracentral defect  / IAD    (( 2 VF's from [place] - that were reviewed by Dr. IVEY - 2012 and 2013 -  SAD w/ fix invl . IAD od / early IAD os ))            Gonio    +3-4 ou         CCT    524/531        OCT    6 test 2016 to 2022 - RNFL - OD:dec. Throughout // OS:dec. G/NS/N/TI/T, pratik TS/NI        HRT    2 test 2015 to 2018  - MR -   Dec thru out   od // dec thru out  os /// CDR 0. 91 od // 0.88 os        Disc photos    2015, 2018      - Ttoday - 10/12    - Test done today    IOP // HVF 10-2 stim V od and 24-2 ss os // DFE / OCT     2. + APD od     3.  NS ou    Minimal - [facility] 20/25 ou still     4. Papilloma OS   To be removed with Arik     5. ++ CME od    Decrease in [facility] from 20/70 to 20/200   New since prior OCT 2/3/2017    S/p PHACO/IOL / AHMED  1/10/2018    Had resolved by 7.29/2019 - on ilevro and PA   -recurrent on 12/2/2019 - off ilevro    - resolved by 6/12/2020 - on ketorolac tid od    -STILL RESOLVED 10/2/2020 - still on ketorolac    PLAN   GLAUCOMA - ADVANCED - OD >OS   HVF's continue to progress - especially OD     S/P  - cyclo G6 - OD - 4/12/2017      S/p Phaco / IOL // phaco/ IOL / ahmed  OD Date: 1/10/2018 - general anesthesia // PCB00 23.0  POY > 4     glaucoma gtts  timolol bid ou   Brim ou tid   rhopressa ou   bisi tid os (do not use od - may cause iritis and cme)   lumigan  os (hold od - get recurrent CME )   Diamox (acetazolamide pills) 500 PO bid - tolerating ok     Ketorolac 2 x day   od (Ilevro - too expensive) - restarted - 12/12/2019 - for recurrent CME od    Repeat SLT os - only 180 degrees treated - too narrow sup and inf (9/20/2018) - steroid taper      OCT macula  5/17/19: +++ CME od /  OD: , Loss of foveal contour, intraretinal and subretinal fluid  OS: , NFC, no thickening    OCT - 7/26/2019 - CME improved / resolved // review retina notes // monitor for recurrence of CME od     OCT 12/2/2019 - increase - recurrent CME od - 488 - off the ilevro (did not appear to affect her vision)     Oct 6/12/2020 - no cme OD - RESOLVED     OCT - 10/2/2020 - NO CME    OCT 12/3/2021 - no cme     IN FUTURE ALWAYS DO A 10-2 STIM V OD AND A 24-2 SS OS     Photo file update 11/18/2022    Pt wants a letter stating that she has glaucoma and has great difficulty  Driving  at night .    F/U 3-4 months with IOP and gonio

## 2022-11-18 ENCOUNTER — OFFICE VISIT (OUTPATIENT)
Dept: OPHTHALMOLOGY | Facility: CLINIC | Age: 67
End: 2022-11-18
Payer: MEDICARE

## 2022-11-18 DIAGNOSIS — H21.561 AFFERENT PUPILLARY DEFECT, RIGHT: ICD-10-CM

## 2022-11-18 DIAGNOSIS — H43.393 FLOATERS, BILATERAL: ICD-10-CM

## 2022-11-18 DIAGNOSIS — H40.1133 PRIMARY OPEN ANGLE GLAUCOMA (POAG) OF BOTH EYES, SEVERE STAGE: Primary | ICD-10-CM

## 2022-11-18 DIAGNOSIS — H53.40 VISUAL FIELD LOSS: ICD-10-CM

## 2022-11-18 DIAGNOSIS — H25.12 NUCLEAR SCLEROSIS OF LEFT EYE: ICD-10-CM

## 2022-11-18 DIAGNOSIS — H35.351 CYSTOID MACULAR EDEMA, RIGHT: ICD-10-CM

## 2022-11-18 PROCEDURE — 1101F PR PT FALLS ASSESS DOC 0-1 FALLS W/OUT INJ PAST YR: ICD-10-PCS | Mod: CPTII,S$GLB,, | Performed by: OPHTHALMOLOGY

## 2022-11-18 PROCEDURE — 3288F FALL RISK ASSESSMENT DOCD: CPT | Mod: CPTII,S$GLB,, | Performed by: OPHTHALMOLOGY

## 2022-11-18 PROCEDURE — 92133 POSTERIOR SEGMENT OCT OPTIC NERVE(OCULAR COHERENCE TOMOGRAPHY) - OU - BOTH EYES: ICD-10-PCS | Mod: S$GLB,,, | Performed by: OPHTHALMOLOGY

## 2022-11-18 PROCEDURE — 1160F RVW MEDS BY RX/DR IN RCRD: CPT | Mod: CPTII,S$GLB,, | Performed by: OPHTHALMOLOGY

## 2022-11-18 PROCEDURE — 3288F PR FALLS RISK ASSESSMENT DOCUMENTED: ICD-10-PCS | Mod: CPTII,S$GLB,, | Performed by: OPHTHALMOLOGY

## 2022-11-18 PROCEDURE — 1159F PR MEDICATION LIST DOCUMENTED IN MEDICAL RECORD: ICD-10-PCS | Mod: CPTII,S$GLB,, | Performed by: OPHTHALMOLOGY

## 2022-11-18 PROCEDURE — 92014 PR EYE EXAM, EST PATIENT,COMPREHESV: ICD-10-PCS | Mod: S$GLB,,, | Performed by: OPHTHALMOLOGY

## 2022-11-18 PROCEDURE — 99999 PR PBB SHADOW E&M-EST. PATIENT-LVL III: ICD-10-PCS | Mod: PBBFAC,,, | Performed by: OPHTHALMOLOGY

## 2022-11-18 PROCEDURE — 1159F MED LIST DOCD IN RCRD: CPT | Mod: CPTII,S$GLB,, | Performed by: OPHTHALMOLOGY

## 2022-11-18 PROCEDURE — 92083 HUMPHREY VISUAL FIELD - OU - BOTH EYES: ICD-10-PCS | Mod: S$GLB,,, | Performed by: OPHTHALMOLOGY

## 2022-11-18 PROCEDURE — 1160F PR REVIEW ALL MEDS BY PRESCRIBER/CLIN PHARMACIST DOCUMENTED: ICD-10-PCS | Mod: CPTII,S$GLB,, | Performed by: OPHTHALMOLOGY

## 2022-11-18 PROCEDURE — 92133 CPTRZD OPH DX IMG PST SGM ON: CPT | Mod: S$GLB,,, | Performed by: OPHTHALMOLOGY

## 2022-11-18 PROCEDURE — 92083 EXTENDED VISUAL FIELD XM: CPT | Mod: S$GLB,,, | Performed by: OPHTHALMOLOGY

## 2022-11-18 PROCEDURE — 99999 PR PBB SHADOW E&M-EST. PATIENT-LVL III: CPT | Mod: PBBFAC,,, | Performed by: OPHTHALMOLOGY

## 2022-11-18 PROCEDURE — 92014 COMPRE OPH EXAM EST PT 1/>: CPT | Mod: S$GLB,,, | Performed by: OPHTHALMOLOGY

## 2022-11-18 PROCEDURE — 1101F PT FALLS ASSESS-DOCD LE1/YR: CPT | Mod: CPTII,S$GLB,, | Performed by: OPHTHALMOLOGY

## 2022-11-30 NOTE — TELEPHONE ENCOUNTER
Addended by: BRIANDA LUU on: 11/30/2022 11:29 AM     Modules accepted: Orders     [employer] message sent

## 2023-02-14 ENCOUNTER — PATIENT MESSAGE (OUTPATIENT)
Dept: OPHTHALMOLOGY | Facility: CLINIC | Age: 68
End: 2023-02-14
Payer: MEDICARE

## 2023-02-20 ENCOUNTER — HOSPITAL ENCOUNTER (OUTPATIENT)
Dept: RADIOLOGY | Facility: HOSPITAL | Age: 68
Discharge: HOME | End: 2023-02-20
Attending: NURSE PRACTITIONER
Payer: MEDICARE

## 2023-02-20 DIAGNOSIS — Z86.000 HISTORY OF DUCTAL CARCINOMA IN SITU (DCIS) OF BREAST: ICD-10-CM

## 2023-02-20 DIAGNOSIS — Z12.31 ENCOUNTER FOR SCREENING MAMMOGRAM FOR MALIGNANT NEOPLASM OF BREAST: ICD-10-CM

## 2023-02-20 PROCEDURE — 77063 BREAST TOMOSYNTHESIS BI: CPT | Mod: 26,,, | Performed by: RADIOLOGY

## 2023-02-20 PROCEDURE — 77067 SCR MAMMO BI INCL CAD: CPT | Mod: TC,PO

## 2023-02-20 PROCEDURE — 77063 MAMMO DIGITAL SCREENING BILAT WITH TOMO: ICD-10-PCS | Mod: 26,,, | Performed by: RADIOLOGY

## 2023-02-20 PROCEDURE — 71046 X-RAY EXAM CHEST 2 VIEWS: CPT | Mod: 26,,, | Performed by: RADIOLOGY

## 2023-02-20 PROCEDURE — 77067 SCR MAMMO BI INCL CAD: CPT | Mod: 26,,, | Performed by: RADIOLOGY

## 2023-02-20 PROCEDURE — 77067 MAMMO DIGITAL SCREENING BILAT WITH TOMO: ICD-10-PCS | Mod: 26,,, | Performed by: RADIOLOGY

## 2023-02-20 PROCEDURE — 71046 XR CHEST PA AND LATERAL: ICD-10-PCS | Mod: 26,,, | Performed by: RADIOLOGY

## 2023-02-20 PROCEDURE — 71046 X-RAY EXAM CHEST 2 VIEWS: CPT | Mod: TC,FY,PO

## 2023-02-23 ENCOUNTER — OFFICE VISIT (OUTPATIENT)
Dept: HEMATOLOGY/ONCOLOGY | Facility: CLINIC | Age: 68
End: 2023-02-23
Payer: MEDICARE

## 2023-02-23 VITALS
DIASTOLIC BLOOD PRESSURE: 76 MMHG | OXYGEN SATURATION: 97 % | RESPIRATION RATE: 16 BRPM | WEIGHT: 182.13 LBS | SYSTOLIC BLOOD PRESSURE: 126 MMHG | HEIGHT: 65 IN | TEMPERATURE: 97 F | BODY MASS INDEX: 30.34 KG/M2 | HEART RATE: 66 BPM

## 2023-02-23 DIAGNOSIS — M94.9 DISORDER OF BONE AND CARTILAGE: ICD-10-CM

## 2023-02-23 DIAGNOSIS — M89.9 DISORDER OF BONE AND CARTILAGE: ICD-10-CM

## 2023-02-23 DIAGNOSIS — Z12.31 ENCOUNTER FOR SCREENING MAMMOGRAM FOR MALIGNANT NEOPLASM OF BREAST: ICD-10-CM

## 2023-02-23 DIAGNOSIS — M85.88 OTHER SPECIFIED DISORDERS OF BONE DENSITY AND STRUCTURE, OTHER SITE: ICD-10-CM

## 2023-02-23 DIAGNOSIS — Z86.000 HISTORY OF DUCTAL CARCINOMA IN SITU (DCIS) OF BREAST: Primary | ICD-10-CM

## 2023-02-23 PROCEDURE — 99214 PR OFFICE/OUTPT VISIT, EST, LEVL IV, 30-39 MIN: ICD-10-PCS | Mod: S$GLB,,, | Performed by: NURSE PRACTITIONER

## 2023-02-23 PROCEDURE — 3288F FALL RISK ASSESSMENT DOCD: CPT | Mod: CPTII,S$GLB,, | Performed by: NURSE PRACTITIONER

## 2023-02-23 PROCEDURE — 3074F PR MOST RECENT SYSTOLIC BLOOD PRESSURE < 130 MM HG: ICD-10-PCS | Mod: CPTII,S$GLB,, | Performed by: NURSE PRACTITIONER

## 2023-02-23 PROCEDURE — 1159F MED LIST DOCD IN RCRD: CPT | Mod: CPTII,S$GLB,, | Performed by: NURSE PRACTITIONER

## 2023-02-23 PROCEDURE — 99999 PR PBB SHADOW E&M-EST. PATIENT-LVL V: CPT | Mod: PBBFAC,,, | Performed by: NURSE PRACTITIONER

## 2023-02-23 PROCEDURE — 1101F PT FALLS ASSESS-DOCD LE1/YR: CPT | Mod: CPTII,S$GLB,, | Performed by: NURSE PRACTITIONER

## 2023-02-23 PROCEDURE — 3078F PR MOST RECENT DIASTOLIC BLOOD PRESSURE < 80 MM HG: ICD-10-PCS | Mod: CPTII,S$GLB,, | Performed by: NURSE PRACTITIONER

## 2023-02-23 PROCEDURE — 99214 OFFICE O/P EST MOD 30 MIN: CPT | Mod: S$GLB,,, | Performed by: NURSE PRACTITIONER

## 2023-02-23 PROCEDURE — 1101F PR PT FALLS ASSESS DOC 0-1 FALLS W/OUT INJ PAST YR: ICD-10-PCS | Mod: CPTII,S$GLB,, | Performed by: NURSE PRACTITIONER

## 2023-02-23 PROCEDURE — 1159F PR MEDICATION LIST DOCUMENTED IN MEDICAL RECORD: ICD-10-PCS | Mod: CPTII,S$GLB,, | Performed by: NURSE PRACTITIONER

## 2023-02-23 PROCEDURE — 3008F PR BODY MASS INDEX (BMI) DOCUMENTED: ICD-10-PCS | Mod: CPTII,S$GLB,, | Performed by: NURSE PRACTITIONER

## 2023-02-23 PROCEDURE — 3288F PR FALLS RISK ASSESSMENT DOCUMENTED: ICD-10-PCS | Mod: CPTII,S$GLB,, | Performed by: NURSE PRACTITIONER

## 2023-02-23 PROCEDURE — 99999 PR PBB SHADOW E&M-EST. PATIENT-LVL V: ICD-10-PCS | Mod: PBBFAC,,, | Performed by: NURSE PRACTITIONER

## 2023-02-23 PROCEDURE — 1126F PR PAIN SEVERITY QUANTIFIED, NO PAIN PRESENT: ICD-10-PCS | Mod: CPTII,S$GLB,, | Performed by: NURSE PRACTITIONER

## 2023-02-23 PROCEDURE — 3008F BODY MASS INDEX DOCD: CPT | Mod: CPTII,S$GLB,, | Performed by: NURSE PRACTITIONER

## 2023-02-23 PROCEDURE — 3074F SYST BP LT 130 MM HG: CPT | Mod: CPTII,S$GLB,, | Performed by: NURSE PRACTITIONER

## 2023-02-23 PROCEDURE — 3078F DIAST BP <80 MM HG: CPT | Mod: CPTII,S$GLB,, | Performed by: NURSE PRACTITIONER

## 2023-02-23 PROCEDURE — 1126F AMNT PAIN NOTED NONE PRSNT: CPT | Mod: CPTII,S$GLB,, | Performed by: NURSE PRACTITIONER

## 2023-02-23 NOTE — PROGRESS NOTES
Subjective:       Patient ID: Deb Tesfaye is a 68 y.o. female.    Chief Complaint: Annual breast evaluation - 84 month post treatment for DCIS left breast    HPI This is a 68-year-old black female formerly known to Dr. 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    She is status post lumpectomy, post-lumpectomy irradiation and 60 months of Tamoxifen.    Hx of Cataracts, Fibrocystic breast, Glaucoma, HTN.    Oncological history:  Abnormal findings on screening Mammogram @ age 60 on 12/02/2015; followed by Diagnostic Mammo on 12/18/15.  Underwent needle local & excision, 01/26/16 ([facility]).  Pathology revealed high-grade comedo DCIS in association with necrosis & focal atypical ductal hyperplasia.  Tumor was ER/CT negative & had negative margins of resection.  Received post-lumpectomy irradiation & placed on chemoprevention Tamoxifen 20 mg daily for a planned 60 months.     She presents to the clinic today for her 84-month post-therapy evaluation.  She remains active and well.   She remains compliant with supplemental Vitamin D.   She denies any new breast complaints, bone pain, difficulties with hot flashes, vaginal bleeding, abdominal discomfort/bloating, nausea, vomiting, constipation, diarrhea, irregular heartbeat, chest pain, myalgias, cramping, etc.    No other new complaints or pertinent findings on a 14-point review of systems.      Past Medical History:   Diagnosis Date    Breast cancer     Cataract     Colon polyp 07/20/2016    Ductal carcinoma in situ (DCIS) of left breast 1/11/2016    left     Fibrocystic breast     Glaucoma     Hypertension      Past Surgical History:   Procedure Laterality Date    AHMED GLAUCOMA VALVE Right 01/10/2018    WITH CE ()    BREAST BIOPSY      BREAST LUMPECTOMY Left     with radiation    CATARACT EXTRACTION W/  INTRAOCULAR LENS IMPLANT Right 01/10/2018    with Buddy ()    COLONOSCOPY  2002     due for every 5.  Mother with colon cancer.    COLONOSCOPY  12/2010    COLONOSCOPY N/A 7/20/2016    Procedure: COLONOSCOPY;  Surgeon: Janak De Leon Jr., MD;  Location: [facility];  Service: Endoscopy;  Laterality: N/A;    COLONOSCOPY N/A 7/20/2022    Procedure: COLONOSCOPY;  Surgeon: Janak De Leon Jr., MD;  Location: [facility];  Service: Endoscopy;  Laterality: N/A;    EYE SURGERY      SELECTIVE LASER TRABECULPALSTY Right 8/13    OS DONE IN [place], [place]    SELECTIVE LASER TRABECUPLASTY Left 8/27/15    OS WITH     TUBAL LIGATION       Current Outpatient Medications on File Prior to Visit   Medication Sig Dispense Refill    acetaZOLAMIDE (DIAMOX) 500 mg CpSR Take 1 capsule (500 mg total) by mouth 2 (two) times daily. Please discontinue the dorzolamide eye drops 180 capsule 3    azelastine (ASTELIN) 137 mcg (0.1 %) nasal spray SHAKE WELL AND USE 1 SPRAY IN EACH NOSTRIL TWICE DAILY 30 mL 11    brimonidine 0.2% (ALPHAGAN) 0.2 % Drop INSTILL 1 DROP IN BOTH EYES THREE TIMES DAILY 30 mL 3    ERGOCALCIFEROL, VITAMIN D2, (VITAMIN D ORAL) Take 1,000 Units by mouth once daily.       fluticasone propionate (FLONASE) 50 mcg/actuation nasal spray 1 spray (50 mcg total) by Each Nostril route once daily. 16 g 0    ipratropium (ATROVENT) 42 mcg (0.06 %) nasal spray 2 sprays by Nasal route 3 (three) times daily. 15 mL 2    ketorolac 0.5% (ACULAR) 0.5 % Drop Place 1 drop into the right eye 2 (two) times daily. 5 mL 12    KRILL OIL ORAL Take 1 capsule by mouth once daily.       LUMIGAN 0.01 % Drop Place 1 drop into both eyes every evening. (Patient taking differently: Place 1 drop into the left eye every evening.) 2.5 mL 12    MULTIVITS,CA,MINERALS/IRON/FA (ONE-A-DAY WOMENS FORMULA ORAL) Take 1 capsule by mouth once daily.      netarsudiL (RHOPRESSA) 0.02 % ophthalmic solution Place 1 drop into both eyes once daily. 2.5 mL 11    pilocarpine HCL 4% (PILOCAR) 4 % ophthalmic solution Place 1 drop into the left eye 3  "(three) times daily. 15 mL 12    pilocarpine HCL 4% (PILOCAR) 4 % ophthalmic solution Place 1 drop into the left eye 3 (three) times daily. 15 mL 12    pravastatin (PRAVACHOL) 20 MG tablet Take 1 tablet (20 mg total) by mouth once daily. 90 tablet 0    telmisartan-hydrochlorothiazide (MICARDIS HCT) 80-25 mg per tablet TAKE 1 TABLET BY MOUTH EVERY DAY 90 tablet 2    timolol maleate 0.5% (TIMOPTIC) 0.5 % Drop Place 1 drop into both eyes 2 (two) times daily. 10 mL 6    valACYclovir (VALTREX) 1000 MG tablet Take 1 tablet (1,000 mg total) by mouth 3 (three) times daily. for 3 days (Patient not taking: Reported on 11/9/2022) 15 tablet 0     No current facility-administered medications on file prior to visit.     Review of Systems   Constitutional:  Negative for appetite change and unexpected weight change.   HENT:  Negative for mouth sores.    Eyes:  Negative for visual disturbance.   Respiratory:  Negative for cough and shortness of breath.    Cardiovascular:  Negative for chest pain.   Gastrointestinal:  Negative for abdominal pain and diarrhea.   Genitourinary:  Negative for frequency.   Musculoskeletal:  Negative for back pain.   Integumentary:  Negative for rash.   Neurological:  Negative for headaches.   Hematological:  Negative for adenopathy.   Psychiatric/Behavioral:  The patient is not nervous/anxious.        Objective:     Weight:  Gain of 1 1/2 pounds in 1 year    Vitals:    02/23/23 1033   BP: 126/76   BP Location: Right arm   Patient Position: Sitting   BP Method: Medium (Manual)   Resp: 16   Temp: 96.8 °F (36 °C)   TempSrc: Temporal   Weight: 82.6 kg (182 lb 1.6 oz)   Height: 5' 5" (1.651 m)       Physical Exam  Vitals reviewed.   Constitutional:       Appearance: Normal appearance.   HENT:      Head: Normocephalic and atraumatic.      Mouth/Throat:      Mouth: Mucous membranes are moist.   Eyes:      Conjunctiva/sclera: Conjunctivae normal.      Pupils: Pupils are equal, round, and reactive to light. "   Cardiovascular:      Rate and Rhythm: Normal rate and regular rhythm.      Pulses: Normal pulses.      Heart sounds: Normal heart sounds.   Pulmonary:      Effort: Pulmonary effort is normal.      Breath sounds: Normal breath sounds.   Chest:      Comments: BREASTS:  Right breast is soft, free of masses, tenderness, nipple discharge or inversion.  Left breast with noted healed lumpectomy scar to the upper outer region; no signs of local recurrence.  Abdominal:      General: Bowel sounds are normal.      Palpations: Abdomen is soft.   Musculoskeletal:         General: Normal range of motion.      Cervical back: Normal range of motion.   Lymphadenopathy:      Cervical: No cervical adenopathy.   Skin:     General: Skin is warm and dry.      Capillary Refill: Capillary refill takes less than 2 seconds.   Neurological:      Mental Status: She is alert and oriented to person, place, and time.   Psychiatric:         Mood and Affect: Mood normal.         Behavior: Behavior normal.         Thought Content: Thought content normal.         Judgment: Judgment normal.       Lab Results   Component Value Date    WBC 7.21 02/20/2023    RBC 4.86 02/20/2023    HGB 13.7 02/20/2023    HCT 40.7 02/20/2023    MCV 84 02/20/2023    MCH 28.2 02/20/2023    MCHC 33.7 02/20/2023    RDW 13.6 02/20/2023     02/20/2023    MPV 10.2 02/20/2023    GRAN 4.2 02/20/2023    GRAN 57.7 02/20/2023    LYMPH 2.4 02/20/2023    LYMPH 32.6 02/20/2023    MONO 0.5 02/20/2023    MONO 6.9 02/20/2023    EOS 0.1 02/20/2023    BASO 0.06 02/20/2023    EOSINOPHIL 1.7 02/20/2023    BASOPHIL 0.8 02/20/2023     CMP  Sodium   Date Value Ref Range Status   02/20/2023 141 136 - 145 mmol/L Final     Potassium   Date Value Ref Range Status   02/20/2023 3.4 (L) 3.5 - 5.1 mmol/L Final     Chloride   Date Value Ref Range Status   02/20/2023 106 95 - 110 mmol/L Final     CO2   Date Value Ref Range Status   02/20/2023 26 23 - 29 mmol/L Final     Glucose   Date Value Ref  Range Status   02/20/2023 105 70 - 110 mg/dL Final     BUN   Date Value Ref Range Status   02/20/2023 14 8 - 23 mg/dL Final     Creatinine   Date Value Ref Range Status   02/20/2023 0.8 0.5 - 1.4 mg/dL Final     Calcium   Date Value Ref Range Status   02/20/2023 9.5 8.7 - 10.5 mg/dL Final     Total Protein   Date Value Ref Range Status   02/20/2023 7.3 6.0 - 8.4 g/dL Final     Albumin   Date Value Ref Range Status   02/20/2023 4.0 3.5 - 5.2 g/dL Final     Total Bilirubin   Date Value Ref Range Status   02/20/2023 0.5 0.1 - 1.0 mg/dL Final     Comment:     For infants and newborns, interpretation of results should be based  on gestational age, weight and in agreement with clinical  observations.    Premature Infant recommended reference ranges:  Up to 24 hours.............<8.0 mg/dL  Up to 48 hours............<12.0 mg/dL  3-5 days..................<15.0 mg/dL  6-29 days.................<15.0 mg/dL       Alkaline Phosphatase   Date Value Ref Range Status   02/20/2023 78 55 - 135 U/L Final     AST   Date Value Ref Range Status   02/20/2023 17 10 - 40 U/L Final     ALT   Date Value Ref Range Status   02/20/2023 18 10 - 44 U/L Final     Anion Gap   Date Value Ref Range Status   02/20/2023 9 8 - 16 mmol/L Final     eGFR if    Date Value Ref Range Status   02/23/2022 >60 >60 mL/min/1.73 m^2 Final     eGFR if non    Date Value Ref Range Status   02/23/2022 >60 >60 mL/min/1.73 m^2 Final     Comment:     Calculation used to obtain the estimated glomerular filtration  rate (eGFR) is the CKD-EPI equation.            Mammogram dated 12/20/2023:  Impression:  No mammographic evidence of malignancy.  BI-RADS Category:  Overall: 2 - Benign  Recommendation:  Routine screening mammogram in 1 year is recommended.    CXR dated 02/20/2023:  Impression:   No evidence of acute cardiopulmonary disease    BMD dated 02/21/2022:  Normal bone density     Assessment:       Problem List Items Addressed This  Visit       History of ductal carcinoma in situ (DCIS) of breast - Primary       Plan:       1.  Continue Calcium & Vitamin D supplementation.  Continue Vitamin D at 2,000 units/day.  2.  Return to clinic in 1 year for 96 month post therapy evaluation with CBC, CMP, Vitamin D, MAMMO (on or after 02/20/24) & BMD prior.  3.  Follow up with PCP (KARIN Nichols MD) in regards to HTN/K+ & Dr. Carol Yadav for GYN.  4.  Call for any concerns.     Assessment/plan reviewed and approved by Dr. Ellis.    Route Chart for Scheduling    Med Onc Chart Routing      Follow up with physician    Follow up with LORRAINE 1 year. f/u in 1 year with CBC, CMP, VIT D, BMD & MAMMO PRIOR   Infusion scheduling note    Injection scheduling note    Labs    Imaging    Pharmacy appointment    Other referrals

## 2023-03-17 DIAGNOSIS — H40.1133 PRIMARY OPEN ANGLE GLAUCOMA (POAG) OF BOTH EYES, SEVERE STAGE: ICD-10-CM

## 2023-03-17 RX ORDER — BIMATOPROST 0.3 MG/ML
1 SOLUTION/ DROPS OPHTHALMIC NIGHTLY
COMMUNITY
Start: 2023-03-17 | End: 2023-03-17 | Stop reason: CLARIF

## 2023-03-17 RX ORDER — BIMATOPROST 0.1 MG/ML
1 SOLUTION/ DROPS OPHTHALMIC NIGHTLY
Qty: 2.5 ML | Refills: 12
Start: 2023-03-17 | End: 2023-12-04 | Stop reason: SDUPTHER

## 2023-03-23 NOTE — PROGRESS NOTES
HPI    DLS: 11/18/2022    Pt here for 4 Month IOP Check;  Pt states no eye pain or discomfort.     Meds:  Ketorolac BID OD   Timolol BID OU   Brimonidine TID OU   Rhopressa QDAY OU   Bisi 4% TID OS   Lumigan QHS OS   Diamox 500mg BID PO      1. POAG OD>OS   2. APD OD     3. NS OS   4. Papilloma OS   5. CME OD   6. PCIOL OD        Last edited by Chapis York on 3/24/2023 11:25 AM.            Assessment /Plan     For exam results, see Encounter Report.    Primary open angle glaucoma (POAG) of both eyes, severe stage    Afferent pupillary defect, right    Visual field loss    Nuclear sclerosis of left eye    Cystoid macular edema, right    Floaters, bilateral    Pseudophakia, right eye            S/P PHACO/IOL/AHMED - OD - 1/10/2017 - developed CME post op    Rx with steroid gtts and ilevro - had responded -   Recurrent CME od - 12/2/2019 - off PA and Ilevro - however  the vision is stable     S/P  CYCLO G6 OD - 4/12/2017  REFERRED BY DR ELISE FOR SEVERE POORLY CONTROLLED POAG - 7/24/2015   Pt recently moved from [place] to the [facility]     Previously referred to Dr. Elise from [facility], review of outside records:    Tm 27/25.    Initial visit 3/29/12  IOP 10 OU on Combitan and Lumigan  C:D 0.85 OU  CCT 524OD, 531 OS       Glaucoma (type and duration)    Dx about 2002 - [place]    First HVF   First at [facility] 2015   First photos   First at [facility] 2015    Treatment / Drops started   2002              Family history    + mom / brother         Glaucoma meds    cosopt ou / brimonidine ou tid / lumigan ou q hs / bisi od tid / diamox / bisi ou        H/O adverse rxn to glaucoma drops    none        LASERS    SLT OD - 8/5/2013 - [place] // SLT os - [facility] 8/27/2015 (good resp 21-->16)        GLAUCOMA SURGERIES    CYCL G6 OD - 4/12/2017  / phaco/IOL / ahmed od 1/10/2018         OTHER EYE SURGERIES    Combined phaco/IOL / ahmed  - od - 1/10/2018         CDR    0.99/0.95        Tbase    ?  Off gtts  (on gtts was up to 28/25)         Tmax    27/25 - per outside record's (reviewed by Arik )             Ttarget    15-16  ou    (may need to be lower)           HVF  - OD 10-2 ss - 3 test 2015 to 2017 - SALT/ IAD (progression)     Change to  10-2 stim V 23test 2018 to 2022  - SALT / IAD od (?ext od 11/2022)    OS 24-2 ss - 7 test 2016 to 2022 -   SAD - sup paracentral defect  / IAD    (( 2 VF's from [place] - that were reviewed by Dr. IVEY - 2012 and 2013 -  SAD w/ fix invl . IAD od / early IAD os ))            Gonio    +3-4 ou         CCT    524/531        OCT    6 test 2016 to 2022 - RNFL - OD:dec. Throughout // OS:dec. G/NS/N/TI/T, bord TS/NI        HRT    2 test 2015 to 2018  - MR -   Dec thru out   od // dec thru out  os /// CDR 0. 91 od // 0.88 os        Disc photos    2015, 2018      - Ttoday - 11/14   - Test done today    IOP // gonio    2. + APD od     3.  NS ou    Minimal - [facility] 20/25 ou still     4. Papilloma OS   To be removed with Arik     5. ++ CME od    Decrease in [place] from 20/70 to 20/200   New since prior OCT 2/3/2017    S/p PHACO/IOL / AHMED  1/10/2018    Had resolved by 7.29/2019 - on ilevro and PA   -recurrent on 12/2/2019 - off ilevro    - resolved by 6/12/2020 - on ketorolac tid od    -STILL RESOLVED 10/2/2020 - still on ketorolac   -TRIAL OFF KETOROLAC - 3/24/2023 - monitor for recurrent cme     PLAN   GLAUCOMA - ADVANCED - OD >OS   HVF's continue to progress - especially OD     S/P  - cyclo G6 - OD - 4/12/2017      S/p Phaco / IOL // phaco/ IOL / ahmed  OD Date: 1/10/2018 - general anesthesia // PCB00 23.0  POY > 5    glaucoma gtts  timolol bid ou   Brim ou tid   rhopressa ou   bisi tid os (do not use od - may cause iritis and cme)   lumigan  os (hold od - get recurrent CME )   Diamox (acetazolamide pills) 500 PO bid - tolerating ok     Ketorolac 2 x day   od (Ilevro - too expensive) - restarted - 12/12/2019 - for recurrent CME od    Repeat SLT os - only 180 degrees treated - too narrow sup  and inf (9/20/2018) - steroid taper      OCT macula 5/17/19: +++ CME od /  OD: , Loss of foveal contour, intraretinal and subretinal fluid  OS: , NFC, no thickening    OCT - 7/26/2019 - CME improved / resolved // review retina notes // monitor for recurrence of CME od     OCT 12/2/2019 - increase - recurrent CME od - 488 - off the ilevro (did not appear to affect her vision)     Oct 6/12/2020 - no cme OD - RESOLVED     OCT - 10/2/2020 - NO CME    OCT 12/3/2021 - no cme     IN FUTURE ALWAYS DO A 10-2 STIM V OD AND A 24-2 SS OS     Photo file update 11/18/2022      Pt wants a letter stating that she has glaucoma and has great difficulty  Driving  at night .    Want to see optometrist for refraction / glasses - ambreen     F/U 3-4 months with IOP - OCT macula

## 2023-03-24 ENCOUNTER — PATIENT MESSAGE (OUTPATIENT)
Dept: OPHTHALMOLOGY | Facility: CLINIC | Age: 68
End: 2023-03-24

## 2023-03-24 ENCOUNTER — OFFICE VISIT (OUTPATIENT)
Dept: OPHTHALMOLOGY | Facility: CLINIC | Age: 68
End: 2023-03-24
Payer: MEDICARE

## 2023-03-24 DIAGNOSIS — H21.561 AFFERENT PUPILLARY DEFECT, RIGHT: ICD-10-CM

## 2023-03-24 DIAGNOSIS — H53.40 VISUAL FIELD LOSS: ICD-10-CM

## 2023-03-24 DIAGNOSIS — H25.12 NUCLEAR SCLEROSIS OF LEFT EYE: ICD-10-CM

## 2023-03-24 DIAGNOSIS — H43.393 FLOATERS, BILATERAL: ICD-10-CM

## 2023-03-24 DIAGNOSIS — H40.1133 PRIMARY OPEN ANGLE GLAUCOMA (POAG) OF BOTH EYES, SEVERE STAGE: Primary | ICD-10-CM

## 2023-03-24 DIAGNOSIS — Z96.1 PSEUDOPHAKIA, RIGHT EYE: ICD-10-CM

## 2023-03-24 DIAGNOSIS — H35.351 CYSTOID MACULAR EDEMA, RIGHT: ICD-10-CM

## 2023-03-24 PROCEDURE — 99999 PR PBB SHADOW E&M-EST. PATIENT-LVL III: ICD-10-PCS | Mod: PBBFAC,,, | Performed by: OPHTHALMOLOGY

## 2023-03-24 PROCEDURE — 3288F FALL RISK ASSESSMENT DOCD: CPT | Mod: CPTII,S$GLB,, | Performed by: OPHTHALMOLOGY

## 2023-03-24 PROCEDURE — 92020 PR SPECIAL EYE EVAL,GONIOSCOPY: ICD-10-PCS | Mod: S$GLB,,, | Performed by: OPHTHALMOLOGY

## 2023-03-24 PROCEDURE — 1160F PR REVIEW ALL MEDS BY PRESCRIBER/CLIN PHARMACIST DOCUMENTED: ICD-10-PCS | Mod: CPTII,S$GLB,, | Performed by: OPHTHALMOLOGY

## 2023-03-24 PROCEDURE — 1126F AMNT PAIN NOTED NONE PRSNT: CPT | Mod: CPTII,S$GLB,, | Performed by: OPHTHALMOLOGY

## 2023-03-24 PROCEDURE — 1101F PT FALLS ASSESS-DOCD LE1/YR: CPT | Mod: CPTII,S$GLB,, | Performed by: OPHTHALMOLOGY

## 2023-03-24 PROCEDURE — 1126F PR PAIN SEVERITY QUANTIFIED, NO PAIN PRESENT: ICD-10-PCS | Mod: CPTII,S$GLB,, | Performed by: OPHTHALMOLOGY

## 2023-03-24 PROCEDURE — 1159F PR MEDICATION LIST DOCUMENTED IN MEDICAL RECORD: ICD-10-PCS | Mod: CPTII,S$GLB,, | Performed by: OPHTHALMOLOGY

## 2023-03-24 PROCEDURE — 3288F PR FALLS RISK ASSESSMENT DOCUMENTED: ICD-10-PCS | Mod: CPTII,S$GLB,, | Performed by: OPHTHALMOLOGY

## 2023-03-24 PROCEDURE — 99214 PR OFFICE/OUTPT VISIT, EST, LEVL IV, 30-39 MIN: ICD-10-PCS | Mod: S$GLB,,, | Performed by: OPHTHALMOLOGY

## 2023-03-24 PROCEDURE — 1159F MED LIST DOCD IN RCRD: CPT | Mod: CPTII,S$GLB,, | Performed by: OPHTHALMOLOGY

## 2023-03-24 PROCEDURE — 92020 GONIOSCOPY: CPT | Mod: S$GLB,,, | Performed by: OPHTHALMOLOGY

## 2023-03-24 PROCEDURE — 1101F PR PT FALLS ASSESS DOC 0-1 FALLS W/OUT INJ PAST YR: ICD-10-PCS | Mod: CPTII,S$GLB,, | Performed by: OPHTHALMOLOGY

## 2023-03-24 PROCEDURE — 99999 PR PBB SHADOW E&M-EST. PATIENT-LVL III: CPT | Mod: PBBFAC,,, | Performed by: OPHTHALMOLOGY

## 2023-03-24 PROCEDURE — 99214 OFFICE O/P EST MOD 30 MIN: CPT | Mod: S$GLB,,, | Performed by: OPHTHALMOLOGY

## 2023-03-24 PROCEDURE — 1160F RVW MEDS BY RX/DR IN RCRD: CPT | Mod: CPTII,S$GLB,, | Performed by: OPHTHALMOLOGY

## 2023-03-24 NOTE — Clinical Note
- needs appt - ambreen - optometry - refraction wants new glasses  - andrew - 4 months - IOP and OCT macula  Uses [facility]

## 2023-04-14 ENCOUNTER — PATIENT MESSAGE (OUTPATIENT)
Dept: OPHTHALMOLOGY | Facility: CLINIC | Age: 68
End: 2023-04-14
Payer: MEDICARE

## 2023-04-14 ENCOUNTER — TELEPHONE (OUTPATIENT)
Dept: OPTOMETRY | Facility: CLINIC | Age: 68
End: 2023-04-14
Payer: MEDICARE

## 2023-04-19 NOTE — LETTER
September 5, 2018      KARIN Nichols MD  [address] [facility] [address] [place] [address]           [facility]  [address] [facility] [address] [place] [address]  Phone: [phone]          Patient: Deb Tesfaye   MR Number: [id number]   YOB: 1955   Date of Visit: 9/5/2018       Dear Dr. KARIN Nichols:    Thank you for referring Deb Tesfaye to me for evaluation. Attached you will find relevant portions of my assessment and plan of care.    If you have questions, please do not hesitate to call me. I look forward to following Deb Tesfaye along with you.    Sincerely,    JACI Mack MD    Enclosure  CC:  No Recipients    If you would like to receive this communication electronically, please contact [email or web address] or [phone] to request more information on [employer] Link access.    For providers and/or their staff who would like to refer a patient to [facility], please contact us through our one-stop-shop provider referral line, [facility], at [phone].    If you feel you have received this communication in error or would no longer like to receive these types of communications, please e-mail [email or web address]         
0

## 2023-05-05 NOTE — TELEPHONE ENCOUNTER
Attempted to call ptAlaina Shelton on vm    Calcipotriene Counseling:  I discussed with the patient the risks of calcipotriene including but not limited to erythema, scaling, itching, and irritation.

## 2023-05-14 DIAGNOSIS — H40.1133 PRIMARY OPEN ANGLE GLAUCOMA (POAG) OF BOTH EYES, SEVERE STAGE: ICD-10-CM

## 2023-05-15 RX ORDER — NETARSUDIL 0.2 MG/ML
1 SOLUTION/ DROPS OPHTHALMIC; TOPICAL DAILY
Qty: 2.5 ML | Refills: 11 | Status: SHIPPED | OUTPATIENT
Start: 2023-05-15

## 2023-06-19 DIAGNOSIS — H40.1133 PRIMARY OPEN ANGLE GLAUCOMA (POAG) OF BOTH EYES, SEVERE STAGE: ICD-10-CM

## 2023-06-19 RX ORDER — ACETAZOLAMIDE 500 MG/1
500 CAPSULE, EXTENDED RELEASE ORAL 2 TIMES DAILY
Qty: 180 CAPSULE | Refills: 3 | Status: SHIPPED | OUTPATIENT
Start: 2023-06-19

## 2023-07-20 NOTE — PROGRESS NOTES
HPI     Glaucoma     Additional comments: 4 month ck and OCT today and pt states no changes   since last exam            Comments    DLS: 3/24/23    1. POAG OD>OS  2. APD OD  3. NS OS  4. Papilloma OS  5. CME OD  6. PCIOL OD    MEDS:  Timolol BID OU   Brimonidine TID OU  Rhopressa QDAY OU   Bisi 4% TID OS  Lumigan QHS OS  Diamox 500mg BID PO            Last edited by Bharati Graham MA on 7/21/2023 11:50 AM.            Assessment /Plan     For exam results, see Encounter Report.    Primary open angle glaucoma (POAG) of both eyes, severe stage    Afferent pupillary defect, right    Visual field loss    Nuclear sclerosis of left eye    Cystoid macular edema, right    Floaters, bilateral    Pseudophakia, right eye        S/P PHACO/IOL/AHMED - OD - 1/10/2017 - developed CME post op    Rx with steroid gtts and ilevro - had responded -   Recurrent CME od - 12/2/2019 - off PA and Ilevro - however  the vision is stable     S/P  CYCLO G6 OD - 4/12/2017  REFERRED BY DR HILLMAN FOR SEVERE POORLY CONTROLLED POAG - 7/24/2015   Pt recently moved from [place] to the [facility]     Previously referred to Dr. Hillman from [facility], review of outside records:    Tm 27/25.    Initial visit 3/29/12  IOP 10 OU on Combitan and Lumigan  C:D 0.85 OU  CCT 524OD, 531 OS       Glaucoma (type and duration)    Dx about 2002 - [place]    First HVF   First at [facility] 2015   First photos   First at [facility] 2015    Treatment / Drops started   2002              Family history    + mom / brother         Glaucoma meds    cosopt ou / brimonidine ou tid / lumigan ou q hs / bisi od tid / diamox / bisi ou        H/O adverse rxn to glaucoma drops    none        LASERS    SLT OD - 8/5/2013 - [place] // SLT os - [facility] 8/27/2015 (good resp 21-->16) // repeat os 9/20/2018         GLAUCOMA SURGERIES    CYCL G6 OD - 4/12/2017  / phaco/IOL / ahmed od 1/10/2018         OTHER EYE SURGERIES    Combined phaco/IOL / ahmed  - od - 1/10/2018          CDR    0.99/0.95        Tbase    ?  Off gtts (on gtts was up to 28/25)         Tmax    27/25 - per outside record's (reviewed by Arik )             Ttarget    15-16  ou    (may need to be lower)           HVF  - OD 10-2 ss - 3 test 2015 to 2017 - SALT/ IAD (progression)     Change to  10-2 stim V 23test 2018 to 2022  - SALT / IAD od (?ext od 11/2022)    OS 24-2 ss - 7 test 2016 to 2022 -   SAD - sup paracentral defect  / IAD    (( 2 VF's from [place] - that were reviewed by Dr. IVEY - 2012 and 2013 -  SAD w/ fix invl . IAD od / early IAD os ))            Gonio    +3-4 ou         CCT    524/531        OCT    6 test 2016 to 2022 - RNFL - OD:dec. Throughout // OS:dec. G/NS/N/TI/T, bord TS/NI        HRT    2 test 2015 to 2018  - MR -   Dec thru out   od // dec thru out  os /// CDR 0. 91 od // 0.88 os        Disc photos    2015, 2018      - Ttoday - 8/14   - Test done today    IOP / macular OCT - ERM/ loss of foveal contour / mild CME - chronic / stable     2. + APD od     3.  NS os    Minimal - [facility] 20/25 os still     4. Papilloma OS   To be removed with Arik     5. ++ CME od    Decrease in [place] from 20/70 to 20/200   New since prior OCT 2/3/2017    S/p PHACO/IOL / AHMED  1/10/2018    Had resolved by 7.29/2019 - on ilevro and PA   -recurrent on 12/2/2019 - off ilevro    - resolved by 6/12/2020 - on ketorolac tid od    -STILL RESOLVED 10/2/2020 - still on ketorolac   -TRIAL OFF KETOROLAC - 3/24/2023 - monitor for recurrent cme     PLAN   GLAUCOMA - ADVANCED - OD >OS   HVF's continue to progress - especially OD     S/P  - cyclo G6 - OD - 4/12/2017      S/p Phaco / IOL // phaco/ IOL / ahmed  OD Date: 1/10/2018 - general anesthesia // PCB00 23.0  POY > 5    glaucoma gtts  timolol bid ou   Brim ou tid   rhopressa ou   bisi tid os (do not use od - may cause iritis and cme)   lumigan  os (hold od - get recurrent CME )   Diamox (acetazolamide pills) 500 PO bid - tolerating ok     Ketorolac 2 x day   od (Ilevro - too  expensive) - restarted - 12/12/2019 - for recurrent CME od // OFF 7/2023    OCT macula 5/17/19: +++ CME od /  OD: , Loss of foveal contour, intraretinal and subretinal fluid  OS: , NFC, no thickening    OCT - 7/26/2019 - CME improved / resolved // review retina notes // monitor for recurrence of CME od     OCT 12/2/2019 - increase - recurrent CME od - 488 - off the ilevro (did not appear to affect her vision)     Oct 6/12/2020 - no cme OD - RESOLVED     OCT - 10/2/2020 - NO CME    OCT 12/3/2021 - no cme     OCT macula - 7/21/2023 - chronic changes - ERM / loss of foveal contour - intraretinal cyst     IN FUTURE ALWAYS DO A 10-2 STIM V OD AND A 24-2 SS OS     Photo file update 7/21/2023     Pt wants a letter stating that she has glaucoma and has great difficulty  Driving  at night .    Want to see optometrist for refraction / glasses - ambreen     F/U 3-4 months with IOP - try a 10-2 stim V od and a 24-2 ss os

## 2023-07-21 ENCOUNTER — OFFICE VISIT (OUTPATIENT)
Dept: OPHTHALMOLOGY | Facility: CLINIC | Age: 68
End: 2023-07-21
Payer: MEDICARE

## 2023-07-21 DIAGNOSIS — H25.12 NUCLEAR SCLEROSIS OF LEFT EYE: ICD-10-CM

## 2023-07-21 DIAGNOSIS — Z96.1 PSEUDOPHAKIA, RIGHT EYE: ICD-10-CM

## 2023-07-21 DIAGNOSIS — H35.371 EPIRETINAL MEMBRANE (ERM) OF RIGHT EYE: ICD-10-CM

## 2023-07-21 DIAGNOSIS — H53.40 VISUAL FIELD LOSS: ICD-10-CM

## 2023-07-21 DIAGNOSIS — H21.561 AFFERENT PUPILLARY DEFECT, RIGHT: ICD-10-CM

## 2023-07-21 DIAGNOSIS — H35.351 CYSTOID MACULAR EDEMA, RIGHT: ICD-10-CM

## 2023-07-21 DIAGNOSIS — H43.393 FLOATERS, BILATERAL: ICD-10-CM

## 2023-07-21 DIAGNOSIS — H40.1133 PRIMARY OPEN ANGLE GLAUCOMA (POAG) OF BOTH EYES, SEVERE STAGE: Primary | ICD-10-CM

## 2023-07-21 PROCEDURE — 1126F AMNT PAIN NOTED NONE PRSNT: CPT | Mod: CPTII,S$GLB,, | Performed by: OPHTHALMOLOGY

## 2023-07-21 PROCEDURE — 1101F PT FALLS ASSESS-DOCD LE1/YR: CPT | Mod: CPTII,S$GLB,, | Performed by: OPHTHALMOLOGY

## 2023-07-21 PROCEDURE — 92134 POSTERIOR SEGMENT OCT RETINA (OCULAR COHERENCE TOMOGRAPHY)-BOTH EYES: ICD-10-PCS | Mod: S$GLB,,, | Performed by: OPHTHALMOLOGY

## 2023-07-21 PROCEDURE — 1160F PR REVIEW ALL MEDS BY PRESCRIBER/CLIN PHARMACIST DOCUMENTED: ICD-10-PCS | Mod: CPTII,S$GLB,, | Performed by: OPHTHALMOLOGY

## 2023-07-21 PROCEDURE — 99214 PR OFFICE/OUTPT VISIT, EST, LEVL IV, 30-39 MIN: ICD-10-PCS | Mod: S$GLB,,, | Performed by: OPHTHALMOLOGY

## 2023-07-21 PROCEDURE — 1101F PR PT FALLS ASSESS DOC 0-1 FALLS W/OUT INJ PAST YR: ICD-10-PCS | Mod: CPTII,S$GLB,, | Performed by: OPHTHALMOLOGY

## 2023-07-21 PROCEDURE — 3288F FALL RISK ASSESSMENT DOCD: CPT | Mod: CPTII,S$GLB,, | Performed by: OPHTHALMOLOGY

## 2023-07-21 PROCEDURE — 92134 CPTRZ OPH DX IMG PST SGM RTA: CPT | Mod: S$GLB,,, | Performed by: OPHTHALMOLOGY

## 2023-07-21 PROCEDURE — 99999 PR PBB SHADOW E&M-EST. PATIENT-LVL II: ICD-10-PCS | Mod: PBBFAC,,, | Performed by: OPHTHALMOLOGY

## 2023-07-21 PROCEDURE — 1126F PR PAIN SEVERITY QUANTIFIED, NO PAIN PRESENT: ICD-10-PCS | Mod: CPTII,S$GLB,, | Performed by: OPHTHALMOLOGY

## 2023-07-21 PROCEDURE — 3288F PR FALLS RISK ASSESSMENT DOCUMENTED: ICD-10-PCS | Mod: CPTII,S$GLB,, | Performed by: OPHTHALMOLOGY

## 2023-07-21 PROCEDURE — 99214 OFFICE O/P EST MOD 30 MIN: CPT | Mod: S$GLB,,, | Performed by: OPHTHALMOLOGY

## 2023-07-21 PROCEDURE — 99999 PR PBB SHADOW E&M-EST. PATIENT-LVL II: CPT | Mod: PBBFAC,,, | Performed by: OPHTHALMOLOGY

## 2023-07-21 PROCEDURE — 1159F PR MEDICATION LIST DOCUMENTED IN MEDICAL RECORD: ICD-10-PCS | Mod: CPTII,S$GLB,, | Performed by: OPHTHALMOLOGY

## 2023-07-21 PROCEDURE — 1160F RVW MEDS BY RX/DR IN RCRD: CPT | Mod: CPTII,S$GLB,, | Performed by: OPHTHALMOLOGY

## 2023-07-21 PROCEDURE — 1159F MED LIST DOCD IN RCRD: CPT | Mod: CPTII,S$GLB,, | Performed by: OPHTHALMOLOGY

## 2023-08-02 ENCOUNTER — PATIENT MESSAGE (OUTPATIENT)
Dept: FAMILY MEDICINE | Facility: CLINIC | Age: 68
End: 2023-08-02
Payer: MEDICARE

## 2023-08-23 ENCOUNTER — OFFICE VISIT (OUTPATIENT)
Dept: FAMILY MEDICINE | Facility: CLINIC | Age: 68
End: 2023-08-23
Payer: MEDICARE

## 2023-08-23 VITALS
DIASTOLIC BLOOD PRESSURE: 84 MMHG | HEIGHT: 65 IN | SYSTOLIC BLOOD PRESSURE: 126 MMHG | HEART RATE: 81 BPM | OXYGEN SATURATION: 98 % | WEIGHT: 181.44 LBS | TEMPERATURE: 98 F | BODY MASS INDEX: 30.23 KG/M2

## 2023-08-23 DIAGNOSIS — J02.9 SORE THROAT: ICD-10-CM

## 2023-08-23 DIAGNOSIS — J02.9 PHARYNGITIS, UNSPECIFIED ETIOLOGY: Primary | ICD-10-CM

## 2023-08-23 LAB
CTP QC/QA: YES
CTP QC/QA: YES
MOLECULAR STREP A: NEGATIVE
SARS-COV-2 RDRP RESP QL NAA+PROBE: NEGATIVE

## 2023-08-23 PROCEDURE — 3288F PR FALLS RISK ASSESSMENT DOCUMENTED: ICD-10-PCS | Mod: CPTII,S$GLB,, | Performed by: PHYSICIAN ASSISTANT

## 2023-08-23 PROCEDURE — 1125F PR PAIN SEVERITY QUANTIFIED, PAIN PRESENT: ICD-10-PCS | Mod: CPTII,S$GLB,, | Performed by: PHYSICIAN ASSISTANT

## 2023-08-23 PROCEDURE — 1125F AMNT PAIN NOTED PAIN PRSNT: CPT | Mod: CPTII,S$GLB,, | Performed by: PHYSICIAN ASSISTANT

## 2023-08-23 PROCEDURE — 3079F PR MOST RECENT DIASTOLIC BLOOD PRESSURE 80-89 MM HG: ICD-10-PCS | Mod: CPTII,S$GLB,, | Performed by: PHYSICIAN ASSISTANT

## 2023-08-23 PROCEDURE — 87635: ICD-10-PCS | Mod: QW,S$GLB,, | Performed by: PHYSICIAN ASSISTANT

## 2023-08-23 PROCEDURE — 87651 STREP A DNA AMP PROBE: CPT | Mod: QW,S$GLB,, | Performed by: PHYSICIAN ASSISTANT

## 2023-08-23 PROCEDURE — 3079F DIAST BP 80-89 MM HG: CPT | Mod: CPTII,S$GLB,, | Performed by: PHYSICIAN ASSISTANT

## 2023-08-23 PROCEDURE — 99213 OFFICE O/P EST LOW 20 MIN: CPT | Mod: S$GLB,,, | Performed by: PHYSICIAN ASSISTANT

## 2023-08-23 PROCEDURE — 87651 POCT STREP A MOLECULAR: ICD-10-PCS | Mod: QW,S$GLB,, | Performed by: PHYSICIAN ASSISTANT

## 2023-08-23 PROCEDURE — 99999 PR PBB SHADOW E&M-EST. PATIENT-LVL IV: ICD-10-PCS | Mod: PBBFAC,,, | Performed by: PHYSICIAN ASSISTANT

## 2023-08-23 PROCEDURE — 3008F PR BODY MASS INDEX (BMI) DOCUMENTED: ICD-10-PCS | Mod: CPTII,S$GLB,, | Performed by: PHYSICIAN ASSISTANT

## 2023-08-23 PROCEDURE — 1101F PT FALLS ASSESS-DOCD LE1/YR: CPT | Mod: CPTII,S$GLB,, | Performed by: PHYSICIAN ASSISTANT

## 2023-08-23 PROCEDURE — 3008F BODY MASS INDEX DOCD: CPT | Mod: CPTII,S$GLB,, | Performed by: PHYSICIAN ASSISTANT

## 2023-08-23 PROCEDURE — 3074F SYST BP LT 130 MM HG: CPT | Mod: CPTII,S$GLB,, | Performed by: PHYSICIAN ASSISTANT

## 2023-08-23 PROCEDURE — 99213 PR OFFICE/OUTPT VISIT, EST, LEVL III, 20-29 MIN: ICD-10-PCS | Mod: S$GLB,,, | Performed by: PHYSICIAN ASSISTANT

## 2023-08-23 PROCEDURE — 1159F MED LIST DOCD IN RCRD: CPT | Mod: CPTII,S$GLB,, | Performed by: PHYSICIAN ASSISTANT

## 2023-08-23 PROCEDURE — 1159F PR MEDICATION LIST DOCUMENTED IN MEDICAL RECORD: ICD-10-PCS | Mod: CPTII,S$GLB,, | Performed by: PHYSICIAN ASSISTANT

## 2023-08-23 PROCEDURE — 3288F FALL RISK ASSESSMENT DOCD: CPT | Mod: CPTII,S$GLB,, | Performed by: PHYSICIAN ASSISTANT

## 2023-08-23 PROCEDURE — 99999 PR PBB SHADOW E&M-EST. PATIENT-LVL IV: CPT | Mod: PBBFAC,,, | Performed by: PHYSICIAN ASSISTANT

## 2023-08-23 PROCEDURE — 1101F PR PT FALLS ASSESS DOC 0-1 FALLS W/OUT INJ PAST YR: ICD-10-PCS | Mod: CPTII,S$GLB,, | Performed by: PHYSICIAN ASSISTANT

## 2023-08-23 PROCEDURE — 87635 SARS-COV-2 COVID-19 AMP PRB: CPT | Mod: QW,S$GLB,, | Performed by: PHYSICIAN ASSISTANT

## 2023-08-23 PROCEDURE — 3074F PR MOST RECENT SYSTOLIC BLOOD PRESSURE < 130 MM HG: ICD-10-PCS | Mod: CPTII,S$GLB,, | Performed by: PHYSICIAN ASSISTANT

## 2023-08-23 NOTE — PROGRESS NOTES
"Subjective:      Patient ID: Deb Tesfaye is a 68 y.o. female.    Chief Complaint: Sore Throat (Sore throat that started 8/21/23.  Pt states no other symptoms)    Patient is new to me.    HPI  Patient has PMH of HTN, HLD, hypokalemia, and breast cancer.    Patient has sore throat for 3 days.  No sick contacts.  Taking coricidin and cough medicine.  Denies fatigue, cough, shortness of breath, or chest pain.    Recently had amoxicillin 875mg BID for 10 days for URI.  Resolved with this.    Review of Systems   Constitutional:  Positive for fatigue. Negative for appetite change, chills and fever.   HENT:  Positive for sore throat. Negative for congestion, ear pain and trouble swallowing.    Eyes:  Negative for pain.   Respiratory:  Negative for cough and shortness of breath.    Cardiovascular:  Negative for chest pain.   Gastrointestinal:  Negative for abdominal pain, blood in stool, constipation, diarrhea, nausea and vomiting.   Musculoskeletal:  Negative for myalgias.       Objective:   /84   Pulse 81   Temp 98.1 °F (36.7 °C) (Oral)   Ht 5' 5" (1.651 m)   Wt 82.3 kg (181 lb 7 oz)   SpO2 98%   BMI 30.19 kg/m²     Physical Exam  Vitals reviewed.   Constitutional:       Appearance: Normal appearance. She is well-developed.   HENT:      Head: Normocephalic and atraumatic.      Right Ear: Hearing, tympanic membrane, ear canal and external ear normal.      Left Ear: Hearing, tympanic membrane, ear canal and external ear normal.      Nose: Nose normal.      Right Sinus: No maxillary sinus tenderness or frontal sinus tenderness.      Left Sinus: No maxillary sinus tenderness or frontal sinus tenderness.      Mouth/Throat:      Lips: Pink.      Mouth: Mucous membranes are moist.      Pharynx: Oropharynx is clear. No posterior oropharyngeal erythema.      Tonsils: No tonsillar exudate. 1+ on the right. 1+ on the left.   Eyes:      General: Lids are normal.      Conjunctiva/sclera: Conjunctivae normal. "   Cardiovascular:      Rate and Rhythm: Normal rate and regular rhythm.      Heart sounds: Normal heart sounds. No murmur heard.     No friction rub. No gallop.   Pulmonary:      Effort: Pulmonary effort is normal. No respiratory distress.      Breath sounds: Normal breath sounds. No decreased breath sounds, wheezing, rhonchi or rales.   Musculoskeletal:         General: Normal range of motion.   Lymphadenopathy:      Cervical: No cervical adenopathy.   Skin:     General: Skin is warm and dry.      Findings: No rash.   Neurological:      General: No focal deficit present.      Mental Status: She is alert and oriented to person, place, and time.   Psychiatric:         Mood and Affect: Mood normal.         Behavior: Behavior normal. Behavior is cooperative.         Judgment: Judgment normal.       Assessment:      1. Pharyngitis, unspecified etiology    2. Sore throat       Plan:   1. Pharyngitis, unspecified etiology  Take 2 Advil twice a day with food.  Take Zyrtec or Claritin daily.    2. Sore throat  - POCT Strep A, Molecular-negative  - POCT COVID-19 Rapid Screening-negative    Follow up as needed.  Patient agreed with plan and expressed understanding.    Thank you for allowing me to serve you,

## 2023-08-30 DIAGNOSIS — H40.1133 PRIMARY OPEN ANGLE GLAUCOMA OF BOTH EYES, SEVERE STAGE: ICD-10-CM

## 2023-08-30 RX ORDER — PILOCARPINE HYDROCHLORIDE 40 MG/ML
1 SOLUTION/ DROPS OPHTHALMIC 3 TIMES DAILY
Qty: 15 ML | Refills: 12 | Status: SHIPPED | OUTPATIENT
Start: 2023-08-30

## 2023-08-31 PROBLEM — Z12.11 COLON CANCER SCREENING: Status: RESOLVED | Noted: 2022-07-20 | Resolved: 2023-08-31

## 2023-09-04 DIAGNOSIS — I10 ESSENTIAL HYPERTENSION: ICD-10-CM

## 2023-09-04 NOTE — TELEPHONE ENCOUNTER
Care Due:                  Date            Visit Type   Department     Provider  --------------------------------------------------------------------------------                                EP -                              PRIMARY      [facility] FAMILY  Last Visit: 11-      CARE ([facility])   MEDICINE       KARIN WRIGHT  Next Visit: None Scheduled  None         None Found                                                            Last  Test          Frequency    Reason                     Performed    Due Date  --------------------------------------------------------------------------------    Office Visit  12 months..  ipratropium, pravastatin,   11- 11-                             telmisartan-hydrochloroth                             duncan...................    [employer] Embedded Care Due Messages. Reference number: [id number].   9/04/2023 3:57:17 AM CDT

## 2023-09-04 NOTE — TELEPHONE ENCOUNTER
Refill Routing Note   Medication(s) are not appropriate for processing by [facility] for the following reason(s):      Required labs abnormal    [facility] action(s):  Defer Care Due:  Labs due            Appointments  past 12m or future 3m with PCP    Date Provider   Last Visit   8/3/2023 KARIN Nichols MD   Next Visit   Visit date not found KARIN Nichols MD   ED visits in past 90 days: 0        Note composed:9:27 AM 09/04/2023

## 2023-09-05 RX ORDER — TELMISARTAN AND HYDROCHLORTHIAZIDE 80; 25 MG/1; MG/1
TABLET ORAL
Qty: 90 TABLET | Refills: 0 | Status: SHIPPED | OUTPATIENT
Start: 2023-09-05 | End: 2023-12-12

## 2023-11-04 NOTE — PROGRESS NOTES
HPI    DLS: 7/21/2023    Pt here for HVF review/OCT;  Pt states no eye pain but some discomfort to the OS maybe has a stye on   her LLL.     Meds;  Timolol BID OU  Brimonidine TID OU  Rhopressa QDAY OU  Bisi 4% TID OS  Lumigan QHS OS  Diamox 500mg BID PO    1. POAG OD>OS   2. APD OD   3. NS OS   4. Papilloma OS   5. CME OD   6. PCIOL OD     Last edited by Chapis York on 11/7/2023 11:55 AM.            Assessment /Plan     For exam results, see Encounter Report.    Primary open angle glaucoma (POAG) of both eyes, severe stage    Afferent pupillary defect, right    Visual field loss    Nuclear sclerosis of left eye    Epiretinal membrane (ERM) of right eye    Floaters, bilateral    Pseudophakia, right eye        S/P PHACO/IOL/AHMED - OD - 1/10/2017 - developed CME post op    Rx with steroid gtts and ilevro - had responded -   Recurrent CME od - 12/2/2019 - off PA and Ilevro - however  the vision is stable     S/P  CYCLO G6 OD - 4/12/2017  REFERRED BY DR ELISE FOR SEVERE POORLY CONTROLLED POAG - 7/24/2015   Pt recently moved from [place] to the [facility]     Previously referred to Dr. Elise from [facility], review of outside records:    Tm 27/25.    Initial visit 3/29/12  IOP 10 OU on Combitan and Lumigan  C:D 0.85 OU  CCT 524OD, 531 OS       Glaucoma (type and duration)    Dx about 2002 - [place]    First HVF   First at [facility] 2015   First photos   First at [facility] 2015    Treatment / Drops started   2002              Family history    + mom / brother         Glaucoma meds    cosopt ou / brimonidine ou tid / lumigan ou q hs / bisi od tid / diamox / bisi ou        H/O adverse rxn to glaucoma drops    none        LASERS    SLT OD - 8/5/2013 - [place] // SLT os - [facility] 8/27/2015 (good resp 21-->16) // repeat os 9/20/2018         GLAUCOMA SURGERIES    CYCL G6 OD - 4/12/2017  / phaco/IOL / ahmed od 1/10/2018         OTHER EYE SURGERIES    Combined phaco/IOL / ahmed  - od - 1/10/2018          CDR    0.99/0.95        Tbase    ?  Off gtts (on gtts was up to 28/25)         Tmax    27/25 - per outside record's (reviewed by Arik )             Ttarget    15-16  ou    (may need to be lower)           HVF  - OD 10-2 ss - 3 test 2015 to 2017 - SALT/ IAD (progression)    11 test 2016 to 2023 - 10-2 stim V od - SALT / IAD - [place] // os SAD involves fix / IAD    (( 2 VF's from [place] - that were reviewed by Dr. IVEY - 2012 and 2013 -  SAD w/ fix invl . IAD od / early IAD os ))            Gonio    +3-4 ou         CCT    524/531        OCT    7  test 2019 to 2023 - RNFL - OD:dec. Throughout // OS:dec. G/NS/N/TI/T, bord TS/NI        HRT    2 test 2015 to 2018  - MR -   Dec thru out   od // dec thru out  os /// CDR 0. 91 od // 0.88 os        Disc photos    2015, 2018      - Ttoday - 8/17    - Test done today    IOP / HVF 10-2 stim V od and 24-2 ss os // DFE // OCT     2. + APD od     3.  NS os    Minimal - [facility] 20/25 os still     4. Papilloma OS   To be removed with Arik     5. ++ CME od    Decrease in [facility] from 20/70 to 20/200   New since prior OCT 2/3/2017    S/p PHACO/IOL / AHMED  1/10/2018    Had resolved by 7/29/2019 - on ilevro and PA   -recurrent on 12/2/2019 - off ilevro    - resolved by 6/12/2020 - on ketorolac tid od    -STILL RESOLVED 10/2/2020 - still on ketorolac   -TRIAL OFF KETOROLAC - 3/24/2023 - monitor for recurrent cme     PLAN   GLAUCOMA - ADVANCED - OD >OS   HVF's continue to progress - especially OD     S/P  - cyclo G6 - OD - 4/12/2017      S/p Phaco / IOL // phaco/ IOL / ahmed  OD Date: 1/10/2018 - general anesthesia // PCB00 23.0  POY > 5    glaucoma gtts  timolol bid ou   Brim ou tid   rhopressa ou   bisi tid os (do not use od - may cause iritis and cme)   lumigan  os (hold od - get recurrent CME )   Diamox (acetazolamide pills) 500 PO bid - tolerating ok     Ketorolac 2 x day   od (Ilevro - too expensive) - restarted - 12/12/2019 - for recurrent CME od // OFF 7/2023    OCT macula  5/17/19: +++ CME od /  OD: , Loss of foveal contour, intraretinal and subretinal fluid  OS: , NFC, no thickening    OCT - 7/26/2019 - CME improved / resolved // review retina notes // monitor for recurrence of CME od     OCT 12/2/2019 - increase - recurrent CME od - 488 - off the ilevro (did not appear to affect her vision)     Oct 6/12/2020 - no cme OD - RESOLVED     OCT - 10/2/2020 - NO CME    OCT 12/3/2021 - no cme     OCT macula - 7/21/2023 - chronic changes - ERM / loss of foveal contour - intraretinal cyst     IN FUTURE ALWAYS DO A 10-2 STIM V OD AND A 24-2 SS OS     Photo file update 7/21/2023     Pt wants a letter stating that she has glaucoma and has great difficulty  Driving  at night .    Want to see optometrist for refraction / glasses - [facility]- has a appt dec 14th - [place] [facility]       11/17/2023  IOP ok   HVF stable / OCT stable   CSM   F/U 3-4 months with IOP / gonio

## 2023-11-07 ENCOUNTER — CLINICAL SUPPORT (OUTPATIENT)
Dept: OPHTHALMOLOGY | Facility: CLINIC | Age: 68
End: 2023-11-07
Payer: MEDICARE

## 2023-11-07 ENCOUNTER — OFFICE VISIT (OUTPATIENT)
Dept: OPHTHALMOLOGY | Facility: CLINIC | Age: 68
End: 2023-11-07
Payer: MEDICARE

## 2023-11-07 DIAGNOSIS — H53.40 VISUAL FIELD LOSS: ICD-10-CM

## 2023-11-07 DIAGNOSIS — H21.561 AFFERENT PUPILLARY DEFECT, RIGHT: ICD-10-CM

## 2023-11-07 DIAGNOSIS — H35.371 EPIRETINAL MEMBRANE (ERM) OF RIGHT EYE: ICD-10-CM

## 2023-11-07 DIAGNOSIS — Z96.1 PSEUDOPHAKIA, RIGHT EYE: ICD-10-CM

## 2023-11-07 DIAGNOSIS — H25.12 NUCLEAR SCLEROSIS OF LEFT EYE: ICD-10-CM

## 2023-11-07 DIAGNOSIS — H40.1133 PRIMARY OPEN ANGLE GLAUCOMA (POAG) OF BOTH EYES, SEVERE STAGE: Primary | ICD-10-CM

## 2023-11-07 DIAGNOSIS — H43.393 FLOATERS, BILATERAL: ICD-10-CM

## 2023-11-07 PROCEDURE — 1101F PT FALLS ASSESS-DOCD LE1/YR: CPT | Mod: CPTII,S$GLB,, | Performed by: OPHTHALMOLOGY

## 2023-11-07 PROCEDURE — 1160F PR REVIEW ALL MEDS BY PRESCRIBER/CLIN PHARMACIST DOCUMENTED: ICD-10-PCS | Mod: CPTII,S$GLB,, | Performed by: OPHTHALMOLOGY

## 2023-11-07 PROCEDURE — 99999 PR PBB SHADOW E&M-EST. PATIENT-LVL III: CPT | Mod: PBBFAC,,, | Performed by: OPHTHALMOLOGY

## 2023-11-07 PROCEDURE — 99214 PR OFFICE/OUTPT VISIT, EST, LEVL IV, 30-39 MIN: ICD-10-PCS | Mod: S$GLB,,, | Performed by: OPHTHALMOLOGY

## 2023-11-07 PROCEDURE — 92133 POSTERIOR SEGMENT OCT OPTIC NERVE(OCULAR COHERENCE TOMOGRAPHY) - OU - BOTH EYES: ICD-10-PCS | Mod: S$GLB,,, | Performed by: OPHTHALMOLOGY

## 2023-11-07 PROCEDURE — 3288F PR FALLS RISK ASSESSMENT DOCUMENTED: ICD-10-PCS | Mod: CPTII,S$GLB,, | Performed by: OPHTHALMOLOGY

## 2023-11-07 PROCEDURE — 92083 EXTENDED VISUAL FIELD XM: CPT | Mod: S$GLB,,, | Performed by: OPHTHALMOLOGY

## 2023-11-07 PROCEDURE — 92083 HUMPHREY VISUAL FIELD - OU - BOTH EYES: ICD-10-PCS | Mod: S$GLB,,, | Performed by: OPHTHALMOLOGY

## 2023-11-07 PROCEDURE — 1160F RVW MEDS BY RX/DR IN RCRD: CPT | Mod: CPTII,S$GLB,, | Performed by: OPHTHALMOLOGY

## 2023-11-07 PROCEDURE — 1126F PR PAIN SEVERITY QUANTIFIED, NO PAIN PRESENT: ICD-10-PCS | Mod: CPTII,S$GLB,, | Performed by: OPHTHALMOLOGY

## 2023-11-07 PROCEDURE — 99999 PR PBB SHADOW E&M-EST. PATIENT-LVL III: ICD-10-PCS | Mod: PBBFAC,,, | Performed by: OPHTHALMOLOGY

## 2023-11-07 PROCEDURE — 3288F FALL RISK ASSESSMENT DOCD: CPT | Mod: CPTII,S$GLB,, | Performed by: OPHTHALMOLOGY

## 2023-11-07 PROCEDURE — 92133 CPTRZD OPH DX IMG PST SGM ON: CPT | Mod: S$GLB,,, | Performed by: OPHTHALMOLOGY

## 2023-11-07 PROCEDURE — 1101F PR PT FALLS ASSESS DOC 0-1 FALLS W/OUT INJ PAST YR: ICD-10-PCS | Mod: CPTII,S$GLB,, | Performed by: OPHTHALMOLOGY

## 2023-11-07 PROCEDURE — 1159F PR MEDICATION LIST DOCUMENTED IN MEDICAL RECORD: ICD-10-PCS | Mod: CPTII,S$GLB,, | Performed by: OPHTHALMOLOGY

## 2023-11-07 PROCEDURE — 1126F AMNT PAIN NOTED NONE PRSNT: CPT | Mod: CPTII,S$GLB,, | Performed by: OPHTHALMOLOGY

## 2023-11-07 PROCEDURE — 1159F MED LIST DOCD IN RCRD: CPT | Mod: CPTII,S$GLB,, | Performed by: OPHTHALMOLOGY

## 2023-11-07 PROCEDURE — 99214 OFFICE O/P EST MOD 30 MIN: CPT | Mod: S$GLB,,, | Performed by: OPHTHALMOLOGY

## 2023-11-07 NOTE — PROGRESS NOTES
HVF done ou. /rel/fix good od fair os coop. Good ou./ lids taped ou./ chart checked for latex allergy./ plano/od .50 + .25 x 40/os-[facility]

## 2023-11-20 ENCOUNTER — TELEPHONE (OUTPATIENT)
Dept: OPTOMETRY | Facility: CLINIC | Age: 68
End: 2023-11-20
Payer: MEDICARE

## 2023-12-04 DIAGNOSIS — H40.1133 PRIMARY OPEN ANGLE GLAUCOMA (POAG) OF BOTH EYES, SEVERE STAGE: ICD-10-CM

## 2023-12-04 RX ORDER — BIMATOPROST 0.1 MG/ML
1 SOLUTION/ DROPS OPHTHALMIC NIGHTLY
Qty: 2.5 ML | Refills: 12
Start: 2023-12-04 | End: 2024-03-11 | Stop reason: SDUPTHER

## 2023-12-10 DIAGNOSIS — I10 ESSENTIAL HYPERTENSION: ICD-10-CM

## 2023-12-10 NOTE — TELEPHONE ENCOUNTER
Refill Routing Note   Medication(s) are not appropriate for processing by [facility] for the following reason(s):        Required labs abnormal    [facility] action(s):  Defer   Requires appointment : Yes     Requires labs : Yes      Medication Therapy Plan: LIPIDS      Appointments  past 12m or future 3m with PCP    Date Provider   Last Visit   8/3/2023 KARIN Nichols MD   Next Visit   Visit date not found KARIN Nichols MD   ED visits in past 90 days: 0        Note composed:3:32 PM 12/10/2023

## 2023-12-10 NOTE — TELEPHONE ENCOUNTER
Care Due:                  Date            Visit Type   Department     Provider  --------------------------------------------------------------------------------                                EP -                              PRIMARY      [facility] FAMILY  Last Visit: 11-      CARE ([facility])   MEDICINE       KARIN WRIGHT  Next Visit: None Scheduled  None         None Found                                                            Last  Test          Frequency    Reason                     Performed    Due Date  --------------------------------------------------------------------------------    Office Visit  15 months..  ipratropium, pravastatin,   11- 02-                             telmisartan-hydrochloroth                             iazide...................    CMP.........  12 months..  pravastatin,               02-   02-                             telmisartan-hydrochloroth                             iazide...................    Lipid Panel.  12 months..  pravastatin..............  02- 02-    [facility] Embedded Care Due Messages. Reference number: [id number].   12/10/2023 3:55:50 AM CST

## 2023-12-12 RX ORDER — TELMISARTAN AND HYDROCHLORTHIAZIDE 80; 25 MG/1; MG/1
TABLET ORAL
Qty: 90 TABLET | Refills: 0 | Status: SHIPPED | OUTPATIENT
Start: 2023-12-12 | End: 2024-03-21

## 2024-02-05 DIAGNOSIS — E87.6 HYPOKALEMIA: ICD-10-CM

## 2024-02-05 RX ORDER — POTASSIUM CHLORIDE 20 MEQ/1
TABLET, EXTENDED RELEASE ORAL
Qty: 360 TABLET | Refills: 1 | Status: SHIPPED | OUTPATIENT
Start: 2024-02-05

## 2024-02-05 NOTE — TELEPHONE ENCOUNTER
No care due was identified.  [facility] Embedded Care Due Messages. Reference number: [id number].   2/05/2024 8:31:43 AM CST

## 2024-02-08 ENCOUNTER — TELEPHONE (OUTPATIENT)
Dept: FAMILY MEDICINE | Facility: CLINIC | Age: 69
End: 2024-02-08
Payer: MEDICARE

## 2024-02-08 NOTE — TELEPHONE ENCOUNTER
----- Message from Venita Hugo Patient Care Assistant sent at 2/8/2024  8:18 AM CST -----  Regarding: same day  Contact: pt  Type:  Same Day Appointment Request    Caller is requesting a same day appointment.  Caller declined first available appointment listed below.      Name of Caller:  pt     When is the first available appointment?  2/19/24    Symptoms:  coughing and sore throat    Best Call Back Number:  [phone] (home)     Additional Information: please call to advise. Thanks!

## 2024-02-09 ENCOUNTER — OFFICE VISIT (OUTPATIENT)
Dept: FAMILY MEDICINE | Facility: CLINIC | Age: 69
End: 2024-02-09
Payer: MEDICARE

## 2024-02-09 VITALS
HEART RATE: 74 BPM | HEIGHT: 65 IN | DIASTOLIC BLOOD PRESSURE: 80 MMHG | OXYGEN SATURATION: 97 % | SYSTOLIC BLOOD PRESSURE: 124 MMHG | WEIGHT: 183 LBS | BODY MASS INDEX: 30.49 KG/M2

## 2024-02-09 DIAGNOSIS — R52 BODY ACHES: Primary | ICD-10-CM

## 2024-02-09 DIAGNOSIS — R05.9 COUGH, UNSPECIFIED TYPE: ICD-10-CM

## 2024-02-09 LAB
CTP QC/QA: YES
CTP QC/QA: YES
POC MOLECULAR INFLUENZA A AGN: NEGATIVE
POC MOLECULAR INFLUENZA B AGN: NEGATIVE
RSV AG SPEC QL IA: NEGATIVE
SARS-COV-2 RDRP RESP QL NAA+PROBE: NEGATIVE
SPECIMEN SOURCE: NORMAL

## 2024-02-09 PROCEDURE — 3008F BODY MASS INDEX DOCD: CPT | Mod: CPTII,S$GLB,, | Performed by: INTERNAL MEDICINE

## 2024-02-09 PROCEDURE — 3079F DIAST BP 80-89 MM HG: CPT | Mod: CPTII,S$GLB,, | Performed by: INTERNAL MEDICINE

## 2024-02-09 PROCEDURE — 87502 INFLUENZA DNA AMP PROBE: CPT | Mod: QW,S$GLB,, | Performed by: INTERNAL MEDICINE

## 2024-02-09 PROCEDURE — 1159F MED LIST DOCD IN RCRD: CPT | Mod: CPTII,S$GLB,, | Performed by: INTERNAL MEDICINE

## 2024-02-09 PROCEDURE — 99213 OFFICE O/P EST LOW 20 MIN: CPT | Mod: S$GLB,,, | Performed by: INTERNAL MEDICINE

## 2024-02-09 PROCEDURE — 1160F RVW MEDS BY RX/DR IN RCRD: CPT | Mod: CPTII,S$GLB,, | Performed by: INTERNAL MEDICINE

## 2024-02-09 PROCEDURE — 1126F AMNT PAIN NOTED NONE PRSNT: CPT | Mod: CPTII,S$GLB,, | Performed by: INTERNAL MEDICINE

## 2024-02-09 PROCEDURE — 87635 SARS-COV-2 COVID-19 AMP PRB: CPT | Mod: QW,S$GLB,, | Performed by: INTERNAL MEDICINE

## 2024-02-09 PROCEDURE — 3288F FALL RISK ASSESSMENT DOCD: CPT | Mod: CPTII,S$GLB,, | Performed by: INTERNAL MEDICINE

## 2024-02-09 PROCEDURE — 3074F SYST BP LT 130 MM HG: CPT | Mod: CPTII,S$GLB,, | Performed by: INTERNAL MEDICINE

## 2024-02-09 PROCEDURE — 87634 RSV DNA/RNA AMP PROBE: CPT | Mod: PO | Performed by: INTERNAL MEDICINE

## 2024-02-09 PROCEDURE — 99999 PR PBB SHADOW E&M-EST. PATIENT-LVL III: CPT | Mod: PBBFAC,,, | Performed by: INTERNAL MEDICINE

## 2024-02-09 PROCEDURE — 1101F PT FALLS ASSESS-DOCD LE1/YR: CPT | Mod: CPTII,S$GLB,, | Performed by: INTERNAL MEDICINE

## 2024-02-09 RX ORDER — PROMETHAZINE HYDROCHLORIDE AND DEXTROMETHORPHAN HYDROBROMIDE 6.25; 15 MG/5ML; MG/5ML
5 SYRUP ORAL EVERY 4 HOURS PRN
Qty: 118 ML | Refills: 0 | Status: SHIPPED | OUTPATIENT
Start: 2024-02-09 | End: 2024-02-19

## 2024-02-09 NOTE — PROGRESS NOTES
Patient ID: Deb Tesfaye is a 68 y.o. female.    Chief Complaint: Cough and Sore Throat (Lasting about 3 days )      Assessment and plan     1. Body aches  - POCT Influenza A/B Molecular  - POCT COVID-19 Rapid Screening; Future  - RSV Antigen Detection Nasopharyngeal Swab    2. Cough, unspecified type  - POCT Influenza A/B Molecular  - POCT COVID-19 Rapid Screening; Future  - promethazine-dextromethorphan (PROMETHAZINE-DM) 6.25-15 mg/5 mL Syrp; Take 5 mLs by mouth every 4 (four) hours as needed (cough).  Dispense: 118 mL; Refill: 0  - RSV Antigen Detection Nasopharyngeal Swab     Screening as ordered   Promethazine DM for cough   Okay to send message for antibiotic prescription if not feeling better in the next few days     HPI     Past medical history of hypertension and hyperlipidemia.    Cough and sore throat x 3 day. Having body aches.     Review of Systems   Constitutional:  Negative for fever.   HENT:  Positive for sore throat.    Respiratory:  Positive for cough. Negative for shortness of breath.    Cardiovascular:  Negative for chest pain.   Gastrointestinal:  Negative for abdominal pain.        Objective     Vitals:    02/09/24 1336   BP: 124/80   Pulse: 74     Wt Readings from Last 3 Encounters:   02/09/24 1336 83 kg (182 lb 15.7 oz)   08/23/23 1430 82.3 kg (181 lb 7 oz)   02/23/23 1033 82.6 kg (182 lb 1.6 oz)      Body mass index is 30.45 kg/m².     Physical Exam  Cardiovascular:      Rate and Rhythm: Normal rate and regular rhythm.      Heart sounds: No murmur heard.     No gallop.   Pulmonary:      Breath sounds: Normal breath sounds. No wheezing or rhonchi.   Abdominal:      Palpations: Abdomen is soft.      Tenderness: There is no abdominal tenderness.            Hypertension Medications               telmisartan-hydrochlorothiazide (MICARDIS HCT) 80-25 mg per tablet TAKE 1 TABLET BY MOUTH EVERY DAY           Hyperlipidemia Medications               KRILL OIL ORAL Take 1 capsule by mouth  once daily.     pravastatin (PRAVACHOL) 20 MG tablet Take 1 tablet (20 mg total) by mouth once daily.           Medication List with Changes/Refills   New Medications    PROMETHAZINE-DEXTROMETHORPHAN (PROMETHAZINE-DM) 6.25-15 MG/5 ML SYRP    Take 5 mLs by mouth every 4 (four) hours as needed (cough).   Current Medications    ACETAZOLAMIDE (DIAMOX) 500 MG CPSR    Take 1 capsule (500 mg total) by mouth 2 (two) times daily.    AZELASTINE (ASTELIN) 137 MCG (0.1 %) NASAL SPRAY    SHAKE WELL AND USE 1 SPRAY IN EACH NOSTRIL TWICE DAILY    BRIMONIDINE 0.2% (ALPHAGAN) 0.2 % DROP    INSTILL 1 DROP IN BOTH EYES THREE TIMES DAILY    ERGOCALCIFEROL, VITAMIN D2, (VITAMIN D ORAL)    Take 1,000 Units by mouth once daily.     FLUTICASONE PROPIONATE (FLONASE) 50 MCG/ACTUATION NASAL SPRAY    1 spray (50 mcg total) by Each Nostril route once daily.    IPRATROPIUM (ATROVENT) 42 MCG (0.06 %) NASAL SPRAY    2 sprays by Nasal route 3 (three) times daily.    KRILL OIL ORAL    Take 1 capsule by mouth once daily.     LUMIGAN 0.01 % DROP    Place 1 drop into both eyes every evening.    MULTIVITS,CA,MINERALS/IRON/FA (ONE-A-DAY WOMENS FORMULA ORAL)    Take 1 capsule by mouth once daily.    NETARSUDIL (RHOPRESSA) 0.02 % OPHTHALMIC SOLUTION    Place 1 drop into both eyes once daily.    PILOCARPINE HCL 4% (PILOCAR) 4 % OPHTHALMIC SOLUTION    Place 1 drop into the left eye 3 (three) times daily.    POTASSIUM CHLORIDE SA (K-DUR,KLOR-CON) 20 MEQ TABLET    TAKE 2 TABLETS(40 MEQ) BY MOUTH TWICE DAILY    PRAVASTATIN (PRAVACHOL) 20 MG TABLET    Take 1 tablet (20 mg total) by mouth once daily.    TELMISARTAN-HYDROCHLOROTHIAZIDE (MICARDIS HCT) 80-25 MG PER TABLET    TAKE 1 TABLET BY MOUTH EVERY DAY    TIMOLOL MALEATE 0.5% (TIMOPTIC) 0.5 % DROP    Place 1 drop into both eyes 2 (two) times daily.       I personally reviewed past medical, family and social history.

## 2024-02-23 ENCOUNTER — HOSPITAL ENCOUNTER (OUTPATIENT)
Dept: RADIOLOGY | Facility: HOSPITAL | Age: 69
Discharge: HOME | End: 2024-02-23
Attending: NURSE PRACTITIONER
Payer: MEDICARE

## 2024-02-23 DIAGNOSIS — Z86.000 HISTORY OF DUCTAL CARCINOMA IN SITU (DCIS) OF BREAST: ICD-10-CM

## 2024-02-23 DIAGNOSIS — Z12.31 ENCOUNTER FOR SCREENING MAMMOGRAM FOR MALIGNANT NEOPLASM OF BREAST: ICD-10-CM

## 2024-02-23 DIAGNOSIS — M85.88 OTHER SPECIFIED DISORDERS OF BONE DENSITY AND STRUCTURE, OTHER SITE: ICD-10-CM

## 2024-02-23 PROCEDURE — 77067 SCR MAMMO BI INCL CAD: CPT | Mod: TC,PO

## 2024-02-23 PROCEDURE — 77080 DXA BONE DENSITY AXIAL: CPT | Mod: 26,,, | Performed by: RADIOLOGY

## 2024-02-23 PROCEDURE — 77063 BREAST TOMOSYNTHESIS BI: CPT | Mod: 26,,, | Performed by: RADIOLOGY

## 2024-02-23 PROCEDURE — 77080 DXA BONE DENSITY AXIAL: CPT | Mod: TC,PO

## 2024-02-23 PROCEDURE — 77067 SCR MAMMO BI INCL CAD: CPT | Mod: 26,,, | Performed by: RADIOLOGY

## 2024-02-26 ENCOUNTER — OFFICE VISIT (OUTPATIENT)
Dept: HEMATOLOGY/ONCOLOGY | Facility: CLINIC | Age: 69
End: 2024-02-26
Payer: MEDICARE

## 2024-02-26 VITALS
OXYGEN SATURATION: 97 % | DIASTOLIC BLOOD PRESSURE: 86 MMHG | HEIGHT: 65 IN | BODY MASS INDEX: 30.78 KG/M2 | WEIGHT: 184.75 LBS | SYSTOLIC BLOOD PRESSURE: 139 MMHG | RESPIRATION RATE: 18 BRPM | HEART RATE: 67 BPM | TEMPERATURE: 97 F

## 2024-02-26 DIAGNOSIS — Z12.31 ENCOUNTER FOR SCREENING MAMMOGRAM FOR MALIGNANT NEOPLASM OF BREAST: ICD-10-CM

## 2024-02-26 DIAGNOSIS — M94.9 DISORDER OF BONE AND CARTILAGE: ICD-10-CM

## 2024-02-26 DIAGNOSIS — Z86.000 HISTORY OF DUCTAL CARCINOMA IN SITU (DCIS) OF BREAST: Primary | ICD-10-CM

## 2024-02-26 DIAGNOSIS — M89.9 DISORDER OF BONE AND CARTILAGE: ICD-10-CM

## 2024-02-26 PROCEDURE — 1126F AMNT PAIN NOTED NONE PRSNT: CPT | Mod: CPTII,S$GLB,, | Performed by: NURSE PRACTITIONER

## 2024-02-26 PROCEDURE — 3008F BODY MASS INDEX DOCD: CPT | Mod: CPTII,S$GLB,, | Performed by: NURSE PRACTITIONER

## 2024-02-26 PROCEDURE — 3288F FALL RISK ASSESSMENT DOCD: CPT | Mod: CPTII,S$GLB,, | Performed by: NURSE PRACTITIONER

## 2024-02-26 PROCEDURE — 99213 OFFICE O/P EST LOW 20 MIN: CPT | Mod: S$GLB,,, | Performed by: NURSE PRACTITIONER

## 2024-02-26 PROCEDURE — 1160F RVW MEDS BY RX/DR IN RCRD: CPT | Mod: CPTII,S$GLB,, | Performed by: NURSE PRACTITIONER

## 2024-02-26 PROCEDURE — 1159F MED LIST DOCD IN RCRD: CPT | Mod: CPTII,S$GLB,, | Performed by: NURSE PRACTITIONER

## 2024-02-26 PROCEDURE — 3079F DIAST BP 80-89 MM HG: CPT | Mod: CPTII,S$GLB,, | Performed by: NURSE PRACTITIONER

## 2024-02-26 PROCEDURE — 99999 PR PBB SHADOW E&M-EST. PATIENT-LVL IV: CPT | Mod: PBBFAC,,, | Performed by: NURSE PRACTITIONER

## 2024-02-26 PROCEDURE — 1101F PT FALLS ASSESS-DOCD LE1/YR: CPT | Mod: CPTII,S$GLB,, | Performed by: NURSE PRACTITIONER

## 2024-02-26 PROCEDURE — 3075F SYST BP GE 130 - 139MM HG: CPT | Mod: CPTII,S$GLB,, | Performed by: NURSE PRACTITIONER

## 2024-02-26 NOTE — PROGRESS NOTES
Subjective:       Patient ID: Deb Tesfaye is a 69 y.o. female.    Chief Complaint: Annual breast evaluation - 96 month (8 yr) post treatment for DCIS left breast    HPI Ms. Tesfaye is a 69-year-old black female formerly known to Dr. 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    She is status post lumpectomy, post-lumpectomy irradiation and 60 months of Tamoxifen.    Hx of Cataracts, Fibrocystic breast, Glaucoma, HTN.    Oncological history:  Abnormal findings on screening Mammogram @ age 60 on 12/02/2015; followed by Diagnostic Mammo on 12/18/15.  Underwent needle local & excision, 01/26/16 (Reji).  Pathology revealed high-grade comedo DCIS in association with necrosis & focal atypical ductal hyperplasia.  Tumor was ER/WV negative & had negative margins of resection.  Received post-lumpectomy irradiation & placed on chemoprevention Tamoxifen 20 mg daily for a planned 60 months.    Today:  02/26/24  Ms. Tesfaye presents to the clinic today for her 96-month post-therapy evaluation.     She remains compliant with supplemental Vitamin D.   She reports that she has been in good health.  She denies any new breast complaints, bone pain, difficulties with hot flashes, vaginal bleeding, abdominal discomfort/bloating, nausea, vomiting, constipation, diarrhea, irregular heartbeat, chest pain, myalgias, cramping, etc.    No other new complaints or pertinent findings on a 14-point review of systems.      Past Medical History:   Diagnosis Date    Breast cancer     Cataract     Colon polyp 07/20/2016    Ductal carcinoma in situ (DCIS) of left breast 1/11/2016    left     Fibrocystic breast     Glaucoma     Hypertension      Past Surgical History:   Procedure Laterality Date    AHMED GLAUCOMA VALVE Right 01/10/2018    WITH CE ()    BREAST BIOPSY      BREAST LUMPECTOMY Left     with radiation    CATARACT EXTRACTION W/  INTRAOCULAR LENS IMPLANT Right  01/10/2018    with ravin ()    COLONOSCOPY  2002    due for every 5.  Mother with colon cancer.    COLONOSCOPY  12/2010    COLONOSCOPY N/A 7/20/2016    Procedure: COLONOSCOPY;  Surgeon: Janak De Leon Jr., MD;  Location: [facility] ENDO;  Service: Endoscopy;  Laterality: N/A;    COLONOSCOPY N/A 7/20/2022    Procedure: COLONOSCOPY;  Surgeon: Janak De Leon Jr., MD;  Location: [facility] ENDO;  Service: Endoscopy;  Laterality: N/A;    EYE SURGERY      SELECTIVE LASER TRABECULPALSTY Right 8/13    OS DONE IN [place], [place]    SELECTIVE LASER TRABECUPLASTY Left 8/27/15    OS WITH     TUBAL LIGATION       Current Outpatient Medications on File Prior to Visit   Medication Sig Dispense Refill    acetaZOLAMIDE (DIAMOX) 500 mg CpSR Take 1 capsule (500 mg total) by mouth 2 (two) times daily. 180 capsule 3    azelastine (ASTELIN) 137 mcg (0.1 %) nasal spray SHAKE WELL AND USE 1 SPRAY IN EACH NOSTRIL TWICE DAILY 30 mL 11    brimonidine 0.2% (ALPHAGAN) 0.2 % Drop INSTILL 1 DROP IN BOTH EYES THREE TIMES DAILY 30 mL 3    ERGOCALCIFEROL, VITAMIN D2, (VITAMIN D ORAL) Take 1,000 Units by mouth once daily.       fluticasone propionate (FLONASE) 50 mcg/actuation nasal spray 1 spray (50 mcg total) by Each Nostril route once daily. 16 g 0    ipratropium (ATROVENT) 42 mcg (0.06 %) nasal spray 2 sprays by Nasal route 3 (three) times daily. 15 mL 2    KRILL OIL ORAL Take 1 capsule by mouth once daily.       LUMIGAN 0.01 % Drop Place 1 drop into both eyes every evening. 2.5 mL 12    MULTIVITS,CA,MINERALS/IRON/FA (ONE-A-DAY WOMENS FORMULA ORAL) Take 1 capsule by mouth once daily.      netarsudiL (RHOPRESSA) 0.02 % ophthalmic solution Place 1 drop into both eyes once daily. 2.5 mL 11    pilocarpine HCL 4% (PILOCAR) 4 % ophthalmic solution Place 1 drop into the left eye 3 (three) times daily. 15 mL 12    potassium chloride SA (K-DUR,KLOR-CON) 20 MEQ tablet TAKE 2 TABLETS(40 MEQ) BY MOUTH TWICE DAILY 360 tablet 1    pravastatin  "(PRAVACHOL) 20 MG tablet Take 1 tablet (20 mg total) by mouth once daily. 90 tablet 1    telmisartan-hydrochlorothiazide (MICARDIS HCT) 80-25 mg per tablet TAKE 1 TABLET BY MOUTH EVERY DAY 90 tablet 0    timolol maleate 0.5% (TIMOPTIC) 0.5 % Drop Place 1 drop into both eyes 2 (two) times daily. 10 mL 6     No current facility-administered medications on file prior to visit.     Review of Systems   Constitutional:  Negative for appetite change and unexpected weight change.   HENT:  Negative for mouth sores.    Eyes:  Negative for visual disturbance.   Respiratory:  Negative for cough and shortness of breath.    Cardiovascular:  Negative for chest pain.   Gastrointestinal:  Negative for abdominal pain and diarrhea.   Genitourinary:  Negative for frequency.   Musculoskeletal:  Negative for back pain.   Integumentary:  Negative for rash.   Neurological:  Negative for headaches.   Hematological:  Negative for adenopathy.   Psychiatric/Behavioral:  The patient is not nervous/anxious.          Objective:     Weight:  Gain of 2 1/2 pounds in 1 year    Vitals:    02/26/24 1110   BP: 139/86   BP Location: Left arm   Patient Position: Sitting   BP Method: Medium (Automatic)   Pulse: 67   Resp: 18   Temp: 97.2 °F (36.2 °C)   TempSrc: Temporal   SpO2: 97%   Weight: 83.8 kg (184 lb 11.9 oz)   Height: 5' 5" (1.651 m)       Physical Exam  Vitals reviewed.   Constitutional:       Appearance: Normal appearance.   HENT:      Head: Normocephalic and atraumatic.      Mouth/Throat:      Mouth: Mucous membranes are moist.   Eyes:      Conjunctiva/sclera: Conjunctivae normal.      Pupils: Pupils are equal, round, and reactive to light.   Cardiovascular:      Rate and Rhythm: Normal rate and regular rhythm.      Pulses: Normal pulses.      Heart sounds: Normal heart sounds.   Pulmonary:      Effort: Pulmonary effort is normal.      Breath sounds: Normal breath sounds.   Chest:      Comments: BREASTS:  Right breast is soft, free of masses, " tenderness, nipple discharge or inversion.  Left breast with noted healed lumpectomy scar to the upper outer region; no signs of local recurrence.  Abdominal:      General: Bowel sounds are normal.      Palpations: Abdomen is soft.   Musculoskeletal:         General: Normal range of motion.      Cervical back: Normal range of motion.   Lymphadenopathy:      Cervical: No cervical adenopathy.   Skin:     General: Skin is warm and dry.      Capillary Refill: Capillary refill takes less than 2 seconds.   Neurological:      Mental Status: She is alert and oriented to person, place, and time.   Psychiatric:         Mood and Affect: Mood normal.         Behavior: Behavior normal.         Thought Content: Thought content normal.         Judgment: Judgment normal.       Lab Results   Component Value Date    WBC 7.27 02/23/2024    RBC 4.79 02/23/2024    HGB 13.6 02/23/2024    HCT 40.9 02/23/2024    MCV 85 02/23/2024    MCH 28.4 02/23/2024    MCHC 33.3 02/23/2024    RDW 13.4 02/23/2024     02/23/2024    MPV 11.0 02/23/2024    GRAN 3.9 02/23/2024    GRAN 53.4 02/23/2024    LYMPH 2.7 02/23/2024    LYMPH 36.7 02/23/2024    MONO 0.5 02/23/2024    MONO 6.7 02/23/2024    EOS 0.2 02/23/2024    BASO 0.05 02/23/2024    EOSINOPHIL 2.1 02/23/2024    BASOPHIL 0.7 02/23/2024     CMP  Sodium   Date Value Ref Range Status   02/23/2024 141 136 - 145 mmol/L Final     Potassium   Date Value Ref Range Status   02/23/2024 3.7 3.5 - 5.1 mmol/L Final     Chloride   Date Value Ref Range Status   02/23/2024 106 95 - 110 mmol/L Final     CO2   Date Value Ref Range Status   02/23/2024 26 23 - 29 mmol/L Final     Glucose   Date Value Ref Range Status   02/23/2024 105 70 - 110 mg/dL Final     BUN   Date Value Ref Range Status   02/23/2024 18 8 - 23 mg/dL Final     Creatinine   Date Value Ref Range Status   02/23/2024 0.8 0.5 - 1.4 mg/dL Final     Calcium   Date Value Ref Range Status   02/23/2024 9.5 8.7 - 10.5 mg/dL Final     Total Protein   Date  Value Ref Range Status   02/23/2024 7.3 6.0 - 8.4 g/dL Final     Albumin   Date Value Ref Range Status   02/23/2024 4.0 3.5 - 5.2 g/dL Final     Total Bilirubin   Date Value Ref Range Status   02/23/2024 0.4 0.1 - 1.0 mg/dL Final     Comment:     For infants and newborns, interpretation of results should be based  on gestational age, weight and in agreement with clinical  observations.    Premature Infant recommended reference ranges:  Up to 24 hours.............<8.0 mg/dL  Up to 48 hours............<12.0 mg/dL  3-5 days..................<15.0 mg/dL  6-29 days.................<15.0 mg/dL       Alkaline Phosphatase   Date Value Ref Range Status   02/23/2024 71 55 - 135 U/L Final     AST   Date Value Ref Range Status   02/23/2024 22 10 - 40 U/L Final     ALT   Date Value Ref Range Status   02/23/2024 29 10 - 44 U/L Final     Anion Gap   Date Value Ref Range Status   02/23/2024 9 8 - 16 mmol/L Final     eGFR if    Date Value Ref Range Status   02/23/2022 >60 >60 mL/min/1.73 m^2 Final     eGFR if non    Date Value Ref Range Status   02/23/2022 >60 >60 mL/min/1.73 m^2 Final     Comment:     Calculation used to obtain the estimated glomerular filtration  rate (eGFR) is the CKD-EPI equation.        Vitamin D:      Mammogram dated 02/23/2024:  Impression:  No mammographic evidence of malignancy.  BI-RADS Category 1:  Negative  Recommendation:  Routine screening mammogram in 1 year is recommended.     BMD dated 02/23/2024:  Normal bone density; improved in lumbar spine     Assessment:       Problem List Items Addressed This Visit       History of ductal carcinoma in situ (DCIS) of breast - Primary    Relevant Orders    CBC Auto Differential    Comprehensive Metabolic Panel    Vitamin D    Mammo Digital Screening Bilat w/ Adam     Other Visit Diagnoses       Encounter for screening mammogram for malignant neoplasm of breast        Relevant Orders    Mammo Digital Screening Bilat w/ Adam     Disorder of bone and cartilage        Relevant Orders    Vitamin D            Plan:       1.  Continue Calcium & Vitamin D supplementation.  Continue Vitamin D at 2,000 units/day.  2.  Return to clinic in 1 year for 108 month post therapy evaluation with CBC, CMP, Vitamin D, MAMMO (on or after 02/23/25) prior.  3.  Follow up with PCP (KARIN Nichols MD) in regards to HTN/K+ & Dr. Carol Yadav for GYN.  4.  Call for any concerns.     Assessment/plan reviewed and approved by Dr. Banuelos.  20 minutes were spent in coordination of patient's care, record review and counseling.    Route Chart for Scheduling    Med Onc Chart Routing      Follow up with physician    Follow up with LORRAINE 1 year. f/u in 1 year with CBC, CMP, VIT D & MAMMO (on or after 02/23/25) prior   Infusion scheduling note    Injection scheduling note    Labs    Imaging    Pharmacy appointment    Other referrals                yesterday

## 2024-02-27 DIAGNOSIS — Z00.00 ENCOUNTER FOR MEDICARE ANNUAL WELLNESS EXAM: ICD-10-CM

## 2024-02-29 DIAGNOSIS — H40.1133 PRIMARY OPEN ANGLE GLAUCOMA OF BOTH EYES, SEVERE STAGE: ICD-10-CM

## 2024-02-29 DIAGNOSIS — H40.1130 PRIMARY OPEN ANGLE GLAUCOMA OF BOTH EYES, UNSPECIFIED GLAUCOMA STAGE: ICD-10-CM

## 2024-03-02 RX ORDER — BRIMONIDINE TARTRATE 2 MG/ML
1 SOLUTION/ DROPS OPHTHALMIC 3 TIMES DAILY
Qty: 30 ML | Refills: 4 | Status: SHIPPED | OUTPATIENT
Start: 2024-03-02

## 2024-03-02 RX ORDER — TIMOLOL MALEATE 5 MG/ML
1 SOLUTION/ DROPS OPHTHALMIC 2 TIMES DAILY
Qty: 15 ML | Refills: 4 | Status: SHIPPED | OUTPATIENT
Start: 2024-03-02

## 2024-03-02 NOTE — PROGRESS NOTES
HPI    DLS: 11/07/2023    Pt here for 4 Month Check;  Pt states no eye pain or discomfort and vision seems to be doing okay.    Meds;  Timolol BID OU   Brimonidine TID OU   Rhopressa QDAY OU   Bisi 4% TID OS   Lumigan QHS OS   Diamox 500mg BID PO     1. PO AG OD>OS   2. APD OD   3. NS OS   4. Papilloma OS   5. CME OD   6. PCIOL OD     Last edited by Chapis York on 3/5/2024 11:51 AM.              Assessment /Plan     For exam results, see Encounter Report.    Primary open angle glaucoma (POAG) of both eyes, severe stage    Afferent pupillary defect, right    Visual field loss    Nuclear sclerosis of left eye    Epiretinal membrane (ERM) of right eye    Floaters, bilateral    Pseudophakia, right eye        S/P PHACO/IOL/AHMED - OD - 1/10/2017 - developed CME post op    Rx with steroid gtts and ilevro - had responded -   Recurrent CME od - 12/2/2019 - off PA and Ilevro - however  the vision is stable     S/P  CYCLO G6 OD - 4/12/2017  REFERRED BY DR ELISE FOR SEVERE POORLY CONTROLLED POAG - 7/24/2015   Pt recently moved from [place] to the [facility]     Previously referred to Dr. Elise from [facility], review of outside records:    Tm 27/25.    Initial visit 3/29/12  IOP 10 OU on Combitan and Lumigan  C:D 0.85 OU  CCT 524OD, 531 OS       Glaucoma (type and duration)    Dx about 2002 - [place]    First HVF   First at [facility] 2015   First photos   First at [facility] 2015    Treatment / Drops started   2002              Family history    + mom / brother         Glaucoma meds    cosopt ou / brimonidine ou tid / lumigan ou q hs / bisi od tid / diamox / bisi ou        H/O adverse rxn to glaucoma drops    none        LASERS    SLT OD - 8/5/2013 - [place] // SLT os - [facility] 8/27/2015 (good resp 21-->16) // repeat os 9/20/2018         GLAUCOMA SURGERIES    CYCL G6 OD - 4/12/2017  / phaco/IOL / ahmed od 1/10/2018         OTHER EYE SURGERIES    Combined phaco/IOL / ahmed  - od - 1/10/2018         CDR     0.99/0.95        Tbase    ?  Off gtts (on gtts was up to 28/25)         Tmax    27/25 - per outside record's (reviewed by Arik )             Ttarget    15-16  ou    (may need to be lower)           HVF  - OD 10-2 ss - 3 test 2015 to 2017 - SALT/ IAD (progression)    11 test 2016 to 2023 - 10-2 stim V od - SALT / IAD - [place] // os SAD involves fix / IAD    (( 2 VF's from [place] - that were reviewed by Dr. IVEY - 2012 and 2013 -  SAD w/ fix invl . IAD od / early IAD os ))            Gonio    +3-4 ou         CCT    524/531        OCT    7  test 2019 to 2023 - RNFL - OD:dec. Throughout // OS:dec. G/NS/N/TI/T, bord TS/NI        HRT    2 test 2015 to 2018  - MR -   Dec thru out   od // dec thru out  os /// CDR 0. 91 od // 0.88 os        Disc photos    2015, 2018      - Ttoday - 11/15   - Test done today    IOP / gonio     2. + APD od     3.  NS os    Minimal - [facility] 20/25 os still     4. Papilloma OS   To be removed with Arik     5. ++ CME od    Decrease in [facility] from 20/70 to 20/200   New since prior OCT 2/3/2017    S/p PHACO/IOL / AHMED  1/10/2018    Had resolved by 7/29/2019 - on ilevro and PA   -recurrent on 12/2/2019 - off ilevro    - resolved by 6/12/2020 - on ketorolac tid od    -STILL RESOLVED 10/2/2020 - still on ketorolac   -TRIAL OFF KETOROLAC - 3/24/2023 - monitor for recurrent cme     PLAN   GLAUCOMA - ADVANCED - OD >OS   HVF's continue to progress - especially OD     S/P  - cyclo G6 - OD - 4/12/2017      S/p Phaco / IOL // phaco/ IOL / ahmed  OD Date: 1/10/2018 - general anesthesia // PCB00 23.0  POY > 6    glaucoma gtts  timolol bid ou   Brim ou tid   rhopressa ou   bisi tid os (do not use od - may cause iritis and cme)   lumigan  os (hold od - get recurrent CME )   Diamox (acetazolamide pills) 500 PO bid - tolerating ok     Ketorolac 2 x day   od (Ilevro - too expensive) - restarted - 12/12/2019 - for recurrent CME od // OFF 7/2023    OCT macula 5/17/19: +++ CME od /  OD: , Loss of foveal  contour, intraretinal and subretinal fluid  OS: , NFC, no thickening    OCT - 7/26/2019 - CME improved / resolved // review retina notes // monitor for recurrence of CME od     OCT 12/2/2019 - increase - recurrent CME od - 488 - off the ilevro (did not appear to affect her vision)     Oct 6/12/2020 - no cme OD - RESOLVED     OCT - 10/2/2020 - NO CME    OCT 12/3/2021 - no cme     OCT macula - 7/21/2023 - chronic changes - ERM / loss of foveal contour - intraretinal cyst     IN FUTURE ALWAYS DO A 10-2 STIM V OD AND A 24-2 SS OS     Photo file update 7/21/2023     Pt wants a letter stating that she has glaucoma and has great difficulty  Driving  at night .    Saw optometrist - [facility] - doing ok with new glasses - 3/2024     3/5/2024   IOP ok   HVF stable / OCT stable (last visit 11/2023)   CSM   F/U 3-4 months with IOP /

## 2024-03-05 ENCOUNTER — OFFICE VISIT (OUTPATIENT)
Dept: OPHTHALMOLOGY | Facility: CLINIC | Age: 69
End: 2024-03-05
Payer: MEDICARE

## 2024-03-05 DIAGNOSIS — H43.393 FLOATERS, BILATERAL: ICD-10-CM

## 2024-03-05 DIAGNOSIS — Z96.1 PSEUDOPHAKIA, RIGHT EYE: ICD-10-CM

## 2024-03-05 DIAGNOSIS — H25.12 NUCLEAR SCLEROSIS OF LEFT EYE: ICD-10-CM

## 2024-03-05 DIAGNOSIS — H53.40 VISUAL FIELD LOSS: ICD-10-CM

## 2024-03-05 DIAGNOSIS — H40.1133 PRIMARY OPEN ANGLE GLAUCOMA (POAG) OF BOTH EYES, SEVERE STAGE: Primary | ICD-10-CM

## 2024-03-05 DIAGNOSIS — H35.371 EPIRETINAL MEMBRANE (ERM) OF RIGHT EYE: ICD-10-CM

## 2024-03-05 DIAGNOSIS — H21.561 AFFERENT PUPILLARY DEFECT, RIGHT: ICD-10-CM

## 2024-03-05 PROCEDURE — 1101F PT FALLS ASSESS-DOCD LE1/YR: CPT | Mod: CPTII,S$GLB,, | Performed by: OPHTHALMOLOGY

## 2024-03-05 PROCEDURE — 1160F RVW MEDS BY RX/DR IN RCRD: CPT | Mod: CPTII,S$GLB,, | Performed by: OPHTHALMOLOGY

## 2024-03-05 PROCEDURE — 1126F AMNT PAIN NOTED NONE PRSNT: CPT | Mod: CPTII,S$GLB,, | Performed by: OPHTHALMOLOGY

## 2024-03-05 PROCEDURE — 99214 OFFICE O/P EST MOD 30 MIN: CPT | Mod: S$GLB,,, | Performed by: OPHTHALMOLOGY

## 2024-03-05 PROCEDURE — 1159F MED LIST DOCD IN RCRD: CPT | Mod: CPTII,S$GLB,, | Performed by: OPHTHALMOLOGY

## 2024-03-05 PROCEDURE — 3288F FALL RISK ASSESSMENT DOCD: CPT | Mod: CPTII,S$GLB,, | Performed by: OPHTHALMOLOGY

## 2024-03-05 PROCEDURE — 99999 PR PBB SHADOW E&M-EST. PATIENT-LVL III: CPT | Mod: PBBFAC,,, | Performed by: OPHTHALMOLOGY

## 2024-03-11 DIAGNOSIS — H40.1133 PRIMARY OPEN ANGLE GLAUCOMA (POAG) OF BOTH EYES, SEVERE STAGE: ICD-10-CM

## 2024-03-11 RX ORDER — BIMATOPROST 0.1 MG/ML
1 SOLUTION/ DROPS OPHTHALMIC NIGHTLY
Qty: 2.5 ML | Refills: 12
Start: 2024-03-11 | End: 2024-03-26 | Stop reason: SDUPTHER

## 2024-03-19 DIAGNOSIS — I10 ESSENTIAL HYPERTENSION: ICD-10-CM

## 2024-03-19 NOTE — TELEPHONE ENCOUNTER
Refill Routing Note   Medication(s) are not appropriate for processing by [facility] for the following reason(s):        Patient not seen by provider within 15 months    [facility] action(s):  Defer     Requires labs : Yes             Appointments  past 12m or future 3m with PCP    Date Provider   Last Visit   8/3/2023 KARIN Nichols MD   Next Visit   8/15/2024 KARIN Nichols MD   ED visits in past 90 days: 0        Note composed:6:59 PM 03/19/2024

## 2024-03-19 NOTE — TELEPHONE ENCOUNTER
Care Due:                  Date            Visit Type   Department     Provider  --------------------------------------------------------------------------------                                EP -                              PRIMARY      [facility] FAMILY  Last Visit: 02-      CARE ([facility])   MEDICINE       Dick AVILA                              ANNUAL                              CHECKUP/PHY  [facility] FAMILY  Next Visit: 08-      S            ELOISA WRIGHT                                                            Last  Test          Frequency    Reason                     Performed    Due Date  --------------------------------------------------------------------------------    Lipid Panel.  12 months..  pravastatin..............  02- 02-    Health [facility] Embedded Care Due Messages. Reference number: [id number].   3/19/2024 3:54:44 AM CDT

## 2024-03-21 RX ORDER — TELMISARTAN AND HYDROCHLORTHIAZIDE 80; 25 MG/1; MG/1
TABLET ORAL
Qty: 90 TABLET | Refills: 0 | Status: SHIPPED | OUTPATIENT
Start: 2024-03-21

## 2024-03-26 DIAGNOSIS — H40.1133 PRIMARY OPEN ANGLE GLAUCOMA (POAG) OF BOTH EYES, SEVERE STAGE: ICD-10-CM

## 2024-03-26 RX ORDER — BIMATOPROST 0.1 MG/ML
1 SOLUTION/ DROPS OPHTHALMIC NIGHTLY
Qty: 2.5 ML | Refills: 12 | Status: SHIPPED | OUTPATIENT
Start: 2024-03-26

## 2024-06-06 DIAGNOSIS — H40.1133 PRIMARY OPEN ANGLE GLAUCOMA (POAG) OF BOTH EYES, SEVERE STAGE: ICD-10-CM

## 2024-06-06 RX ORDER — NETARSUDIL 0.2 MG/ML
1 SOLUTION/ DROPS OPHTHALMIC; TOPICAL DAILY
Qty: 2.5 ML | Refills: 11 | Status: SHIPPED | OUTPATIENT
Start: 2024-06-06

## 2024-06-17 DIAGNOSIS — E78.5 HYPERLIPIDEMIA, UNSPECIFIED HYPERLIPIDEMIA TYPE: ICD-10-CM

## 2024-06-17 NOTE — TELEPHONE ENCOUNTER
No care due was identified.  Health [facility] Embedded Care Due Messages. Reference number: [id number].   6/17/2024 4:57:02 PM CDT

## 2024-06-18 RX ORDER — PRAVASTATIN SODIUM 20 MG/1
20 TABLET ORAL DAILY
Qty: 90 TABLET | Refills: 0 | OUTPATIENT
Start: 2024-06-18

## 2024-06-18 NOTE — TELEPHONE ENCOUNTER
Refill Routing Note   Medication(s) are not appropriate for processing by [facility] for the following reason(s):        Patient not seen by provider within 15 months    [facility] action(s):  Defer               Appointments  past 12m or future 3m with PCP    Date Provider   Last Visit   8/3/2023 KARIN Nichols MD   Next Visit   8/15/2024 KARIN Nichols MD   ED visits in past 90 days: 0        Note composed:3:03 AM 06/18/2024

## 2024-06-19 ENCOUNTER — TELEPHONE (OUTPATIENT)
Dept: FAMILY MEDICINE | Facility: CLINIC | Age: 69
End: 2024-06-19
Payer: MEDICARE

## 2024-06-19 NOTE — TELEPHONE ENCOUNTER
----- Message from Gwen Martinez sent at 6/19/2024 10:37 AM CDT -----  Regarding: Returning call  Type:  Patient Returning Call    Who Called:  Pt  Who Left Message for Patient:  Marina?  Does the patient know what this is regarding?:  Yes  Best Call Back Number:  [phone]    Additional Information:

## 2024-06-23 DIAGNOSIS — I10 ESSENTIAL HYPERTENSION: ICD-10-CM

## 2024-06-23 NOTE — TELEPHONE ENCOUNTER
No care due was identified.  [facility] Embedded Care Due Messages. Reference number: [id number].   6/23/2024 3:55:18 AM CDT

## 2024-06-24 NOTE — TELEPHONE ENCOUNTER
Refill Routing Note   Medication(s) are not appropriate for processing by [facility] for the following reason(s):        Patient not seen by provider within 15 months    [facility] action(s):  Route             Appointments  past 12m or future 3m with PCP    Date Provider   Last Visit   8/3/2023 KARIN Nichols MD   Next Visit   8/15/2024 KARIN Nichols MD   ED visits in past 90 days: 0        Note composed:8:35 PM 06/23/2024

## 2024-06-30 RX ORDER — TELMISARTAN AND HYDROCHLORTHIAZIDE 80; 25 MG/1; MG/1
1 TABLET ORAL DAILY
Qty: 90 TABLET | Refills: 0 | Status: SHIPPED | OUTPATIENT
Start: 2024-06-30

## 2024-07-11 ENCOUNTER — OFFICE VISIT (OUTPATIENT)
Dept: FAMILY MEDICINE | Facility: CLINIC | Age: 69
End: 2024-07-11
Payer: MEDICARE

## 2024-07-11 VITALS
DIASTOLIC BLOOD PRESSURE: 76 MMHG | SYSTOLIC BLOOD PRESSURE: 124 MMHG | HEART RATE: 73 BPM | WEIGHT: 188.06 LBS | HEIGHT: 65 IN | BODY MASS INDEX: 31.33 KG/M2 | OXYGEN SATURATION: 98 %

## 2024-07-11 DIAGNOSIS — I10 ESSENTIAL HYPERTENSION: ICD-10-CM

## 2024-07-11 DIAGNOSIS — Z00.00 ENCOUNTER FOR MEDICARE ANNUAL WELLNESS EXAM: ICD-10-CM

## 2024-07-11 DIAGNOSIS — Z86.000 HISTORY OF DUCTAL CARCINOMA IN SITU (DCIS) OF BREAST: ICD-10-CM

## 2024-07-11 DIAGNOSIS — Z00.00 ENCOUNTER FOR PREVENTIVE HEALTH EXAMINATION: Primary | ICD-10-CM

## 2024-07-11 DIAGNOSIS — E78.2 MIXED HYPERLIPIDEMIA: ICD-10-CM

## 2024-07-11 DIAGNOSIS — E66.9 OBESITY (BMI 30-39.9): ICD-10-CM

## 2024-07-11 PROCEDURE — 99999 PR PBB SHADOW E&M-EST. PATIENT-LVL IV: CPT | Mod: PBBFAC,,, | Performed by: NURSE PRACTITIONER

## 2024-07-11 NOTE — PROGRESS NOTES
"  Deb Tesfaye presented for an initial Medicare AWV today. The following components were reviewed and updated:    Medical history  Family History  Social history  Allergies and Current Medications  Health Risk Assessment  Health Maintenance  Care Team    **See Completed Assessments for Annual Wellness visit with in the encounter summary    The following assessments were completed:  Depression Screening  Cognitive function Screening    Timed Get Up Test  Whisper Test    Opioid documentation:  Patient does not have a current opioid prescription.        Vitals:    07/11/24 1004   BP: 124/76   Pulse: 73   SpO2: 98%   Weight: 85.3 kg (188 lb 0.8 oz)   Height: 5' 5" (1.651 m)     Body mass index is 31.29 kg/m².       Physical Exam  Vitals reviewed.   Constitutional:       General: She is not in acute distress.  Pulmonary:      Effort: Pulmonary effort is normal. No respiratory distress.   Neurological:      Mental Status: She is alert and oriented to person, place, and time.   Psychiatric:         Mood and Affect: Mood normal.         Behavior: Behavior normal.         Thought Content: Thought content normal.         Judgment: Judgment normal.     Diagnoses and health risks identified today and associated recommendations/orders:  1. Encounter for preventive health examination  Reviewed and discussed health maintenance.    - Lipid Panel; Future  - Comprehensive Metabolic Panel; Future  - Hemoglobin A1C; Future    2. Encounter for Medicare annual wellness exam  - Ambulatory Referral/Consult to Enhanced Annual Wellness Visit (eAWV)    3. Mixed hyperlipidemia  Stable- continue current treatment and follow up routinely with PCP   Pravastatin  Lab Results   Component Value Date    CHOL 191 02/20/2023    CHOL 195 09/30/2020    CHOL 181 07/12/2019     Lab Results   Component Value Date    HDL 44 02/20/2023    HDL 49 09/30/2020    HDL 50 07/12/2019     Lab Results   Component Value Date    LDLCALC 130.6 02/20/2023    LDLCALC " 129.2 09/30/2020    LDLCALC 113.6 07/12/2019     Lab Results   Component Value Date    TRIG 82 02/20/2023    TRIG 84 09/30/2020    TRIG 87 07/12/2019       Lab Results   Component Value Date    CHOLHDL 23.0 02/20/2023    CHOLHDL 25.1 09/30/2020    CHOLHDL 27.6 07/12/2019      - Lipid Panel; Future  - Comprehensive Metabolic Panel; Future  - Hemoglobin A1C; Future    4. Essential hypertension  Stable- continue current treatment and follow up routinely with PCP   Dana 80/25  Vitals:    07/11/24 1004   BP: 124/76   Pulse: 73   - Lipid Panel; Future  - Comprehensive Metabolic Panel; Future    5. History of ductal carcinoma in situ (DCIS) of breast  Stable- continue current treatment and follow up routinely with PCP and hem/onc (Horacio)  Mammo and labs yearly for surveillance. Repeat 2/2025    6. Obesity (BMI 30-39.9)  Encouraged healthy eating, weight loss and routine exercise     Provided Deb with a 5-10 year written screening schedule and personal prevention plan. Recommendations were developed using the USPSTF age appropriate recommendations. Education, counseling, and referrals were provided as needed.  After Visit Summary printed and given to patient which includes a list of additional screenings\tests needed.    I offered to discuss advanced care planning, including how to pick a person who would make decisions for you if you were unable to make them for yourself, called a health care power of , and what kind of decisions you might make such as use of life sustaining treatments such as ventilators and tube feeding when faced with a life limiting illness recorded on a living will that they will need to know. (How you want to be cared for as you near the end of your natural life)  Patient declined a discussion regarding advance directives at this time. She will complete independently with her family   Dara Michel NP

## 2024-07-11 NOTE — PATIENT INSTRUCTIONS
Counseling and Referral of Other Preventative  (Italic type indicates deductible and co-insurance are waived)    Patient Name: Deb Tesfaye  Today's Date: 7/11/2024    Health Maintenance       Date Due Completion Date    Hemoglobin A1c (Diabetic Prevention Screening) Never done ---    Shingles Vaccine (1 of 2) Never done ---    RSV Vaccine (Age 60+ and Pregnant patients) (1 - 1-dose 60+ series) Never done ---    COVID-19 Vaccine (5 - 2023-24 season) 09/01/2023 10/12/2022    Pneumococcal Vaccines (Age 65+) (3 of 3 - PPSV23 or PCV20) 08/21/2024 8/21/2019    Influenza Vaccine (1) 09/01/2024 9/20/2023    Mammogram 02/23/2025 2/23/2024    TETANUS VACCINE 10/26/2026 10/26/2016    DEXA Scan 02/23/2027 2/23/2024    Colorectal Cancer Screening 07/20/2027 7/20/2022    Override on 6/1/2010: Done (approx. date [place] [phone])    Lipid Panel 02/20/2028 2/20/2023        No orders of the defined types were placed in this encounter.    The following information is provided to all patients.  This information is to help you find resources for any of the problems found today that may be affecting your health:                  Living healthy guide: www.[facility].[place].gov      Understanding Diabetes: www.diabetes.org      Eating healthy: www.cdc.gov/healthyweight      CDC home safety checklist: www.cdc.gov/steadi/patient.html      Agency on Aging: www.goea.[place].[facility]      Alcoholics anonymous (AA): www.aa.org      Physical Activity: www.korina.nih.gov/jn8mjxc      Tobacco use: www.quitwithusla.org

## 2024-07-12 DIAGNOSIS — I10 ESSENTIAL HYPERTENSION: ICD-10-CM

## 2024-07-12 DIAGNOSIS — E78.5 HYPERLIPIDEMIA, UNSPECIFIED HYPERLIPIDEMIA TYPE: ICD-10-CM

## 2024-07-12 NOTE — TELEPHONE ENCOUNTER
No care due was identified.  Health [facility] Embedded Care Due Messages. Reference number: [id number].   7/12/2024 6:50:41 PM CDT

## 2024-07-12 NOTE — TELEPHONE ENCOUNTER
----- Message from Marie Peterson sent at 7/12/2024  9:54 AM CDT -----  Regarding: Refill Request  Contact: [facility]  Type:  RX Refill Request    Who Called: [facility]   Refill or New Rx:Refill     RX Name and Strength:pravastatin (PRAVACHOL) 20 MG tablet  How is the patient currently taking it? (ex. 1XDay):1xday  Is this a 30 day or 90 day RX:90    RX Name and Strength:telmisartan-hydrochlorothiazide (MICARDIS HCT) 80-25 mg per tablet   How is the patient currently taking it? (ex. 1XDay):1xday  Is this a 30 day or 90 day RX:90    Preferred Pharmacy with phone number:    [facility] (Mail Order) - [place] [address]  [address]  [place] [address]  Phone: [phone] Fax: [phone]      Local or Mail Order:Mail  Ordering Provider:Simone     Would the patient rather a call back or a response via [facility]? Call Back    Best Call Back Number: [phone] opt 2    Additional Information: Sent fax yesterday when system was down.     Have a great day. Thank you!

## 2024-07-13 RX ORDER — PRAVASTATIN SODIUM 20 MG/1
20 TABLET ORAL DAILY
Qty: 90 TABLET | Refills: 0 | Status: CANCELLED | OUTPATIENT
Start: 2024-07-13

## 2024-07-13 NOTE — TELEPHONE ENCOUNTER
Refill Routing Note   Medication(s) are not appropriate for processing by [facility] for the following reason(s):        Patient not seen by provider within 15 months    [facility] action(s):  Defer               Appointments  past 12m or future 3m with PCP    Date Provider   Last Visit   8/3/2023 KARIN Nichols MD   Next Visit   8/15/2024 KARIN Nichols MD   ED visits in past 90 days: 0        Note composed:8:48 AM 07/13/2024

## 2024-07-14 NOTE — PROGRESS NOTES
HPI    DLS: 3/05/2024    Pt here for 4 Month Check;  Pt states no eye pain or discomfort. Pt states vision seems to be doing   okay but at times if she reads too much vision gets blurry.     Meds:  Timolol BID OU   Brimonidine TID OU   Rhopressa QDAY OU   Bisi 4% TID OS   Lumigan QHS OS   Diamox 500mg BID PO     1. PO AG O D>OS   2. APD OD   3. NS OS   4. Papilloma OS   5. CME OD   6. PCIOL OD     Last edited by Chapis York on 7/15/2024 11:58 AM.            Assessment /Plan     For exam results, see Encounter Report.    Primary open angle glaucoma (POAG) of both eyes, severe stage  -     Zhang Visual Field - OU - Extended - Both Eyes; Future  -     Posterior Segment OCT Optic Nerve- Both eyes; Future    Afferent pupillary defect, right    Visual field loss    Nuclear sclerosis of left eye    Epiretinal membrane (ERM) of right eye    Floaters, bilateral    Pseudophakia, right eye        S/P PHACO/IOL/AHMED - OD - 1/10/2017 - developed CME post op    Rx with steroid gtts and ilevro - had responded -   Recurrent CME od - 12/2/2019 - off PA and Ilevro - however  the vision is stable     S/P  CYCLO G6 OD - 4/12/2017  REFERRED BY DR ELISE FOR SEVERE POORLY CONTROLLED POAG - 7/24/2015   Pt recently moved from [place] to the [facility]     Previously referred to Dr. Elise from [facility], review of outside records:    Tm 27/25.    Initial visit 3/29/12  IOP 10 OU on Combitan and Lumigan  C:D 0.85 OU  CCT 524OD, 531 OS       Glaucoma (type and duration)    Dx about 2002 - [place]    First HVF   First at [facility] 2015   First photos   First at [facility] 2015    Treatment / Drops started   2002              Family history    + mom / brother         Glaucoma meds    cosopt ou / brimonidine ou tid / lumigan ou q hs / bisi od tid / diamox / bisi ou        H/O adverse rxn to glaucoma drops    none        LASERS    SLT OD - 8/5/2013 - [place] // SLT os - [facility] 8/27/2015 (good resp 21-->16) // repeat os  9/20/2018         GLAUCOMA SURGERIES    CYCL G6 OD - 4/12/2017  / phaco/IOL / ahmed od 1/10/2018         OTHER EYE SURGERIES    Combined phaco/IOL / ahmed  - od - 1/10/2018         CDR    0.99/0.95        Tbase    ?  Off gtts (on gtts was up to 28/25)         Tmax    27/25 - per outside record's (reviewed by Arik )             Ttarget    15-16  ou    (may need to be lower)           HVF  - OD 10-2 ss - 3 test 2015 to 2017 - SALT/ IAD (progression)    11 test 2016 to 2023 - 10-2 stim V od - SALT / IAD [place] // os SAD involves fix / IAD    (( 2 VF's from [place] - that were reviewed by Dr. IVEY - 2012 and 2013 -  SAD w/ fix invl . IAD od / early IAD os ))            Gonio    +3-4 ou         CCT    524/531        OCT    7  test 2019 to 2023 - RNFL - OD:dec. Throughout // OS:dec. G/NS/N/TI/T, borcarmelina TS/NI        HRT    2 test 2015 to 2018  - MR -   Dec thru out   od // dec thru out  os /// CDR 0. 91 od // 0.88 os        Disc photos    2015, 2018      - Ttoday - 12/15   - Test done today    IOP /     2. + APD od     3.  NS os    Minimal - [facility] 20/25 os still     4. Papilloma OS   To be removed with Arik     5. ++ CME od    Decrease in [place] from 20/70 to 20/200   New since prior OCT 2/3/2017    S/p PHACO/IOL / AHMED  1/10/2018    Had resolved by 7/29/2019 - on ilevro and PA   -recurrent on 12/2/2019 - off ilevro    - resolved by 6/12/2020 - on ketorolac tid od    -STILL RESOLVED 10/2/2020 - still on ketorolac   -TRIAL OFF KETOROLAC - 3/24/2023 - monitor for recurrent cme     PLAN   GLAUCOMA - ADVANCED - OD >OS   HVF's continue to progress - especially OD     S/P  - cyclo G6 - OD - 4/12/2017      S/p Phaco / IOL // phaco/ IOL / ahmed  OD Date: 1/10/2018 - general anesthesia // PCB00 23.0  POY > 6    glaucoma gtts  timolol bid ou   Brim ou tid   rhopressa ou   bisi tid os (do not use od - may cause iritis and cme)   lumigan  os (hold od - get recurrent CME )   Diamox (acetazolamide pills) 500 PO bid - tolerating ok      Ketorolac 2 x day   od (Ilevro - too expensive) - restarted - 12/12/2019 - for recurrent CME od // OFF 7/2023    OCT macula 5/17/19: +++ CME od /  OD: , Loss of foveal contour, intraretinal and subretinal fluid  OS: , NFC, no thickening    OCT - 7/26/2019 - CME improved / resolved // review retina notes // monitor for recurrence of CME od     OCT 12/2/2019 - increase - recurrent CME od - 488 - off the ilevro (did not appear to affect her vision)     Oct 6/12/2020 - no cme OD - RESOLVED     OCT - 10/2/2020 - NO CME    OCT 12/3/2021 - no cme     OCT macula - 7/21/2023 - chronic changes - ERM / loss of foveal contour - intraretinal cyst     IN FUTURE ALWAYS DO A 10-2 STIM V OD AND A 24-2 SS OS     Photo file update 7/21/2023     Pt wants a letter stating that she has glaucoma and has great difficulty  Driving  at night .    Saw optometrist - [facility] - doing ok with new glasses - 3/2024     7/15/2024   IOP ok     F/U 3-4 months with IOP /HVF 10-2 stim V od and 24-2 ss os // DFE // disc photos

## 2024-07-15 ENCOUNTER — OFFICE VISIT (OUTPATIENT)
Dept: OPHTHALMOLOGY | Facility: CLINIC | Age: 69
End: 2024-07-15
Payer: MEDICARE

## 2024-07-15 DIAGNOSIS — H43.393 FLOATERS, BILATERAL: ICD-10-CM

## 2024-07-15 DIAGNOSIS — H35.371 EPIRETINAL MEMBRANE (ERM) OF RIGHT EYE: ICD-10-CM

## 2024-07-15 DIAGNOSIS — H21.561 AFFERENT PUPILLARY DEFECT, RIGHT: ICD-10-CM

## 2024-07-15 DIAGNOSIS — H53.40 VISUAL FIELD LOSS: ICD-10-CM

## 2024-07-15 DIAGNOSIS — H25.12 NUCLEAR SCLEROSIS OF LEFT EYE: ICD-10-CM

## 2024-07-15 DIAGNOSIS — E78.5 HYPERLIPIDEMIA, UNSPECIFIED HYPERLIPIDEMIA TYPE: ICD-10-CM

## 2024-07-15 DIAGNOSIS — H40.1133 PRIMARY OPEN ANGLE GLAUCOMA (POAG) OF BOTH EYES, SEVERE STAGE: Primary | ICD-10-CM

## 2024-07-15 DIAGNOSIS — Z96.1 PSEUDOPHAKIA, RIGHT EYE: ICD-10-CM

## 2024-07-15 PROCEDURE — 99999 PR PBB SHADOW E&M-EST. PATIENT-LVL III: CPT | Mod: PBBFAC,,, | Performed by: OPHTHALMOLOGY

## 2024-07-15 PROCEDURE — 3288F FALL RISK ASSESSMENT DOCD: CPT | Mod: CPTII,S$GLB,, | Performed by: OPHTHALMOLOGY

## 2024-07-15 PROCEDURE — 1160F RVW MEDS BY RX/DR IN RCRD: CPT | Mod: CPTII,S$GLB,, | Performed by: OPHTHALMOLOGY

## 2024-07-15 PROCEDURE — 1159F MED LIST DOCD IN RCRD: CPT | Mod: CPTII,S$GLB,, | Performed by: OPHTHALMOLOGY

## 2024-07-15 PROCEDURE — 99214 OFFICE O/P EST MOD 30 MIN: CPT | Mod: S$GLB,,, | Performed by: OPHTHALMOLOGY

## 2024-07-15 PROCEDURE — 1126F AMNT PAIN NOTED NONE PRSNT: CPT | Mod: CPTII,S$GLB,, | Performed by: OPHTHALMOLOGY

## 2024-07-15 PROCEDURE — 1101F PT FALLS ASSESS-DOCD LE1/YR: CPT | Mod: CPTII,S$GLB,, | Performed by: OPHTHALMOLOGY

## 2024-07-15 RX ORDER — TELMISARTAN AND HYDROCHLORTHIAZIDE 80; 25 MG/1; MG/1
1 TABLET ORAL DAILY
Qty: 90 TABLET | Refills: 0 | Status: SHIPPED | OUTPATIENT
Start: 2024-07-15

## 2024-07-15 RX ORDER — PRAVASTATIN SODIUM 20 MG/1
20 TABLET ORAL DAILY
Qty: 90 TABLET | Refills: 0 | Status: SHIPPED | OUTPATIENT
Start: 2024-07-15

## 2024-07-15 NOTE — TELEPHONE ENCOUNTER
No care due was identified.  Health [facility] Embedded Care Due Messages. Reference number: [id number].   7/15/2024 10:12:31 AM CDT

## 2024-07-15 NOTE — TELEPHONE ENCOUNTER
----- Message from Camille Hutson sent at 7/15/2024  9:32 AM CDT -----  Type:  RX Refill Request    Who Called:  Ora  Refill or New Rx:  refill  RX Name and Strength:  pravastatin (PRAVACHOL) 20 MG tablet, telmisartan-hydrochlorothiazide (MICARDIS HCT) 80-25 mg per tablet  How is the patient currently taking it? (ex. 1XDay):  as directed  Is this a 30 day or 90 day RX:  90  Preferred Pharmacy with phone number:    [facility] (Mail Order) - [place] - [address]  [address]  [place] [address]  Phone: [phone] Fax: [phone]  Best Call Back Number:  [phone]  Additional Information:  Ora is calling in regards to getting the pt's Rx filled. Please call back and advise. Thanks!

## 2024-08-10 ENCOUNTER — LAB VISIT (OUTPATIENT)
Dept: LAB | Facility: HOSPITAL | Age: 69
End: 2024-08-10
Attending: NURSE PRACTITIONER
Payer: MEDICARE

## 2024-08-10 DIAGNOSIS — Z00.00 ENCOUNTER FOR PREVENTIVE HEALTH EXAMINATION: ICD-10-CM

## 2024-08-10 DIAGNOSIS — I10 ESSENTIAL HYPERTENSION: ICD-10-CM

## 2024-08-10 DIAGNOSIS — E78.2 MIXED HYPERLIPIDEMIA: ICD-10-CM

## 2024-08-10 LAB
ALBUMIN SERPL BCP-MCNC: 3.9 G/DL (ref 3.5–5.2)
ALP SERPL-CCNC: 74 U/L (ref 55–135)
ALT SERPL W/O P-5'-P-CCNC: 28 U/L (ref 10–44)
ANION GAP SERPL CALC-SCNC: 12 MMOL/L (ref 8–16)
AST SERPL-CCNC: 25 U/L (ref 10–40)
BILIRUB SERPL-MCNC: 0.5 MG/DL (ref 0.1–1)
BUN SERPL-MCNC: 12 MG/DL (ref 8–23)
CALCIUM SERPL-MCNC: 9.4 MG/DL (ref 8.7–10.5)
CHLORIDE SERPL-SCNC: 105 MMOL/L (ref 95–110)
CHOLEST SERPL-MCNC: 185 MG/DL (ref 120–199)
CHOLEST/HDLC SERPL: 4.5 {RATIO} (ref 2–5)
CO2 SERPL-SCNC: 27 MMOL/L (ref 23–29)
CREAT SERPL-MCNC: 0.8 MG/DL (ref 0.5–1.4)
EST. GFR  (NO RACE VARIABLE): >60 ML/MIN/1.73 M^2
ESTIMATED AVG GLUCOSE: 120 MG/DL (ref 68–131)
GLUCOSE SERPL-MCNC: 114 MG/DL (ref 70–110)
HBA1C MFR BLD: 5.8 % (ref 4–5.6)
HDLC SERPL-MCNC: 41 MG/DL (ref 40–75)
HDLC SERPL: 22.2 % (ref 20–50)
LDLC SERPL CALC-MCNC: 129 MG/DL (ref 63–159)
NONHDLC SERPL-MCNC: 144 MG/DL
POTASSIUM SERPL-SCNC: 3.8 MMOL/L (ref 3.5–5.1)
PROT SERPL-MCNC: 7.1 G/DL (ref 6–8.4)
SODIUM SERPL-SCNC: 144 MMOL/L (ref 136–145)
TRIGL SERPL-MCNC: 75 MG/DL (ref 30–150)

## 2024-08-10 PROCEDURE — 83036 HEMOGLOBIN GLYCOSYLATED A1C: CPT | Performed by: NURSE PRACTITIONER

## 2024-08-10 PROCEDURE — 36415 COLL VENOUS BLD VENIPUNCTURE: CPT | Mod: PO | Performed by: NURSE PRACTITIONER

## 2024-08-10 PROCEDURE — 80053 COMPREHEN METABOLIC PANEL: CPT | Performed by: NURSE PRACTITIONER

## 2024-08-10 PROCEDURE — 80061 LIPID PANEL: CPT | Performed by: NURSE PRACTITIONER

## 2024-08-12 ENCOUNTER — TELEPHONE (OUTPATIENT)
Dept: FAMILY MEDICINE | Facility: CLINIC | Age: 69
End: 2024-08-12
Payer: MEDICARE

## 2024-08-12 NOTE — TELEPHONE ENCOUNTER
Attempted to call pt to reschedule appt with Dr. Nichols for 8/15/24.  Dr. Nichols will be out of the clinic.  Left voicemail with call back number.

## 2024-09-03 ENCOUNTER — OFFICE VISIT (OUTPATIENT)
Dept: FAMILY MEDICINE | Facility: CLINIC | Age: 69
End: 2024-09-03
Payer: MEDICARE

## 2024-09-03 VITALS
BODY MASS INDEX: 30.52 KG/M2 | DIASTOLIC BLOOD PRESSURE: 80 MMHG | HEART RATE: 77 BPM | WEIGHT: 183.19 LBS | OXYGEN SATURATION: 97 % | HEIGHT: 65 IN | SYSTOLIC BLOOD PRESSURE: 122 MMHG

## 2024-09-03 DIAGNOSIS — R73.03 PREDIABETES: ICD-10-CM

## 2024-09-03 DIAGNOSIS — E78.2 MIXED HYPERLIPIDEMIA: ICD-10-CM

## 2024-09-03 DIAGNOSIS — E66.9 OBESITY (BMI 30-39.9): ICD-10-CM

## 2024-09-03 DIAGNOSIS — I10 ESSENTIAL HYPERTENSION: Primary | ICD-10-CM

## 2024-09-03 PROCEDURE — 3074F SYST BP LT 130 MM HG: CPT | Mod: CPTII,S$GLB,, | Performed by: FAMILY MEDICINE

## 2024-09-03 PROCEDURE — 1160F RVW MEDS BY RX/DR IN RCRD: CPT | Mod: CPTII,S$GLB,, | Performed by: FAMILY MEDICINE

## 2024-09-03 PROCEDURE — 1159F MED LIST DOCD IN RCRD: CPT | Mod: CPTII,S$GLB,, | Performed by: FAMILY MEDICINE

## 2024-09-03 PROCEDURE — 99999 PR PBB SHADOW E&M-EST. PATIENT-LVL III: CPT | Mod: PBBFAC,,, | Performed by: FAMILY MEDICINE

## 2024-09-03 PROCEDURE — 3288F FALL RISK ASSESSMENT DOCD: CPT | Mod: CPTII,S$GLB,, | Performed by: FAMILY MEDICINE

## 2024-09-03 PROCEDURE — 3044F HG A1C LEVEL LT 7.0%: CPT | Mod: CPTII,S$GLB,, | Performed by: FAMILY MEDICINE

## 2024-09-03 PROCEDURE — 1126F AMNT PAIN NOTED NONE PRSNT: CPT | Mod: CPTII,S$GLB,, | Performed by: FAMILY MEDICINE

## 2024-09-03 PROCEDURE — 3008F BODY MASS INDEX DOCD: CPT | Mod: CPTII,S$GLB,, | Performed by: FAMILY MEDICINE

## 2024-09-03 PROCEDURE — 99214 OFFICE O/P EST MOD 30 MIN: CPT | Mod: S$GLB,,, | Performed by: FAMILY MEDICINE

## 2024-09-03 PROCEDURE — 3079F DIAST BP 80-89 MM HG: CPT | Mod: CPTII,S$GLB,, | Performed by: FAMILY MEDICINE

## 2024-09-03 PROCEDURE — 1101F PT FALLS ASSESS-DOCD LE1/YR: CPT | Mod: CPTII,S$GLB,, | Performed by: FAMILY MEDICINE

## 2024-09-03 NOTE — PROGRESS NOTES
"Subjective:       Patient ID: Deb Tesfaye is a 69 y.o. female.    Chief Complaint: Annual Exam    Pt is known to me.  Pt is here for followup of chronic medical issues.  The pt is doing well in general with no acute complaints.  The pt saw Sugey Michel recently.  Her HbA1c is 5.8.   She admits that she has been on a sweet tea "kick."    We discussed healthy weight loss/glucose control plans including WW.  She is doing chair exercises at home.  We discussed the pt's recent labs.  Discussed health maintenance with pt and will schedule appropriate studies and/or immunizations.        Review of Systems    Objective:      Physical Exam  Vitals and nursing note reviewed.   Constitutional:       General: She is not in acute distress.     Appearance: Normal appearance. She is well-developed. She is obese.   HENT:      Head: Normocephalic.   Eyes:      Conjunctiva/sclera: Conjunctivae normal.      Pupils: Pupils are equal, round, and reactive to light.   Neck:      Thyroid: No thyromegaly.      Vascular: No carotid bruit.   Cardiovascular:      Rate and Rhythm: Normal rate and regular rhythm.      Heart sounds: Normal heart sounds.   Pulmonary:      Effort: Pulmonary effort is normal.      Breath sounds: Normal breath sounds.   Abdominal:      General: Bowel sounds are normal.      Palpations: Abdomen is soft.      Tenderness: There is no abdominal tenderness.   Musculoskeletal:         General: No tenderness or deformity. Normal range of motion.      Cervical back: Normal range of motion and neck supple.      Right lower leg: No edema.      Left lower leg: No edema.   Lymphadenopathy:      Cervical: No cervical adenopathy.   Skin:     General: Skin is warm and dry.   Neurological:      Mental Status: She is alert and oriented to person, place, and time.      Cranial Nerves: No cranial nerve deficit.      Motor: No abnormal muscle tone.      Coordination: Coordination normal.      Deep Tendon Reflexes: Reflexes " normal.   Psychiatric:         Behavior: Behavior normal.         Assessment:       1. Essential hypertension    2. Mixed hyperlipidemia    3. Obesity (BMI 30-39.9)    4. Prediabetes        Plan:       Deb was seen today for annual exam.    Diagnoses and all orders for this visit:    Essential hypertension    Mixed hyperlipidemia    Obesity (BMI 30-39.9)  Comments:  Encouraged healthy weight loss    Prediabetes  -     Cancel: Hemoglobin A1C; Future  -     Hemoglobin A1C; Future      During this visit, I reviewed the pt's history, medications, allergies, and problem list.

## 2024-09-05 ENCOUNTER — TELEPHONE (OUTPATIENT)
Dept: OBSTETRICS AND GYNECOLOGY | Facility: CLINIC | Age: 69
End: 2024-09-05
Payer: MEDICARE

## 2024-09-05 NOTE — TELEPHONE ENCOUNTER
----- Message from Terrell Correia sent at 9/5/2024  2:56 PM CDT -----  Contact: Self  Type:  Sooner Appointment Request    Caller is requesting a sooner appointment.  Caller declined first available appointment listed below.  Caller will not accept being placed on the waitlist and is requesting a message be sent to doctor.    Name of Caller:  Patient  When is the first available appointment?  N/a  Symptoms:  WWE  Would the patient rather a call back or a response via [facility]? Call  Best Call Back Number:  [phone]   Additional Information:

## 2024-09-17 DIAGNOSIS — E87.6 HYPOKALEMIA: ICD-10-CM

## 2024-09-17 RX ORDER — POTASSIUM CHLORIDE 20 MEQ/1
TABLET, EXTENDED RELEASE ORAL
Qty: 360 TABLET | Refills: 3 | Status: SHIPPED | OUTPATIENT
Start: 2024-09-17

## 2024-09-17 NOTE — TELEPHONE ENCOUNTER
No care due was identified.  [facility] Embedded Care Due Messages. Reference number: [id number].   9/17/2024 8:03:15 AM CDT

## 2024-09-17 NOTE — TELEPHONE ENCOUNTER
Refill Decision Note   Deb Tesfaye  is requesting a refill authorization.  Brief Assessment and Rationale for Refill:  Approve     Medication Therapy Plan:        Comments:     Note composed:4:17 PM 09/17/2024

## 2024-09-24 ENCOUNTER — OFFICE VISIT (OUTPATIENT)
Dept: OBSTETRICS AND GYNECOLOGY | Facility: CLINIC | Age: 69
End: 2024-09-24
Payer: MEDICARE

## 2024-09-24 VITALS
WEIGHT: 181 LBS | HEIGHT: 65 IN | SYSTOLIC BLOOD PRESSURE: 122 MMHG | DIASTOLIC BLOOD PRESSURE: 84 MMHG | BODY MASS INDEX: 30.16 KG/M2

## 2024-09-24 DIAGNOSIS — Z01.419 ROUTINE GYNECOLOGICAL EXAMINATION: Primary | ICD-10-CM

## 2024-09-24 PROCEDURE — 3074F SYST BP LT 130 MM HG: CPT | Mod: CPTII,S$GLB,, | Performed by: OBSTETRICS & GYNECOLOGY

## 2024-09-24 PROCEDURE — 99999 PR PBB SHADOW E&M-EST. PATIENT-LVL III: CPT | Mod: PBBFAC,,, | Performed by: OBSTETRICS & GYNECOLOGY

## 2024-09-24 PROCEDURE — 1159F MED LIST DOCD IN RCRD: CPT | Mod: CPTII,S$GLB,, | Performed by: OBSTETRICS & GYNECOLOGY

## 2024-09-24 PROCEDURE — 3044F HG A1C LEVEL LT 7.0%: CPT | Mod: CPTII,S$GLB,, | Performed by: OBSTETRICS & GYNECOLOGY

## 2024-09-24 PROCEDURE — 3079F DIAST BP 80-89 MM HG: CPT | Mod: CPTII,S$GLB,, | Performed by: OBSTETRICS & GYNECOLOGY

## 2024-09-24 PROCEDURE — 1101F PT FALLS ASSESS-DOCD LE1/YR: CPT | Mod: CPTII,S$GLB,, | Performed by: OBSTETRICS & GYNECOLOGY

## 2024-09-24 PROCEDURE — 3288F FALL RISK ASSESSMENT DOCD: CPT | Mod: CPTII,S$GLB,, | Performed by: OBSTETRICS & GYNECOLOGY

## 2024-09-24 PROCEDURE — G0101 CA SCREEN;PELVIC/BREAST EXAM: HCPCS | Mod: S$GLB,,, | Performed by: OBSTETRICS & GYNECOLOGY

## 2024-09-24 NOTE — PROGRESS NOTES
Chief Complaint   Patient presents with    Well Woman       History of Present Illness: Deb Tesfaye is a 69 y.o. female that presents today 9/24/2024 with No LMP recorded. Patient is postmenopausal.  for well gyn visit.    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  Mother with colon cancer.    COLONOSCOPY  12/2010    COLONOSCOPY N/A 7/20/2016    Procedure: COLONOSCOPY;  Surgeon: Janak De Leon Jr., MD;  Location: [facility] ENDO;  Service: Endoscopy;  Laterality: N/A;    COLONOSCOPY N/A 7/20/2022    Procedure: COLONOSCOPY;  Surgeon: Janak De Leon Jr., MD;  Location: [facility] ENDO;  Service: Endoscopy;  Laterality: N/A;    EYE SURGERY      SELECTIVE LASER TRABECULPALSTY Right 8/13    OS DONE IN [place], [place]    SELECTIVE LASER TRABECUPLASTY Left 8/27/15    OS WITH     TUBAL LIGATION         Outpatient Medications Prior to Visit   Medication Sig Dispense Refill    acetaZOLAMIDE (DIAMOX) 500 mg CpSR Take 1 capsule (500 mg total) by mouth 2 (two) times daily. 180 capsule 3    azelastine (ASTELIN) 137 mcg (0.1 %) nasal spray SHAKE WELL AND USE 1 SPRAY IN EACH NOSTRIL TWICE DAILY 30 mL 11    brimonidine 0.2% (ALPHAGAN) 0.2 % Drop Place 1 drop into both eyes 3 (three) times daily. 30 mL 4    ERGOCALCIFEROL, VITAMIN D2, (VITAMIN D ORAL) Take 1,000 Units by mouth once daily.       fluticasone propionate (FLONASE) 50 mcg/actuation nasal spray 1 spray (50 mcg total) by Each Nostril route once daily. 16 g 0     KRILL OIL ORAL Take 1 capsule by mouth once daily.       LUMIGAN 0.01 % Drop Place 1 drop into both eyes every evening. 2.5 mL 12    MULTIVITS,CA,MINERALS/IRON/FA (ONE-A-DAY WOMENS FORMULA ORAL) Take 1 capsule by mouth once daily.      netarsudiL (RHOPRESSA) 0.02 % ophthalmic solution Place 1 drop into both eyes once daily. 2.5 mL 11    pilocarpine HCL 4% (PILOCAR) 4 % ophthalmic solution Place 1 drop into the left eye 3 (three) times daily. 15 mL 12    potassium chloride SA (K-DUR,KLOR-CON) 20 MEQ tablet TAKE 2 TABLETS(40 MEQ) BY MOUTH TWICE DAILY 360 tablet 3    pravastatin (PRAVACHOL) 20 MG tablet Take 1 tablet (20 mg total) by mouth once daily. 90 tablet 0    telmisartan-hydrochlorothiazide (MICARDIS HCT) 80-25 mg per tablet Take 1 tablet by mouth once daily. 90 tablet 0    timolol maleate 0.5% (TIMOPTIC) 0.5 % Drop Place 1 drop into both eyes 2 (two) times daily. 15 mL 4    ipratropium (ATROVENT) 42 mcg (0.06 %) nasal spray 2 sprays by Nasal route 3 (three) times daily. (Patient not taking: Reported on 9/24/2024) 15 mL 2     No facility-administered medications prior to visit.       Review of patient's allergies indicates:  No Known Allergies    Family History   Problem Relation Name Age of Onset    Cancer Mother  54        colon cancer    Cancer Maternal Grandfather      Glaucoma Maternal Grandfather      Cataracts Maternal Grandfather      Hypertension Father      Amblyopia Neg Hx      Blindness Neg Hx      Diabetes Neg Hx      Macular degeneration Neg Hx      Retinal detachment Neg Hx      Stroke Neg Hx      Strabismus Neg Hx      Thyroid disease Neg Hx         Social History     Socioeconomic History    Marital status:     Number of children: 3   Tobacco Use    Smoking status: Never    Smokeless tobacco: Never   Substance and Sexual Activity    Alcohol use: No    Drug use: No    Sexual activity: Yes     Partners: Male     Social Determinants of Health     Financial Resource Strain: Low Risk   (2024)    Overall Financial Resource Strain (CARDIA)     Difficulty of Paying Living Expenses: Not very hard   Food Insecurity: No Food Insecurity (2024)    Hunger Vital Sign     Worried About Running Out of Food in the Last Year: Never true     Ran Out of Food in the Last Year: Never true   Transportation Needs: No Transportation Needs (2024)    PRAPARE - Transportation     Lack of Transportation (Medical): No     Lack of Transportation (Non-Medical): No   Physical Activity: Insufficiently Active (2024)    Exercise Vital Sign     Days of Exercise per Week: 4 days     Minutes of Exercise per Session: 20 min   Stress: No Stress Concern Present (2024)    [employer] - Occupational Stress Questionnaire     Feeling of Stress : Not at all   Recent Concern: Stress - Stress Concern Present (2024)    [employer] - Occupational Stress Questionnaire     Feeling of Stress : To some extent   Housing Stability: Unknown (2024)    Housing Stability Vital Sign     Unable to Pay for Housing in the Last Year: Patient declined     Homeless in the Last Year: No       OB History    Para Term  AB Living   7 6 3   1 3   SAB IAB Ectopic Multiple Live Births   1              # Outcome Date GA Lbr Reji/2nd Weight Sex Type Anes PTL Lv   7 Term            6 Term            5 Term            4 SAB            3 Para      Vag-Spont      2 Para      Vag-Spont      1 Para      Vag-Spont          Review of Symptoms:  GENERAL: Denies weight gain or weight loss. Feeling well overall.   SKIN: Denies rash or lesions.   HEAD: Denies head injury or headache.   NODES: Denies enlarged lymph nodes.   CHEST: Denies chest pain or shortness of breath.   CARDIOVASCULAR: Denies palpitations or left sided chest pain.   ABDOMEN: No abdominal pain, constipation, diarrhea, nausea, vomiting or rectal bleeding.   URINARY: No frequency, dysuria, hematuria, or  "burning on urination.  HEMATOLOGIC: No easy bruisability or excessive bleeding.   MUSCULOSKELETAL: Denies joint pain or swelling.     /84   Ht 5' 5" (1.651 m)   Wt 82.1 kg (181 lb)   Physical Exam:  APPEARANCE: Well nourished, well developed, in no acute distress.  SKIN: Normal skin turgor, no lesions.  NECK: Neck symmetric without masses   RESPIRATORY: Normal respiratory effort with no retractions or use of accessory muscles  CARDIOVASCULAR: Peripheral vascular system with no swelling no varicosities and palpation of pulses normal  LYMPHATIC: No enlargements of the lymph nodes noted in the neck, axillae, or groin  ABDOMEN: Soft. No tenderness or masses. No hepatosplenomegaly. No hernias.  BREASTS: Symmetrical, no skin changes or visible lesions. No palpable masses, nipple discharge or adenopathy bilaterally.  PELVIC: Normal external female genitalia without lesions. Normal hair distribution. Adequate perineal body, normal urethral meatus. Urethra with no masses.  Bladder nontender. Vagina moist and well rugated without lesions or discharge. Cervix pink and without lesions. No significant cystocele or rectocele. Bimanual exam showed uterus normal size, shape, position, mobile and nontender. Adnexa without masses or tenderness. Urethra and bladder normal.   EXTREMITIES: No clubbing cyanosis or edema.    ASSESSMENT/PLAN:  Routine gynecological examination  -     Liquid-Based Pap Smear, Screening  -     HPV High Risk Genotypes, PCR          Patient was counseled today on Pelvic exams and Pap Smear guidelines.   We discussed STD screening if at high risk for a STD.  We discussed recommendation for breast cancer screening with mammogram every other year after the age of 40 and annually after the age of 50.    We discussed colon cancer screening when indicated.   Osteoporosis screening discussed when indicated.   She was advised to see her primary care physician for all other health maintenance.     FOLLOW-UP with " me for next routine visit.

## 2024-10-05 ENCOUNTER — PATIENT MESSAGE (OUTPATIENT)
Dept: OPHTHALMOLOGY | Facility: CLINIC | Age: 69
End: 2024-10-05
Payer: MEDICARE

## 2024-10-07 DIAGNOSIS — H40.1133 PRIMARY OPEN ANGLE GLAUCOMA OF BOTH EYES, SEVERE STAGE: ICD-10-CM

## 2024-10-07 RX ORDER — PILOCARPINE HYDROCHLORIDE 40 MG/ML
1 SOLUTION/ DROPS OPHTHALMIC 3 TIMES DAILY
Qty: 15 ML | Refills: 12 | Status: SHIPPED | OUTPATIENT
Start: 2024-10-07

## 2024-10-08 DIAGNOSIS — E78.5 HYPERLIPIDEMIA, UNSPECIFIED HYPERLIPIDEMIA TYPE: ICD-10-CM

## 2024-10-08 RX ORDER — PRAVASTATIN SODIUM 20 MG/1
20 TABLET ORAL DAILY
Qty: 90 TABLET | Refills: 3 | Status: SHIPPED | OUTPATIENT
Start: 2024-10-08

## 2024-10-08 NOTE — TELEPHONE ENCOUNTER
No care due was identified.  [facility] Embedded Care Due Messages. Reference number: [id number].   10/08/2024 11:36:43 AM CDT

## 2024-10-08 NOTE — TELEPHONE ENCOUNTER
Refill Decision Note   Deb Tesfaye  is requesting a refill authorization.  Brief Assessment and Rationale for Refill:  Approve     Medication Therapy Plan:         Comments:     Note composed:6:48 PM 10/08/2024

## 2024-10-18 DIAGNOSIS — H40.1133 PRIMARY OPEN ANGLE GLAUCOMA (POAG) OF BOTH EYES, SEVERE STAGE: ICD-10-CM

## 2024-10-21 RX ORDER — ACETAZOLAMIDE 500 MG/1
500 CAPSULE, EXTENDED RELEASE ORAL 2 TIMES DAILY
Qty: 180 CAPSULE | Refills: 3 | Status: SHIPPED | OUTPATIENT
Start: 2024-10-21

## 2024-10-31 DIAGNOSIS — H40.1133 PRIMARY OPEN ANGLE GLAUCOMA OF BOTH EYES, SEVERE STAGE: ICD-10-CM

## 2024-10-31 RX ORDER — PILOCARPINE HYDROCHLORIDE 40 MG/ML
1 SOLUTION/ DROPS OPHTHALMIC 3 TIMES DAILY
Qty: 15 ML | Refills: 12 | Status: SHIPPED | OUTPATIENT
Start: 2024-10-31

## 2024-11-16 NOTE — PROGRESS NOTES
HPI    DLS: 7/15/2024    Pt here for HVF review/Disc Photos;  Pt states no eye pain or discomfort.       Meds;  Timolol BID OU   Brimonidine TID OU   Rhopressa QDAY OU   Bisi 4%  TID OS   Lumigan QHS OS   Diamox 500mg BID PO     1. PO AG O D>OS   2. APD OD   3. NS OS   4. Papilloma OS   5. CME OD   6. PCIOL OD     Last edited by Chapis York on 11/19/2024 11:51 AM.            Assessment /Plan     For exam results, see Encounter Report.    Primary open angle glaucoma of both eyes, severe stage    Primary open angle glaucoma (POAG) of both eyes, severe stage  -     Color Fundus Photography - OU - Both Eyes    Afferent pupillary defect, right    Visual field loss    Nuclear sclerosis of left eye    Epiretinal membrane (ERM) of right eye    Floaters, bilateral    Pseudophakia, right eye        S/P PHACO/IOL/AHMED - OD - 1/10/2017 - developed CME post op    Rx with steroid gtts and ilevro - had responded -   Recurrent CME od - 12/2/2019 - off PA and Ilevro - however  the vision is stable     S/P  CYCLO G6 OD - 4/12/2017  REFERRED BY DR ELISE FOR SEVERE POORLY CONTROLLED POAG - 7/24/2015   Pt recently moved from [place] to the [facility]     Previously referred to Dr. Elise from [facility], review of outside records:    Tm 27/25.    Initial visit 3/29/12  IOP 10 OU on Combitan and Lumigan  C:D 0.85 OU  CCT 524OD, 531 OS       Glaucoma (type and duration)    Dx about 2002 - [place]    First HVF   First at [facility] 2015   First photos   First at [facility] 2015    Treatment / Drops started   2002              Family history    + mom / brother         Glaucoma meds    cosopt ou / brimonidine ou tid / lumigan ou q hs / bisi od tid / diamox / bisi ou        H/O adverse rxn to glaucoma drops    none        LASERS    SLT OD - 8/5/2013 - [place] // SLT os - [facility] 8/27/2015 (good resp 21-->16) // repeat os 9/20/2018         GLAUCOMA SURGERIES    CYCL G6 OD - 4/12/2017  / phaco/IOL / ahmed od 1/10/2018          OTHER EYE SURGERIES    Combined phaco/IOL / ahmed  - od - 1/10/2018         CDR    0.99/0.95        Tbase    ?  Off gtts (on gtts was up to 28/25)         Tmax    27/25 - per outside record's (reviewed by Arik )             Ttarget    15-16  ou    (may need to be lower)           HVF  - OD 10-2 ss - 3 test 2015 to 2017 - SALT/ IAD (progression)    OD - 10-2 stim V - 1 test 2024 to 2024 - dense SAD / IAD    OS - 9 test 2016 to 2024 - 24-2 ss --  SAD involves fix / IAD - fluctuates - ? Porgression    (( 2 VF's from [place] - that were reviewed by Dr. IVEY - 2012 and 2013 -  SAD w/ fix invl . IAD od / early IAD os ))            Gonio    +3-4 ou         CCT    524/531        OCT    7  test 2019 to 2023 - RNFL - OD:dec. Throughout // OS:dec. G/NS/N/TI/T, pratik TS/NI        HRT    2 test 2015 to 2018  - MR -   Dec thru out   od // dec thru out  os /// CDR 0. 91 od // 0.88 os        Disc photos    2015, 2018 // 2024     - Ttoday - 13/16   - Test done today    IOP / HVF 10-2 stim V od and 24-2 ss os // DFE // photos     2. + APD od     3.  NS os    Minimal - VA 20/25 os still     4. Papilloma OS   To be removed with Arik     5. ++ CME od    Decrease in VA from 20/70 to 20/200   New since prior OCT 2/3/2017    S/p PHACO/IOL / AHMED  1/10/2018    Had resolved by 7/29/2019 - on ilevro and PA   -recurrent on 12/2/2019 - off ilevro    - resolved by 6/12/2020 - on ketorolac tid od    -STILL RESOLVED 10/2/2020 - still on ketorolac   -TRIAL OFF KETOROLAC - 3/24/2023 - monitor for recurrent cme     PLAN   GLAUCOMA - ADVANCED - OD >OS   HVF's continue to progress - especially OD     S/P  - cyclo G6 - OD - 4/12/2017      S/p Phaco / IOL // phaco/ IOL / ahmed  OD Date: 1/10/2018 - general anesthesia // PCB00 23.0  POY > 6    glaucoma gtts  timolol bid ou   Brim ou tid   rhopressa ou   bisi tid os (do not use od - may cause iritis and cme)   lumigan  os (hold od - get recurrent CME )   Diamox (acetazolamide pills) 500 PO bid -  tolerating ok     Ketorolac 2 x day   od (Ilevro - too expensive) - restarted - 12/12/2019 - for recurrent CME od // OFF 7/2023    OCT macula 5/17/19: +++ CME od /  OD: , Loss of foveal contour, intraretinal and subretinal fluid  OS: , NFC, no thickening  OCT - 7/26/2019 - CME improved / resolved // review retina notes // monitor for recurrence of CME od   OCT 12/2/2019 - increase - recurrent CME od - 488 - off the ilevro (did not appear to affect her vision)   Oct 6/12/2020 - no cme OD - RESOLVED   OCT - 10/2/2020 - NO CME  OCT 12/3/2021 - no cme   OCT macula - 7/21/2023 - chronic changes - ERM / loss of foveal contour - intraretinal cyst     IN FUTURE ALWAYS DO A 10-2 STIM V OD AND A 24-2 SS OS     Photo file update 7/21/2023     Pt wants a letter stating that she has glaucoma and has great difficulty  Driving  at night .    Saw optometrist - [facility] - doing ok with new glasses - 3/2024     11/19/2024   IOP ok od / borderline os   HVF stble od - now getting 10-2 stim V od // still gets 24-2 ss os - ? Fluctuation vs prog os   Photos     CSM - on MMT    If ++ prog OS would need incisional surgery    Minimal cat - VA is still 20/20 os     F/U 4 months    Consider monitoring VF's q 8 months going forward

## 2024-11-19 ENCOUNTER — CLINICAL SUPPORT (OUTPATIENT)
Dept: OPHTHALMOLOGY | Facility: CLINIC | Age: 69
End: 2024-11-19
Payer: MEDICARE

## 2024-11-19 ENCOUNTER — OFFICE VISIT (OUTPATIENT)
Dept: OPHTHALMOLOGY | Facility: CLINIC | Age: 69
End: 2024-11-19
Payer: MEDICARE

## 2024-11-19 DIAGNOSIS — H53.40 VISUAL FIELD LOSS: ICD-10-CM

## 2024-11-19 DIAGNOSIS — H35.371 EPIRETINAL MEMBRANE (ERM) OF RIGHT EYE: ICD-10-CM

## 2024-11-19 DIAGNOSIS — H21.561 AFFERENT PUPILLARY DEFECT, RIGHT: ICD-10-CM

## 2024-11-19 DIAGNOSIS — H40.1133 PRIMARY OPEN ANGLE GLAUCOMA OF BOTH EYES, SEVERE STAGE: Primary | ICD-10-CM

## 2024-11-19 DIAGNOSIS — H43.393 FLOATERS, BILATERAL: ICD-10-CM

## 2024-11-19 DIAGNOSIS — H40.1133 PRIMARY OPEN ANGLE GLAUCOMA (POAG) OF BOTH EYES, SEVERE STAGE: ICD-10-CM

## 2024-11-19 DIAGNOSIS — Z96.1 PSEUDOPHAKIA, RIGHT EYE: ICD-10-CM

## 2024-11-19 DIAGNOSIS — H25.12 NUCLEAR SCLEROSIS OF LEFT EYE: ICD-10-CM

## 2024-11-19 PROCEDURE — 92250 FUNDUS PHOTOGRAPHY W/I&R: CPT | Mod: S$GLB,,, | Performed by: OPHTHALMOLOGY

## 2024-11-19 PROCEDURE — 1101F PT FALLS ASSESS-DOCD LE1/YR: CPT | Mod: CPTII,S$GLB,, | Performed by: OPHTHALMOLOGY

## 2024-11-19 PROCEDURE — 1126F AMNT PAIN NOTED NONE PRSNT: CPT | Mod: CPTII,S$GLB,, | Performed by: OPHTHALMOLOGY

## 2024-11-19 PROCEDURE — 1160F RVW MEDS BY RX/DR IN RCRD: CPT | Mod: CPTII,S$GLB,, | Performed by: OPHTHALMOLOGY

## 2024-11-19 PROCEDURE — 3044F HG A1C LEVEL LT 7.0%: CPT | Mod: CPTII,S$GLB,, | Performed by: OPHTHALMOLOGY

## 2024-11-19 PROCEDURE — 99999 PR PBB SHADOW E&M-EST. PATIENT-LVL III: CPT | Mod: PBBFAC,,, | Performed by: OPHTHALMOLOGY

## 2024-11-19 PROCEDURE — 3288F FALL RISK ASSESSMENT DOCD: CPT | Mod: CPTII,S$GLB,, | Performed by: OPHTHALMOLOGY

## 2024-11-19 PROCEDURE — 99214 OFFICE O/P EST MOD 30 MIN: CPT | Mod: S$GLB,,, | Performed by: OPHTHALMOLOGY

## 2024-11-19 PROCEDURE — 1159F MED LIST DOCD IN RCRD: CPT | Mod: CPTII,S$GLB,, | Performed by: OPHTHALMOLOGY

## 2024-12-27 DIAGNOSIS — I10 ESSENTIAL HYPERTENSION: ICD-10-CM

## 2024-12-27 RX ORDER — TELMISARTAN AND HYDROCHLORTHIAZIDE 80; 25 MG/1; MG/1
1 TABLET ORAL DAILY
Qty: 90 TABLET | Refills: 2 | Status: SHIPPED | OUTPATIENT
Start: 2024-12-27

## 2024-12-27 NOTE — TELEPHONE ENCOUNTER
No care due was identified.  [facility] Embedded Care Due Messages. Reference number: [id number].   12/27/2024 12:00:16 PM CST

## 2024-12-28 NOTE — TELEPHONE ENCOUNTER
Refill Decision Note   Deb Tesfaye  is requesting a refill authorization.  Brief Assessment and Rationale for Refill:  Approve     Medication Therapy Plan:         Comments:     Note composed:7:25 PM 12/27/2024

## 2025-02-11 ENCOUNTER — PATIENT MESSAGE (OUTPATIENT)
Dept: FAMILY MEDICINE | Facility: CLINIC | Age: 70
End: 2025-02-11
Payer: MEDICARE

## 2025-02-14 ENCOUNTER — TELEPHONE (OUTPATIENT)
Dept: HEMATOLOGY/ONCOLOGY | Facility: CLINIC | Age: 70
End: 2025-02-14
Payer: MEDICARE

## 2025-02-24 DIAGNOSIS — Z00.00 ENCOUNTER FOR MEDICARE ANNUAL WELLNESS EXAM: ICD-10-CM

## 2025-03-15 NOTE — PROGRESS NOTES
HPI    DLS: 11/19/24 w/ Dr. Perez     70 y.o. female presents for IOP Check. Patient complains of itching to   BLL, possibly due to seasonal allergies. Denies changes to VA, eye pain,   headaches, flashes, and floaters.       Meds;  Timolol BID OU   Brimonidine TID OU   Rhopressa QDAY OU   Bisi 4%  TID OS   Lumigan QHS OS   Diamox 500mg BID PO     1. PO AG O D>OS   2. APD OD   3. NS OS   4. Papilloma OS   5. CME OD   6. PCIOL OD     Last edited by Judit Isaac on 3/20/2025 11:28 AM.            Assessment /Plan     For exam results, see Encounter Report.    Primary open angle glaucoma of both eyes, severe stage    Primary open angle glaucoma (POAG) of both eyes, severe stage    Afferent pupillary defect, right    Visual field loss    Nuclear sclerosis of left eye    Epiretinal membrane (ERM) of right eye    Floaters, bilateral    Pseudophakia, right eye    Blepharitis of both eyes, unspecified eyelid, unspecified type  -     erythromycin (ROMYCIN) ophthalmic ointment; Apply to clean lids and lashes at night - 1/4 inch ointment q hs  Dispense: 1 each; Refill: 6        S/P PHACO/IOL/AHMED - OD - 1/10/2017 - developed CME post op    Rx with steroid gtts and ilevro - had responded -   Recurrent CME od - 12/2/2019 - off PA and Ilevro - however  the vision is stable     S/P  CYCLO G6 OD - 4/12/2017  REFERRED BY DR HILLMAN FOR SEVERE POORLY CONTROLLED POAG - 7/24/2015   Pt recently moved from [place] to [facility]     Previously referred to Dr. Hillman from [facility], review of outside records:    Tm 27/25.    Initial visit 3/29/12  IOP 10 OU on Combitan and Lumigan  C:D 0.85 OU  CCT 524OD, 531 OS       Glaucoma (type and duration)    Dx about 2002 - [place]    First HVF   First at [facility] 2015   First photos   First at [facility] 2015    Treatment / Drops started   2002              Family history    + mom / brother         Glaucoma meds    cosopt ou / brimonidine ou tid / lumigan ou q hs / bisi  od tid / diamox / bisi ou        H/O adverse rxn to glaucoma drops    none        LASERS    SLT OD - 8/5/2013 - [place] // SLT os - [facility] 8/27/2015 (good resp 21-->16) // repeat os 9/20/2018         GLAUCOMA SURGERIES    CYCL G6 OD - 4/12/2017  / phaco/IOL / ahmed od 1/10/2018         OTHER EYE SURGERIES    Combined phaco/IOL / ahmed  - od - 1/10/2018         CDR    0.99/0.95        Tbase    ?  Off gtts (on gtts was up to 28/25)         Tmax    27/25 - per outside record's (reviewed by Arik )             Ttarget    15-16  ou    (may need to be lower)           HVF  - OD 10-2 ss - 3 test 2015 to 2017 - SALT/ IAD (progression)    OD - 10-2 stim V - 1 test 2024 to 2024 - dense SAD / IAD    OS - 9 test 2016 to 2024 - 24-2 ss --  SAD involves fix / IAD - fluctuates - ? Porgression    (( 2 VF's from [place] - that were reviewed by Dr. IVEY - 2012 and 2013 -  SAD w/ fix invl . IAD od / early IAD os ))            Gonio    +3-4 ou         CCT    524/531        OCT    7  test 2019 to 2023 - RNFL - OD:dec. Throughout // OS:dec. G/NS/N/TI/T, bord TS/NI        HRT    2 test 2015 to 2018  - MR -   Dec thru out   od // dec thru out  os /// CDR 0. 91 od // 0.88 os        Disc photos    2015, 2018 // 2024     - Ttoday - 10/13    - Test done today    IOP /     2. + APD od     3.  NS os    Minimal - [facility] 20/25 os still     4. Papilloma OS   To be removed with Arik     5. ++ CME od    Decrease in VA from 20/70 to 20/200   New since prior OCT 2/3/2017    S/p PHACO/IOL / AHMED  1/10/2018    Had resolved by 7/29/2019 - on ilevro and PA   -recurrent on 12/2/2019 - off ilevro    - resolved by 6/12/2020 - on ketorolac tid od    -STILL RESOLVED 10/2/2020 - still on ketorolac   -TRIAL OFF KETOROLAC - 3/24/2023 - monitor for recurrent cme     PLAN   GLAUCOMA - ADVANCED - OD >OS   HVF's continue to progress - especially OD     S/P  - cyclo G6 - OD - 4/12/2017      S/p Phaco / IOL // phaco/ IOL / ahmed  OD Date: 1/10/2018 - general  anesthesia // PCB00 23.0  POY > 6    glaucoma gtts  timolol bid ou   Brim ou tid   rhopressa ou   bisi tid os (do not use od - may cause iritis and cme)   lumigan  os (hold od - get recurrent CME )   Diamox (acetazolamide pills) 500 PO bid - tolerating ok     Ketorolac 2 x day   od (Ilevro - too expensive) - restarted - 12/12/2019 - for recurrent CME od // OFF 7/2023    OCT macula 5/17/19: +++ CME od /  OD: , Loss of foveal contour, intraretinal and subretinal fluid  OS: , NFC, no thickening  OCT - 7/26/2019 - CME improved / resolved // review retina notes // monitor for recurrence of CME od   OCT 12/2/2019 - increase - recurrent CME od - 488 - off the ilevro (did not appear to affect her vision)   Oct 6/12/2020 - no cme OD - RESOLVED   OCT - 10/2/2020 - NO CME  OCT 12/3/2021 - no cme   OCT macula - 7/21/2023 - chronic changes - ERM / loss of foveal contour - intraretinal cyst     IN FUTURE ALWAYS DO A 10-2 STIM V OD AND A 24-2 SS OS     Photo file update 7/21/2023     Pt wants a letter stating that she has glaucoma and has great difficulty  Driving  at night .    Saw optometrist - [facility] - doing ok with new glasses - 3/2024     11/19/2024   IOP ok od / borderline os   HVF stble od - now getting 10-2 stim V od // still gets 24-2 ss os - ? Fluctuation vs prog os   Photos     CSM - on MMT    If ++ prog OS would need incisional surgery    Minimal cat - VA is still 20/20 os     3/20/2025 - itchy lids / + blepharitis   WC / LH/AT;s EES oint   Cont glaucoma drops the same     F/U 4 months - IOP // gonio

## 2025-03-20 ENCOUNTER — OFFICE VISIT (OUTPATIENT)
Dept: OPHTHALMOLOGY | Facility: CLINIC | Age: 70
End: 2025-03-20
Payer: MEDICARE

## 2025-03-20 DIAGNOSIS — H21.561 AFFERENT PUPILLARY DEFECT, RIGHT: ICD-10-CM

## 2025-03-20 DIAGNOSIS — H01.006 BLEPHARITIS OF BOTH EYES, UNSPECIFIED EYELID, UNSPECIFIED TYPE: ICD-10-CM

## 2025-03-20 DIAGNOSIS — Z96.1 PSEUDOPHAKIA, RIGHT EYE: ICD-10-CM

## 2025-03-20 DIAGNOSIS — H35.371 EPIRETINAL MEMBRANE (ERM) OF RIGHT EYE: ICD-10-CM

## 2025-03-20 DIAGNOSIS — H43.393 FLOATERS, BILATERAL: ICD-10-CM

## 2025-03-20 DIAGNOSIS — H40.1133 PRIMARY OPEN ANGLE GLAUCOMA OF BOTH EYES, SEVERE STAGE: Primary | ICD-10-CM

## 2025-03-20 DIAGNOSIS — H53.40 VISUAL FIELD LOSS: ICD-10-CM

## 2025-03-20 DIAGNOSIS — H01.003 BLEPHARITIS OF BOTH EYES, UNSPECIFIED EYELID, UNSPECIFIED TYPE: ICD-10-CM

## 2025-03-20 DIAGNOSIS — H25.12 NUCLEAR SCLEROSIS OF LEFT EYE: ICD-10-CM

## 2025-03-20 DIAGNOSIS — H40.1133 PRIMARY OPEN ANGLE GLAUCOMA (POAG) OF BOTH EYES, SEVERE STAGE: ICD-10-CM

## 2025-03-20 PROCEDURE — 99999 PR PBB SHADOW E&M-EST. PATIENT-LVL III: CPT | Mod: PBBFAC,,, | Performed by: OPHTHALMOLOGY

## 2025-03-20 PROCEDURE — 1159F MED LIST DOCD IN RCRD: CPT | Mod: CPTII,S$GLB,, | Performed by: OPHTHALMOLOGY

## 2025-03-20 PROCEDURE — 1126F AMNT PAIN NOTED NONE PRSNT: CPT | Mod: CPTII,S$GLB,, | Performed by: OPHTHALMOLOGY

## 2025-03-20 PROCEDURE — 3288F FALL RISK ASSESSMENT DOCD: CPT | Mod: CPTII,S$GLB,, | Performed by: OPHTHALMOLOGY

## 2025-03-20 PROCEDURE — 1101F PT FALLS ASSESS-DOCD LE1/YR: CPT | Mod: CPTII,S$GLB,, | Performed by: OPHTHALMOLOGY

## 2025-03-20 PROCEDURE — 1160F RVW MEDS BY RX/DR IN RCRD: CPT | Mod: CPTII,S$GLB,, | Performed by: OPHTHALMOLOGY

## 2025-03-20 PROCEDURE — 99214 OFFICE O/P EST MOD 30 MIN: CPT | Mod: S$GLB,,, | Performed by: OPHTHALMOLOGY

## 2025-03-20 RX ORDER — ERYTHROMYCIN 5 MG/G
OINTMENT OPHTHALMIC
Qty: 1 EACH | Refills: 6 | Status: SHIPPED | OUTPATIENT
Start: 2025-03-20

## 2025-03-27 ENCOUNTER — HOSPITAL ENCOUNTER (OUTPATIENT)
Dept: RADIOLOGY | Facility: HOSPITAL | Age: 70
Discharge: HOME | End: 2025-03-27
Attending: NURSE PRACTITIONER
Payer: MEDICARE

## 2025-03-27 DIAGNOSIS — Z86.000 HISTORY OF DUCTAL CARCINOMA IN SITU (DCIS) OF BREAST: ICD-10-CM

## 2025-03-27 DIAGNOSIS — Z12.31 ENCOUNTER FOR SCREENING MAMMOGRAM FOR MALIGNANT NEOPLASM OF BREAST: ICD-10-CM

## 2025-03-27 DIAGNOSIS — H40.1130 PRIMARY OPEN ANGLE GLAUCOMA OF BOTH EYES, UNSPECIFIED GLAUCOMA STAGE: ICD-10-CM

## 2025-03-27 PROCEDURE — 77063 BREAST TOMOSYNTHESIS BI: CPT | Mod: 26,,, | Performed by: RADIOLOGY

## 2025-03-27 PROCEDURE — 77063 BREAST TOMOSYNTHESIS BI: CPT | Mod: TC,PO

## 2025-03-27 PROCEDURE — 77067 SCR MAMMO BI INCL CAD: CPT | Mod: 26,,, | Performed by: RADIOLOGY

## 2025-03-27 RX ORDER — TIMOLOL MALEATE 5 MG/ML
1 SOLUTION/ DROPS OPHTHALMIC 2 TIMES DAILY
Qty: 15 ML | Refills: 4 | Status: SHIPPED | OUTPATIENT
Start: 2025-03-27

## 2025-04-04 ENCOUNTER — OFFICE VISIT (OUTPATIENT)
Dept: HEMATOLOGY/ONCOLOGY | Facility: CLINIC | Age: 70
End: 2025-04-04
Payer: MEDICARE

## 2025-04-04 VITALS
TEMPERATURE: 97 F | RESPIRATION RATE: 18 BRPM | SYSTOLIC BLOOD PRESSURE: 132 MMHG | OXYGEN SATURATION: 97 % | BODY MASS INDEX: 31 KG/M2 | HEART RATE: 77 BPM | DIASTOLIC BLOOD PRESSURE: 79 MMHG | WEIGHT: 186.06 LBS | HEIGHT: 65 IN

## 2025-04-04 DIAGNOSIS — M89.9 DISORDER OF BONE AND CARTILAGE: ICD-10-CM

## 2025-04-04 DIAGNOSIS — M85.88 OTHER SPECIFIED DISORDERS OF BONE DENSITY AND STRUCTURE, OTHER SITE: ICD-10-CM

## 2025-04-04 DIAGNOSIS — Z12.31 ENCOUNTER FOR SCREENING MAMMOGRAM FOR MALIGNANT NEOPLASM OF BREAST: ICD-10-CM

## 2025-04-04 DIAGNOSIS — M94.9 DISORDER OF BONE AND CARTILAGE: ICD-10-CM

## 2025-04-04 DIAGNOSIS — Z86.000 HISTORY OF DUCTAL CARCINOMA IN SITU (DCIS) OF BREAST: Primary | ICD-10-CM

## 2025-04-04 PROCEDURE — 99999 PR PBB SHADOW E&M-EST. PATIENT-LVL V: CPT | Mod: PBBFAC,,, | Performed by: NURSE PRACTITIONER

## 2025-04-04 NOTE — PROGRESS NOTES
Subjective:       Patient ID: Deb Tesfaye is a 70 y.o. female.    Chief Complaint: Annual breast evaluation - 108 month (9 yr) post treatment for DCIS left breast    HPI Ms. Tesfaye is a 70-year-old black female formerly known to Dr. 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    She is status post lumpectomy, post-lumpectomy irradiation and 60 months of Tamoxifen.    Hx of Cataracts, Fibrocystic breast, Glaucoma, HTN.    Oncological history:  Abnormal findings on screening Mammogram @ age 60 on 12/02/2015; followed by Diagnostic Mammo on 12/18/15.  Underwent needle local & excision, 01/26/16 ([facility]).  Pathology revealed high-grade comedo DCIS in association with necrosis & focal atypical ductal hyperplasia.  Tumor was ER/NH negative & had negative margins of resection.  Received post-lumpectomy irradiation & placed on chemoprevention Tamoxifen 20 mg daily for a planned 60 months.    Today:  04/04/25  Ms. Tesfaye presents to the clinic today for her 108-month post-therapy evaluation in good spirits.  She reports that he  had a stroke in 09/2024 & she has been busy assisting with his care.    She remains compliant with supplemental Vitamin D (2,000/day).   She endorses good health and remains active.  She denies any new breast complaints, new lumps or bumps, bone pain, difficulties with hot flashes, vaginal bleeding, abdominal discomfort/bloating, nausea, vomiting, constipation, diarrhea, irregular heartbeat, chest pain, myalgias, cramping, etc.    No other new complaints or pertinent findings on a 14-point review of systems.  Labs & imaging reviewed.      Past Medical History:   Diagnosis Date    Breast cancer     Cataract     Colon polyp 07/20/2016    Ductal carcinoma in situ (DCIS) of left breast 1/11/2016    left     Fibrocystic breast     Glaucoma     Hypertension      Past Surgical History:   Procedure Laterality Date    AHMED GLAUCOMA VALVE  Right 01/10/2018    WITH CE ()    BREAST BIOPSY      BREAST LUMPECTOMY Left     with radiation    CATARACT EXTRACTION W/  INTRAOCULAR LENS IMPLANT Right 01/10/2018    with Ahmed ()    COLONOSCOPY  2002    due for every 5.  Mother with colon cancer.    COLONOSCOPY  12/2010    COLONOSCOPY N/A 7/20/2016    Procedure: COLONOSCOPY;  Surgeon: Janak De Leon Jr., MD;  Location: [facility] ENDO;  Service: Endoscopy;  Laterality: N/A;    COLONOSCOPY N/A 7/20/2022    Procedure: COLONOSCOPY;  Surgeon: Janak De Leon Jr., MD;  Location: [facility] ENDO;  Service: Endoscopy;  Laterality: N/A;    EYE SURGERY      SELECTIVE LASER TRABECULPALSTY Right 8/13    OS DONE IN [place], [place]    SELECTIVE LASER TRABECUPLASTY Left 8/27/15    OS WITH     TUBAL LIGATION       Current Outpatient Medications on File Prior to Visit   Medication Sig Dispense Refill    acetaZOLAMIDE (DIAMOX) 500 mg CpSR Take 1 capsule (500 mg total) by mouth 2 (two) times daily. 180 capsule 3    azelastine (ASTELIN) 137 mcg (0.1 %) nasal spray SHAKE WELL AND USE 1 SPRAY IN EACH NOSTRIL TWICE DAILY 30 mL 11    brimonidine 0.2% (ALPHAGAN) 0.2 % Drop Place 1 drop into both eyes 3 (three) times daily. 30 mL 4    ERGOCALCIFEROL, VITAMIN D2, (VITAMIN D ORAL) Take 1,000 Units by mouth once daily.       erythromycin (ROMYCIN) ophthalmic ointment Apply to clean lids and lashes at night - 1/4 inch ointment q hs 1 each 6    fluticasone propionate (FLONASE) 50 mcg/actuation nasal spray 1 spray (50 mcg total) by Each Nostril route once daily. 16 g 0    ipratropium (ATROVENT) 42 mcg (0.06 %) nasal spray 2 sprays by Nasal route 3 (three) times daily. 15 mL 2    KRILL OIL ORAL Take 1 capsule by mouth once daily.       LUMIGAN 0.01 % Drop Place 1 drop into both eyes every evening. 2.5 mL 12    MULTIVITS,CA,MINERALS/IRON/FA (ONE-A-DAY WOMENS FORMULA ORAL) Take 1 capsule by mouth once daily.      netarsudiL (RHOPRESSA) 0.02 % ophthalmic solution Place 1 drop  "into both eyes once daily. 2.5 mL 11    pilocarpine HCL 4% (PILOCAR) 4 % ophthalmic solution Place 1 drop into the left eye 3 (three) times daily. 15 mL 12    potassium chloride SA (K-DUR,KLOR-CON) 20 MEQ tablet TAKE 2 TABLETS(40 MEQ) BY MOUTH TWICE DAILY 360 tablet 3    pravastatin (PRAVACHOL) 20 MG tablet Take 1 tablet (20 mg total) by mouth once daily. 90 tablet 3    telmisartan-hydrochlorothiazide (MICARDIS HCT) 80-25 mg per tablet Take 1 tablet by mouth once daily. 90 tablet 2    timolol maleate 0.5% (TIMOPTIC) 0.5 % Drop Place 1 drop into both eyes 2 (two) times daily. 15 mL 4     No current facility-administered medications on file prior to visit.     Review of Systems   Constitutional:  Negative for appetite change and unexpected weight change.   HENT:  Negative for mouth sores.    Eyes:  Negative for visual disturbance.   Respiratory:  Negative for cough and shortness of breath.    Cardiovascular:  Negative for chest pain.   Gastrointestinal:  Negative for abdominal pain and diarrhea.   Genitourinary:  Negative for frequency.   Musculoskeletal:  Negative for back pain.   Integumentary:  Negative for rash.   Neurological:  Negative for headaches.   Hematological:  Negative for adenopathy.   Psychiatric/Behavioral:  The patient is not nervous/anxious.          Objective:     Weight:  Gain of 1 1/2 pounds in 16 months  Vitals:    04/04/25 1057   BP: 132/79   BP Location: Left arm   Patient Position: Sitting   Pulse: 77   Resp: 18   Temp: 97.2 °F (36.2 °C)   TempSrc: Temporal   SpO2: 97%   Weight: 84.4 kg (186 lb 1.1 oz)   Height: 5' 5" (1.651 m)         Physical Exam  Vitals reviewed.   Constitutional:       Appearance: Normal appearance.   HENT:      Head: Normocephalic and atraumatic.      Mouth/Throat:      Mouth: Mucous membranes are moist.   Eyes:      Conjunctiva/sclera: Conjunctivae normal.      Pupils: Pupils are equal, round, and reactive to light.   Cardiovascular:      Rate and Rhythm: Normal rate " and regular rhythm.      Pulses: Normal pulses.      Heart sounds: Normal heart sounds.   Pulmonary:      Effort: Pulmonary effort is normal.      Breath sounds: Normal breath sounds.   Chest:      Comments: BREASTS:  Right breast is soft, free of masses, tenderness, nipple discharge or inversion.  Left breast with noted healed lumpectomy scar to the upper outer region; no signs of local recurrence. Axillae soft & clear bilateral.  Abdominal:      General: Bowel sounds are normal.      Palpations: Abdomen is soft.   Musculoskeletal:         General: Normal range of motion.      Cervical back: Normal range of motion.   Lymphadenopathy:      Cervical: No cervical adenopathy.   Skin:     General: Skin is warm and dry.      Capillary Refill: Capillary refill takes less than 2 seconds.   Neurological:      Mental Status: She is alert and oriented to person, place, and time.   Psychiatric:         Mood and Affect: Mood normal.         Behavior: Behavior normal.         Thought Content: Thought content normal.         Judgment: Judgment normal.         Lab Results   Component Value Date    WBC 8.39 03/27/2025    RBC 4.84 03/27/2025    HGB 14.0 03/27/2025    HCT 41.6 03/27/2025    MCV 86 03/27/2025    MCH 28.9 03/27/2025    MCHC 33.7 03/27/2025    RDW 13.8 03/27/2025     03/27/2025    MPV 10.3 03/27/2025    GRAN 3.9 02/23/2024    GRAN 53.4 02/23/2024    LYMPH 29.1 03/27/2025    LYMPH 2.44 03/27/2025    MONO 7.4 03/27/2025    MONO 0.62 03/27/2025    EOS 1.7 03/27/2025    EOS 0.14 03/27/2025    BASO 0.05 02/23/2024    EOSINOPHIL 2.1 02/23/2024    BASOPHIL 1.0 03/27/2025    BASOPHIL 0.08 03/27/2025     CMP  Sodium   Date Value Ref Range Status   03/27/2025 143 136 - 145 mmol/L Final   08/10/2024 144 136 - 145 mmol/L Final     Potassium   Date Value Ref Range Status   03/27/2025 3.5 3.5 - 5.1 mmol/L Final   08/10/2024 3.8 3.5 - 5.1 mmol/L Final     Chloride   Date Value Ref Range Status   03/27/2025 111 (H) 95 - 110  mmol/L Final   08/10/2024 105 95 - 110 mmol/L Final     CO2   Date Value Ref Range Status   03/27/2025 23 23 - 29 mmol/L Final   08/10/2024 27 23 - 29 mmol/L Final     Glucose   Date Value Ref Range Status   08/10/2024 114 (H) 70 - 110 mg/dL Final     BUN   Date Value Ref Range Status   03/27/2025 15 8 - 23 mg/dL Final     Creatinine   Date Value Ref Range Status   03/27/2025 0.8 0.5 - 1.4 mg/dL Final     Calcium   Date Value Ref Range Status   03/27/2025 9.5 8.7 - 10.5 mg/dL Final   08/10/2024 9.4 8.7 - 10.5 mg/dL Final     Total Protein   Date Value Ref Range Status   08/10/2024 7.1 6.0 - 8.4 g/dL Final     Albumin   Date Value Ref Range Status   03/27/2025 4.1 3.5 - 5.2 g/dL Final   08/10/2024 3.9 3.5 - 5.2 g/dL Final     Total Bilirubin   Date Value Ref Range Status   08/10/2024 0.5 0.1 - 1.0 mg/dL Final     Comment:     For infants and newborns, interpretation of results should be based  on gestational age, weight and in agreement with clinical  observations.    Premature Infant recommended reference ranges:  Up to 24 hours.............<8.0 mg/dL  Up to 48 hours............<12.0 mg/dL  3-5 days..................<15.0 mg/dL  6-29 days.................<15.0 mg/dL       Bilirubin Total   Date Value Ref Range Status   03/27/2025 0.5 0.1 - 1.0 mg/dL Final     Comment:     For infants and newborns, interpretation of results should be based   on gestational age, weight and in agreement with clinical   observations.    Premature Infant recommended reference ranges:   0-24 hours:  <8.0 mg/dL   24-48 hours: <12.0 mg/dL   3-5 days:    <15.0 mg/dL   6-29 days:   <15.0 mg/dL     Alkaline Phosphatase   Date Value Ref Range Status   08/10/2024 74 55 - 135 U/L Final     ALP   Date Value Ref Range Status   03/27/2025 77 40 - 150 unit/L Final     AST   Date Value Ref Range Status   03/27/2025 17 11 - 45 unit/L Final   08/10/2024 25 10 - 40 U/L Final     ALT   Date Value Ref Range Status   03/27/2025 26 10 - 44 unit/L Final    08/10/2024 28 10 - 44 U/L Final     Anion Gap   Date Value Ref Range Status   03/27/2025 9 8 - 16 mmol/L Final     eGFR if    Date Value Ref Range Status   02/23/2022 >60 >60 mL/min/1.73 m^2 Final     eGFR if non    Date Value Ref Range Status   02/23/2022 >60 >60 mL/min/1.73 m^2 Final     Comment:     Calculation used to obtain the estimated glomerular filtration  rate (eGFR) is the CKD-EPI equation.        Vitamin D:  49    Mammogram dated 02/27/2025:  Impression:  No mammographic evidence of malignancy.  BI-RADS Category 2:  Benign  Recommendation:  Routine screening mammogram in 1 year is recommended.    BMD dated 02/23/2024:  Normal bone density; improved in lumbar spine     Assessment:       Problem List Items Addressed This Visit       History of ductal carcinoma in situ (DCIS) of breast - Primary       Plan:       1.  Return to clinic in 1 year for 120 month post therapy evaluation with CBC, CMP, Vitamin D, MAMMO (on or after 02/27/26) & BMD prior.  2.  Continue Calcium & Vitamin D (2,000/day) supplementation.     3.  Follow up with PCP (KARIN Nichols MD) in regards to HTN/K+ & Dr. Carol Yadav for GYN.  4.  Call for any concerns.     Assessment/plan reviewed and approved by Dr. Berry.  30 minutes were spent in coordination of patient's care, record review and counseling.  SUBHA Welsh, FNP-C  [facility]    Route Chart for Scheduling    Med Onc Chart Routing      Follow up with physician    Follow up with [facility] 1 year. f/u in 1 year with CBC, CMP, VIT D, BMD & MAMMO (on or after 03/27/26) - imaging on same day   Infusion scheduling note    Injection scheduling note    Labs    Imaging    Pharmacy appointment    Other referrals

## 2025-04-23 ENCOUNTER — LAB VISIT (OUTPATIENT)
Dept: LAB | Facility: HOSPITAL | Age: 70
End: 2025-04-23
Payer: MEDICARE

## 2025-04-23 ENCOUNTER — OFFICE VISIT (OUTPATIENT)
Dept: OBSTETRICS AND GYNECOLOGY | Facility: CLINIC | Age: 70
End: 2025-04-23
Payer: MEDICARE

## 2025-04-23 VITALS
HEIGHT: 65 IN | WEIGHT: 186.31 LBS | SYSTOLIC BLOOD PRESSURE: 134 MMHG | DIASTOLIC BLOOD PRESSURE: 82 MMHG | BODY MASS INDEX: 31.04 KG/M2

## 2025-04-23 DIAGNOSIS — N64.52 NIPPLE DISCHARGE: ICD-10-CM

## 2025-04-23 DIAGNOSIS — N76.0 ACUTE VAGINITIS: ICD-10-CM

## 2025-04-23 DIAGNOSIS — N95.0 PMB (POSTMENOPAUSAL BLEEDING): ICD-10-CM

## 2025-04-23 DIAGNOSIS — Z86.000 HISTORY OF DUCTAL CARCINOMA IN SITU (DCIS) OF BREAST: ICD-10-CM

## 2025-04-23 DIAGNOSIS — N64.52 NIPPLE DISCHARGE: Primary | ICD-10-CM

## 2025-04-23 LAB
BILIRUBIN, UA POC OHS: NEGATIVE
BLOOD, UA POC OHS: ABNORMAL
CLARITY, UA POC OHS: CLEAR
COLOR, UA POC OHS: YELLOW
GLUCOSE, UA POC OHS: NEGATIVE
KETONES, UA POC OHS: NEGATIVE
LEUKOCYTES, UA POC OHS: NEGATIVE
NITRITE, UA POC OHS: NEGATIVE
PH, UA POC OHS: 6
PROLACTIN SERPL IA-MCNC: 4.9 NG/ML (ref 5.2–26.5)
PROTEIN, UA POC OHS: NEGATIVE
SPECIFIC GRAVITY, UA POC OHS: <=1.005
UROBILINOGEN, UA POC OHS: 0.2

## 2025-04-23 PROCEDURE — 36415 COLL VENOUS BLD VENIPUNCTURE: CPT | Mod: PN

## 2025-04-23 PROCEDURE — 99214 OFFICE O/P EST MOD 30 MIN: CPT | Mod: S$GLB,,,

## 2025-04-23 PROCEDURE — 1126F AMNT PAIN NOTED NONE PRSNT: CPT | Mod: CPTII,S$GLB,,

## 2025-04-23 PROCEDURE — 1159F MED LIST DOCD IN RCRD: CPT | Mod: CPTII,S$GLB,,

## 2025-04-23 PROCEDURE — 3044F HG A1C LEVEL LT 7.0%: CPT | Mod: CPTII,S$GLB,,

## 2025-04-23 PROCEDURE — 99999 PR PBB SHADOW E&M-EST. PATIENT-LVL IV: CPT | Mod: PBBFAC,,,

## 2025-04-23 PROCEDURE — 3008F BODY MASS INDEX DOCD: CPT | Mod: CPTII,S$GLB,,

## 2025-04-23 PROCEDURE — 3288F FALL RISK ASSESSMENT DOCD: CPT | Mod: CPTII,S$GLB,,

## 2025-04-23 PROCEDURE — 3075F SYST BP GE 130 - 139MM HG: CPT | Mod: CPTII,S$GLB,,

## 2025-04-23 PROCEDURE — 81003 URINALYSIS AUTO W/O SCOPE: CPT | Mod: QW,S$GLB,,

## 2025-04-23 PROCEDURE — 84146 ASSAY OF PROLACTIN: CPT

## 2025-04-23 PROCEDURE — 1101F PT FALLS ASSESS-DOCD LE1/YR: CPT | Mod: CPTII,S$GLB,,

## 2025-04-23 PROCEDURE — 3079F DIAST BP 80-89 MM HG: CPT | Mod: CPTII,S$GLB,,

## 2025-04-23 NOTE — PROGRESS NOTES
Subjective     Patient ID: Deb Tesfaye is a 70 y.o. female.    Chief Complaint:  PMB      History of Present Illness  HPI  Deb is a 71 y/o BF here for evaluation of one week of light PMB. She has been having daily light pink blood when wiping after toileting. She had one day of small clots in the toilet. She denies any urinary concerns. Reports some pelvic cramping. She has been postmenopausal for 10 years. No HRT. No medication changes. She is not on a blood thinner. She is not sexually active with her spouse. He recently had a stroke.  Additionally has had two occurrences of clear discharge from right nipple over the last month. Normal MMG last month. She does have a history of DCIS and left lumpectomy 9 years ago. Off tamoxifen.     GYN & OB History  No LMP recorded. Patient is postmenopausal.   Date of Last Pap: 2024    OB History    Para Term  AB Living   7 6 3  1 3   SAB IAB Ectopic Multiple Live Births   1          # Outcome Date GA Lbr Reji/2nd Weight Sex Type Anes PTL Lv   7 Term            6 Term            5 Term            4 SAB            3 Para      Vag-Spont      2 Para      Vag-Spont      1 Para      Vag-Spont        Problem List[1]    Past Medical History:   Diagnosis Date    Breast cancer     Cataract     Colon polyp 2016    Ductal carcinoma in situ (DCIS) of left breast 2016    left     Fibrocystic breast     Glaucoma     Hypertension      Past Surgical History:   Procedure Laterality Date    AHMED GLAUCOMA VALVE Right 01/10/2018    WITH  ()    BREAST BIOPSY      BREAST LUMPECTOMY Left     with radiation    CATARACT EXTRACTION W/  INTRAOCULAR LENS IMPLANT Right 01/10/2018    with med ()    COLONOSCOPY  2002    due for every 5.  Mother with colon cancer.    COLONOSCOPY  2010    COLONOSCOPY N/A 2016    Procedure: COLONOSCOPY;  Surgeon: Janak De Leon Jr., MD;  Location: [facility];  Service: Endoscopy;  Laterality:  N/A;    COLONOSCOPY N/A 7/20/2022    Procedure: COLONOSCOPY;  Surgeon: Janak De Leon Jr., MD;  Location: [facility];  Service: Endoscopy;  Laterality: N/A;    EYE SURGERY      SELECTIVE LASER TRABECULPALSTY Right 8/13    OS DONE IN [place], [place]    SELECTIVE LASER TRABECUPLASTY Left 8/27/15    OS WITH     TUBAL LIGATION       Medications Ordered Prior to Encounter[2]      Review of Systems  Review of Systems   Constitutional:  Negative for chills and fever.   Eyes:  Negative for visual disturbance.   Respiratory:  Negative for shortness of breath.    Cardiovascular:  Negative for chest pain and palpitations.   Gastrointestinal:  Negative for abdominal pain, constipation, diarrhea, nausea and vomiting.   Genitourinary:  Positive for pelvic pain, vaginal bleeding and postcoital bleeding. Negative for dysuria, flank pain, hematuria, vaginal discharge, vaginal pain and vaginal odor.   Musculoskeletal:  Negative for back pain.   Integumentary:  Positive for nipple discharge. Negative for rash, breast mass, breast skin changes and breast tenderness.   Neurological:  Negative for seizures, syncope and headaches.   Hematological:  Does not bruise/bleed easily.   Psychiatric/Behavioral:  Negative for depression. The patient is not nervous/anxious.    Breast: Positive for nipple discharge.Negative for breast self exam, lump, mass, mastodynia, skin changes and tenderness         Objective   Physical Exam:   Constitutional: She is oriented to person, place, and time. She appears well-developed and well-nourished. No distress.    HENT:   Head: Normocephalic.   Nose: No epistaxis.    Eyes: Pupils are equal, round, and reactive to light.      Pulmonary/Chest: Effort normal. No respiratory distress. She exhibits no mass, no tenderness, no laceration, no edema, no deformity, no swelling and no retraction. Right breast exhibits no inverted nipple, no mass, no nipple discharge, no skin change, no tenderness, no bleeding  and no swelling. Left breast exhibits no inverted nipple, no mass, no nipple discharge, no skin change, no tenderness, no bleeding and no swelling.        Abdominal: Soft. She exhibits no distension. There is no abdominal tenderness.     Genitourinary:    Inguinal canal, vagina, uterus, right adnexa and left adnexa normal.      Pelvic exam was performed with patient supine.   The external female genitalia was normal.     Labial bartholins normal.There is no rash, tenderness or lesion on the right labia. There is no rash, tenderness or lesion on the left labia. Cervix is normal. Vagina exhibits no lesion. No erythema, vaginal discharge, tenderness or bleeding in the vagina.    No signs of injury in the vagina.   Cervix exhibits friability (scant, pink, at endocervix). Cervix exhibits no motion tenderness, no lesion, no discharge, no tenderness and no polyp.    pap smear completedUterus consistancy normal and Uerus contour normal  Uterus is not enlarged and not tender.               Neurological: She is alert and oriented to person, place, and time.    Skin: Skin is warm and dry.    Psychiatric: She has a normal mood and affect. Judgment and thought content normal.            Assessment and Plan     1. Nipple discharge  - PROLACTIN; Future  - US Breast Right Limited; Future    2. PMB (postmenopausal bleeding)  - US Pelvis Comp with Transvag NON-OB (xpd; Future  - POCT Urinalysis(Instrument)  - Liquid-Based Pap Smear, Screening    3. History of ductal carcinoma in situ (DCIS) of breast  - US Breast Right Limited; Future    4. Acute vaginitis  - Vaginosis Screen by DNA Probe    - Will contact with all results. Recommend follow up with Dr. Yadav after US for further evaluation and management.       I spent a total of 30 minutes on the day of the visit. This includes face to face time and non-face to face time preparing to see the patient (eg, review of tests), obtaining and/or reviewing separately obtained history,  documenting clinical information in the electronic or other health record, independently interpreting results and communicating results to the patient/family/caregiver, or care coordinator.         [1]   Patient Active Problem List  Diagnosis    Primary open angle glaucoma of both eyes, severe stage    Visual field loss    Papilloma of left eyelid    Hyperopia of both eyes with astigmatism and presbyopia    Nuclear sclerosis of both eyes    Family history of colon cancer in mother    Essential hypertension    History of ductal carcinoma in situ (DCIS) of breast    Hyperlipidemia    Hypokalemia    Family history of colon cancer    Atypical ductal hyperplasia of breast    Glaucoma of both eyes secondary to inflammation    Nuclear sclerotic cataract of both eyes    Nuclear sclerotic cataract of right eye    Cystoid macular edema, right    Floaters, bilateral    Obesity (BMI 30-39.9)    Prediabetes   [2]   Current Outpatient Medications on File Prior to Visit   Medication Sig Dispense Refill    acetaZOLAMIDE (DIAMOX) 500 mg CpSR Take 1 capsule (500 mg total) by mouth 2 (two) times daily. 180 capsule 3    azelastine (ASTELIN) 137 mcg (0.1 %) nasal spray SHAKE WELL AND USE 1 SPRAY IN EACH NOSTRIL TWICE DAILY 30 mL 11    brimonidine 0.2% (ALPHAGAN) 0.2 % Drop Place 1 drop into both eyes 3 (three) times daily. 30 mL 4    ERGOCALCIFEROL, VITAMIN D2, (VITAMIN D ORAL) Take 1,000 Units by mouth once daily.       erythromycin (ROMYCIN) ophthalmic ointment Apply to clean lids and lashes at night - 1/4 inch ointment q hs 1 each 6    fluticasone propionate (FLONASE) 50 mcg/actuation nasal spray 1 spray (50 mcg total) by Each Nostril route once daily. 16 g 0    ipratropium (ATROVENT) 42 mcg (0.06 %) nasal spray 2 sprays by Nasal route 3 (three) times daily. 15 mL 2    KRILL OIL ORAL Take 1 capsule by mouth once daily.       LUMIGAN 0.01 % Drop Place 1 drop into both eyes every evening. 2.5 mL 12    MULTIVITS,CA,MINERALS/IRON/FA  (ONE-A-DAY WOMENS FORMULA ORAL) Take 1 capsule by mouth once daily.      netarsudiL (RHOPRESSA) 0.02 % ophthalmic solution Place 1 drop into both eyes once daily. 2.5 mL 11    pilocarpine HCL 4% (PILOCAR) 4 % ophthalmic solution Place 1 drop into the left eye 3 (three) times daily. 15 mL 12    potassium chloride SA (K-DUR,KLOR-CON) 20 MEQ tablet TAKE 2 TABLETS(40 MEQ) BY MOUTH TWICE DAILY 360 tablet 3    pravastatin (PRAVACHOL) 20 MG tablet Take 1 tablet (20 mg total) by mouth once daily. 90 tablet 3    telmisartan-hydrochlorothiazide (MICARDIS HCT) 80-25 mg per tablet Take 1 tablet by mouth once daily. 90 tablet 2    timolol maleate 0.5% (TIMOPTIC) 0.5 % Drop Place 1 drop into both eyes 2 (two) times daily. 15 mL 4     No current facility-administered medications on file prior to visit.

## 2025-04-28 ENCOUNTER — HOSPITAL ENCOUNTER (OUTPATIENT)
Dept: RADIOLOGY | Facility: HOSPITAL | Age: 70
Discharge: HOME | End: 2025-04-28
Payer: MEDICARE

## 2025-04-28 ENCOUNTER — RESULTS FOLLOW-UP (OUTPATIENT)
Dept: OBSTETRICS AND GYNECOLOGY | Facility: CLINIC | Age: 70
End: 2025-04-28

## 2025-04-28 DIAGNOSIS — Z86.000 HISTORY OF DUCTAL CARCINOMA IN SITU (DCIS) OF BREAST: ICD-10-CM

## 2025-04-28 DIAGNOSIS — N95.0 PMB (POSTMENOPAUSAL BLEEDING): ICD-10-CM

## 2025-04-28 DIAGNOSIS — N64.52 NIPPLE DISCHARGE: ICD-10-CM

## 2025-04-28 PROCEDURE — 76856 US EXAM PELVIC COMPLETE: CPT | Mod: 26,,, | Performed by: RADIOLOGY

## 2025-04-28 PROCEDURE — 76856 US EXAM PELVIC COMPLETE: CPT | Mod: TC,PO

## 2025-04-28 PROCEDURE — 76830 TRANSVAGINAL US NON-OB: CPT | Mod: 26,,, | Performed by: RADIOLOGY

## 2025-04-28 PROCEDURE — 76642 ULTRASOUND BREAST LIMITED: CPT | Mod: TC,PO,RT

## 2025-04-28 PROCEDURE — 76642 ULTRASOUND BREAST LIMITED: CPT | Mod: 26,RT,, | Performed by: RADIOLOGY

## 2025-05-07 DIAGNOSIS — N64.52 NIPPLE DISCHARGE: Primary | ICD-10-CM

## 2025-05-14 ENCOUNTER — TELEPHONE (OUTPATIENT)
Dept: OBSTETRICS AND GYNECOLOGY | Facility: CLINIC | Age: 70
End: 2025-05-14

## 2025-05-14 ENCOUNTER — HOSPITAL ENCOUNTER (OUTPATIENT)
Dept: RADIOLOGY | Facility: HOSPITAL | Age: 70
Discharge: HOME | End: 2025-05-14
Payer: MEDICARE

## 2025-05-14 ENCOUNTER — PATIENT MESSAGE (OUTPATIENT)
Dept: OBSTETRICS AND GYNECOLOGY | Facility: CLINIC | Age: 70
End: 2025-05-14

## 2025-05-14 ENCOUNTER — OFFICE VISIT (OUTPATIENT)
Dept: OBSTETRICS AND GYNECOLOGY | Facility: CLINIC | Age: 70
End: 2025-05-14
Payer: MEDICARE

## 2025-05-14 ENCOUNTER — RESULTS FOLLOW-UP (OUTPATIENT)
Dept: OBSTETRICS AND GYNECOLOGY | Facility: CLINIC | Age: 70
End: 2025-05-14

## 2025-05-14 VITALS
BODY MASS INDEX: 30.34 KG/M2 | RESPIRATION RATE: 18 BRPM | WEIGHT: 182.13 LBS | HEIGHT: 65 IN | SYSTOLIC BLOOD PRESSURE: 124 MMHG | DIASTOLIC BLOOD PRESSURE: 72 MMHG

## 2025-05-14 DIAGNOSIS — Z86.000 HISTORY OF DUCTAL CARCINOMA IN SITU (DCIS) OF BREAST: ICD-10-CM

## 2025-05-14 DIAGNOSIS — Z79.810 PROPHYLACTIC USE OF TAMOXIFEN: ICD-10-CM

## 2025-05-14 DIAGNOSIS — N95.0 PMB (POSTMENOPAUSAL BLEEDING): Primary | ICD-10-CM

## 2025-05-14 DIAGNOSIS — N64.52 NIPPLE DISCHARGE: ICD-10-CM

## 2025-05-14 DIAGNOSIS — D21.9 FIBROID: ICD-10-CM

## 2025-05-14 DIAGNOSIS — R93.89 THICKENED ENDOMETRIUM: ICD-10-CM

## 2025-05-14 PROCEDURE — 1126F AMNT PAIN NOTED NONE PRSNT: CPT | Mod: CPTII,S$GLB,, | Performed by: OBSTETRICS & GYNECOLOGY

## 2025-05-14 PROCEDURE — 77049 MRI BREAST C-+ W/CAD BI: CPT | Mod: TC,PO

## 2025-05-14 PROCEDURE — 25500020 PHARM REV CODE 255: Mod: PO

## 2025-05-14 PROCEDURE — 3078F DIAST BP <80 MM HG: CPT | Mod: CPTII,S$GLB,, | Performed by: OBSTETRICS & GYNECOLOGY

## 2025-05-14 PROCEDURE — 3044F HG A1C LEVEL LT 7.0%: CPT | Mod: CPTII,S$GLB,, | Performed by: OBSTETRICS & GYNECOLOGY

## 2025-05-14 PROCEDURE — A9577 INJ MULTIHANCE: HCPCS | Mod: PO

## 2025-05-14 PROCEDURE — 77049 MRI BREAST C-+ W/CAD BI: CPT | Mod: 26,,, | Performed by: RADIOLOGY

## 2025-05-14 PROCEDURE — 1101F PT FALLS ASSESS-DOCD LE1/YR: CPT | Mod: CPTII,S$GLB,, | Performed by: OBSTETRICS & GYNECOLOGY

## 2025-05-14 PROCEDURE — 1159F MED LIST DOCD IN RCRD: CPT | Mod: CPTII,S$GLB,, | Performed by: OBSTETRICS & GYNECOLOGY

## 2025-05-14 PROCEDURE — 3074F SYST BP LT 130 MM HG: CPT | Mod: CPTII,S$GLB,, | Performed by: OBSTETRICS & GYNECOLOGY

## 2025-05-14 PROCEDURE — 3008F BODY MASS INDEX DOCD: CPT | Mod: CPTII,S$GLB,, | Performed by: OBSTETRICS & GYNECOLOGY

## 2025-05-14 PROCEDURE — 99999 PR PBB SHADOW E&M-EST. PATIENT-LVL V: CPT | Mod: PBBFAC,,, | Performed by: OBSTETRICS & GYNECOLOGY

## 2025-05-14 PROCEDURE — 99214 OFFICE O/P EST MOD 30 MIN: CPT | Mod: S$GLB,,, | Performed by: OBSTETRICS & GYNECOLOGY

## 2025-05-14 PROCEDURE — 3288F FALL RISK ASSESSMENT DOCD: CPT | Mod: CPTII,S$GLB,, | Performed by: OBSTETRICS & GYNECOLOGY

## 2025-05-14 RX ORDER — SODIUM CHLORIDE, SODIUM LACTATE, POTASSIUM CHLORIDE, CALCIUM CHLORIDE 600; 310; 30; 20 MG/100ML; MG/100ML; MG/100ML; MG/100ML
INJECTION, SOLUTION INTRAVENOUS CONTINUOUS
OUTPATIENT
Start: 2025-05-14

## 2025-05-14 RX ORDER — FAMOTIDINE 20 MG/1
20 TABLET, FILM COATED ORAL
Status: SHIPPED | OUTPATIENT
Start: 2025-05-14

## 2025-05-14 RX ADMIN — GADOBENATE DIMEGLUMINE 16 ML: 529 INJECTION, SOLUTION INTRAVENOUS at 01:05

## 2025-05-14 NOTE — PROGRESS NOTES
Chief Complaint   Patient presents with    Follow-up     PMB- abn. Pel u/s       History of Present Illness: Deb Tesfaye is a 70 y.o. female that presents today 5/14/2025 with LMP No LMP recorded. Patient is postmenopausal. for   Chief Complaint   Patient presents with    Follow-up     PMB- abn. Pel u/s     She reports 5 days around Easter of bleeding.  She reports using pads during those days.  But pink and noticed when she wiped with clots.    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  Mother with colon cancer.    COLONOSCOPY  12/2010    COLONOSCOPY N/A 7/20/2016    Procedure: COLONOSCOPY;  Surgeon: Janak De Leon Jr., MD;  Location: [facility];  Service: Endoscopy;  Laterality: N/A;    COLONOSCOPY N/A 7/20/2022    Procedure: COLONOSCOPY;  Surgeon: Janak De Leon Jr., MD;  Location: [facility];  Service: Endoscopy;  Laterality: N/A;    EYE SURGERY      SELECTIVE LASER TRABECULPALSTY Right 8/13    OS DONE IN [place], [place]    SELECTIVE LASER TRABECUPLASTY Left 8/27/15    OS WITH     TUBAL LIGATION         Medications Prior to Visit[1]    Review of patient's allergies indicates:  No Known Allergies    Family History   Problem Relation Name Age of Onset    Cancer Mother  54        colon cancer    Cancer Maternal Grandfather      Glaucoma Maternal Grandfather      Cataracts Maternal Grandfather      Hypertension Father      Amblyopia Neg Hx      Blindness Neg Hx      Diabetes Neg Hx      Macular  "degeneration Neg Hx      Retinal detachment Neg Hx      Stroke Neg Hx      Strabismus Neg Hx      Thyroid disease Neg Hx         Social History     Tobacco Use    Smoking status: Never    Smokeless tobacco: Never   Substance Use Topics    Alcohol use: No       Social History     Substance and Sexual Activity   Sexual Activity Yes    Partners: Male       OB History    Para Term  AB Living   7 6 3  1 3   SAB IAB Ectopic Multiple Live Births   1          # Outcome Date GA Lbr Reji/2nd Weight Sex Type Anes PTL Lv   7 Term            6 Term            5 Term            4 SAB            3 Para      Vag-Spont      2 Para      Vag-Spont      1 Para      Vag-Spont            /72 (Patient Position: Sitting)   Resp 18   Ht 5' 5" (1.651 m)   Wt 82.6 kg (182 lb 1.6 oz)   Body mass index is 30.3 kg/m².    Physical Exam:  APPEARANCE: Well nourished, well developed, in no acute distress.  SKIN: Normal skin turgor, no lesions.  NECK: Neck symmetric without masses   RESPIRATORY: Normal respiratory effort with no retractions or use of accessory muscles  CARDIOVASCULAR: Peripheral vascular system with no swelling no varicosities and palpation of pulses normal  LYMPHATIC: No enlargements of the lymph nodes noted in the neck, axillae, or groin  ABDOMEN: Soft. No tenderness or masses. No hepatosplenomegaly. No hernias.  PELVIC: Normal external female genitalia without lesions. Normal hair distribution. Adequate perineal body, normal urethral meatus. Urethra with no masses.  Bladder nontender. Vagina moist and well rugated without lesions or discharge. Cervix pink and without lesions. No significant cystocele or rectocele. Bimanual exam showed uterus normal size, shape, position, mobile and nontender. Adnexa without masses or tenderness. Urethra and bladder normal.  EXTREMITIES: No clubbing cyanosis or edema.    ASSESSMENT/PLAN:  PMB (postmenopausal bleeding)  -     Case Request Operating Room: HYSTEROSCOPY, WITH " DILATION AND CURETTAGE OF UTERUS  -     Place in Outpatient; Standing  -     Full code; Standing  -     Insert peripheral IV; Standing  -     Moran to Gravity; Standing  -     POCT glucose; Standing  -     Notify physician if BS > 180 for hysterectomy patients; Standing  -     Chlorhexidine (CHG) 2% Wipes; Standing  -     Notify Physician/Vital Signs Parameters Urine output less than 0.5mL/kg/hr (with indwelling catheter) or 30 mL/hr (without indwelling catheter) or blood glucose greater than 200 mg/dL; Standing  -     Notify physician; Standing  -     Notify Physician - Potential Need of Opioid Reversal; Standing  -     Diet NPO; Standing  -     Chlorohexidine Gluconate Bath; Standing  -     CBC auto differential; Standing  -     Type & Screen Pre Op; Standing  -     EKG 12-lead; Standing  -     X-Ray Chest PA And Lateral; Standing  -     Place sequential compression device; Standing    History of ductal carcinoma in situ (DCIS) of breast  -     Case Request Operating Room: HYSTEROSCOPY, WITH DILATION AND CURETTAGE OF UTERUS    Fibroid  -     Case Request Operating Room: HYSTEROSCOPY, WITH DILATION AND CURETTAGE OF UTERUS    Prophylactic use of tamoxifen  Comments:  16-21  Orders:  -     Case Request Operating Room: HYSTEROSCOPY, WITH DILATION AND CURETTAGE OF UTERUS    Thickened endometrium  -     Place in Outpatient; Standing  -     Full code; Standing  -     Insert peripheral IV; Standing  -     Moran to Gravity; Standing  -     POCT glucose; Standing  -     Notify physician if BS > 180 for hysterectomy patients; Standing  -     Chlorhexidine (CHG) 2% Wipes; Standing  -     Notify Physician/Vital Signs Parameters Urine output less than 0.5mL/kg/hr (with indwelling catheter) or 30 mL/hr (without indwelling catheter) or blood glucose greater than 200 mg/dL; Standing  -     Notify physician; Standing  -     Notify Physician - Potential Need of Opioid Reversal; Standing  -     Diet NPO; Standing  -      Chlorohexidine Gluconate Bath; Standing  -     CBC auto differential; Standing  -     Type & Screen Pre Op; Standing  -     EKG 12-lead; Standing  -     X-Ray Chest PA And Lateral; Standing  -     Place sequential compression device; Standing    Other orders  -     lactated ringers infusion  -     IP VTE LOW RISK PATIENT; Standing  -     famotidine tablet 20 mg        30 minutes spent today spent preparing reviewing previous external notes, reviewing previous results, performing medical examination, ordering tests or medications, counseling and documenting.            [1]   Outpatient Medications Prior to Visit   Medication Sig Dispense Refill    acetaZOLAMIDE (DIAMOX) 500 mg CpSR Take 1 capsule (500 mg total) by mouth 2 (two) times daily. 180 capsule 3    azelastine (ASTELIN) 137 mcg (0.1 %) nasal spray SHAKE WELL AND USE 1 SPRAY IN EACH NOSTRIL TWICE DAILY 30 mL 11    brimonidine 0.2% (ALPHAGAN) 0.2 % Drop Place 1 drop into both eyes 3 (three) times daily. 30 mL 4    ERGOCALCIFEROL, VITAMIN D2, (VITAMIN D ORAL) Take 1,000 Units by mouth once daily.       erythromycin (ROMYCIN) ophthalmic ointment Apply to clean lids and lashes at night - 1/4 inch ointment q hs 1 each 6    fluticasone propionate (FLONASE) 50 mcg/actuation nasal spray 1 spray (50 mcg total) by Each Nostril route once daily. 16 g 0    ipratropium (ATROVENT) 42 mcg (0.06 %) nasal spray 2 sprays by Nasal route 3 (three) times daily. 15 mL 2    KRILL OIL ORAL Take 1 capsule by mouth once daily.       LUMIGAN 0.01 % Drop Place 1 drop into both eyes every evening. 2.5 mL 12    MULTIVITS,CA,MINERALS/IRON/FA (ONE-A-DAY WOMENS FORMULA ORAL) Take 1 capsule by mouth once daily.      netarsudiL (RHOPRESSA) 0.02 % ophthalmic solution Place 1 drop into both eyes once daily. 2.5 mL 11    pilocarpine HCL 4% (PILOCAR) 4 % ophthalmic solution Place 1 drop into the left eye 3 (three) times daily. 15 mL 12    potassium chloride SA (K-DUR,KLOR-CON) 20 MEQ tablet TAKE 2  TABLETS(40 MEQ) BY MOUTH TWICE DAILY 360 tablet 3    pravastatin (PRAVACHOL) 20 MG tablet Take 1 tablet (20 mg total) by mouth once daily. 90 tablet 3    telmisartan-hydrochlorothiazide (MICARDIS HCT) 80-25 mg per tablet Take 1 tablet by mouth once daily. 90 tablet 2    timolol maleate 0.5% (TIMOPTIC) 0.5 % Drop Place 1 drop into both eyes 2 (two) times daily. 15 mL 4     No facility-administered medications prior to visit.

## 2025-05-14 NOTE — TELEPHONE ENCOUNTER
Copied from CRM #[id number]. Topic: General Inquiry - Return Call  >> May 14, 2025  3:36 PM Jia wrote:  Type:  Patient Returning Call    Who Called:pt  Who Left Message for Patient: office  Does the patient know what this is regarding?:  Would the patient rather a call back or a response via [facility]? Call   Best Call Back Number:[phone]    Additional Information: pt returning call from officeelif

## 2025-05-14 NOTE — Clinical Note
Add [facility] Notify pt of date  Preop 1 week prior Consents day of     We have your surgery set  6/16 7 am   It will take place at the [facility]  Located at [address] [address] [address] [address]   You must arrive 1 hour prior to surgery.  Nothing to EAT or DRINK after midnight.  No ASPIRIN for 7 days prior to surgery.   No injectable medications 10 days  prior to surgery   Your PREOP appointment is set  It will take place at the   [facility]  [address] [address] [address] [place] [address]

## 2025-05-20 DIAGNOSIS — H40.1133 PRIMARY OPEN ANGLE GLAUCOMA OF BOTH EYES, SEVERE STAGE: ICD-10-CM

## 2025-05-21 RX ORDER — BRIMONIDINE TARTRATE 2 MG/ML
1 SOLUTION/ DROPS OPHTHALMIC 3 TIMES DAILY
Qty: 30 ML | Refills: 4 | Status: SHIPPED | OUTPATIENT
Start: 2025-05-21

## 2025-06-11 ENCOUNTER — PATIENT MESSAGE (OUTPATIENT)
Dept: FAMILY MEDICINE | Facility: CLINIC | Age: 70
End: 2025-06-11
Payer: MEDICARE

## 2025-06-14 DIAGNOSIS — H40.1133 PRIMARY OPEN ANGLE GLAUCOMA (POAG) OF BOTH EYES, SEVERE STAGE: ICD-10-CM

## 2025-06-16 PROBLEM — N95.0 PMB (POSTMENOPAUSAL BLEEDING): Status: ACTIVE | Noted: 2025-06-16

## 2025-06-16 PROBLEM — R93.89 THICKENED ENDOMETRIUM: Status: ACTIVE | Noted: 2025-06-16

## 2025-06-16 PROBLEM — D21.9 FIBROID: Status: ACTIVE | Noted: 2025-06-16

## 2025-06-16 RX ORDER — NETARSUDIL 0.2 MG/ML
1 SOLUTION/ DROPS OPHTHALMIC; TOPICAL DAILY
Qty: 2.5 ML | Refills: 11 | Status: SHIPPED | OUTPATIENT
Start: 2025-06-16

## 2025-06-17 ENCOUNTER — PATIENT MESSAGE (OUTPATIENT)
Dept: OBSTETRICS AND GYNECOLOGY | Facility: CLINIC | Age: 70
End: 2025-06-17
Payer: MEDICARE

## 2025-06-17 ENCOUNTER — TELEPHONE (OUTPATIENT)
Dept: OBSTETRICS AND GYNECOLOGY | Facility: CLINIC | Age: 70
End: 2025-06-17

## 2025-06-17 ENCOUNTER — TELEPHONE (OUTPATIENT)
Dept: OBSTETRICS AND GYNECOLOGY | Facility: CLINIC | Age: 70
End: 2025-06-17
Payer: MEDICARE

## 2025-06-17 NOTE — TELEPHONE ENCOUNTER
Scheduled pt, had surgery yesterday needed 2 week f/u could not schedule her for exactly 2 weeks because you will be out at her 2 week selina

## 2025-06-17 NOTE — TELEPHONE ENCOUNTER
Copied from CRM #[id number]. Topic: General Inquiry - Patient Advice  >> Jun 17, 2025  9:37 AM Magdalena wrote:  Pt is calling to get a appointment scheduled for a 2 week f/u following the procedure that she had on 06/16/25  pt is only wanting to see Dr Yadav   Subjective   History of Present Illness  Patient presents to the emergency department with shortness of breath.  When questioned, she states that her friends made her come in because her O2 sats were in the 80s.  Patient currently denies chest pain but has ST elevation noted on her EKG along with tachycardia.  EKG was sent to Dr. Oneal who recommends lab work, Cardizem to slow the patient's heart rate down, and repeat EKG.  Patient does have a history of hypertension, diabetes mellitus, hypercholesterolemia and is a smoker who claims to have quit smoking 6 months ago.  She denies any history of known coronary artery disease or cardiac procedures.  Patient also mentions that she has home O2 as needed, but has not been recently wearing oxygen at home.    Shortness of Breath  Severity:  Mild  Duration:  1 day  Timing:  Constant  Progression:  Waxing and waning  Relieved by:  Rest and oxygen  Worsened by:  Exertion  Associated symptoms: no abdominal pain, no chest pain, no cough, no diaphoresis, no ear pain, no fever, no headaches, no neck pain, no rash, no sore throat, no vomiting and no wheezing    Risk factors: tobacco use      Review of Systems   Constitutional:  Positive for activity change. Negative for appetite change, chills, diaphoresis, fatigue and fever.   HENT:  Negative for congestion, ear discharge, ear pain, nosebleeds, rhinorrhea, sinus pressure, sore throat and trouble swallowing.    Eyes:  Negative for discharge and redness.   Respiratory:  Positive for shortness of breath. Negative for apnea, cough, chest tightness and wheezing.    Cardiovascular:  Negative for chest pain.   Gastrointestinal:  Negative for abdominal pain, diarrhea, nausea and vomiting.   Endocrine: Negative for polyuria.   Genitourinary:  Negative for dysuria, frequency and urgency.   Musculoskeletal:  Negative for myalgias and neck pain.   Skin:  Negative for color change and rash.   Allergic/Immunologic: Negative for  immunocompromised state.   Neurological:  Negative for dizziness, seizures, syncope, weakness, light-headedness and headaches.   Hematological:  Negative for adenopathy. Does not bruise/bleed easily.   Psychiatric/Behavioral:  Negative for behavioral problems and confusion.    All other systems reviewed and are negative.    Past Medical History:   Diagnosis Date    A-fib     Anxiety     Arthritis     Asthma     Atherosclerosis of native arteries of the extremities with ulceration     bilateral legs - bilateral iliac stents 2008       CHF (congestive heart failure)     Chronic lower back pain     COPD (chronic obstructive pulmonary disease)     Essential (primary) hypertension     GERD (gastroesophageal reflux disease)     History of pulmonary embolus (PE)     Hyperlipidemia     Insomnia     Lumbago     Nicotine dependence     Occlusion of artery      and stenosis of bilateral carotid arteries - BABS 16-49%, LICA 0-15%    Other atherosclerosis of native artery of other extremity     LEFT subclavian stent 2005 (occluded)    Sleep apnea        Allergies   Allergen Reactions    Morphine And Related Itching, Confusion and Hallucinations    Penicillins Rash       Past Surgical History:   Procedure Laterality Date    CARDIAC CATHETERIZATION  04/06/2016    No evidence of any obstructive epicardial CAD.Preserved LV systolic function with EF of 55%.    CARDIAC CATHETERIZATION N/A 11/1/2022    Procedure: Left Heart Cath;  Surgeon: Neftali Haywood MD;  Location: [facility] CATH INVASIVE LOCATION;  Service: Cardiology;  Laterality: N/A;    CENTRAL VENOUS LINE INSERTION  04/07/2016    Successful placement of right uppe extremity 6-Telugu triple lumen PICC line.    ENDOSCOPY N/A 1/12/2018    Procedure: ESOPHAGOGASTRODUODENOSCOPY;  Surgeon: Bin Oh MD;  Location: [facility] ENDOSCOPY;  Service:     ENDOSCOPY N/A 1/17/2018    Procedure: ESOPHAGOGASTRODUODENOSCOPY;  Surgeon: Bin Oh MD;  Location: [facility] ENDOSCOPY;  Service:      ENDOSCOPY N/A 3/16/2018    Procedure: ESOPHAGOGASTRODUODENOSCOPY possible dilation;  Surgeon: Bin Martin MD;  Location: [facility] ENDOSCOPY;  Service: Gastroenterology    FEMORAL POPLITEAL BYPASS Left 4/14/2020    Procedure: FEMORAL POPLITEAL BYPASS ABOVE KNEE  (POLYTETRAFLUOROETHYLENE)  LEFT COMMON FEMORAL ARTERY ENDARTERECTOMY PTA LEFT ILIAC ARTERIOGRAM        (c-arm#2 and c-arm table);  Surgeon: David Suh MD;  Location: [facility] OR;  Service: Vascular;  Laterality: Left;    FEMORAL POPLITEAL BYPASS Right 9/17/2021    Procedure: RIGHT common femoral endarterectomy FEMORAL POPLITEAL BYPASS (above the knee polytetrafluoroethylene  lower extremity arteriogram              (c-arm#2 and c-arm table);  Surgeon: David Suh MD;  Location: [facility] OR;  Service: Vascular;  Laterality: Right;    HYSTERECTOMY      INTERVENTIONAL RADIOLOGY PROCEDURE Left 9/9/2020    Procedure: IR angiogram extremity left;  Surgeon: David Suh MD;  Location: [facility] ANGIO INVASIVE LOCATION;  Service: Interventional Radiology;  Laterality: Left;    KYPHOPLASTY N/A 5/23/2019    Procedure: KYPHOPLASTY LUMBAR FOUR;  Surgeon: Aba Santa MD;  Location: [facility] OR;  Service: Orthopedic Spine    TOTAL SHOULDER REPLACEMENT Left     TRANSESOPHAGEAL ECHOCARDIOGRAM (JACQUES)  04/07/2016    With color flow-Mild to moderate [facility].LV systolic function well preserved with Ef of 55-60%.Mitral and AV intact.Diastolic dysfunction    UPPER GASTROINTESTINAL ENDOSCOPY  01/17/2018    Dr. Bin Martin M.D.       Family History   Problem Relation Age of Onset    Heart disease Other     Hypertension Other        Social History     Socioeconomic History    Marital status:    Tobacco Use    Smoking status: Former     Packs/day: 1.00     Years: 50.00     Pack years: 50.00     Types: Cigarettes     Passive exposure: Past    Smokeless tobacco: Never   Vaping Use    Vaping Use: Never used   Substance and Sexual  Activity    Alcohol use: No    Drug use: No    Sexual activity: Defer           Objective   Physical Exam  Vitals and nursing note reviewed.   Constitutional:       Appearance: She is well-developed.   HENT:      Head: Normocephalic and atraumatic.      Nose: Nose normal.   Eyes:      General: No scleral icterus.        Right eye: No discharge.         Left eye: No discharge.      Conjunctiva/sclera: Conjunctivae normal.      Pupils: Pupils are equal, round, and reactive to light.   Neck:      Trachea: No tracheal deviation.   Cardiovascular:      Rate and Rhythm: Regular rhythm. Tachycardia present.      Heart sounds: Normal heart sounds. No murmur heard.  Pulmonary:      Effort: Pulmonary effort is normal. No respiratory distress.      Breath sounds: Normal breath sounds. No stridor. No wheezing or rales.   Abdominal:      General: Bowel sounds are normal. There is no distension.      Palpations: Abdomen is soft. There is no mass.      Tenderness: There is no abdominal tenderness. There is no guarding or rebound.   Musculoskeletal:      Cervical back: Normal range of motion and neck supple.   Skin:     General: Skin is warm and dry.      Findings: No erythema or rash.   Neurological:      Mental Status: She is alert and oriented to person, place, and time.      Coordination: Coordination normal.   Psychiatric:         Behavior: Behavior normal.         Thought Content: Thought content normal.       ECG 12 Lead Chest Pain      Date/Time: 8/24/2023 2:10 PM  Performed by: Marino Perez MD  Authorized by: Marino Perez MD   Interpreted by physician  BPM: 178  ST Elevation: II and III  ST Depression: I    ECG 12 Lead      Date/Time: 8/24/2023 2:10 PM  Performed by: Marino Perez MD  Authorized by: Marino Perez MD   Interpreted by physician  Rhythm: atrial fibrillation  BPM: 121  ST Elevation: III             ED Course  ED Course as of 08/24/23 1411   Thu Aug 24, 2023   1408 Patient was  seen in the emergency department by Dr. Oneal, who recommends amiodarone drip in addition to the current Cardizem drip.  He will continue following patient in hospital and suspects he may end up intervening with a heart catheterization.  Patient continues to deny chest pain. [CB]      ED Course User Index  [CB] Marino Perez MD           Labs Reviewed   COMPREHENSIVE METABOLIC PANEL - Abnormal; Notable for the following components:       Result Value    Glucose 105 (*)     BUN 29 (*)     Creatinine 1.15 (*)     Potassium 3.4 (*)     CO2 19.0 (*)     Calcium 6.8 (*)     Albumin 2.9 (*)     BUN/Creatinine Ratio 25.2 (*)     eGFR 51.4 (*)     All other components within normal limits    Narrative:     GFR Normal >60  Chronic Kidney Disease <60  Kidney Failure <15     TROPONIN - Abnormal; Notable for the following components:    HS Troponin T 72 (*)     All other components within normal limits    Narrative:     High Sensitive Troponin T Reference Range:  <10.0 ng/L- Negative Female for AMI  <15.0 ng/L- Negative Male for AMI  >=10 - Abnormal Female indicating possible myocardial injury.  >=15 - Abnormal Male indicating possible myocardial injury.   Clinicians would have to utilize clinical acumen, EKG, Troponin, and serial changes to determine if it is an Acute Myocardial Infarction or myocardial injury due to an underlying chronic condition.        CBC WITH AUTO DIFFERENTIAL - Abnormal; Notable for the following components:    WBC 15.21 (*)     RBC 2.88 (*)     Hemoglobin 8.5 (*)     Hematocrit 26.0 (*)     Lymphocyte % 18.7 (*)     Neutrophils, Absolute 10.31 (*)     Monocytes, Absolute 1.79 (*)     Immature Grans, Absolute 0.08 (*)     All other components within normal limits   CK - Abnormal; Notable for the following components:    Creatine Kinase 765 (*)     All other components within normal limits   MAGNESIUM - Normal   BNP (IN-HOUSE)   HIGH SENSITIVITIY TROPONIN T 2HR   CBC AND DIFFERENTIAL     Narrative:     The following orders were created for panel order CBC & Differential.  Procedure                               Abnormality         Status                     ---------                               -----------         ------                     CBC Auto Differential[[id number]]        Abnormal            Final result                 Please view results for these tests on the individual orders.   EXTRA TUBES    Narrative:     The following orders were created for panel order Extra Tubes.  Procedure                               Abnormality         Status                     ---------                               -----------         ------                     Gold Top - SST[[id number]]                                   Final result               Light Blue Top[[id number]]                                   Final result                 Please view results for these tests on the individual orders.   GOLD TOP - SST   LIGHT BLUE TOP       IR Insert Midline Without Port Pump 5 Plus   Final Result      US Guided Vascular Access    (Results Pending)                                         Medical Decision Making  Problems Addressed:  Atrial fibrillation, unspecified type: complicated acute illness or injury  Elevated troponin: complicated acute illness or injury  Shortness of breath: complicated acute illness or injury    Amount and/or Complexity of Data Reviewed  Labs: ordered.  Radiology: ordered.  ECG/medicine tests: ordered.    Risk  OTC drugs.  Prescription drug management.  Decision regarding hospitalization.        Final diagnoses:   Shortness of breath   Elevated troponin   Atrial fibrillation, unspecified type       ED Disposition  ED Disposition       ED Disposition   Decision to Admit    Condition   --    Comment   Level of Care: Critical Care [6]   Diagnosis: Dyspnea [366244]   Admitting Physician: JONATHAN WALTERS [[id number]]   Attending Physician: JONATHAN WALTERS [[id number]]   Certification: I  Certify That Inpatient Hospital Services Are Medically Necessary For Greater Than 2 Midnights                 No follow-up provider specified.       Medication List      No changes were made to your prescriptions during this visit.            Marino Perez MD  08/24/23 1410       Marino Perez MD  08/24/23 1411

## 2025-06-22 DIAGNOSIS — H40.1133 PRIMARY OPEN ANGLE GLAUCOMA (POAG) OF BOTH EYES, SEVERE STAGE: ICD-10-CM

## 2025-06-23 ENCOUNTER — TELEPHONE (OUTPATIENT)
Dept: OBSTETRICS AND GYNECOLOGY | Facility: CLINIC | Age: 70
End: 2025-06-23
Payer: MEDICARE

## 2025-06-23 DIAGNOSIS — N95.0 PMB (POSTMENOPAUSAL BLEEDING): ICD-10-CM

## 2025-06-23 DIAGNOSIS — Z86.000 HISTORY OF DUCTAL CARCINOMA IN SITU (DCIS) OF BREAST: ICD-10-CM

## 2025-06-23 DIAGNOSIS — N85.00 ENDOMETRIAL HYPERPLASIA WITHOUT ATYPIA: Primary | ICD-10-CM

## 2025-06-23 RX ORDER — BIMATOPROST 0.1 MG/ML
1 SOLUTION/ DROPS OPHTHALMIC NIGHTLY
Qty: 2.5 ML | Refills: 12 | Status: SHIPPED | OUTPATIENT
Start: 2025-06-23

## 2025-06-25 ENCOUNTER — TELEPHONE (OUTPATIENT)
Dept: GYNECOLOGIC ONCOLOGY | Facility: CLINIC | Age: 70
End: 2025-06-25
Payer: MEDICARE

## 2025-06-25 NOTE — NURSING
New pt referral received from Dr Yadav for pt to be seen with GYN/ONC. LVM with for a call back to arrange new pt appointment. Direct navigator phone number provided to pt [phone]    Oncology Navigation   Intake  Cancer Type: Gynecologic (hysterctomy consult)  Type of Referral: Internal (Dr Carol Yadav)  Date of Referral: 06/23/25  Initial Nurse Navigator Contact: 06/25/25  Referral to Initial Contact Timeline (days): 2     Treatment   Providers:  PCP- Dr. Batsheva Nichols  OBGYN- Dr. 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 Pt had high-grade comedo DCIS of the left breast, as well as atypical ductal hyperplasia.  She is status post lumpectomy, post-lumpectomy irradiation and 60 months of Tamoxifen. Pt is no longer taking.     4/28/25- Ultrasound   Endometrium demonstrates focal thickening in two locations suggestive of polyps, hyperplasia, or neoplasm. This could relate to 1 region spanning 22 mm on the sagittal. It is also possible this relates to blood products and or clot.  Multiple cervix heterogeneity is are noted that could relate to complicated nabothian cysts. Solid masses are not excluded. Follow-up for size stability or better categorization with MRI female pelvis evaluation would be suggested.  Neither ovary could be visualized making evaluation suboptimal  Submucosal fibroid is suspected    6/16/25- D&C due to PMB by Dr Yadav  UTERINE MASS: BENIGN ENDOMETRIAL POLYP WITH FOCI OF ENDOMETRIAL HYPERPLASIA WITHOUT ATYPIA. LEIOMYOMA. NEGATIVE FOR MALIGNANCY.   ENDOMETRIAL CURETTAGE: SCANTY FRAGMENTS OF BENIGN ENDOCERVIX, TINY LEIOMYOMA, AND TINY ENDOMETRIAL POLYP; NEGATIVE FOR MALIGNANCY.    6/23/25- referral placed by Dr Yadav for pt to be seen by GYN ONC for a hysterectom consult.       Acuity      Follow Up  No follow-ups on file.

## 2025-06-26 NOTE — NURSING
Pt contacted navigator back after missed call. Pt updated that a new referral was received from Dr Yadav for a surgical consult. Pt called and name and  verified. Explained my role as nurse navigator and direct number provided. Options for GYN/ONC providers at all clinic locations and soonest availability discussed with pt. Pt scheduled to see Dr Winston in the next available [facility] appointment. Patient encouraged to call for any questions or concerns about her care or appointments. Pt verbalized understanding. Date time and location of appointment reviewed with pt.

## 2025-06-29 NOTE — PROGRESS NOTES
HPI    DLS: 3/20/2025    Pt here for 3 Month Check;  Pt states no eye pain or discomfort. Pt denies any vision changes.     Meds;  Timolol BID OU  Brimonidine TID OU  Rhopressa QDAY OU  Bisi 4% TID OS  Lumigan QHS OS  Diamox 500mg BID PO    1. PO AG O D>OS   2. APD OD   3. NS OS   4. Papilloma OS   5. CME OD   6. PCIOL OD     Last edited by Chapis York MA on 6/30/2025  1:16 PM.            Assessment /Plan     For exam results, see Encounter Report.    Primary open angle glaucoma (POAG) of both eyes, severe stage  -     Zhang Visual Field - OU - Extended - Both Eyes; Future  -     Posterior Segment OCT Optic Nerve- Both eyes; Future    Afferent pupillary defect, right    Visual field loss    Nuclear sclerosis of left eye    Epiretinal membrane (ERM) of right eye    Floaters, bilateral    Blepharitis of both eyes, unspecified eyelid, unspecified type    Pseudophakia, right eye          S/P PHACO/IOL/AHMED - OD - 1/10/2017 - developed CME post op    Rx with steroid gtts and ilevro - had responded -   Recurrent CME od - 12/2/2019 - off PA and Ilevro - however  the vision is stable     S/P  CYCLO G6 OD - 4/12/2017  REFERRED BY DR ELISE FOR SEVERE POORLY CONTROLLED POAG - 7/24/2015   Pt recently moved from [place] to the [facility]     Previously referred to Dr. Elise from [facility], review of outside records:    Tm 27/25.    Initial visit 3/29/12  IOP 10 OU on Combitan and Lumigan  C:D 0.85 OU  CCT 524OD, 531 OS       Glaucoma (type and duration)    Dx about 2002 - [place]    First HVF   First at [facility] 2015   First photos   First at [facility] 2015    Treatment / Drops started   2002              Family history    + mom / brother         Glaucoma meds    cosopt ou / brimonidine ou tid / lumigan ou q hs / bisi od tid / diamox / bisi ou        H/O adverse rxn to glaucoma drops    none        LASERS    SLT OD - 8/5/2013 - [place] // SLT os - [facility] 8/27/2015 (good resp 21-->16) // repeat os  9/20/2018         GLAUCOMA SURGERIES    CYCL G6 OD - 4/12/2017  / phaco/IOL / ahmed od 1/10/2018         OTHER EYE SURGERIES    Combined phaco/IOL / ahmed  - od - 1/10/2018         CDR    0.99/0.95        Tbase    ?  Off gtts (on gtts was up to 28/25)         Tmax    27/25 - per outside record's (reviewed by Arik )             Ttarget    15-16  ou    (may need to be lower)           HVF  - OD 10-2 ss - 3 test 2015 to 2017 - SALT/ IAD (progression)    OD - 10-2 stim V - 1 test 2024 to 2024 - dense SAD / IAD    OS - 9 test 2016 to 2024 - 24-2 ss --  SAD involves fix / IAD - fluctuates - ? Porgression    (( 2 VF's from [facility] - that were reviewed by Dr. IVEY - 2012 and 2013 -  SAD w/ fix invl . IAD od / early IAD os ))            Gonio    +3-4 ou         CCT    524/531        OCT    7  test 2019 to 2023 - RNFL - OD:dec. Throughout // OS:dec. G/NS/N/TI/T, bord TS/NI        HRT    2 test 2015 to 2018  - MR -   Dec thru out   od // dec thru out  os /// CDR 0. 91 od // 0.88 os        Disc photos    2015, 2018 // 2024     - Ttoday - 13/10   - Test done today    IOP / gonio    2. + APD od     3.  NS os    Minimal - [facility] 20/25 os still     4. H/O  ++ CME od    Decrease in [facility] from 20/70 to 20/200   New since prior OCT 2/3/2017    S/p PHACO/IOL / AHMED  1/10/2018    Had resolved by 7/29/2019 - on ilevro and PA   -recurrent on 12/2/2019 - off ilevro    - resolved by 6/12/2020 - on ketorolac tid od    -STILL RESOLVED 10/2/2020 - still on ketorolac   -TRIAL OFF KETOROLAC - 3/24/2023 - monitor for recurrent cme       PLAN   GLAUCOMA - ADVANCED - OD >OS   HVF's continue to progress - especially OD     S/P  - cyclo G6 - OD - 4/12/2017      S/p Phaco / IOL // phaco/ IOL / ahmed  OD Date: 1/10/2018 - general anesthesia // PCB00 23.0  POY > 7    glaucoma gtts  timolol bid ou   Brim ou tid   rhopressa ou   bisi tid os (do not use od - may cause iritis and cme)   lumigan  os (hold od - get recurrent CME )   Diamox (acetazolamide pills)  500 PO bid - tolerating ok     Ketorolac 2 x day   od (Ilevro - too expensive) - restarted - 12/12/2019 - for recurrent CME od // OFF 7/2023    OCT macula 5/17/19: +++ CME od /  OD: , Loss of foveal contour, intraretinal and subretinal fluid  OS: , NFC, no thickening  OCT - 7/26/2019 - CME improved / resolved // review retina notes // monitor for recurrence of CME od   OCT 12/2/2019 - increase - recurrent CME od - 488 - off the ilevro (did not appear to affect her vision)   Oct 6/12/2020 - no cme OD - RESOLVED   OCT - 10/2/2020 - NO CME  OCT 12/3/2021 - no cme   OCT macula - 7/21/2023 - chronic changes - ERM / loss of foveal contour - intraretinal cyst     IN FUTURE ALWAYS DO A 10-2 STIM V OD AND A 24-2 SS OS    Pt wants a letter stating that she has glaucoma and has great difficulty  Driving  at night .    Saw optometrist - [facility] - doing ok with new glasses - 3/2024     11/19/2024   IOP ok od / borderline os   HVF stble od - now getting 10-2 stim V od // still gets 24-2 ss os - ? Fluctuation vs prog os   Photos     6/30/2025   IOP good 13/10  Gonio - mostly open   CSM - on MMT // gtts and diamox    If ++ prog OS would need incisional surgery    Minimal cat - VA is still 20/20 os     F/U 4 months - IOP // HVF 10-2 stim V od and 24-2 ss os

## 2025-06-30 ENCOUNTER — OFFICE VISIT (OUTPATIENT)
Dept: OPHTHALMOLOGY | Facility: CLINIC | Age: 70
End: 2025-06-30
Payer: MEDICARE

## 2025-06-30 DIAGNOSIS — H25.12 NUCLEAR SCLEROSIS OF LEFT EYE: ICD-10-CM

## 2025-06-30 DIAGNOSIS — H01.006 BLEPHARITIS OF BOTH EYES, UNSPECIFIED EYELID, UNSPECIFIED TYPE: ICD-10-CM

## 2025-06-30 DIAGNOSIS — H21.561 AFFERENT PUPILLARY DEFECT, RIGHT: ICD-10-CM

## 2025-06-30 DIAGNOSIS — H40.1133 PRIMARY OPEN ANGLE GLAUCOMA (POAG) OF BOTH EYES, SEVERE STAGE: Primary | ICD-10-CM

## 2025-06-30 DIAGNOSIS — H53.40 VISUAL FIELD LOSS: ICD-10-CM

## 2025-06-30 DIAGNOSIS — Z96.1 PSEUDOPHAKIA, RIGHT EYE: ICD-10-CM

## 2025-06-30 DIAGNOSIS — H35.371 EPIRETINAL MEMBRANE (ERM) OF RIGHT EYE: ICD-10-CM

## 2025-06-30 DIAGNOSIS — H43.393 FLOATERS, BILATERAL: ICD-10-CM

## 2025-06-30 DIAGNOSIS — H01.003 BLEPHARITIS OF BOTH EYES, UNSPECIFIED EYELID, UNSPECIFIED TYPE: ICD-10-CM

## 2025-06-30 PROCEDURE — 1160F RVW MEDS BY RX/DR IN RCRD: CPT | Mod: CPTII,S$GLB,, | Performed by: OPHTHALMOLOGY

## 2025-06-30 PROCEDURE — 1126F AMNT PAIN NOTED NONE PRSNT: CPT | Mod: CPTII,S$GLB,, | Performed by: OPHTHALMOLOGY

## 2025-06-30 PROCEDURE — 3044F HG A1C LEVEL LT 7.0%: CPT | Mod: CPTII,S$GLB,, | Performed by: OPHTHALMOLOGY

## 2025-06-30 PROCEDURE — 92020 GONIOSCOPY: CPT | Mod: S$GLB,,, | Performed by: OPHTHALMOLOGY

## 2025-06-30 PROCEDURE — 99214 OFFICE O/P EST MOD 30 MIN: CPT | Mod: S$GLB,,, | Performed by: OPHTHALMOLOGY

## 2025-06-30 PROCEDURE — 1159F MED LIST DOCD IN RCRD: CPT | Mod: CPTII,S$GLB,, | Performed by: OPHTHALMOLOGY

## 2025-06-30 PROCEDURE — 1101F PT FALLS ASSESS-DOCD LE1/YR: CPT | Mod: CPTII,S$GLB,, | Performed by: OPHTHALMOLOGY

## 2025-06-30 PROCEDURE — 3288F FALL RISK ASSESSMENT DOCD: CPT | Mod: CPTII,S$GLB,, | Performed by: OPHTHALMOLOGY

## 2025-06-30 PROCEDURE — 99999 PR PBB SHADOW E&M-EST. PATIENT-LVL III: CPT | Mod: PBBFAC,,, | Performed by: OPHTHALMOLOGY

## 2025-07-03 ENCOUNTER — OFFICE VISIT (OUTPATIENT)
Dept: GYNECOLOGIC ONCOLOGY | Facility: CLINIC | Age: 70
End: 2025-07-03
Payer: MEDICARE

## 2025-07-03 VITALS
TEMPERATURE: 97 F | HEIGHT: 65 IN | WEIGHT: 184.31 LBS | BODY MASS INDEX: 30.71 KG/M2 | RESPIRATION RATE: 16 BRPM | SYSTOLIC BLOOD PRESSURE: 144 MMHG | OXYGEN SATURATION: 96 % | DIASTOLIC BLOOD PRESSURE: 80 MMHG | HEART RATE: 83 BPM

## 2025-07-03 DIAGNOSIS — N95.0 PMB (POSTMENOPAUSAL BLEEDING): ICD-10-CM

## 2025-07-03 DIAGNOSIS — Z86.000 HISTORY OF DUCTAL CARCINOMA IN SITU (DCIS) OF BREAST: ICD-10-CM

## 2025-07-03 DIAGNOSIS — N85.00 ENDOMETRIAL HYPERPLASIA WITHOUT ATYPIA: Primary | ICD-10-CM

## 2025-07-03 PROCEDURE — 99999 PR PBB SHADOW E&M-EST. PATIENT-LVL III: CPT | Mod: PBBFAC,,, | Performed by: OBSTETRICS & GYNECOLOGY

## 2025-07-03 NOTE — LETTER
July 3, 2025        Carol Yadav MD  [id number] [address]  [facility] [place] [facility] [address]             [facility] - GYN Oncology  [address] [facility] [address]  Phone: [phone]   Patient: Deb Tesfaye   MR Number: [id number]   YOB: 1955   Date of Visit: 7/3/2025       Dear Dr. Yadav:    Thank you for referring Deb Tesfaye to me for evaluation. Below are the relevant portions of my assessment and plan of care.            If you have questions, please do not hesitate to call me. I look forward to following Deb along with you.    Sincerely,      Vero Winston MD [facility] M. Kelli Smith, MD

## 2025-07-03 NOTE — PROGRESS NOTES
Subjective    Patient ID: Deb Tesfaye is a 70 y.o. female     Chief complaint: NP:Leif, hysterectomy consult (NP:Leif, hysterectomy consult)      HPI  70 y.o.  female referred by Dr. Carol Yadav for endometrial hyperplasia without atypia. History of breast cancer diagnosed 10 years ago, completed 5 years of Tamoxifen.   Had PMB for 4-5 days in April of this year which has since resolved. Pelvic US demonstrated focal endometrial thickening. Pathology from [facility] consistent with polyp with foci of endometrial hyperplasia without atypia.     She has a past medical history of Breast cancer, Cataract, Colon polyp (2016), Ductal carcinoma in situ (DCIS) of left breast (2016), Fibrocystic breast, Glaucoma, and Hypertension.     She has a past surgical history of BTL.     She has a family history of colon cancer in her mother / denies family history of breast/gyn cancer.    2025 Pelvic US: Endometrium demonstrates focal thickening in two locations suggestive of polyps, hyperplasia, or neoplasm. This could relate to 1 region spanning 22 mm on the sagittal. It is also possible this relates to blood products and or clot.  Multiple cervix heterogeneity is are noted that could relate to complicated nabothian cysts. Solid masses are not excluded. Follow-up for size stability or better categorization with MRI female pelvis evaluation would be suggested.  Neither ovary could be visualized making evaluation suboptimal.  Submucosal fibroid is suspected    2025 [facility]  UTERINE MASS: BENIGN ENDOMETRIAL POLYP WITH FOCI OF ENDOMETRIAL HYPERPLASIA WITHOUT ATYPIA. LEIOMYOMA. NEGATIVE FOR MALIGNANCY.   ENDOMETRIAL CURETTAGE: SCANTY FRAGMENTS OF BENIGN ENDOCERVIX, TINY LEIOMYOMA, AND TINY ENDOMETRIAL POLYP; NEGATIVE FOR MALIGNANCY.    Review of Systems   Constitutional:  Negative for activity change, appetite change, chills, fatigue, fever and unexpected weight change.   Respiratory:  Negative for  cough and shortness of breath.    Cardiovascular:  Negative for chest pain and leg swelling.   Gastrointestinal:  Negative for abdominal distention, abdominal pain, constipation, diarrhea, nausea and vomiting.   Genitourinary:  Negative for difficulty urinating, flank pain, hematuria, vaginal bleeding, vaginal discharge and vaginal pain.   Musculoskeletal: Negative.  Negative for gait problem.   Skin: Negative.  Negative for rash.   Neurological: Negative.    Hematological:  Negative for adenopathy. Does not bruise/bleed easily.   Psychiatric/Behavioral: Negative.          Review of patient's allergies indicates:  No Known Allergies     Current Medications[1]     Past Medical History[2]     Past Surgical History[3]     OB History    Para Term  AB Living   5 4 3  1    SAB IAB Ectopic Multiple Live Births   1          # Outcome Date GA Lbr Reji/2nd Weight Sex Type Anes PTL Lv   5 Term            4 Term            3 Term            2 SAB            1 Para      Vag-Spont        Social History[4]   Family History   Problem Relation Name Age of Onset    Cancer Mother  54        colon cancer    Cancer Maternal Grandfather      Glaucoma Maternal Grandfather      Cataracts Maternal Grandfather      Hypertension Father      Amblyopia Neg Hx      Blindness Neg Hx      Diabetes Neg Hx      Macular degeneration Neg Hx      Retinal detachment Neg Hx      Stroke Neg Hx      Strabismus Neg Hx      Thyroid disease Neg Hx       Health Maintenance Topics with due status: Not Due       Topic Last Completion Date    TETANUS VACCINE 10/26/2016    Colorectal Cancer Screening 2022    Influenza Vaccine 2023    DEXA Scan 2024    Lipid Panel 08/10/2024    Hemoglobin A1c (Prediabetes) 2025    Mammogram 2025     Health Maintenance Due   Topic Date Due    Shingles Vaccine (1 of 2) Never done    RSV Vaccine (Age 60+ and Pregnant patients) (1 - Risk 60-74 years 1-dose series) Never done    Pneumococcal  "Vaccines (Age 50+) (3 of 3 - PCV20 or PCV21) 08/21/2024    COVID-19 Vaccine (5 - 2024-25 season) 09/01/2024         Objective    BP (!) 144/80   Pulse 83   Temp 97.3 °F (36.3 °C) (Temporal)   Resp 16   Ht 5' 5" (1.651 m)   Wt 83.6 kg (184 lb 4.9 oz)   SpO2 96%   BMI 30.67 kg/m²     Physical Exam  Vitals reviewed.   Constitutional:       General: She is not in acute distress.     Appearance: Normal appearance. She is not ill-appearing.   HENT:      Head: Normocephalic and atraumatic.      Nose: No congestion.   Eyes:      Pupils: Pupils are equal, round, and reactive to light.   Cardiovascular:      Rate and Rhythm: Normal rate.      Pulses: Normal pulses.   Pulmonary:      Effort: Pulmonary effort is normal. No respiratory distress.   Abdominal:      General: There is no distension.      Palpations: Abdomen is soft.      Tenderness: There is no abdominal tenderness.   Musculoskeletal:      Right lower leg: No edema.      Left lower leg: No edema.   Skin:     General: Skin is warm and dry.   Neurological:      General: No focal deficit present.      Mental Status: She is alert and oriented to person, place, and time. Mental status is at baseline.   Psychiatric:         Mood and Affect: Mood normal.         Behavior: Behavior normal.              Assessment and Plan    1. Endometrial hyperplasia without atypia  - Ambulatory referral/consult to Gynecologic Oncology    2. PMB (postmenopausal bleeding)  - Ambulatory referral/consult to Gynecologic Oncology    3. History of ductal carcinoma in situ (DCIS) of breast  - Ambulatory referral/consult to Gynecologic Oncology      Plan:  Today the patients diagnosis of complex hyperplasia without atypia was discussed with her in detail with the use of visual aids. Risk of progression to malignancy is approximately 5-10%. We discussed definitive treatment is hysterectomy/BSO. Alternative treatment options are hormonal with Progesterone IUD or PO progesterone follow by " close follow up and repeat sampling at 3-6 months to ensure resolution and no progression/recurrence. Given the patients history of hormone receptor positive breast cancer hormonal treatment would be contraindicated; therefore, I recommend definitive surgical management. We discussed risks and benefits of robotic hysterectomy, bilateral salpingo oophorectomy. She would like to discuss with her family and will let us know if she would like to proceed with surgery.        I spent approximately 45 minutes reviewing the available records and evaluating the patient, out of which over 50% of the time was spent face to face with the patient in counseling and coordinating this patient's care.                  [1]   Current Outpatient Medications   Medication Sig Dispense Refill    acetaminophen (TYLENOL) 500 MG tablet Take 500 mg by mouth every 6 (six) hours as needed for Pain.      acetaZOLAMIDE (DIAMOX) 500 mg CpSR Take 1 capsule (500 mg total) by mouth 2 (two) times daily. 180 capsule 3    azelastine (ASTELIN) 137 mcg (0.1 %) nasal spray SHAKE WELL AND USE 1 SPRAY IN EACH NOSTRIL TWICE DAILY (Patient taking differently: 2 sprays by Nasal route daily as needed.) 30 mL 11    brimonidine 0.2% (ALPHAGAN) 0.2 % Drop Place 1 drop into both eyes 3 (three) times daily. 30 mL 4    ERGOCALCIFEROL, VITAMIN D2, (VITAMIN D ORAL) Take 1,000 Units by mouth once daily.       erythromycin (ROMYCIN) ophthalmic ointment Apply to clean lids and lashes at night - 1/4 inch ointment q hs (Patient taking differently: Place 1 g into both eyes every 6 (six) hours as needed. Apply to clean lids and lashes at night - 1/4 inch ointment q hs) 1 each 6    fluticasone propionate (FLONASE) 50 mcg/actuation nasal spray 1 spray (50 mcg total) by Each Nostril route once daily. (Patient taking differently: 1 spray by Each Nostril route daily as needed.) 16 g 0    ipratropium (ATROVENT) 42 mcg (0.06 %) nasal spray 2 sprays by Nasal route 3 (three) times  daily. 15 mL 2    KRILL OIL ORAL Take 1 capsule by mouth once daily.       LUMIGAN 0.01 % Drop Place 1 drop into both eyes every evening. 2.5 mL 12    MULTIVITS,CA,MINERALS/IRON/FA (ONE-A-DAY WOMENS FORMULA ORAL) Take 1 capsule by mouth once daily.      netarsudiL (RHOPRESSA) 0.02 % ophthalmic solution Place 1 drop into both eyes once daily. 2.5 mL 11    pilocarpine HCL 4% (PILOCAR) 4 % ophthalmic solution Place 1 drop into the left eye 3 (three) times daily. 15 mL 12    potassium chloride SA (K-DUR,KLOR-CON) 20 MEQ tablet TAKE 2 TABLETS(40 MEQ) BY MOUTH TWICE DAILY 360 tablet 3    pravastatin (PRAVACHOL) 20 MG tablet Take 1 tablet (20 mg total) by mouth once daily. 90 tablet 3    telmisartan-hydrochlorothiazide (MICARDIS HCT) 80-25 mg per tablet Take 1 tablet by mouth once daily. 90 tablet 2    timolol maleate 0.5% (TIMOPTIC) 0.5 % Drop Place 1 drop into both eyes 2 (two) times daily. 15 mL 4    ibuprofen (ADVIL,MOTRIN) 800 MG tablet Take 1 tablet (800 mg total) by mouth every 8 (eight) hours as needed for Pain. (Patient not taking: Reported on 7/3/2025) 20 tablet 0    oxyCODONE-acetaminophen (PERCOCET) 5-325 mg per tablet Take 1 tablet by mouth every 4 (four) hours as needed for Pain. (Patient not taking: Reported on 7/3/2025) 14 tablet 0     Current Facility-Administered Medications   Medication Dose Route Frequency Provider Last Rate Last Admin    famotidine tablet 20 mg  20 mg Oral On Call Procedure Carol Yadav MD       [2]   Past Medical History:  Diagnosis Date    Breast cancer     Cataract     Colon polyp 07/20/2016    Ductal carcinoma in situ (DCIS) of left breast 01/11/2016    left     Fibrocystic breast     Glaucoma     Hypertension    [3]   Past Surgical History:  Procedure Laterality Date    AHMED GLAUCOMA VALVE Right 01/10/2018    WITH CE ()    BREAST BIOPSY      BREAST LUMPECTOMY Left     with radiation    CATARACT EXTRACTION W/  INTRAOCULAR LENS IMPLANT Right 01/10/2018     with Buddy ()    COLONOSCOPY  2002    due for every 5.  Mother with colon cancer.    COLONOSCOPY  12/2010    COLONOSCOPY N/A 07/20/2016    Procedure: COLONOSCOPY;  Surgeon: Janak De Leon Jr., MD;  Location: [facility];  Service: Endoscopy;  Laterality: N/A;    COLONOSCOPY N/A 07/20/2022    Procedure: COLONOSCOPY;  Surgeon: Janak De Leon Jr., MD;  Location: [facility];  Service: Endoscopy;  Laterality: N/A;    EYE SURGERY      HYSTEROSCOPIC RESECTION OF MYOMA  6/16/2025    Procedure: MYOMECTOMY, HYSTEROSCOPIC;  Surgeon: Carol Yadav MD;  Location: [facility] OR;  Service: OB/GYN;;    HYSTEROSCOPY WITH DILATION AND CURETTAGE OF UTERUS N/A 6/16/2025    Procedure: HYSTEROSCOPY, WITH DILATION AND CURETTAGE OF UTERUS;  Surgeon: Carol Yadav MD;  Location: [facility] OR;  Service: OB/GYN;  Laterality: N/A;    SELECTIVE LASER TRABECULPALSTY Right 08/2013    OS DONE IN [place], [place]    SELECTIVE LASER TRABECUPLASTY Left 08/27/2015    OS WITH     TUBAL LIGATION     [4]   Social History  Tobacco Use    Smoking status: Never     Passive exposure: Never    Smokeless tobacco: Never   Vaping Use    Vaping status: Never Used   Substance Use Topics    Alcohol use: No    Drug use: No

## 2025-07-15 DIAGNOSIS — E78.5 HYPERLIPIDEMIA, UNSPECIFIED HYPERLIPIDEMIA TYPE: ICD-10-CM

## 2025-07-16 RX ORDER — PRAVASTATIN SODIUM 20 MG/1
20 TABLET ORAL DAILY
Qty: 90 TABLET | Refills: 0 | Status: SHIPPED | OUTPATIENT
Start: 2025-07-16

## 2025-07-17 ENCOUNTER — TELEPHONE (OUTPATIENT)
Dept: GYNECOLOGIC ONCOLOGY | Facility: CLINIC | Age: 70
End: 2025-07-17
Payer: MEDICARE

## 2025-07-17 DIAGNOSIS — Z01.818 PRE-OP TESTING: ICD-10-CM

## 2025-07-17 DIAGNOSIS — N85.00 ENDOMETRIAL HYPERPLASIA WITHOUT ATYPIA: Primary | ICD-10-CM

## 2025-07-17 NOTE — TELEPHONE ENCOUNTER
"Pt contacted navigator and states she is ready to proceed with her surgery with Dr Winston. Pt updated that navigator will explore surgical date options with Dr Winston at both the [facility] as well at the [facility]. Pt states, "I also want to relay that if Dr Winston feels it is safe, I would prefer to wait until after Sept 8th to have the surgery. Pt updated that 9/15 may be an option. Dr Winston will be updated and pt contacted back with definitive date options and for scheduling. Pt verbalized understanding.    "

## 2025-07-23 NOTE — TELEPHONE ENCOUNTER
Pt contacted and discussed surgical date of Monday September 15th at the [facility]. Pt agreed to surgical date. Pre-op and Post op appointments scheduled. PT updated that surgical consents will be obtained the morning of surgery, however if pt desires to come into clinic prior to surgery to sign them with Dr Winston then Navigator can easily arrange that. Pt declines an appointment at this time and is comfortable with signing consents on the morning of surgery. All pt questions answered. Appointment dates, times and locations reviewed with pt. Pt verbalized understanding and has no additional questions at this time.

## 2025-08-20 ENCOUNTER — OFFICE VISIT (OUTPATIENT)
Dept: FAMILY MEDICINE | Facility: CLINIC | Age: 70
End: 2025-08-20
Payer: MEDICARE

## 2025-08-20 VITALS
SYSTOLIC BLOOD PRESSURE: 130 MMHG | HEIGHT: 65 IN | HEART RATE: 76 BPM | BODY MASS INDEX: 30.23 KG/M2 | DIASTOLIC BLOOD PRESSURE: 80 MMHG | WEIGHT: 181.44 LBS | OXYGEN SATURATION: 97 %

## 2025-08-20 DIAGNOSIS — N85.00 ENDOMETRIAL HYPERPLASIA: ICD-10-CM

## 2025-08-20 DIAGNOSIS — R73.03 PREDIABETES: ICD-10-CM

## 2025-08-20 DIAGNOSIS — J00 ACUTE NASOPHARYNGITIS: ICD-10-CM

## 2025-08-20 DIAGNOSIS — E78.2 MIXED HYPERLIPIDEMIA: ICD-10-CM

## 2025-08-20 DIAGNOSIS — Z23 NEED FOR VACCINATION: ICD-10-CM

## 2025-08-20 DIAGNOSIS — I10 ESSENTIAL HYPERTENSION: Primary | ICD-10-CM

## 2025-08-20 PROCEDURE — 3288F FALL RISK ASSESSMENT DOCD: CPT | Mod: CPTII,S$PBB,, | Performed by: FAMILY MEDICINE

## 2025-08-20 PROCEDURE — 3044F HG A1C LEVEL LT 7.0%: CPT | Mod: CPTII,S$PBB,, | Performed by: FAMILY MEDICINE

## 2025-08-20 PROCEDURE — 3079F DIAST BP 80-89 MM HG: CPT | Mod: CPTII,S$PBB,, | Performed by: FAMILY MEDICINE

## 2025-08-20 PROCEDURE — 1126F AMNT PAIN NOTED NONE PRSNT: CPT | Mod: CPTII,S$PBB,, | Performed by: FAMILY MEDICINE

## 2025-08-20 PROCEDURE — 3008F BODY MASS INDEX DOCD: CPT | Mod: CPTII,S$PBB,, | Performed by: FAMILY MEDICINE

## 2025-08-20 PROCEDURE — 1160F RVW MEDS BY RX/DR IN RCRD: CPT | Mod: CPTII,S$PBB,, | Performed by: FAMILY MEDICINE

## 2025-08-20 PROCEDURE — 99214 OFFICE O/P EST MOD 30 MIN: CPT | Mod: S$PBB,,, | Performed by: FAMILY MEDICINE

## 2025-08-20 PROCEDURE — 90677 PCV20 VACCINE IM: CPT | Mod: S$PBB,,, | Performed by: FAMILY MEDICINE

## 2025-08-20 PROCEDURE — 3075F SYST BP GE 130 - 139MM HG: CPT | Mod: CPTII,S$PBB,, | Performed by: FAMILY MEDICINE

## 2025-08-20 PROCEDURE — G2211 COMPLEX E/M VISIT ADD ON: HCPCS | Mod: S$PBB,,, | Performed by: FAMILY MEDICINE

## 2025-08-20 PROCEDURE — 99999 PR PBB SHADOW E&M-EST. PATIENT-LVL IV: CPT | Mod: PBBFAC,,, | Performed by: FAMILY MEDICINE

## 2025-08-20 PROCEDURE — 1101F PT FALLS ASSESS-DOCD LE1/YR: CPT | Mod: CPTII,S$PBB,, | Performed by: FAMILY MEDICINE

## 2025-08-20 PROCEDURE — 1159F MED LIST DOCD IN RCRD: CPT | Mod: CPTII,S$PBB,, | Performed by: FAMILY MEDICINE

## 2025-08-20 PROCEDURE — G0009 ADMIN PNEUMOCOCCAL VACCINE: HCPCS | Mod: S$PBB,,, | Performed by: FAMILY MEDICINE

## 2025-08-20 RX ORDER — IPRATROPIUM BROMIDE 42 UG/1
2 SPRAY, METERED NASAL 3 TIMES DAILY
Qty: 15 ML | Refills: 2 | Status: SHIPPED | OUTPATIENT
Start: 2025-08-20

## 2025-09-05 ENCOUNTER — TELEPHONE (OUTPATIENT)
Dept: HEMATOLOGY/ONCOLOGY | Facility: CLINIC | Age: 70
End: 2025-09-05
Payer: MEDICARE

## (undated) DEVICE — KIT GREY EYE

## (undated) DEVICE — DRESSING TRANS 2X2 TEGADERM

## (undated) DEVICE — SEE MEDLINE ITEM 157144

## (undated) DEVICE — DRAPE STERI 32 X 50

## (undated) DEVICE — SHEET DRAPE FAN-FOLDED 3/4

## (undated) DEVICE — SOL BETADINE 5%

## (undated) DEVICE — NDL RETROBULAR AKTKINSON 23G

## (undated) DEVICE — SOL BSS BALANCED SALT

## (undated) DEVICE — SPONGE WEC CEL SPEARS

## (undated) DEVICE — KIT MEROCEL INSTRUMENT WIPE

## (undated) DEVICE — SET BLD COLL SAFETY 23G X 3/4

## (undated) DEVICE — SEE MEDLINE ITEM 157131

## (undated) DEVICE — SYR 3CC LUER LOC

## (undated) DEVICE — TIP PHACO 45 DEGREE KELMAN

## (undated) DEVICE — SOL WATER STRL IRR 1000ML

## (undated) DEVICE — DRESSING EYE OVAL LF

## (undated) DEVICE — GOWN SURGICAL X-LARGE

## (undated) DEVICE — SHEILD & GARTERS FOX METAL EYE

## (undated) DEVICE — DRAPE STERI-DRAPE APERTURE

## (undated) DEVICE — SEE MEDLINE ITEM 152622

## (undated) DEVICE — SYR 10CC LUER LOCK

## (undated) DEVICE — SHIELD COLLAGEN 12HR CORNEAL

## (undated) DEVICE — SUT 10/0 12IN VICRYL VIO M

## (undated) DEVICE — Device